# Patient Record
Sex: FEMALE | Race: WHITE | NOT HISPANIC OR LATINO | Employment: OTHER
[De-identification: names, ages, dates, MRNs, and addresses within clinical notes are randomized per-mention and may not be internally consistent; named-entity substitution may affect disease eponyms.]

---

## 2017-06-10 ENCOUNTER — HEALTH MAINTENANCE LETTER (OUTPATIENT)
Age: 52
End: 2017-06-10

## 2019-05-01 ENCOUNTER — APPOINTMENT (OUTPATIENT)
Age: 54
Setting detail: DERMATOLOGY
End: 2019-05-01

## 2019-05-01 VITALS — HEIGHT: 64 IN | WEIGHT: 140 LBS

## 2019-05-01 DIAGNOSIS — L57.0 ACTINIC KERATOSIS: ICD-10-CM

## 2019-05-01 PROCEDURE — OTHER LIQUID NITROGEN: OTHER

## 2019-05-01 PROCEDURE — 99213 OFFICE O/P EST LOW 20 MIN: CPT | Mod: 25

## 2019-05-01 PROCEDURE — 17003 DESTRUCT PREMALG LES 2-14: CPT

## 2019-05-01 PROCEDURE — OTHER COUNSELING: OTHER

## 2019-05-01 PROCEDURE — 17000 DESTRUCT PREMALG LESION: CPT

## 2019-05-01 ASSESSMENT — LOCATION DETAILED DESCRIPTION DERM
LOCATION DETAILED: LEFT CENTRAL MALAR CHEEK
LOCATION DETAILED: LEFT INFERIOR CENTRAL MALAR CHEEK

## 2019-05-01 ASSESSMENT — LOCATION SIMPLE DESCRIPTION DERM: LOCATION SIMPLE: LEFT CHEEK

## 2019-05-01 ASSESSMENT — LOCATION ZONE DERM: LOCATION ZONE: FACE

## 2019-05-01 NOTE — PROCEDURE: LIQUID NITROGEN
Number Of Freeze-Thaw Cycles: 1 freeze-thaw cycle
Render Note In Bullet Format When Appropriate: Yes
Post-Care Instructions: I reviewed with the patient in detail post-care instructions. Patient is to wear sunprotection, and avoid picking at any of the treated lesions. Patient may apply Aquaphor or Vaseline to crusted or scabbing areas.
Consent: The patient's consent was obtained including but not limited to risks of crusting, scabbing, blistering, scarring, darker or lighter pigmentary change, recurrence, incomplete removal and infection.
Detail Level: Detailed
Duration Of Freeze Thaw-Cycle (Seconds): 3

## 2019-09-30 ENCOUNTER — HEALTH MAINTENANCE LETTER (OUTPATIENT)
Age: 54
End: 2019-09-30

## 2019-11-22 ENCOUNTER — APPOINTMENT (OUTPATIENT)
Age: 54
Setting detail: DERMATOLOGY
End: 2019-11-22

## 2019-11-22 DIAGNOSIS — L57.8 OTHER SKIN CHANGES DUE TO CHRONIC EXPOSURE TO NONIONIZING RADIATION: ICD-10-CM

## 2019-11-22 PROBLEM — D48.5 NEOPLASM OF UNCERTAIN BEHAVIOR OF SKIN: Status: ACTIVE | Noted: 2019-11-22

## 2019-11-22 PROCEDURE — OTHER PATHOLOGY BILLING: OTHER

## 2019-11-22 PROCEDURE — OTHER BIOPSY BY SHAVE METHOD: OTHER

## 2019-11-22 PROCEDURE — 99213 OFFICE O/P EST LOW 20 MIN: CPT | Mod: 25

## 2019-11-22 PROCEDURE — OTHER COUNSELING: OTHER

## 2019-11-22 PROCEDURE — 88305 TISSUE EXAM BY PATHOLOGIST: CPT

## 2019-11-22 PROCEDURE — 11102 TANGNTL BX SKIN SINGLE LES: CPT

## 2019-11-22 ASSESSMENT — LOCATION ZONE DERM: LOCATION ZONE: FACE

## 2019-11-22 ASSESSMENT — LOCATION SIMPLE DESCRIPTION DERM: LOCATION SIMPLE: LEFT CHEEK

## 2019-11-22 ASSESSMENT — LOCATION DETAILED DESCRIPTION DERM: LOCATION DETAILED: LEFT CENTRAL MALAR CHEEK

## 2019-11-22 NOTE — PROCEDURE: BIOPSY BY SHAVE METHOD
Biopsy Type: H and E
Was A Bandage Applied: Yes
Destruction After The Procedure: No
Electrodesiccation And Curettage Text: The wound bed was treated with electrodesiccation and curettage after the biopsy was performed.
Biopsy Method: Dermablade
Anesthesia Volume In Cc (Will Not Render If 0): 0.5
Consent: Written consent was obtained and risks were reviewed including but not limited to scarring, infection, bleeding, scabbing, incomplete removal, nerve damage and allergy to anesthesia.
Size Of Lesion In Cm: 0
Detail Level: Detailed
Silver Nitrate Text: The wound bed was treated with silver nitrate after the biopsy was performed.
Type Of Destruction Used: Curettage
Cryotherapy Text: The wound bed was treated with cryotherapy after the biopsy was performed.
Billing Type: Third-Party Bill
Electrodesiccation Text: The wound bed was treated with electrodesiccation after the biopsy was performed.
Depth Of Biopsy: dermis
Curettage Text: The wound bed was treated with curettage after the biopsy was performed.
Dressing: bandage
Wound Care: Petrolatum
Post-Care Instructions: I reviewed with the patient in detail post-care instructions. Patient is to keep the biopsy site dry overnight, and then apply bacitracin twice daily until healed. Patient may apply hydrogen peroxide soaks to remove any crusting.
Notification Instructions: Patient will be notified of biopsy results. However, patient instructed to call the office if not contacted within 2 weeks.
Hemostasis: Drysol
Anesthesia Type: 1% lidocaine with epinephrine

## 2019-11-22 NOTE — PROCEDURE: PATHOLOGY BILLING
Immunohistochemistry ([id number] and [id number]) billing is not performed here. Please use the Immunohistochemistry Stain Billing plan to accomplish this. Immunohistochemistry ([id number] and [id number]) billing is not performed here. Please use the Immunohistochemistry Stain Billing plan to accomplish this.

## 2019-12-14 ENCOUNTER — RX ONLY (RX ONLY)
Age: 54
End: 2019-12-14

## 2019-12-14 ENCOUNTER — APPOINTMENT (OUTPATIENT)
Age: 54
Setting detail: DERMATOLOGY
End: 2019-12-18

## 2019-12-14 PROBLEM — C44.01 BASAL CELL CARCINOMA OF SKIN OF LIP: Status: ACTIVE | Noted: 2019-12-14

## 2019-12-14 PROCEDURE — OTHER MOHS SURGERY: OTHER

## 2019-12-14 PROCEDURE — 13152 CMPLX RPR E/N/E/L 2.6-7.5 CM: CPT

## 2019-12-14 PROCEDURE — 17312 MOHS ADDL STAGE: CPT

## 2019-12-14 PROCEDURE — 17311 MOHS 1 STAGE H/N/HF/G: CPT

## 2019-12-14 PROCEDURE — A4550 SURGICAL TRAYS: HCPCS

## 2019-12-19 ENCOUNTER — APPOINTMENT (OUTPATIENT)
Age: 54
Setting detail: DERMATOLOGY
End: 2019-12-19

## 2019-12-19 DIAGNOSIS — Z48.02 ENCOUNTER FOR REMOVAL OF SUTURES: ICD-10-CM

## 2019-12-19 PROCEDURE — OTHER SUTURE REMOVAL (GLOBAL PERIOD): OTHER

## 2019-12-19 PROCEDURE — OTHER ADDITIONAL NOTES: OTHER

## 2019-12-19 ASSESSMENT — LOCATION SIMPLE DESCRIPTION DERM: LOCATION SIMPLE: RIGHT LIP

## 2019-12-19 ASSESSMENT — LOCATION DETAILED DESCRIPTION DERM: LOCATION DETAILED: RIGHT UPPER CUTANEOUS LIP

## 2019-12-19 ASSESSMENT — LOCATION ZONE DERM: LOCATION ZONE: LIP

## 2019-12-19 NOTE — PROCEDURE: ADDITIONAL NOTES
Additional Notes: Patient was concerned about how much her wound is bulging out. AR examined the wound and ensured her that it looks like it’s healing well and that she can follow up in a couple months and see how the scar looks/possible treatments
Detail Level: Simple

## 2019-12-19 NOTE — PROCEDURE: SUTURE REMOVAL (GLOBAL PERIOD)
Add 29953 Cpt? (Important Note: In 2017 The Use Of 74380 Is Being Tracked By Cms To Determine Future Global Period Reimbursement For Global Periods): no
Detail Level: Detailed

## 2020-01-10 ENCOUNTER — APPOINTMENT (OUTPATIENT)
Age: 55
Setting detail: DERMATOLOGY
End: 2020-01-12

## 2020-01-10 VITALS — HEIGHT: 64 IN | RESPIRATION RATE: 16 BRPM | WEIGHT: 140 LBS

## 2020-01-10 DIAGNOSIS — L71.8 OTHER ROSACEA: ICD-10-CM

## 2020-01-10 DIAGNOSIS — Z48.817 ENCOUNTER FOR SURGICAL AFTERCARE FOLLOWING SURGERY ON THE SKIN AND SUBCUTANEOUS TISSUE: ICD-10-CM

## 2020-01-10 PROCEDURE — OTHER COUNSELING: OTHER

## 2020-01-10 PROCEDURE — OTHER PRESCRIPTION: OTHER

## 2020-01-10 PROCEDURE — 99213 OFFICE O/P EST LOW 20 MIN: CPT

## 2020-01-10 RX ORDER — METRONIDAZOLE 7.5 MG/G
0.75% CREAM TOPICAL BID
Qty: 1 | Refills: 2 | Status: ERX

## 2020-01-10 ASSESSMENT — LOCATION SIMPLE DESCRIPTION DERM
LOCATION SIMPLE: CHIN
LOCATION SIMPLE: RIGHT CHEEK
LOCATION SIMPLE: LEFT CHEEK

## 2020-01-10 ASSESSMENT — LOCATION ZONE DERM: LOCATION ZONE: FACE

## 2020-01-10 ASSESSMENT — LOCATION DETAILED DESCRIPTION DERM
LOCATION DETAILED: RIGHT CENTRAL MALAR CHEEK
LOCATION DETAILED: LEFT MEDIAL MALAR CHEEK
LOCATION DETAILED: LEFT CHIN

## 2020-01-10 NOTE — PROCEDURE: COUNSELING
Detail Level: Detailed
Patient Specific Counseling (Will Not Stick From Patient To Patient): Pt reports uncomfortable sensation on the scar area. There is firm lump along the excision site. \\nAdvised that at this time she is still healing, her swelling has decreased, and will continue to improve in appearance. Recommended gentle massage along the site, sunscreen application and time. In the future, may consider IPL along the scar to decrease redness.

## 2020-02-06 ENCOUNTER — APPOINTMENT (OUTPATIENT)
Age: 55
Setting detail: DERMATOLOGY
End: 2020-02-07

## 2020-02-06 VITALS — WEIGHT: 145 LBS | RESPIRATION RATE: 14 BRPM | HEIGHT: 64 IN

## 2020-02-06 DIAGNOSIS — L71.8 OTHER ROSACEA: ICD-10-CM

## 2020-02-06 DIAGNOSIS — Z48.817 ENCOUNTER FOR SURGICAL AFTERCARE FOLLOWING SURGERY ON THE SKIN AND SUBCUTANEOUS TISSUE: ICD-10-CM

## 2020-02-06 PROBLEM — L30.9 DERMATITIS, UNSPECIFIED: Status: ACTIVE | Noted: 2020-02-06

## 2020-02-06 PROCEDURE — OTHER COUNSELING: OTHER

## 2020-02-06 PROCEDURE — OTHER ADDITIONAL NOTES: OTHER

## 2020-02-06 PROCEDURE — OTHER PATHOLOGY BILLING: OTHER

## 2020-02-06 PROCEDURE — OTHER BIOPSY BY SHAVE METHOD: OTHER

## 2020-02-06 PROCEDURE — 99213 OFFICE O/P EST LOW 20 MIN: CPT | Mod: 25

## 2020-02-06 PROCEDURE — 11102 TANGNTL BX SKIN SINGLE LES: CPT

## 2020-02-06 PROCEDURE — 88305 TISSUE EXAM BY PATHOLOGIST: CPT

## 2020-02-06 ASSESSMENT — LOCATION ZONE DERM
LOCATION ZONE: LIP
LOCATION ZONE: FACE

## 2020-02-06 ASSESSMENT — LOCATION DETAILED DESCRIPTION DERM
LOCATION DETAILED: LEFT MEDIAL MALAR CHEEK
LOCATION DETAILED: RIGHT UPPER CUTANEOUS LIP

## 2020-02-06 ASSESSMENT — LOCATION SIMPLE DESCRIPTION DERM
LOCATION SIMPLE: RIGHT LIP
LOCATION SIMPLE: LEFT CHEEK

## 2020-02-06 NOTE — HPI: RASH
What Type Of Note Output Would You Prefer (Optional)?: Standard Output
Is The Patient Presenting As Previously Scheduled?: Yes
How Severe Is Your Rash?: mild
Is This A New Presentation, Or A Follow-Up?: Follow Up Rash
Additional History: She has been using metronidazole without improvement, it actually worsened. She is here for follow up.

## 2020-02-06 NOTE — PROCEDURE: ADDITIONAL NOTES
Detail Level: Simple
Additional Notes: Reassured patient that the scar looks better today than at last visit. Patient is still worried about the puffiness. AR reassured her that the puffiness will continue to reduce. In a couple months if she still isn’t happy with the scar, AR discussed that she can do further treatment
Additional Notes: Patient diagnosed with what seemed to be rosacea at last visit. Was prescribed metronidazole, which seemed to make red papules worse

## 2020-02-06 NOTE — PROCEDURE: BIOPSY BY SHAVE METHOD
Hide Additional Size Dimension?: No
Silver Nitrate Text: The wound bed was treated with silver nitrate after the biopsy was performed.
Notification Instructions: Patient will be notified of biopsy results. However, patient instructed to call the office if not contacted within 2 weeks.
Biopsy Type: H and E
Depth Of Biopsy: dermis
Size Of Lesion In Cm: 0
Was A Bandage Applied: Yes
Billing Type: Client Bill
Post-Care Instructions: I reviewed with the patient in detail post-care instructions. Patient is to keep the biopsy site dry overnight, and then apply bacitracin twice daily until healed. Patient may apply hydrogen peroxide soaks to remove any crusting.
Detail Level: Detailed
Anesthesia Type: 1% lidocaine with epinephrine
Wound Care: Petrolatum
Hemostasis: Drysol
Curettage Text: The wound bed was treated with curettage after the biopsy was performed.
Anesthesia Volume In Cc (Will Not Render If 0): 0.5
Biopsy Method: Dermablade
Electrodesiccation And Curettage Text: The wound bed was treated with electrodesiccation and curettage after the biopsy was performed.
Consent: Written consent was obtained and risks were reviewed including but not limited to scarring, infection, bleeding, scabbing, incomplete removal, nerve damage and allergy to anesthesia.
Electrodesiccation Text: The wound bed was treated with electrodesiccation after the biopsy was performed.
Cryotherapy Text: The wound bed was treated with cryotherapy after the biopsy was performed.
Type Of Destruction Used: Curettage
Dressing: bandage

## 2020-02-06 NOTE — PROCEDURE: PATHOLOGY BILLING
Immunohistochemistry ([id number] and [id number]) billing is not performed here. Please use the Immunohistochemistry Stain Billing plan to accomplish this. Immunohistochemistry ([id number] and [id number]) billing is not performed here. Please use the Immunohistochemistry Stain Billing plan to accomplish this.

## 2020-02-27 ENCOUNTER — APPOINTMENT (OUTPATIENT)
Age: 55
Setting detail: DERMATOLOGY
End: 2020-02-27

## 2020-02-27 DIAGNOSIS — L91.0 HYPERTROPHIC SCAR: ICD-10-CM

## 2020-02-27 DIAGNOSIS — L57.0 ACTINIC KERATOSIS: ICD-10-CM

## 2020-02-27 PROCEDURE — 17003 DESTRUCT PREMALG LES 2-14: CPT

## 2020-02-27 PROCEDURE — OTHER LIQUID NITROGEN: OTHER

## 2020-02-27 PROCEDURE — 17000 DESTRUCT PREMALG LESION: CPT

## 2020-02-27 PROCEDURE — OTHER PRESCRIPTION: OTHER

## 2020-02-27 PROCEDURE — OTHER ADDITIONAL NOTES: OTHER

## 2020-02-27 PROCEDURE — OTHER INTRALESIONAL KENALOG: OTHER

## 2020-02-27 PROCEDURE — OTHER COUNSELING: OTHER

## 2020-02-27 PROCEDURE — 11900 INJECT SKIN LESIONS </W 7: CPT | Mod: 59

## 2020-02-27 RX ORDER — FLUOROURACIL 5 MG/G
CREAM TOPICAL BID
Qty: 1 | Refills: 0 | Status: ERX

## 2020-02-27 ASSESSMENT — LOCATION DETAILED DESCRIPTION DERM
LOCATION DETAILED: RIGHT INFERIOR CENTRAL MALAR CHEEK
LOCATION DETAILED: RIGHT UPPER CUTANEOUS LIP
LOCATION DETAILED: RIGHT MEDIAL MALAR CHEEK
LOCATION DETAILED: RIGHT INFERIOR MEDIAL MALAR CHEEK
LOCATION DETAILED: RIGHT CENTRAL MALAR CHEEK
LOCATION DETAILED: LEFT INFERIOR CENTRAL MALAR CHEEK

## 2020-02-27 ASSESSMENT — LOCATION ZONE DERM
LOCATION ZONE: LIP
LOCATION ZONE: FACE

## 2020-02-27 ASSESSMENT — LOCATION SIMPLE DESCRIPTION DERM
LOCATION SIMPLE: LEFT CHEEK
LOCATION SIMPLE: RIGHT CHEEK
LOCATION SIMPLE: RIGHT LIP

## 2020-02-27 NOTE — PROCEDURE: ADDITIONAL NOTES
Detail Level: Simple
Additional Notes: Follow up 2-4 wks after finishing treatment with efudex for a full body exam.

## 2020-02-27 NOTE — PROCEDURE: INTRALESIONAL KENALOG
Detail Level: Detailed
Consent: The risks of atrophy were reviewed with the patient.
Kenalog Preparation: Kenalog
Concentration Of Solution Injected (Mg/Ml): 2.5
Total Volume Injected (Ccs- Only Use Numbers And Decimals): .15
X Size Of Lesion In Cm (Optional): 0
Include Z78.9 (Other Specified Conditions Influencing Health Status) As An Associated Diagnosis?: No
Medical Necessity Clause: This procedure was medically necessary because the lesions that were treated were:

## 2020-02-27 NOTE — PROCEDURE: COUNSELING
Detail Level: Detailed
Patient Specific Counseling (Will Not Stick From Patient To Patient): Discussed IPL laser and will discuss with Her mohs surgeon/plastic surgeon to see I’d they will consider complimentary laser treatment.

## 2020-05-07 ENCOUNTER — APPOINTMENT (OUTPATIENT)
Age: 55
Setting detail: DERMATOLOGY
End: 2020-05-12

## 2020-05-07 VITALS — RESPIRATION RATE: 14 BRPM | WEIGHT: 150 LBS | HEIGHT: 65 IN

## 2020-05-07 DIAGNOSIS — D22 MELANOCYTIC NEVI: ICD-10-CM

## 2020-05-07 DIAGNOSIS — D18.0 HEMANGIOMA: ICD-10-CM

## 2020-05-07 DIAGNOSIS — L82.1 OTHER SEBORRHEIC KERATOSIS: ICD-10-CM

## 2020-05-07 DIAGNOSIS — Z71.89 OTHER SPECIFIED COUNSELING: ICD-10-CM

## 2020-05-07 DIAGNOSIS — L81.4 OTHER MELANIN HYPERPIGMENTATION: ICD-10-CM

## 2020-05-07 DIAGNOSIS — L57.0 ACTINIC KERATOSIS: ICD-10-CM

## 2020-05-07 DIAGNOSIS — Z85.828 PERSONAL HISTORY OF OTHER MALIGNANT NEOPLASM OF SKIN: ICD-10-CM

## 2020-05-07 PROBLEM — D18.01 HEMANGIOMA OF SKIN AND SUBCUTANEOUS TISSUE: Status: ACTIVE | Noted: 2020-05-07

## 2020-05-07 PROBLEM — D22.5 MELANOCYTIC NEVI OF TRUNK: Status: ACTIVE | Noted: 2020-05-07

## 2020-05-07 PROCEDURE — OTHER COUNSELING: OTHER

## 2020-05-07 PROCEDURE — 99214 OFFICE O/P EST MOD 30 MIN: CPT

## 2020-05-07 ASSESSMENT — LOCATION SIMPLE DESCRIPTION DERM
LOCATION SIMPLE: UPPER BACK
LOCATION SIMPLE: LEFT UPPER BACK
LOCATION SIMPLE: RIGHT CHEEK
LOCATION SIMPLE: RIGHT LIP

## 2020-05-07 ASSESSMENT — LOCATION ZONE DERM
LOCATION ZONE: FACE
LOCATION ZONE: LIP
LOCATION ZONE: TRUNK

## 2020-05-07 ASSESSMENT — LOCATION DETAILED DESCRIPTION DERM
LOCATION DETAILED: RIGHT UPPER CUTANEOUS LIP
LOCATION DETAILED: LEFT SUPERIOR MEDIAL UPPER BACK
LOCATION DETAILED: RIGHT CENTRAL MALAR CHEEK
LOCATION DETAILED: INFERIOR THORACIC SPINE

## 2020-05-07 NOTE — PROCEDURE: COUNSELING
Patient Specific Counseling (Will Not Stick From Patient To Patient): She treated her face with 5FU bid x 1 month. Instructed her to D/C the 5 FU at this time.
Detail Level: Generalized
Detail Level: Simple
Detail Level: Zone

## 2020-05-07 NOTE — HPI: FULL BODY SKIN EXAMINATION
How Severe Are Your Spot(S)?: mild
What Type Of Note Output Would You Prefer (Optional)?: Standard Output
What Is The Reason For Today's Visit?: Full Body Skin Examination
What Is The Reason For Today's Visit? (Being Monitored For X): the risk of recurrence of previously treated lesion(s)
Additional History: She would like to address the scar from mohs on her upper lip. The scar and appearance is undesirable.

## 2020-07-01 ENCOUNTER — APPOINTMENT (OUTPATIENT)
Age: 55
Setting detail: DERMATOLOGY
End: 2020-07-03

## 2020-07-01 DIAGNOSIS — Z85.828 PERSONAL HISTORY OF OTHER MALIGNANT NEOPLASM OF SKIN: ICD-10-CM

## 2020-07-01 DIAGNOSIS — L90.5 SCAR CONDITIONS AND FIBROSIS OF SKIN: ICD-10-CM

## 2020-07-01 PROCEDURE — OTHER COUNSELING: OTHER

## 2020-07-01 PROCEDURE — OTHER DEFER: OTHER

## 2020-07-01 PROCEDURE — 99213 OFFICE O/P EST LOW 20 MIN: CPT

## 2020-07-01 PROCEDURE — OTHER MIPS QUALITY: OTHER

## 2020-07-01 ASSESSMENT — LOCATION ZONE DERM: LOCATION ZONE: LIP

## 2020-07-01 ASSESSMENT — LOCATION DETAILED DESCRIPTION DERM: LOCATION DETAILED: RIGHT UPPER CUTANEOUS LIP

## 2020-07-01 ASSESSMENT — LOCATION SIMPLE DESCRIPTION DERM: LOCATION SIMPLE: RIGHT LIP

## 2020-07-01 NOTE — PROCEDURE: DEFER
Other Procedure: Surgical scar revision
Detail Level: Detailed
Introduction Text (Please End With A Colon): The following procedure was deferred: Surgical scar revision

## 2020-07-01 NOTE — PROCEDURE: COUNSELING
Detail Level: Detailed
Patient Specific Counseling (Will Not Stick From Patient To Patient): Discussed in detail with patient the reasons that her scar is noticeable: the depressed, atrophic line of the scar which casts a shadow in almost any light and the slight lifting of the vermilion border in dynamic movement.  I explained that the atrophic configuration and the dynamic changes could be improved, though not guaranteed, by surgical excision of this portion of the scar, undermining, and re-suturing with increased wound edge eversion.  She is self-conscious enough of this scar, because it causes so many adverse comments, that she would like to proceed with the proposed scar revision.  I will discuss this with Dr. Abrams and get back to her.
Detail Level: Simple

## 2020-07-09 ENCOUNTER — APPOINTMENT (OUTPATIENT)
Age: 55
Setting detail: DERMATOLOGY
End: 2020-07-13

## 2020-07-09 DIAGNOSIS — Z85.828 PERSONAL HISTORY OF OTHER MALIGNANT NEOPLASM OF SKIN: ICD-10-CM

## 2020-07-09 DIAGNOSIS — R22.9 LOCALIZED SWELLING, MASS AND LUMP, UNSPECIFIED: ICD-10-CM

## 2020-07-09 PROCEDURE — OTHER SCAR REVISION: OTHER

## 2020-07-09 PROCEDURE — OTHER MIPS QUALITY: OTHER

## 2020-07-09 PROCEDURE — OTHER COUNSELING: OTHER

## 2020-07-09 ASSESSMENT — LOCATION DETAILED DESCRIPTION DERM: LOCATION DETAILED: RIGHT UPPER CUTANEOUS LIP

## 2020-07-09 ASSESSMENT — LOCATION ZONE DERM: LOCATION ZONE: LIP

## 2020-07-09 ASSESSMENT — LOCATION SIMPLE DESCRIPTION DERM: LOCATION SIMPLE: RIGHT LIP

## 2020-07-09 NOTE — PROCEDURE: SCAR REVISION
Estimated Blood Loss (Cc): minimal
Post-Care Instructions: I reviewed with the patient in detail post-care instructions. Patient is not to engage in any heavy lifting, exercise, or swimming for the next 14 days. Should the patient develop any fevers, chills, bleeding, severe pain patient will contact the office immediately.
Epidermal Closure: running and interrupted
Deep Sutures: 6-0 Monocryl
Repair Type: Flap
Flap Billing Options: Measure Secondary Defect Directly
Primary Defect Length (In Cm): 1.2
Wound Care: Petrolatum
Epidermal Sutures: 6-0 Surgipro
Skin Substitute Units (Will Override Primary Defect Units If Greater Than 0): 0
Consent: The rationale for scar revision was explained to the patient and consent was obtained. The risks, benefits and alternatives to therapy were discussed in detail. Specifically, the risks of infection, scarring, bleeding, prolonged wound healing, incomplete removal, allergy to anesthesia, nerve injury and recurrence were addressed. Prior to the procedure, the treatment site was clearly identified and confirmed by the patient. All components of Universal Protocol/PAUSE Rule completed.
Simple / Intermediate / Complex Repair - Final Wound Length In Cm: 1.4
Hemostasis: Electrocautery
Anesthesia Type: 1% lidocaine with epinephrine and a 1:10 solution of 8.4% sodium bicarbonate
Bill For Surgical Tray: no
Flap Type: O-T Advancement Flap
Detail Level: Detailed
Suture Removal: 7 days

## 2020-07-16 ENCOUNTER — APPOINTMENT (OUTPATIENT)
Age: 55
Setting detail: DERMATOLOGY
End: 2020-07-19

## 2020-07-16 DIAGNOSIS — Z48.02 ENCOUNTER FOR REMOVAL OF SUTURES: ICD-10-CM

## 2020-07-16 PROCEDURE — OTHER COUNSELING: OTHER

## 2020-07-16 PROCEDURE — OTHER SUTURE REMOVAL (GLOBAL PERIOD): OTHER

## 2020-07-16 PROCEDURE — OTHER DIAGNOSIS COMMENT: OTHER

## 2020-07-16 ASSESSMENT — LOCATION DETAILED DESCRIPTION DERM: LOCATION DETAILED: RIGHT UPPER CUTANEOUS LIP

## 2020-07-16 ASSESSMENT — LOCATION SIMPLE DESCRIPTION DERM: LOCATION SIMPLE: RIGHT LIP

## 2020-07-16 ASSESSMENT — LOCATION ZONE DERM: LOCATION ZONE: LIP

## 2020-07-16 NOTE — PROCEDURE: SUTURE REMOVAL (GLOBAL PERIOD)
Detail Level: Detailed
Body Location Override (Optional - Billing Will Still Be Based On Selected Body Map Location If Applicable): Right upper cutaneous lip
Add 61516 Cpt? (Important Note: In 2017 The Use Of 46819 Is Being Tracked By Cms To Determine Future Global Period Reimbursement For Global Periods): no

## 2020-07-16 NOTE — PROCEDURE: DIAGNOSIS COMMENT
Detail Level: Simple
Comment: S/P scar revision performed 7/9/2020 following MMS for Basal Cell Carcinoma

## 2020-08-13 ENCOUNTER — APPOINTMENT (OUTPATIENT)
Dept: URBAN - METROPOLITAN AREA CLINIC 255 | Age: 55
Setting detail: DERMATOLOGY
End: 2020-08-13

## 2020-08-13 ENCOUNTER — APPOINTMENT (OUTPATIENT)
Dept: URBAN - METROPOLITAN AREA CLINIC 255 | Age: 55
Setting detail: DERMATOLOGY
End: 2020-08-14

## 2020-08-13 DIAGNOSIS — Z85.828 PERSONAL HISTORY OF OTHER MALIGNANT NEOPLASM OF SKIN: ICD-10-CM

## 2020-08-13 DIAGNOSIS — Z48.817 ENCOUNTER FOR SURGICAL AFTERCARE FOLLOWING SURGERY ON THE SKIN AND SUBCUTANEOUS TISSUE: ICD-10-CM

## 2020-08-13 PROCEDURE — OTHER MIPS QUALITY: OTHER

## 2020-08-13 PROCEDURE — OTHER ULCER EVALUATION: OTHER

## 2020-08-13 PROCEDURE — 99213 OFFICE O/P EST LOW 20 MIN: CPT | Mod: 24

## 2020-08-13 PROCEDURE — OTHER DIAGNOSIS COMMENT: OTHER

## 2020-08-13 PROCEDURE — OTHER COUNSELING: OTHER

## 2020-08-13 ASSESSMENT — LOCATION SIMPLE DESCRIPTION DERM: LOCATION SIMPLE: RIGHT LIP

## 2020-08-13 ASSESSMENT — LOCATION ZONE DERM: LOCATION ZONE: LIP

## 2020-08-13 ASSESSMENT — LOCATION DETAILED DESCRIPTION DERM: LOCATION DETAILED: RIGHT UPPER CUTANEOUS LIP

## 2020-08-13 NOTE — PROCEDURE: ULCER EVALUATION
X Size Of Lesion In Cm (Optional): 0
Instructions (Optional): Physical Examination:\\nEschar: none\\nGranulation Tissue: absent \\nRe-Epithelialization: complete\\nDrainage: none\\nSurrounding Edema: absent\\nPain to palpation: 0 / 10\\nOdor: none \\nSurrounding Dermatitis: absent\\n\\nAssessment:\\nHealed (completely re-epithelialized)\\nNo signs / symptoms of infection\\nNo dressing-associated contact dermatitis\\n\\nPlan:\\nContinue gently massaging along the suture line using a fingertip moistened with Vitamin E oil in circular motion and massage along the suture line for 5 minutes up to three times a day.\\n\\nFollow up in 6-8 weeks\\n
Detail Level: Detailed
Body Location Override (Optional - Billing Will Still Be Based On Selected Body Map Location If Applicable): Right Upper Cutaneous Lip

## 2020-08-13 NOTE — PROCEDURE: MIPS QUALITY
Your wet prep was normal.     Take the Diflucan - 1 dose, 1 time.     Follow up if symptoms fail to resolve as expected.         
Detail Level: Detailed
Quality 226: Preventive Care And Screening: Tobacco Use: Screening And Cessation Intervention: Patient screened for tobacco use and is an ex/non-smoker
Quality 110: Preventive Care And Screening: Influenza Immunization: Influenza Immunization Administered during Influenza season
Quality 130: Documentation Of Current Medications In The Medical Record: Current Medications Documented
Quality 431: Preventive Care And Screening: Unhealthy Alcohol Use - Screening: Patient screened for unhealthy alcohol use using a single question and scores less than 2 times per year

## 2020-08-13 NOTE — PROCEDURE: DIAGNOSIS COMMENT
Detail Level: Simple
Comment: BCC, Right upper cutaneous lip, S/P MMS (12/2019), S/P Surgical Scar revision (07/09/2020)

## 2020-09-24 ENCOUNTER — APPOINTMENT (OUTPATIENT)
Dept: URBAN - METROPOLITAN AREA CLINIC 255 | Age: 55
Setting detail: DERMATOLOGY
End: 2020-09-27

## 2020-09-24 DIAGNOSIS — Z85.828 PERSONAL HISTORY OF OTHER MALIGNANT NEOPLASM OF SKIN: ICD-10-CM

## 2020-09-24 DIAGNOSIS — L90.5 SCAR CONDITIONS AND FIBROSIS OF SKIN: ICD-10-CM

## 2020-09-24 PROCEDURE — 99213 OFFICE O/P EST LOW 20 MIN: CPT | Mod: 24

## 2020-09-24 PROCEDURE — OTHER COUNSELING: OTHER

## 2020-09-24 ASSESSMENT — LOCATION DETAILED DESCRIPTION DERM: LOCATION DETAILED: RIGHT UPPER CUTANEOUS LIP

## 2020-09-24 ASSESSMENT — LOCATION SIMPLE DESCRIPTION DERM: LOCATION SIMPLE: RIGHT LIP

## 2020-09-24 ASSESSMENT — LOCATION ZONE DERM: LOCATION ZONE: LIP

## 2021-01-15 ENCOUNTER — HEALTH MAINTENANCE LETTER (OUTPATIENT)
Age: 56
End: 2021-01-15

## 2021-05-24 ENCOUNTER — APPOINTMENT (OUTPATIENT)
Dept: URBAN - METROPOLITAN AREA CLINIC 253 | Age: 56
Setting detail: DERMATOLOGY
End: 2021-05-24

## 2021-05-24 VITALS — HEIGHT: 64 IN | WEIGHT: 150 LBS

## 2021-05-24 DIAGNOSIS — K13.0 DISEASES OF LIPS: ICD-10-CM

## 2021-05-24 DIAGNOSIS — D22 MELANOCYTIC NEVI: ICD-10-CM

## 2021-05-24 DIAGNOSIS — L82.1 OTHER SEBORRHEIC KERATOSIS: ICD-10-CM

## 2021-05-24 DIAGNOSIS — L84 CORNS AND CALLOSITIES: ICD-10-CM

## 2021-05-24 DIAGNOSIS — D18.0 HEMANGIOMA: ICD-10-CM

## 2021-05-24 DIAGNOSIS — Z71.89 OTHER SPECIFIED COUNSELING: ICD-10-CM

## 2021-05-24 DIAGNOSIS — B07.8 OTHER VIRAL WARTS: ICD-10-CM

## 2021-05-24 DIAGNOSIS — L81.4 OTHER MELANIN HYPERPIGMENTATION: ICD-10-CM

## 2021-05-24 DIAGNOSIS — Z85.828 PERSONAL HISTORY OF OTHER MALIGNANT NEOPLASM OF SKIN: ICD-10-CM

## 2021-05-24 PROBLEM — D18.01 HEMANGIOMA OF SKIN AND SUBCUTANEOUS TISSUE: Status: ACTIVE | Noted: 2021-05-24

## 2021-05-24 PROBLEM — D48.5 NEOPLASM OF UNCERTAIN BEHAVIOR OF SKIN: Status: ACTIVE | Noted: 2021-05-24

## 2021-05-24 PROBLEM — D22.5 MELANOCYTIC NEVI OF TRUNK: Status: ACTIVE | Noted: 2021-05-24

## 2021-05-24 PROCEDURE — 99213 OFFICE O/P EST LOW 20 MIN: CPT | Mod: 25

## 2021-05-24 PROCEDURE — OTHER PATHOLOGY BILLING: OTHER

## 2021-05-24 PROCEDURE — OTHER LIQUID NITROGEN: OTHER

## 2021-05-24 PROCEDURE — OTHER COUNSELING: OTHER

## 2021-05-24 PROCEDURE — OTHER BIOPSY BY SHAVE METHOD: OTHER

## 2021-05-24 PROCEDURE — 11102 TANGNTL BX SKIN SINGLE LES: CPT | Mod: 59

## 2021-05-24 PROCEDURE — OTHER PRESCRIPTION: OTHER

## 2021-05-24 PROCEDURE — 17110 DESTRUCT B9 LESION 1-14: CPT

## 2021-05-24 PROCEDURE — 88305 TISSUE EXAM BY PATHOLOGIST: CPT

## 2021-05-24 RX ORDER — NYSTATIN 100000 [USP'U]/G
OINTMENT TOPICAL BID
Qty: 1 | Refills: 0 | Status: ERX

## 2021-05-24 ASSESSMENT — LOCATION DETAILED DESCRIPTION DERM
LOCATION DETAILED: RIGHT MID-UPPER BACK
LOCATION DETAILED: LEFT PLANTAR FOREFOOT OVERLYING 4TH METATARSAL
LOCATION DETAILED: RIGHT SUPERIOR VERMILION LIP
LOCATION DETAILED: LEFT ORAL COMMISSURE
LOCATION DETAILED: LEFT PLANTAR FOREFOOT OVERLYING 2ND METATARSAL
LOCATION DETAILED: INFERIOR THORACIC SPINE
LOCATION DETAILED: RIGHT UPPER CUTANEOUS LIP

## 2021-05-24 ASSESSMENT — LOCATION SIMPLE DESCRIPTION DERM
LOCATION SIMPLE: RIGHT LIP
LOCATION SIMPLE: UPPER BACK
LOCATION SIMPLE: LEFT LIP
LOCATION SIMPLE: RIGHT UPPER BACK
LOCATION SIMPLE: LEFT PLANTAR SURFACE

## 2021-05-24 ASSESSMENT — LOCATION ZONE DERM
LOCATION ZONE: TRUNK
LOCATION ZONE: LIP
LOCATION ZONE: FEET

## 2021-05-24 NOTE — PROCEDURE: PATHOLOGY BILLING
Immunohistochemistry ([id number] and [id number]) billing is not performed here. Please use the Immunohistochemistry Stain Billing plan to accomplish this. Immunohistochemistry ([id number] and [id number]) billing is not performed here. Please use the Immunohistochemistry Stain Billing plan to accomplish this.

## 2021-05-24 NOTE — PROCEDURE: BIOPSY BY SHAVE METHOD
Bill 78638 For Specimen Handling/Conveyance To Laboratory?: no
Was A Bandage Applied: Yes
Post-Care Instructions: I reviewed with the patient in detail post-care instructions. Patient is to keep the biopsy site dry overnight, and then apply bacitracin twice daily until healed. Patient may apply hydrogen peroxide soaks to remove any crusting.
Biopsy Type: H and E
X Size Of Lesion In Cm: 0
Type Of Destruction Used: Curettage
Electrodesiccation And Curettage Text: The wound bed was treated with electrodesiccation and curettage after the biopsy was performed.
Wound Care: Petrolatum
Anesthesia Volume In Cc (Will Not Render If 0): 0.3
Notification Instructions: Patient will be notified of biopsy results. However, patient instructed to call the office if not contacted within 2 weeks.
Depth Of Biopsy: dermis
Cryotherapy Text: The wound bed was treated with cryotherapy after the biopsy was performed.
Silver Nitrate Text: The wound bed was treated with silver nitrate after the biopsy was performed.
Hemostasis: Drysol
Detail Level: Detailed
Billing Type: Client Bill
Anesthesia Type: 2% lidocaine with epinephrine and a 1:10 solution of 8.4% sodium bicarbonate
Biopsy Method: Dermablade
Electrodesiccation Text: The wound bed was treated with electrodesiccation after the biopsy was performed.
Consent: Written consent was obtained and risks were reviewed including but not limited to scarring, infection, bleeding, scabbing, incomplete removal, nerve damage and allergy to anesthesia.
Information: Selecting Yes will display possible errors in your note based on the variables you have selected. This validation is only offered as a suggestion for you. PLEASE NOTE THAT THE VALIDATION TEXT WILL BE REMOVED WHEN YOU FINALIZE YOUR NOTE. IF YOU WANT TO FAX A PRELIMINARY NOTE YOU WILL NEED TO TOGGLE THIS TO 'NO' IF YOU DO NOT WANT IT IN YOUR FAXED NOTE.
Curettage Text: The wound bed was treated with curettage after the biopsy was performed.
Dressing: bandage

## 2021-05-24 NOTE — PROCEDURE: LIQUID NITROGEN
Post-Care Instructions: I reviewed with the patient in detail post-care instructions. Patient is to wear sunprotection, and avoid picking at any of the treated lesions. Pt may apply Vaseline to crusted or scabbing areas.
Consent: The patient's consent was obtained including but not limited to risks of crusting, scabbing, blistering, scarring, darker or lighter pigmentary change, recurrence, incomplete removal and infection.
Medical Necessity Clause: This procedure was medically necessary because the lesions that were treated were:
Detail Level: Detailed
Number Of Freeze-Thaw Cycles: 2 freeze-thaw cycles
Duration Of Freeze Thaw-Cycle (Seconds): 3
Medical Necessity Information: It is in your best interest to select a reason for this procedure from the list below. All of these items fulfill various CMS LCD requirements except the new and changing color options.
Render Post-Care Instructions In Note?: yes
Add 52 Modifier (Optional): no

## 2021-10-18 ENCOUNTER — APPOINTMENT (OUTPATIENT)
Dept: URBAN - METROPOLITAN AREA CLINIC 253 | Age: 56
Setting detail: DERMATOLOGY
End: 2021-10-20

## 2021-10-18 VITALS — HEIGHT: 64 IN | RESPIRATION RATE: 14 BRPM | WEIGHT: 150 LBS

## 2021-10-18 DIAGNOSIS — L82.1 OTHER SEBORRHEIC KERATOSIS: ICD-10-CM

## 2021-10-18 DIAGNOSIS — D18.0 HEMANGIOMA: ICD-10-CM

## 2021-10-18 DIAGNOSIS — L84 CORNS AND CALLOSITIES: ICD-10-CM

## 2021-10-18 DIAGNOSIS — L60.0 INGROWING NAIL: ICD-10-CM

## 2021-10-18 DIAGNOSIS — Z85.828 PERSONAL HISTORY OF OTHER MALIGNANT NEOPLASM OF SKIN: ICD-10-CM

## 2021-10-18 DIAGNOSIS — Z71.89 OTHER SPECIFIED COUNSELING: ICD-10-CM

## 2021-10-18 DIAGNOSIS — D22 MELANOCYTIC NEVI: ICD-10-CM

## 2021-10-18 DIAGNOSIS — Z41.9 ENCOUNTER FOR PROCEDURE FOR PURPOSES OTHER THAN REMEDYING HEALTH STATE, UNSPECIFIED: ICD-10-CM

## 2021-10-18 DIAGNOSIS — L81.4 OTHER MELANIN HYPERPIGMENTATION: ICD-10-CM

## 2021-10-18 PROBLEM — D22.5 MELANOCYTIC NEVI OF TRUNK: Status: ACTIVE | Noted: 2021-10-18

## 2021-10-18 PROBLEM — D18.01 HEMANGIOMA OF SKIN AND SUBCUTANEOUS TISSUE: Status: ACTIVE | Noted: 2021-10-18

## 2021-10-18 PROBLEM — D48.5 NEOPLASM OF UNCERTAIN BEHAVIOR OF SKIN: Status: ACTIVE | Noted: 2021-10-18

## 2021-10-18 PROCEDURE — 99213 OFFICE O/P EST LOW 20 MIN: CPT | Mod: 25

## 2021-10-18 PROCEDURE — OTHER COUNSELING: OTHER

## 2021-10-18 PROCEDURE — OTHER BIOPSY BY PUNCH METHOD: OTHER

## 2021-10-18 PROCEDURE — 11104 PUNCH BX SKIN SINGLE LESION: CPT

## 2021-10-18 PROCEDURE — OTHER ADDITIONAL NOTES: OTHER

## 2021-10-18 ASSESSMENT — LOCATION SIMPLE DESCRIPTION DERM
LOCATION SIMPLE: UPPER BACK
LOCATION SIMPLE: LEFT GREAT TOE
LOCATION SIMPLE: LEFT PLANTAR SURFACE
LOCATION SIMPLE: LEFT UPPER BACK
LOCATION SIMPLE: RIGHT LIP

## 2021-10-18 ASSESSMENT — LOCATION ZONE DERM
LOCATION ZONE: TOENAIL
LOCATION ZONE: TRUNK
LOCATION ZONE: FEET
LOCATION ZONE: LIP

## 2021-10-18 ASSESSMENT — LOCATION DETAILED DESCRIPTION DERM
LOCATION DETAILED: INFERIOR THORACIC SPINE
LOCATION DETAILED: LEFT LATERAL UPPER BACK
LOCATION DETAILED: LEFT PLANTAR FOREFOOT OVERLYING 2ND METATARSAL
LOCATION DETAILED: LEFT GREAT TOENAIL
LOCATION DETAILED: RIGHT UPPER CUTANEOUS LIP

## 2021-10-18 NOTE — PROCEDURE: ADDITIONAL NOTES
Statement Selected
Render Risk Assessment In Note?: no
Detail Level: Zone
Additional Notes: Recommended [place] for cellulite \\nPt asked about a breast lift. Recommended Dr. Nayak
Additional Notes: Recommended placing a cotton ball or gauze under toenail
Additional Notes: Pared down with a 15 blade. Recommended scrubbing with a pumice stone after bathing
Additional Notes: Discussed cosmetic treatment of SKs on forearm. Cosmetic price sheet given

## 2021-10-18 NOTE — PROCEDURE: BIOPSY BY PUNCH METHOD
X Size Of Lesion In Cm (Optional): 0
Billing Type: Third-Party Bill
Anesthesia Volume In Cc (Will Not Render If 0): 0.5
Render Post-Care Instructions In Note?: no
Detail Level: Detailed
Suture Removal: 14 days
Number Of Epidermal Sutures (Optional): 1
Hemostasis: None
Home Suture Removal Text: Patient was provided a home suture removal kit and will remove their sutures at home.  If they have any questions or difficulties they will call the office.
Anesthesia Type: 2% lidocaine with epinephrine
Information: Selecting Yes will display possible errors in your note based on the variables you have selected. This validation is only offered as a suggestion for you. PLEASE NOTE THAT THE VALIDATION TEXT WILL BE REMOVED WHEN YOU FINALIZE YOUR NOTE. IF YOU WANT TO FAX A PRELIMINARY NOTE YOU WILL NEED TO TOGGLE THIS TO 'NO' IF YOU DO NOT WANT IT IN YOUR FAXED NOTE.
Biopsy Type: H and E
Dressing: bandage
Epidermal Sutures: 3-0 Nylon
Consent: Written consent was obtained and risks were reviewed including but not limited to scarring, infection, bleeding, scabbing, incomplete removal, nerve damage and allergy to anesthesia.
Notification Instructions: Patient will be notified of biopsy results. However, patient instructed to call the office if not contacted within 2 weeks.
Punch Size In Mm: 3
Validate Note Data (See Information Below): Yes
Post-Care Instructions: I reviewed with the patient in detail post-care instructions. Patient is to keep the biopsy site dry overnight, and then apply bacitracin twice daily until healed. Patient may apply hydrogen peroxide soaks to remove any crusting.
Wound Care: Petrolatum

## 2021-10-24 ENCOUNTER — HEALTH MAINTENANCE LETTER (OUTPATIENT)
Age: 56
End: 2021-10-24

## 2022-02-13 ENCOUNTER — HEALTH MAINTENANCE LETTER (OUTPATIENT)
Age: 57
End: 2022-02-13

## 2022-10-04 ENCOUNTER — HOSPITAL ENCOUNTER (OUTPATIENT)
Dept: MAMMOGRAPHY | Facility: CLINIC | Age: 57
Discharge: HOME | End: 2022-10-04
Attending: PHYSICIAN ASSISTANT | Admitting: PHYSICIAN ASSISTANT
Payer: COMMERCIAL

## 2022-10-04 DIAGNOSIS — Z12.31 VISIT FOR SCREENING MAMMOGRAM: ICD-10-CM

## 2022-10-04 PROCEDURE — 77067 SCR MAMMO BI INCL CAD: CPT

## 2022-11-09 ENCOUNTER — APPOINTMENT (OUTPATIENT)
Dept: URBAN - METROPOLITAN AREA CLINIC 253 | Age: 57
Setting detail: DERMATOLOGY
End: 2022-11-15

## 2022-11-09 VITALS — WEIGHT: 160 LBS | HEIGHT: 65 IN | RESPIRATION RATE: 14 BRPM

## 2022-11-09 DIAGNOSIS — Z71.89 OTHER SPECIFIED COUNSELING: ICD-10-CM

## 2022-11-09 DIAGNOSIS — L81.4 OTHER MELANIN HYPERPIGMENTATION: ICD-10-CM

## 2022-11-09 DIAGNOSIS — D22 MELANOCYTIC NEVI: ICD-10-CM

## 2022-11-09 DIAGNOSIS — L85.3 XEROSIS CUTIS: ICD-10-CM

## 2022-11-09 DIAGNOSIS — L82.1 OTHER SEBORRHEIC KERATOSIS: ICD-10-CM

## 2022-11-09 DIAGNOSIS — Z85.828 PERSONAL HISTORY OF OTHER MALIGNANT NEOPLASM OF SKIN: ICD-10-CM

## 2022-11-09 DIAGNOSIS — D18.0 HEMANGIOMA: ICD-10-CM

## 2022-11-09 PROBLEM — D18.01 HEMANGIOMA OF SKIN AND SUBCUTANEOUS TISSUE: Status: ACTIVE | Noted: 2022-11-09

## 2022-11-09 PROBLEM — D22.5 MELANOCYTIC NEVI OF TRUNK: Status: ACTIVE | Noted: 2022-11-09

## 2022-11-09 PROCEDURE — OTHER COUNSELING: OTHER

## 2022-11-09 PROCEDURE — OTHER MIPS QUALITY: OTHER

## 2022-11-09 PROCEDURE — 99213 OFFICE O/P EST LOW 20 MIN: CPT

## 2022-11-09 ASSESSMENT — LOCATION DETAILED DESCRIPTION DERM
LOCATION DETAILED: RIGHT DISTAL CALF
LOCATION DETAILED: RIGHT BUTTOCK
LOCATION DETAILED: INFERIOR THORACIC SPINE
LOCATION DETAILED: LEFT POSTERIOR ANKLE
LOCATION DETAILED: RIGHT UPPER CUTANEOUS LIP
LOCATION DETAILED: SUPERIOR THORACIC SPINE
LOCATION DETAILED: LEFT BUTTOCK

## 2022-11-09 ASSESSMENT — LOCATION ZONE DERM
LOCATION ZONE: TRUNK
LOCATION ZONE: LIP
LOCATION ZONE: LEG

## 2022-11-09 ASSESSMENT — LOCATION SIMPLE DESCRIPTION DERM
LOCATION SIMPLE: LEFT BUTTOCK
LOCATION SIMPLE: RIGHT CALF
LOCATION SIMPLE: RIGHT LIP
LOCATION SIMPLE: UPPER BACK
LOCATION SIMPLE: LEFT ANKLE
LOCATION SIMPLE: RIGHT BUTTOCK

## 2023-01-19 ENCOUNTER — APPOINTMENT (OUTPATIENT)
Dept: CT IMAGING | Facility: CLINIC | Age: 58
End: 2023-01-19
Attending: EMERGENCY MEDICINE
Payer: COMMERCIAL

## 2023-01-19 ENCOUNTER — APPOINTMENT (OUTPATIENT)
Dept: GENERAL RADIOLOGY | Facility: CLINIC | Age: 58
End: 2023-01-19
Attending: EMERGENCY MEDICINE
Payer: COMMERCIAL

## 2023-01-19 ENCOUNTER — HOSPITAL ENCOUNTER (EMERGENCY)
Facility: CLINIC | Age: 58
Discharge: HOME | End: 2023-01-19
Attending: EMERGENCY MEDICINE | Admitting: EMERGENCY MEDICINE
Payer: COMMERCIAL

## 2023-01-19 VITALS
RESPIRATION RATE: 16 BRPM | TEMPERATURE: 97.5 F | SYSTOLIC BLOOD PRESSURE: 122 MMHG | HEART RATE: 80 BPM | OXYGEN SATURATION: 100 % | DIASTOLIC BLOOD PRESSURE: 79 MMHG

## 2023-01-19 DIAGNOSIS — S50.12XA TRAUMATIC HEMATOMA OF LEFT FOREARM, INITIAL ENCOUNTER: ICD-10-CM

## 2023-01-19 DIAGNOSIS — S00.03XA SCALP HEMATOMA, INITIAL ENCOUNTER: ICD-10-CM

## 2023-01-19 DIAGNOSIS — S01.01XA SCALP LACERATION, INITIAL ENCOUNTER: ICD-10-CM

## 2023-01-19 LAB
ANION GAP SERPL CALCULATED.3IONS-SCNC: 11 MMOL/L (ref 7–15)
BASOPHILS # BLD AUTO: 0 10E3/UL (ref 0–0.2)
BASOPHILS NFR BLD AUTO: 2 %
BUN SERPL-MCNC: 11.2 MG/DL (ref 8–23)
CALCIUM SERPL-MCNC: 8.6 MG/DL (ref 8.6–10)
CHLORIDE SERPL-SCNC: 103 MMOL/L (ref 98–107)
CREAT SERPL-MCNC: 1.04 MG/DL (ref 0.51–0.95)
DEPRECATED HCO3 PLAS-SCNC: 22 MMOL/L (ref 22–29)
EOSINOPHIL # BLD AUTO: 0.1 10E3/UL (ref 0–0.7)
EOSINOPHIL NFR BLD AUTO: 3 %
ERYTHROCYTE [DISTWIDTH] IN BLOOD BY AUTOMATED COUNT: 15.9 % (ref 10–15)
GFR SERPL CREATININE-BSD FRML MDRD: 62 ML/MIN/1.73M2
GLUCOSE SERPL-MCNC: 81 MG/DL (ref 70–99)
HCT VFR BLD AUTO: 37.4 % (ref 35–47)
HGB BLD-MCNC: 12.1 G/DL (ref 11.7–15.7)
HOLD SPECIMEN: NORMAL
HOLD SPECIMEN: NORMAL
IMM GRANULOCYTES # BLD: 0 10E3/UL
IMM GRANULOCYTES NFR BLD: 1 %
LYMPHOCYTES # BLD AUTO: 0.7 10E3/UL (ref 0.8–5.3)
LYMPHOCYTES NFR BLD AUTO: 25 %
MCH RBC QN AUTO: 30.9 PG (ref 26.5–33)
MCHC RBC AUTO-ENTMCNC: 32.4 G/DL (ref 31.5–36.5)
MCV RBC AUTO: 95 FL (ref 78–100)
MONOCYTES # BLD AUTO: 0.2 10E3/UL (ref 0–1.3)
MONOCYTES NFR BLD AUTO: 8 %
NEUTROPHILS # BLD AUTO: 1.7 10E3/UL (ref 1.6–8.3)
NEUTROPHILS NFR BLD AUTO: 61 %
NRBC # BLD AUTO: 0 10E3/UL
NRBC BLD AUTO-RTO: 0 /100
PLATELET # BLD AUTO: 128 10E3/UL (ref 150–450)
POTASSIUM SERPL-SCNC: 4.3 MMOL/L (ref 3.4–5.3)
RBC # BLD AUTO: 3.92 10E6/UL (ref 3.8–5.2)
SODIUM SERPL-SCNC: 136 MMOL/L (ref 136–145)
WBC # BLD AUTO: 2.7 10E3/UL (ref 4–11)

## 2023-01-19 PROCEDURE — 85025 COMPLETE CBC W/AUTO DIFF WBC: CPT | Performed by: EMERGENCY MEDICINE

## 2023-01-19 PROCEDURE — 70450 CT HEAD/BRAIN W/O DYE: CPT

## 2023-01-19 PROCEDURE — 36415 COLL VENOUS BLD VENIPUNCTURE: CPT | Performed by: EMERGENCY MEDICINE

## 2023-01-19 PROCEDURE — 80048 BASIC METABOLIC PNL TOTAL CA: CPT | Performed by: EMERGENCY MEDICINE

## 2023-01-19 PROCEDURE — 99285 EMERGENCY DEPT VISIT HI MDM: CPT | Mod: 25

## 2023-01-19 PROCEDURE — 12001 RPR S/N/AX/GEN/TRNK 2.5CM/<: CPT

## 2023-01-19 PROCEDURE — 73090 X-RAY EXAM OF FOREARM: CPT | Mod: LT

## 2023-01-19 RX ORDER — MORPHINE SULFATE 4 MG/ML
4 INJECTION, SOLUTION INTRAMUSCULAR; INTRAVENOUS
Status: DISCONTINUED | OUTPATIENT
Start: 2023-01-19 | End: 2023-01-19 | Stop reason: HOSPADM

## 2023-01-19 ASSESSMENT — ENCOUNTER SYMPTOMS
BRUISES/BLEEDS EASILY: 1
HEADACHES: 0
DIAPHORESIS: 0
CHEST TIGHTNESS: 0
SHORTNESS OF BREATH: 0
BACK PAIN: 0
DIZZINESS: 0
FEVER: 0
LIGHT-HEADEDNESS: 0
WOUND: 1
CHILLS: 0
NECK PAIN: 0

## 2023-01-19 ASSESSMENT — ACTIVITIES OF DAILY LIVING (ADL): ADLS_ACUITY_SCORE: 35

## 2023-01-19 NOTE — ED TRIAGE NOTES
Mechanical fall off elliptical machine at gym around 0945 striking the posterior of her skull, hematoma noted and hair is bloody but bleeding controlled upon arrival. L hematoma to L posterior forearm as well, CMS intact. VSS on RA. 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 Neuro status intact.

## 2023-01-19 NOTE — ED PROVIDER NOTES
History     Chief Complaint:  Fall and Head Injury       HPI   Sulma Ramos is a 57 year old female who presents with fall on elipitical now more than 3 hours ago.  Purely mechanicla.  No LOC.  Hit posterior scalp and had some bleeding and left arm bruised.    On eliquis      Independent Historian:     Review of External Notes:     ROS:  Review of Systems   Constitutional: Negative for chills, diaphoresis and fever.   Respiratory: Negative for chest tightness and shortness of breath.    Cardiovascular: Negative for chest pain.   Musculoskeletal: Negative for back pain and neck pain.   Skin: Positive for wound.   Neurological: Negative for dizziness, light-headedness and headaches.   Hematological: Bruises/bleeds easily.   All other systems reviewed and are negative.      Allergies:  Clarithromycin  Clindamycin Base  Droperidol     Medications:    Ascorbic Acid (VITAMIN C PO)  bacitracin 500 UNIT/GM OINT  calcium-vitamin D (CALTRATE) 600-400 MG-UNIT per tablet  cyanocobalamin 1000 MCG/ML injection  ferrous sulfate 325 (65 FE) MG tablet  FLUoxetine (PROZAC) 40 MG capsule  Furosemide (LASIX) 20 MG tablet  GABAPENTIN PO  lactulose 20 GM/30ML SOLN  LEVOTHYROXINE SODIUM PO  Multiple Vitamins-Minerals (MULTIVITAMIN OR)  NADOLOL PO  oxyCODONE-acetaminophen (PERCOCET) 5-325 MG per tablet  Pyridoxine HCl (VITAMIN B6 PO)  Rifaximin (XIFAXAN PO)  spironolactone (ALDACTONE) 100 MG tablet  Thiamine HCl (VITAMIN B-1 PO)  Zolpidem Tartrate (AMBIEN PO)        Past Medical History:    Past Medical History:   Diagnosis Date     Anxiety state, unspecified      Polyphagia(783.6)        Past Surgical History:    Past Surgical History:   Procedure Laterality Date     Z NONSPECIFIC PROCEDURE       x 2     Z NONSPECIFIC PROCEDURE              Family History:    family history includes Arthritis in her father, paternal grandfather, and paternal grandmother; C.A.D. in her mother and paternal grandfather; Hypertension  in her mother and paternal grandfather; Thyroid Disease in her paternal grandfather.    Social History:   reports that she has never smoked. She does not have any smokeless tobacco history on file. She reports current alcohol use. She reports that she does not use drugs.  PCP: Raudel Ortiz     Physical Exam     Patient Vitals for the past 24 hrs:   BP Temp Temp src Pulse Resp SpO2   01/19/23 1021 103/79 97.5  F (36.4  C) Temporal 85 19 94 %        Physical Exam  General: Patient is well appearing. No distress.  Head: No bony deformity or crepitus of the scalp.  Post occipital about a silver dollar size small nonexpanding hematoma with a small not full thickness 1 cm horizontal laceration that is aproximateable, otherwise abraded tissue for another 2cm laterally.  Bleeding has stopped.   Eyes: Conjunctivae and EOM are normal. No scleral icterus.  Neck: Normal range of motion. Neck supple.  No midline tenderness  Cardiovascular: Normal rate, regular rhythm, normal heart sounds and intact distal pulses.   Pulmonary/Chest: Breath sounds normal. No respiratory distress.  Abdominal: Soft. Bowel sounds are normal. No distension. No tenderness. No rebound or guarding.   Musculoskeletal: Left forearm contusion and ecchymosis, not actively expanding.  Pro/sup/ flex/ext of elbow and full function of wrist without pain or numbness.  Cap refill brisk at fingers.  Skin: Warm and dry. No rash noted. Not diaphoretic. As above    Emergency Department Course   ECG:  ECG results from 09/06/13   EKG 12-lead, tracing only     Value    Interpretation ECG Click View Image link to view waveform and result       Imaging:  Head CT w/o contrast   Final Result   IMPRESSION:   1.  Left posterior scalp contusion without underlying fracture or   acute intracranial abnormality.   2.  Mild/moderate generalized volume loss and chronic microvascular   ischemic change.      ALONSO ARITA MD            SYSTEM ID:  [id number]      Radius/Ulna  XR,  PA &LAT, left    (Results Pending)      Report per radiology    Laboratory:  Labs Ordered and Resulted from Time of ED Arrival to Time of ED Departure   BASIC METABOLIC PANEL - Abnormal       Result Value    Sodium 136      Potassium 4.3      Chloride 103      Carbon Dioxide (CO2) 22      Anion Gap 11      Urea Nitrogen 11.2      Creatinine 1.04 (*)     Calcium 8.6      Glucose 81      GFR Estimate 62     CBC WITH PLATELETS AND DIFFERENTIAL - Abnormal    WBC Count 2.7 (*)     RBC Count 3.92      Hemoglobin 12.1      Hematocrit 37.4      MCV 95      MCH 30.9      MCHC 32.4      RDW 15.9 (*)     Platelet Count 128 (*)     % Neutrophils 61      % Lymphocytes 25      % Monocytes 8      % Eosinophils 3      % Basophils 2      % Immature Granulocytes 1      NRBCs per 100 WBC 0      Absolute Neutrophils 1.7      Absolute Lymphocytes 0.7 (*)     Absolute Monocytes 0.2      Absolute Eosinophils 0.1      Absolute Basophils 0.0      Absolute Immature Granulocytes 0.0      Absolute NRBCs 0.0          [facility]    -Laceration Repair    Date/Time: 1/19/2023 12:51 PM  Performed by: Estrada Kothari MD  Authorized by: Estrada Kothari MD     Risks, benefits and alternatives discussed.      ANESTHESIA (see MAR for exact dosages):     Anesthesia method:  None  LACERATION DETAILS     Location:  Scalp    Length (cm):  1    Depth (mm):  3    REPAIR TYPE:     Repair type:  Simple      EXPLORATION:     Hemostasis achieved with:  Direct pressure    Wound extent: fascia not violated and no underlying fracture      Contaminated: no      TREATMENT:     Area cleansed with:  Shdyana-Clens    Amount of cleaning:  Extensive    Irrigation solution:  Sterile saline    Irrigation method:  Tap    Visualized foreign bodies/material removed: no      SKIN REPAIR     Repair method:  Staples    Number of staples:  2    APPROXIMATION     Approximation:  Close    POST-PROCEDURE DETAILS     Dressing:  Open (no  dressing)        PROCEDURE    Patient Tolerance:  Patient tolerated the procedure well with no immediate complications         Emergency Department Course & Assessments:             Interventions:  Medications   morphine (PF) injection 4 mg (has no administration in time range)        Independent Interpretation (X-rays, CTs, rhythm strip):       Consultations/Discussion of Management or Tests:          Social Determinants of Health affecting care:       Disposition:  The patient was discharged to home.     Impression & Plan        Medical Decision Making:  Pt had purely mechanical fall with overall reassuring trauma picture.  On blood thinner but CT normal intracranial at this time.  Discussed risk of delayed bleeding.  Also small scalp hematoma and partial thickness small lac that was closed as above.  Discussed bleeding infection need for removal.  Left forearm fully functional and doubt fracture but with blood thinner had in depth discussion on what to look for in compartment syndrome and strict return and f/u.  Diagnosis:    ICD-10-CM    1. Scalp hematoma, initial encounter  S00.03XA       2. Scalp laceration, initial encounter  S01.01XA       3. Traumatic hematoma of left forearm, initial encounter  S50.12XA            Discharge Medications:  New Prescriptions    No medications on file        Scribe Disclosure:  Estrada VANEGAS MD, am serving as a scribe at 12:47 PM on 1/19/2023 to document services personally performed by Estrada Kothari MD based on my observations and the provider's statements to me.     1/19/2023   Estrada Kothari MD Stevens, Andrew C, MD  01/19/23 6140

## 2023-01-27 ENCOUNTER — ALLIED HEALTH/NURSE VISIT (OUTPATIENT)
Dept: FAMILY MEDICINE | Facility: CLINIC | Age: 58
End: 2023-01-27
Payer: COMMERCIAL

## 2023-01-27 DIAGNOSIS — Z48.02 VISIT FOR SUTURE REMOVAL: Primary | ICD-10-CM

## 2023-01-27 PROCEDURE — 99207 PR NO CHARGE NURSE ONLY: CPT

## 2023-01-27 NOTE — NURSING NOTE
Suture removal:     Date sutures applied: 1/19/23         Where (setting) in which they applied:ER visit    Description:  Type: staples  Location: scalp    History:    Cause of laceration: trauma    Accompanying Signs & Symptoms: (staff: if yes-describe)  Redness: No  Warmth: No  Drainage: No  Still bleeding: No  Fevers: No    Last tetanus shot: last tetanus booster within 10 years    Teresa Capellan RN   PAL (Patient Advocate Liason)  [facility]

## 2023-02-26 ENCOUNTER — APPOINTMENT (OUTPATIENT)
Dept: GENERAL RADIOLOGY | Facility: CLINIC | Age: 58
End: 2023-02-26
Attending: EMERGENCY MEDICINE
Payer: COMMERCIAL

## 2023-02-26 ENCOUNTER — APPOINTMENT (OUTPATIENT)
Dept: CT IMAGING | Facility: CLINIC | Age: 58
End: 2023-02-26
Attending: EMERGENCY MEDICINE
Payer: COMMERCIAL

## 2023-02-26 ENCOUNTER — APPOINTMENT (OUTPATIENT)
Dept: GENERAL RADIOLOGY | Facility: CLINIC | Age: 58
End: 2023-02-26
Attending: PHYSICIAN ASSISTANT
Payer: COMMERCIAL

## 2023-02-26 ENCOUNTER — HOSPITAL ENCOUNTER (EMERGENCY)
Facility: CLINIC | Age: 58
Discharge: ANOTHER HOSPITAL | End: 2023-02-26
Attending: EMERGENCY MEDICINE | Admitting: EMERGENCY MEDICINE
Payer: COMMERCIAL

## 2023-02-26 ENCOUNTER — APPOINTMENT (OUTPATIENT)
Dept: GENERAL RADIOLOGY | Facility: CLINIC | Age: 58
End: 2023-02-26
Attending: NURSE PRACTITIONER
Payer: COMMERCIAL

## 2023-02-26 ENCOUNTER — HOSPITAL ENCOUNTER (INPATIENT)
Facility: CLINIC | Age: 58
LOS: 9 days | Discharge: HOME HEALTH | End: 2023-03-07
Attending: EMERGENCY MEDICINE | Admitting: PSYCHIATRY & NEUROLOGY
Payer: COMMERCIAL

## 2023-02-26 ENCOUNTER — APPOINTMENT (OUTPATIENT)
Dept: CT IMAGING | Facility: CLINIC | Age: 58
End: 2023-02-26
Payer: COMMERCIAL

## 2023-02-26 ENCOUNTER — APPOINTMENT (OUTPATIENT)
Dept: ULTRASOUND IMAGING | Facility: CLINIC | Age: 58
End: 2023-02-26
Attending: PHYSICIAN ASSISTANT
Payer: COMMERCIAL

## 2023-02-26 ENCOUNTER — ANESTHESIA (OUTPATIENT)
Dept: SURGERY | Facility: CLINIC | Age: 58
End: 2023-02-26
Payer: COMMERCIAL

## 2023-02-26 ENCOUNTER — ANESTHESIA EVENT (OUTPATIENT)
Dept: SURGERY | Facility: CLINIC | Age: 58
End: 2023-02-26
Payer: COMMERCIAL

## 2023-02-26 ENCOUNTER — TRANSFERRED RECORDS (OUTPATIENT)
Dept: HEALTH INFORMATION MANAGEMENT | Facility: CLINIC | Age: 58
End: 2023-02-26

## 2023-02-26 ENCOUNTER — APPOINTMENT (OUTPATIENT)
Dept: CT IMAGING | Facility: CLINIC | Age: 58
End: 2023-02-26
Attending: PHYSICIAN ASSISTANT
Payer: COMMERCIAL

## 2023-02-26 VITALS
RESPIRATION RATE: 14 BRPM | OXYGEN SATURATION: 100 % | DIASTOLIC BLOOD PRESSURE: 87 MMHG | WEIGHT: 175 LBS | HEART RATE: 87 BPM | TEMPERATURE: 97.8 F | SYSTOLIC BLOOD PRESSURE: 143 MMHG | BODY MASS INDEX: 29.88 KG/M2 | HEIGHT: 64 IN

## 2023-02-26 DIAGNOSIS — K52.9 COLITIS: ICD-10-CM

## 2023-02-26 DIAGNOSIS — T14.8XXA TRAUMATIC ECCHYMOSIS OF MULTIPLE SITES: ICD-10-CM

## 2023-02-26 DIAGNOSIS — M79.89 SWELLING OF LIMB: ICD-10-CM

## 2023-02-26 DIAGNOSIS — Z11.52 ENCOUNTER FOR SCREENING LABORATORY TESTING FOR SEVERE ACUTE RESPIRATORY SYNDROME CORONAVIRUS 2 (SARS-COV-2): ICD-10-CM

## 2023-02-26 DIAGNOSIS — K52.9 INFLAMMATORY BOWEL DISEASE: ICD-10-CM

## 2023-02-26 DIAGNOSIS — A41.9 SEPSIS, DUE TO UNSPECIFIED ORGANISM, UNSPECIFIED WHETHER ACUTE ORGAN DYSFUNCTION PRESENT (H): ICD-10-CM
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
## 2023-02-26 DIAGNOSIS — E16.2 HYPOGLYCEMIA: ICD-10-CM

## 2023-02-26 DIAGNOSIS — J90 PLEURAL EFFUSION: ICD-10-CM

## 2023-02-26 DIAGNOSIS — D68.9 COAGULOPATHY (H): ICD-10-CM

## 2023-02-26 DIAGNOSIS — K76.82 ENCEPHALOPATHY, HEPATIC (H): ICD-10-CM

## 2023-02-26 DIAGNOSIS — S06.5XAA SUBDURAL HEMATOMA (H): Primary | ICD-10-CM

## 2023-02-26 DIAGNOSIS — S06.5XAA: ICD-10-CM

## 2023-02-26 DIAGNOSIS — K74.60 CIRRHOSIS OF LIVER WITHOUT ASCITES, UNSPECIFIED HEPATIC CIRRHOSIS TYPE (H): ICD-10-CM

## 2023-02-26 DIAGNOSIS — R41.82 ALTERED MENTAL STATUS, UNSPECIFIED ALTERED MENTAL STATUS TYPE: ICD-10-CM

## 2023-02-26 LAB
ABO/RH(D): NORMAL
ABO/RH(D): NORMAL
ALBUMIN SERPL BCG-MCNC: 2.4 G/DL (ref 3.5–5.2)
ALBUMIN UR-MCNC: NEGATIVE MG/DL
ALP SERPL-CCNC: 157 U/L (ref 35–104)
ALT SERPL W P-5'-P-CCNC: 34 U/L (ref 10–35)
AMMONIA PLAS-SCNC: 45 UMOL/L (ref 11–51)
ANION GAP SERPL CALCULATED.3IONS-SCNC: 12 MMOL/L (ref 7–15)
ANION GAP SERPL CALCULATED.3IONS-SCNC: 14 MMOL/L (ref 7–15)
ANTIBODY SCREEN: NEGATIVE
ANTIBODY SCREEN: NEGATIVE
APPEARANCE UR: CLEAR
APTT PPP: 39 SECONDS (ref 22–38)
APTT PPP: 40 SECONDS (ref 22–38)
AST SERPL W P-5'-P-CCNC: 96 U/L (ref 10–35)
BASE EXCESS BLDA CALC-SCNC: -4.1 MMOL/L (ref -9.6–2)
BASOPHILS # BLD AUTO: 0 10E3/UL (ref 0–0.2)
BASOPHILS # BLD AUTO: 0 10E3/UL (ref 0–0.2)
BASOPHILS NFR BLD AUTO: 1 %
BASOPHILS NFR BLD AUTO: 1 %
BILIRUB SERPL-MCNC: 1.5 MG/DL
BILIRUB UR QL STRIP: NEGATIVE
BLD PROD TYP BPU: NORMAL
BLOOD COMPONENT TYPE: NORMAL
BUN SERPL-MCNC: 25.3 MG/DL (ref 8–23)
BUN SERPL-MCNC: 30.7 MG/DL (ref 8–23)
CA-I BLD-MCNC: 4.5 MG/DL (ref 4.4–5.2)
CA-I BLD-MCNC: 4.5 MG/DL (ref 4.4–5.2)
CA-I BLD-MCNC: 4.8 MG/DL (ref 4.4–5.2)
CALCIUM SERPL-MCNC: 8.3 MG/DL (ref 8.6–10)
CALCIUM SERPL-MCNC: 8.5 MG/DL (ref 8.6–10)
CF REDUC 60M P MA LENFR BLD TEG: 1.3 % (ref 0–15)
CFT BLD TEG: 1.8 MINUTE (ref 1–3)
CHLORIDE SERPL-SCNC: 107 MMOL/L (ref 98–107)
CHLORIDE SERPL-SCNC: 109 MMOL/L (ref 98–107)
CI (COAGULATION INDEX)(Z) NON NATIVE: 0.5 (ref -3–3)
CLOT ANGLE BLD TEG: 69.2 DEGREES (ref 53–72)
CLOT INIT BLD TEG: 4.7 MINUTE (ref 5–10)
CLOT LYSIS 30M P MA LENFR BLD TEG: 0 % (ref 0–8)
CLOT STRENGTH BLD TEG: 6 KD/SC (ref 4.5–11)
CODING SYSTEM: NORMAL
COLOR UR AUTO: YELLOW
CPB POCT: NO
CREAT SERPL-MCNC: 1.07 MG/DL (ref 0.51–0.95)
CREAT SERPL-MCNC: 1.56 MG/DL (ref 0.51–0.95)
CROSSMATCH: NORMAL
DEPRECATED HCO3 PLAS-SCNC: 17 MMOL/L (ref 22–29)
DEPRECATED HCO3 PLAS-SCNC: 22 MMOL/L (ref 22–29)
EOSINOPHIL # BLD AUTO: 0 10E3/UL (ref 0–0.7)
EOSINOPHIL # BLD AUTO: 0 10E3/UL (ref 0–0.7)
EOSINOPHIL NFR BLD AUTO: 1 %
EOSINOPHIL NFR BLD AUTO: 2 %
ERYTHROCYTE [DISTWIDTH] IN BLOOD BY AUTOMATED COUNT: 17.9 % (ref 10–15)
ERYTHROCYTE [DISTWIDTH] IN BLOOD BY AUTOMATED COUNT: 18.1 % (ref 10–15)
ERYTHROCYTE [DISTWIDTH] IN BLOOD BY AUTOMATED COUNT: 18.2 % (ref 10–15)
ETHANOL SERPL-MCNC: <0.01 G/DL
FIBRINOGEN PPP-MCNC: 183 MG/DL (ref 170–490)
FLUAV RNA SPEC QL NAA+PROBE: NEGATIVE
FLUBV RNA RESP QL NAA+PROBE: NEGATIVE
GFR SERPL CREATININE-BSD FRML MDRD: 38 ML/MIN/1.73M2
GFR SERPL CREATININE-BSD FRML MDRD: 60 ML/MIN/1.73M2
GLUCOSE BLD-MCNC: 120 MG/DL (ref 70–99)
GLUCOSE BLD-MCNC: 137 MG/DL (ref 70–99)
GLUCOSE BLDC GLUCOMTR-MCNC: 112 MG/DL (ref 70–99)
GLUCOSE BLDC GLUCOMTR-MCNC: 130 MG/DL (ref 70–99)
GLUCOSE BLDC GLUCOMTR-MCNC: 132 MG/DL (ref 70–99)
GLUCOSE BLDC GLUCOMTR-MCNC: 135 MG/DL (ref 70–99)
GLUCOSE BLDC GLUCOMTR-MCNC: 140 MG/DL (ref 70–99)
GLUCOSE BLDC GLUCOMTR-MCNC: 140 MG/DL (ref 70–99)
GLUCOSE SERPL-MCNC: 138 MG/DL (ref 70–99)
GLUCOSE SERPL-MCNC: 63 MG/DL (ref 70–99)
GLUCOSE UR STRIP-MCNC: NEGATIVE MG/DL
HCO3 BLDA-SCNC: 21 MMOL/L (ref 21–28)
HCO3 BLDV-SCNC: 23 MMOL/L (ref 21–28)
HCT VFR BLD AUTO: 21.3 % (ref 35–47)
HCT VFR BLD AUTO: 27.9 % (ref 35–47)
HCT VFR BLD AUTO: 29.7 % (ref 35–47)
HCT VFR BLD CALC: 32 % (ref 35–47)
HEMOCCULT STL QL: NEGATIVE
HGB BLD-MCNC: 10.9 G/DL (ref 11.7–15.7)
HGB BLD-MCNC: 7 G/DL (ref 11.7–15.7)
HGB BLD-MCNC: 8.1 G/DL (ref 11.7–15.7)
HGB BLD-MCNC: 9.2 G/DL (ref 11.7–15.7)
HGB BLD-MCNC: 9.4 G/DL (ref 11.7–15.7)
HGB UR QL STRIP: NEGATIVE
HOLD SPECIMEN: NORMAL
IMM GRANULOCYTES # BLD: 0 10E3/UL
IMM GRANULOCYTES # BLD: 0 10E3/UL
IMM GRANULOCYTES NFR BLD: 1 %
IMM GRANULOCYTES NFR BLD: 1 %
INR BLD: 1.8 (ref 2–3)
INR PPP: 1.43 (ref 0.85–1.15)
INR PPP: 1.72 (ref 0.85–1.15)
INR PPP: 2.66 (ref 0.85–1.15)
ISSUE DATE AND TIME: NORMAL
KETONES UR STRIP-MCNC: ABNORMAL MG/DL
LACTATE BLD-SCNC: 1.4 MMOL/L
LACTATE SERPL-SCNC: 2.4 MMOL/L (ref 0.7–2)
LEUKOCYTE ESTERASE UR QL STRIP: NEGATIVE
LIPASE SERPL-CCNC: 43 U/L (ref 13–60)
LYMPHOCYTES # BLD AUTO: 0.4 10E3/UL (ref 0.8–5.3)
LYMPHOCYTES # BLD AUTO: 0.6 10E3/UL (ref 0.8–5.3)
LYMPHOCYTES NFR BLD AUTO: 21 %
LYMPHOCYTES NFR BLD AUTO: 31 %
MAGNESIUM SERPL-MCNC: 2 MG/DL (ref 1.7–2.3)
MCF BLD TEG: 54.7 MM (ref 50–70)
MCH RBC QN AUTO: 32.4 PG (ref 26.5–33)
MCH RBC QN AUTO: 32.4 PG (ref 26.5–33)
MCH RBC QN AUTO: 32.5 PG (ref 26.5–33)
MCHC RBC AUTO-ENTMCNC: 31.6 G/DL (ref 31.5–36.5)
MCHC RBC AUTO-ENTMCNC: 32.9 G/DL (ref 31.5–36.5)
MCHC RBC AUTO-ENTMCNC: 33 G/DL (ref 31.5–36.5)
MCV RBC AUTO: 102 FL (ref 78–100)
MCV RBC AUTO: 99 FL (ref 78–100)
MCV RBC AUTO: 99 FL (ref 78–100)
MONOCYTES # BLD AUTO: 0.1 10E3/UL (ref 0–1.3)
MONOCYTES # BLD AUTO: 0.2 10E3/UL (ref 0–1.3)
MONOCYTES NFR BLD AUTO: 10 %
MONOCYTES NFR BLD AUTO: 6 %
MUCOUS THREADS #/AREA URNS LPF: PRESENT /LPF
NEUTROPHILS # BLD AUTO: 1 10E3/UL (ref 1.6–8.3)
NEUTROPHILS # BLD AUTO: 1.4 10E3/UL (ref 1.6–8.3)
NEUTROPHILS NFR BLD AUTO: 55 %
NEUTROPHILS NFR BLD AUTO: 70 %
NITRATE UR QL: NEGATIVE
NRBC # BLD AUTO: 0 10E3/UL
NRBC # BLD AUTO: 0 10E3/UL
NRBC BLD AUTO-RTO: 0 /100
NRBC BLD AUTO-RTO: 0 /100
NT-PROBNP SERPL-MCNC: 2082 PG/ML (ref 0–900)
O2/TOTAL GAS SETTING VFR VENT: 31 %
PCO2 BLDA: 37 MM HG (ref 35–45)
PCO2 BLDV: 42 MM HG (ref 40–50)
PH BLDA: 7.36 [PH] (ref 7.35–7.45)
PH BLDV: 7.35 [PH] (ref 7.32–7.43)
PH UR STRIP: 5.5 [PH] (ref 5–7)
PLATELET # BLD AUTO: 112 10E3/UL (ref 150–450)
PLATELET # BLD AUTO: 124 10E3/UL (ref 150–450)
PLATELET # BLD AUTO: 91 10E3/UL (ref 150–450)
PO2 BLDA: 86 MM HG (ref 80–105)
PO2 BLDV: 28 MM HG (ref 25–47)
POTASSIUM BLD-SCNC: 3.5 MMOL/L (ref 3.4–5.3)
POTASSIUM BLD-SCNC: 3.6 MMOL/L (ref 3.5–5)
POTASSIUM SERPL-SCNC: 3.7 MMOL/L (ref 3.4–5.3)
POTASSIUM SERPL-SCNC: 3.8 MMOL/L (ref 3.4–5.3)
PROCALCITONIN SERPL IA-MCNC: 0.58 NG/ML
PROT SERPL-MCNC: 5.3 G/DL (ref 6.4–8.3)
RADIOLOGIST FLAGS: ABNORMAL
RBC # BLD AUTO: 2.16 10E6/UL (ref 3.8–5.2)
RBC # BLD AUTO: 2.83 10E6/UL (ref 3.8–5.2)
RBC # BLD AUTO: 2.9 10E6/UL (ref 3.8–5.2)
RBC URINE: 1 /HPF
RSV RNA SPEC NAA+PROBE: NEGATIVE
SAO2 % BLDV: 49 % (ref 94–100)
SARS-COV-2 RNA RESP QL NAA+PROBE: NEGATIVE
SODIUM BLD-SCNC: 141 MMOL/L (ref 133–144)
SODIUM BLD-SCNC: 143 MMOL/L (ref 133–144)
SODIUM SERPL-SCNC: 140 MMOL/L (ref 136–145)
SODIUM SERPL-SCNC: 141 MMOL/L (ref 136–145)
SP GR UR STRIP: 1.02 (ref 1–1.03)
SPECIMEN EXPIRATION DATE: NORMAL
SPECIMEN EXPIRATION DATE: NORMAL
SQUAMOUS EPITHELIAL: 1 /HPF
TRANSITIONAL EPI: 3 /HPF
TROPONIN T SERPL HS-MCNC: 22 NG/L
TROPONIN T SERPL HS-MCNC: 77 NG/L
TSH SERPL DL<=0.005 MIU/L-ACNC: 1.11 UIU/ML (ref 0.3–4.2)
UNIT ABO/RH: NORMAL
UNIT NUMBER: NORMAL
UNIT STATUS: NORMAL
UNIT TYPE ISBT: 6200
UROBILINOGEN UR STRIP-MCNC: NORMAL MG/DL
WBC # BLD AUTO: 1.7 10E3/UL (ref 4–11)
WBC # BLD AUTO: 1.8 10E3/UL (ref 4–11)
WBC # BLD AUTO: 2 10E3/UL (ref 4–11)
WBC URINE: 11 /HPF

## 2023-02-26 PROCEDURE — 72128 CT CHEST SPINE W/O DYE: CPT

## 2023-02-26 PROCEDURE — 87637 SARSCOV2&INF A&B&RSV AMP PRB: CPT | Performed by: EMERGENCY MEDICINE

## 2023-02-26 PROCEDURE — 82947 ASSAY GLUCOSE BLOOD QUANT: CPT | Performed by: STUDENT IN AN ORGANIZED HEALTH CARE EDUCATION/TRAINING PROGRAM

## 2023-02-26 PROCEDURE — 84484 ASSAY OF TROPONIN QUANT: CPT | Performed by: EMERGENCY MEDICINE

## 2023-02-26 PROCEDURE — P9037 PLATE PHERES LEUKOREDU IRRAD: HCPCS

## 2023-02-26 PROCEDURE — 250N000009 HC RX 250: Performed by: NEUROLOGICAL SURGERY

## 2023-02-26 PROCEDURE — 250N000011 HC RX IP 250 OP 636: Performed by: STUDENT IN AN ORGANIZED HEALTH CARE EDUCATION/TRAINING PROGRAM

## 2023-02-26 PROCEDURE — 999N000185 HC STATISTIC TRANSPORT TIME EA 15 MIN

## 2023-02-26 PROCEDURE — 272N000002 HC OR SUPPLY OTHER OPNP: Performed by: NEUROLOGICAL SURGERY

## 2023-02-26 PROCEDURE — 70450 CT HEAD/BRAIN W/O DYE: CPT

## 2023-02-26 PROCEDURE — 99292 CRITICAL CARE ADDL 30 MIN: CPT | Mod: CS

## 2023-02-26 PROCEDURE — 96365 THER/PROPH/DIAG IV INF INIT: CPT | Mod: 59

## 2023-02-26 PROCEDURE — 70450 CT HEAD/BRAIN W/O DYE: CPT | Mod: 26 | Performed by: RADIOLOGY

## 2023-02-26 PROCEDURE — 250N000011 HC RX IP 250 OP 636: Performed by: EMERGENCY MEDICINE

## 2023-02-26 PROCEDURE — 94002 VENT MGMT INPAT INIT DAY: CPT

## 2023-02-26 PROCEDURE — 74177 CT ABD & PELVIS W/CONTRAST: CPT

## 2023-02-26 PROCEDURE — 87086 URINE CULTURE/COLONY COUNT: CPT | Performed by: EMERGENCY MEDICINE

## 2023-02-26 PROCEDURE — 999N000158 HC STATISTIC RCP TIME ED VENT EA 10 MIN

## 2023-02-26 PROCEDURE — 84145 PROCALCITONIN (PCT): CPT | Performed by: EMERGENCY MEDICINE

## 2023-02-26 PROCEDURE — C9803 HOPD COVID-19 SPEC COLLECT: HCPCS

## 2023-02-26 PROCEDURE — 250N000009 HC RX 250: Performed by: NURSE PRACTITIONER

## 2023-02-26 PROCEDURE — 999N000065 XR CHEST PORT 1 VIEW

## 2023-02-26 PROCEDURE — 85610 PROTHROMBIN TIME: CPT | Performed by: STUDENT IN AN ORGANIZED HEALTH CARE EDUCATION/TRAINING PROGRAM

## 2023-02-26 PROCEDURE — 85025 COMPLETE CBC W/AUTO DIFF WBC: CPT | Performed by: EMERGENCY MEDICINE

## 2023-02-26 PROCEDURE — 84484 ASSAY OF TROPONIN QUANT: CPT | Performed by: STUDENT IN AN ORGANIZED HEALTH CARE EDUCATION/TRAINING PROGRAM

## 2023-02-26 PROCEDURE — 71045 X-RAY EXAM CHEST 1 VIEW: CPT | Mod: 26 | Performed by: RADIOLOGY

## 2023-02-26 PROCEDURE — 72131 CT LUMBAR SPINE W/O DYE: CPT | Mod: 26 | Performed by: RADIOLOGY

## 2023-02-26 PROCEDURE — 83880 ASSAY OF NATRIURETIC PEPTIDE: CPT | Performed by: EMERGENCY MEDICINE

## 2023-02-26 PROCEDURE — 72125 CT NECK SPINE W/O DYE: CPT

## 2023-02-26 PROCEDURE — 72128 CT CHEST SPINE W/O DYE: CPT | Mod: 26 | Performed by: RADIOLOGY

## 2023-02-26 PROCEDURE — 73090 X-RAY EXAM OF FOREARM: CPT | Mod: 26 | Performed by: RADIOLOGY

## 2023-02-26 PROCEDURE — 93975 VASCULAR STUDY: CPT | Mod: 26 | Performed by: RADIOLOGY

## 2023-02-26 PROCEDURE — 93005 ELECTROCARDIOGRAM TRACING: CPT | Mod: XU

## 2023-02-26 PROCEDURE — 70486 CT MAXILLOFACIAL W/O DYE: CPT

## 2023-02-26 PROCEDURE — C9113 INJ PANTOPRAZOLE SODIUM, VIA: HCPCS | Performed by: EMERGENCY MEDICINE

## 2023-02-26 PROCEDURE — 99291 CRITICAL CARE FIRST HOUR: CPT | Mod: CS | Performed by: EMERGENCY MEDICINE

## 2023-02-26 PROCEDURE — C9803 HOPD COVID-19 SPEC COLLECT: HCPCS | Performed by: EMERGENCY MEDICINE

## 2023-02-26 PROCEDURE — 250N000013 HC RX MED GY IP 250 OP 250 PS 637: Performed by: NURSE PRACTITIONER

## 2023-02-26 PROCEDURE — 82962 GLUCOSE BLOOD TEST: CPT

## 2023-02-26 PROCEDURE — 99207 PR NO BILLABLE SERVICE THIS VISIT: CPT | Performed by: STUDENT IN AN ORGANIZED HEALTH CARE EDUCATION/TRAINING PROGRAM

## 2023-02-26 PROCEDURE — 5A1945Z RESPIRATORY VENTILATION, 24-96 CONSECUTIVE HOURS: ICD-10-PCS | Performed by: PSYCHIATRY & NEUROLOGY

## 2023-02-26 PROCEDURE — 61154 BURR HOLE W/EVAC&/DRG HMTMA: CPT | Mod: LT | Performed by: NEUROLOGICAL SURGERY

## 2023-02-26 PROCEDURE — 99207 PR APP CREDIT; MD BILLING SHARED VISIT: CPT | Performed by: NURSE PRACTITIONER

## 2023-02-26 PROCEDURE — 93975 VASCULAR STUDY: CPT

## 2023-02-26 PROCEDURE — 82077 ASSAY SPEC XCP UR&BREATH IA: CPT | Performed by: PHYSICIAN ASSISTANT

## 2023-02-26 PROCEDURE — 85730 THROMBOPLASTIN TIME PARTIAL: CPT | Performed by: STUDENT IN AN ORGANIZED HEALTH CARE EDUCATION/TRAINING PROGRAM

## 2023-02-26 PROCEDURE — 70450 CT HEAD/BRAIN W/O DYE: CPT | Mod: XE

## 2023-02-26 PROCEDURE — 258N000001 HC RX 258: Performed by: EMERGENCY MEDICINE

## 2023-02-26 PROCEDURE — 81001 URINALYSIS AUTO W/SCOPE: CPT | Performed by: EMERGENCY MEDICINE

## 2023-02-26 PROCEDURE — 258N000003 HC RX IP 258 OP 636: Performed by: STUDENT IN AN ORGANIZED HEALTH CARE EDUCATION/TRAINING PROGRAM

## 2023-02-26 PROCEDURE — 85610 PROTHROMBIN TIME: CPT

## 2023-02-26 PROCEDURE — 360N000078 HC SURGERY LEVEL 5, PER MIN: Performed by: NEUROLOGICAL SURGERY

## 2023-02-26 PROCEDURE — 83690 ASSAY OF LIPASE: CPT | Performed by: EMERGENCY MEDICINE

## 2023-02-26 PROCEDURE — 93010 ELECTROCARDIOGRAM REPORT: CPT | Mod: 76 | Performed by: INTERNAL MEDICINE

## 2023-02-26 PROCEDURE — 86923 COMPATIBILITY TEST ELECTRIC: CPT | Performed by: STUDENT IN AN ORGANIZED HEALTH CARE EDUCATION/TRAINING PROGRAM

## 2023-02-26 PROCEDURE — 84132 ASSAY OF SERUM POTASSIUM: CPT

## 2023-02-26 PROCEDURE — 99292 CRITICAL CARE ADDL 30 MIN: CPT | Performed by: NURSE PRACTITIONER

## 2023-02-26 PROCEDURE — 99291 CRITICAL CARE FIRST HOUR: CPT | Mod: 25,CS

## 2023-02-26 PROCEDURE — 999N000188 HC STATISTIC TRAUMA CODE W/O ACCESS

## 2023-02-26 PROCEDURE — 82803 BLOOD GASES ANY COMBINATION: CPT

## 2023-02-26 PROCEDURE — 82330 ASSAY OF CALCIUM: CPT | Performed by: NURSE PRACTITIONER

## 2023-02-26 PROCEDURE — 272N000001 HC OR GENERAL SUPPLY STERILE: Performed by: NEUROLOGICAL SURGERY

## 2023-02-26 PROCEDURE — 250N000009 HC RX 250: Performed by: EMERGENCY MEDICINE

## 2023-02-26 PROCEDURE — 31500 INSERT EMERGENCY AIRWAY: CPT

## 2023-02-26 PROCEDURE — 85610 PROTHROMBIN TIME: CPT | Performed by: EMERGENCY MEDICINE

## 2023-02-26 PROCEDURE — G0390 TRAUMA RESPONS W/HOSP CRITI: HCPCS | Performed by: EMERGENCY MEDICINE

## 2023-02-26 PROCEDURE — P9016 RBC LEUKOCYTES REDUCED: HCPCS

## 2023-02-26 PROCEDURE — 82272 OCCULT BLD FECES 1-3 TESTS: CPT | Performed by: EMERGENCY MEDICINE

## 2023-02-26 PROCEDURE — 999N000157 HC STATISTIC RCP TIME EA 10 MIN

## 2023-02-26 PROCEDURE — 99291 CRITICAL CARE FIRST HOUR: CPT | Mod: FS | Performed by: NURSE PRACTITIONER

## 2023-02-26 PROCEDURE — 74018 RADEX ABDOMEN 1 VIEW: CPT | Mod: 26 | Performed by: RADIOLOGY

## 2023-02-26 PROCEDURE — 82947 ASSAY GLUCOSE BLOOD QUANT: CPT

## 2023-02-26 PROCEDURE — 82140 ASSAY OF AMMONIA: CPT | Performed by: EMERGENCY MEDICINE

## 2023-02-26 PROCEDURE — 72131 CT LUMBAR SPINE W/O DYE: CPT

## 2023-02-26 PROCEDURE — 84132 ASSAY OF SERUM POTASSIUM: CPT | Performed by: STUDENT IN AN ORGANIZED HEALTH CARE EDUCATION/TRAINING PROGRAM

## 2023-02-26 PROCEDURE — 00940ZZ DRAINAGE OF INTRACRANIAL SUBDURAL SPACE, OPEN APPROACH: ICD-10-PCS | Performed by: NEUROLOGICAL SURGERY

## 2023-02-26 PROCEDURE — 51702 INSERT TEMP BLADDER CATH: CPT

## 2023-02-26 PROCEDURE — 82330 ASSAY OF CALCIUM: CPT

## 2023-02-26 PROCEDURE — 36415 COLL VENOUS BLD VENIPUNCTURE: CPT | Performed by: EMERGENCY MEDICINE

## 2023-02-26 PROCEDURE — 99223 1ST HOSP IP/OBS HIGH 75: CPT | Mod: 57 | Performed by: NEUROLOGICAL SURGERY

## 2023-02-26 PROCEDURE — 250N000025 HC SEVOFLURANE, PER MIN: Performed by: NEUROLOGICAL SURGERY

## 2023-02-26 PROCEDURE — 3E043XZ INTRODUCTION OF VASOPRESSOR INTO CENTRAL VEIN, PERCUTANEOUS APPROACH: ICD-10-PCS | Performed by: PSYCHIATRY & NEUROLOGY

## 2023-02-26 PROCEDURE — 999N000065 XR ABDOMEN PORT 1 VIEW

## 2023-02-26 PROCEDURE — 87040 BLOOD CULTURE FOR BACTERIA: CPT | Performed by: EMERGENCY MEDICINE

## 2023-02-26 PROCEDURE — 85396 CLOTTING ASSAY WHOLE BLOOD: CPT | Performed by: EMERGENCY MEDICINE

## 2023-02-26 PROCEDURE — 80053 COMPREHEN METABOLIC PANEL: CPT | Performed by: EMERGENCY MEDICINE

## 2023-02-26 PROCEDURE — P9059 PLASMA, FRZ BETWEEN 8-24HOUR: HCPCS | Performed by: STUDENT IN AN ORGANIZED HEALTH CARE EDUCATION/TRAINING PROGRAM

## 2023-02-26 PROCEDURE — 83605 ASSAY OF LACTIC ACID: CPT | Performed by: STUDENT IN AN ORGANIZED HEALTH CARE EDUCATION/TRAINING PROGRAM

## 2023-02-26 PROCEDURE — 73090 X-RAY EXAM OF FOREARM: CPT | Mod: LT

## 2023-02-26 PROCEDURE — 250N000011 HC RX IP 250 OP 636: Performed by: NURSE PRACTITIONER

## 2023-02-26 PROCEDURE — 86923 COMPATIBILITY TEST ELECTRIC: CPT

## 2023-02-26 PROCEDURE — 85384 FIBRINOGEN ACTIVITY: CPT | Performed by: STUDENT IN AN ORGANIZED HEALTH CARE EDUCATION/TRAINING PROGRAM

## 2023-02-26 PROCEDURE — 84443 ASSAY THYROID STIM HORMONE: CPT | Performed by: NURSE PRACTITIONER

## 2023-02-26 PROCEDURE — 82805 BLOOD GASES W/O2 SATURATION: CPT | Performed by: STUDENT IN AN ORGANIZED HEALTH CARE EDUCATION/TRAINING PROGRAM

## 2023-02-26 PROCEDURE — 200N000002 HC R&B ICU UMMC

## 2023-02-26 PROCEDURE — 85027 COMPLETE CBC AUTOMATED: CPT | Performed by: STUDENT IN AN ORGANIZED HEALTH CARE EDUCATION/TRAINING PROGRAM

## 2023-02-26 PROCEDURE — 250N000009 HC RX 250: Performed by: STUDENT IN AN ORGANIZED HEALTH CARE EDUCATION/TRAINING PROGRAM

## 2023-02-26 PROCEDURE — 96375 TX/PRO/DX INJ NEW DRUG ADDON: CPT | Mod: 59

## 2023-02-26 PROCEDURE — 85730 THROMBOPLASTIN TIME PARTIAL: CPT | Performed by: EMERGENCY MEDICINE

## 2023-02-26 PROCEDURE — 86901 BLOOD TYPING SEROLOGIC RH(D): CPT | Performed by: EMERGENCY MEDICINE

## 2023-02-26 PROCEDURE — 93005 ELECTROCARDIOGRAM TRACING: CPT

## 2023-02-26 PROCEDURE — 250N000009 HC RX 250

## 2023-02-26 PROCEDURE — 370N000017 HC ANESTHESIA TECHNICAL FEE, PER MIN: Performed by: NEUROLOGICAL SURGERY

## 2023-02-26 PROCEDURE — 250N000011 HC RX IP 250 OP 636

## 2023-02-26 PROCEDURE — 99222 1ST HOSP IP/OBS MODERATE 55: CPT | Performed by: PHYSICIAN ASSISTANT

## 2023-02-26 PROCEDURE — 70450 CT HEAD/BRAIN W/O DYE: CPT | Mod: 77

## 2023-02-26 PROCEDURE — 99291 CRITICAL CARE FIRST HOUR: CPT | Mod: 25,CS | Performed by: EMERGENCY MEDICINE

## 2023-02-26 PROCEDURE — 83735 ASSAY OF MAGNESIUM: CPT | Performed by: EMERGENCY MEDICINE

## 2023-02-26 PROCEDURE — P9016 RBC LEUKOCYTES REDUCED: HCPCS | Performed by: STUDENT IN AN ORGANIZED HEALTH CARE EDUCATION/TRAINING PROGRAM

## 2023-02-26 RX ORDER — LEVETIRACETAM 10 MG/ML
1000 INJECTION INTRAVASCULAR 2 TIMES DAILY
Status: DISCONTINUED | OUTPATIENT
Start: 2023-02-27 | End: 2023-02-27

## 2023-02-26 RX ORDER — FLUMAZENIL 0.1 MG/ML
0.2 INJECTION, SOLUTION INTRAVENOUS
Status: DISCONTINUED | OUTPATIENT
Start: 2023-02-26 | End: 2023-02-26 | Stop reason: HOSPADM

## 2023-02-26 RX ORDER — LORAZEPAM 2 MG/ML
0.5 INJECTION INTRAMUSCULAR ONCE
Status: COMPLETED | OUTPATIENT
Start: 2023-02-26 | End: 2023-02-26

## 2023-02-26 RX ORDER — PROPOFOL 10 MG/ML
5-75 INJECTION, EMULSION INTRAVENOUS CONTINUOUS
Status: DISCONTINUED | OUTPATIENT
Start: 2023-02-26 | End: 2023-02-26 | Stop reason: HOSPADM

## 2023-02-26 RX ORDER — ACETAMINOPHEN 500 MG
1000 TABLET ORAL EVERY 6 HOURS PRN
Status: DISCONTINUED | OUTPATIENT
Start: 2023-02-26 | End: 2023-02-27

## 2023-02-26 RX ORDER — PROCHLORPERAZINE 25 MG
25 SUPPOSITORY, RECTAL RECTAL EVERY 12 HOURS PRN
Status: DISCONTINUED | OUTPATIENT
Start: 2023-02-26 | End: 2023-03-07 | Stop reason: HOSPADM

## 2023-02-26 RX ORDER — NOREPINEPHRINE BITARTRATE 0.06 MG/ML
INJECTION, SOLUTION INTRAVENOUS
Status: COMPLETED
Start: 2023-02-26 | End: 2023-02-26

## 2023-02-26 RX ORDER — THIAMINE HYDROCHLORIDE 100 MG/ML
100 INJECTION, SOLUTION INTRAMUSCULAR; INTRAVENOUS DAILY
Status: DISCONTINUED | OUTPATIENT
Start: 2023-02-26 | End: 2023-03-07

## 2023-02-26 RX ORDER — FOLIC ACID 5 MG/ML
1 INJECTION, SOLUTION INTRAMUSCULAR; INTRAVENOUS; SUBCUTANEOUS DAILY
Status: DISCONTINUED | OUTPATIENT
Start: 2023-02-26 | End: 2023-03-07

## 2023-02-26 RX ORDER — DEXTROSE MONOHYDRATE 25 G/50ML
INJECTION, SOLUTION INTRAVENOUS
Status: DISCONTINUED
Start: 2023-02-26 | End: 2023-02-26 | Stop reason: HOSPADM

## 2023-02-26 RX ORDER — DEXTROSE MONOHYDRATE 25 G/50ML
25-50 INJECTION, SOLUTION INTRAVENOUS
Status: DISCONTINUED | OUTPATIENT
Start: 2023-02-26 | End: 2023-03-07 | Stop reason: HOSPADM

## 2023-02-26 RX ORDER — NICOTINE POLACRILEX 4 MG
15-30 LOZENGE BUCCAL
Status: DISCONTINUED | OUTPATIENT
Start: 2023-02-26 | End: 2023-03-07 | Stop reason: HOSPADM

## 2023-02-26 RX ORDER — NALOXONE HYDROCHLORIDE 0.4 MG/ML
0.2 INJECTION, SOLUTION INTRAMUSCULAR; INTRAVENOUS; SUBCUTANEOUS
Status: DISCONTINUED | OUTPATIENT
Start: 2023-02-26 | End: 2023-03-07 | Stop reason: HOSPADM

## 2023-02-26 RX ORDER — PROPOFOL 10 MG/ML
5-75 INJECTION, EMULSION INTRAVENOUS CONTINUOUS
Status: DISCONTINUED | OUTPATIENT
Start: 2023-02-26 | End: 2023-02-28

## 2023-02-26 RX ORDER — DEXTROSE MONOHYDRATE 25 G/50ML
50 INJECTION, SOLUTION INTRAVENOUS ONCE
Status: COMPLETED | OUTPATIENT
Start: 2023-02-26 | End: 2023-02-26

## 2023-02-26 RX ORDER — SODIUM CHLORIDE 234 MG/ML
30 SOLUTION, CONCENTRATE INTRAVENOUS ONCE
Status: DISCONTINUED | OUTPATIENT
Start: 2023-02-26 | End: 2023-02-26

## 2023-02-26 RX ORDER — POLYETHYLENE GLYCOL 3350 17 G/17G
17 POWDER, FOR SOLUTION ORAL ONCE
Status: COMPLETED | OUTPATIENT
Start: 2023-02-26 | End: 2023-02-26

## 2023-02-26 RX ORDER — GABAPENTIN 600 MG/1
600 TABLET ORAL AT BEDTIME
Status: DISCONTINUED | OUTPATIENT
Start: 2023-02-26 | End: 2023-03-07 | Stop reason: HOSPADM

## 2023-02-26 RX ORDER — SODIUM CHLORIDE, SODIUM GLUCONATE, SODIUM ACETATE, POTASSIUM CHLORIDE AND MAGNESIUM CHLORIDE 526; 502; 368; 37; 30 MG/100ML; MG/100ML; MG/100ML; MG/100ML; MG/100ML
1000 INJECTION, SOLUTION INTRAVENOUS ONCE
Status: COMPLETED | OUTPATIENT
Start: 2023-02-26 | End: 2023-02-26

## 2023-02-26 RX ORDER — CEFAZOLIN SODIUM/WATER 2 G/20 ML
SYRINGE (ML) INTRAVENOUS PRN
Status: DISCONTINUED | OUTPATIENT
Start: 2023-02-26 | End: 2023-02-26

## 2023-02-26 RX ORDER — LEVOTHYROXINE SODIUM 25 UG/1
25 TABLET ORAL EVERY OTHER DAY
Status: DISCONTINUED | OUTPATIENT
Start: 2023-02-26 | End: 2023-03-07 | Stop reason: HOSPADM

## 2023-02-26 RX ORDER — AMOXICILLIN 250 MG
2 CAPSULE ORAL AT BEDTIME
Status: DISCONTINUED | OUTPATIENT
Start: 2023-02-26 | End: 2023-02-27

## 2023-02-26 RX ORDER — CYANOCOBALAMIN 1000 UG/ML
1000 INJECTION, SOLUTION INTRAMUSCULAR; SUBCUTANEOUS
Status: DISCONTINUED | OUTPATIENT
Start: 2023-02-26 | End: 2023-03-07 | Stop reason: HOSPADM

## 2023-02-26 RX ORDER — ONDANSETRON 4 MG/1
4 TABLET, ORALLY DISINTEGRATING ORAL EVERY 6 HOURS PRN
Status: DISCONTINUED | OUTPATIENT
Start: 2023-02-26 | End: 2023-03-07 | Stop reason: HOSPADM

## 2023-02-26 RX ORDER — PROCHLORPERAZINE MALEATE 10 MG
10 TABLET ORAL EVERY 6 HOURS PRN
Status: DISCONTINUED | OUTPATIENT
Start: 2023-02-26 | End: 2023-03-07 | Stop reason: HOSPADM

## 2023-02-26 RX ORDER — CALCIUM GLUCONATE 20 MG/ML
2 INJECTION, SOLUTION INTRAVENOUS ONCE
Status: COMPLETED | OUTPATIENT
Start: 2023-02-26 | End: 2023-02-26

## 2023-02-26 RX ORDER — DEXMEDETOMIDINE HYDROCHLORIDE 4 UG/ML
.1-1.2 INJECTION, SOLUTION INTRAVENOUS CONTINUOUS
Status: DISCONTINUED | OUTPATIENT
Start: 2023-02-26 | End: 2023-02-26

## 2023-02-26 RX ORDER — DEXAMETHASONE SODIUM PHOSPHATE 10 MG/ML
10 INJECTION, SOLUTION INTRAMUSCULAR; INTRAVENOUS ONCE
Status: COMPLETED | OUTPATIENT
Start: 2023-02-26 | End: 2023-02-26

## 2023-02-26 RX ORDER — PROPOFOL 10 MG/ML
INJECTION, EMULSION INTRAVENOUS
Status: COMPLETED
Start: 2023-02-26 | End: 2023-02-26

## 2023-02-26 RX ORDER — HYDRALAZINE HYDROCHLORIDE 20 MG/ML
10 INJECTION INTRAMUSCULAR; INTRAVENOUS EVERY 4 HOURS PRN
Status: DISCONTINUED | OUTPATIENT
Start: 2023-02-26 | End: 2023-03-07 | Stop reason: HOSPADM

## 2023-02-26 RX ORDER — NALOXONE HYDROCHLORIDE 0.4 MG/ML
0.4 INJECTION, SOLUTION INTRAMUSCULAR; INTRAVENOUS; SUBCUTANEOUS
Status: DISCONTINUED | OUTPATIENT
Start: 2023-02-26 | End: 2023-03-07 | Stop reason: HOSPADM

## 2023-02-26 RX ORDER — IOPAMIDOL 755 MG/ML
500 INJECTION, SOLUTION INTRAVASCULAR ONCE
Status: COMPLETED | OUTPATIENT
Start: 2023-02-26 | End: 2023-02-26

## 2023-02-26 RX ORDER — LEVETIRACETAM 10 MG/ML
1000 INJECTION INTRAVASCULAR ONCE
Status: COMPLETED | OUTPATIENT
Start: 2023-02-26 | End: 2023-02-26

## 2023-02-26 RX ORDER — ONDANSETRON 2 MG/ML
4 INJECTION INTRAMUSCULAR; INTRAVENOUS EVERY 6 HOURS PRN
Status: DISCONTINUED | OUTPATIENT
Start: 2023-02-26 | End: 2023-03-07 | Stop reason: HOSPADM

## 2023-02-26 RX ORDER — SODIUM CHLORIDE, SODIUM GLUCONATE, SODIUM ACETATE, POTASSIUM CHLORIDE AND MAGNESIUM CHLORIDE 526; 502; 368; 37; 30 MG/100ML; MG/100ML; MG/100ML; MG/100ML; MG/100ML
INJECTION, SOLUTION INTRAVENOUS CONTINUOUS PRN
Status: DISCONTINUED | OUTPATIENT
Start: 2023-02-26 | End: 2023-02-26

## 2023-02-26 RX ORDER — LABETALOL HYDROCHLORIDE 5 MG/ML
10 INJECTION, SOLUTION INTRAVENOUS
Status: DISCONTINUED | OUTPATIENT
Start: 2023-02-26 | End: 2023-03-06

## 2023-02-26 RX ORDER — PROPOFOL 10 MG/ML
5-75 INJECTION, EMULSION INTRAVENOUS CONTINUOUS
Status: DISCONTINUED | OUTPATIENT
Start: 2023-02-26 | End: 2023-02-26

## 2023-02-26 RX ORDER — BUPIVACAINE HYDROCHLORIDE AND EPINEPHRINE 2.5; 5 MG/ML; UG/ML
INJECTION, SOLUTION EPIDURAL; INFILTRATION; INTRACAUDAL; PERINEURAL PRN
Status: DISCONTINUED | OUTPATIENT
Start: 2023-02-26 | End: 2023-02-26 | Stop reason: HOSPADM

## 2023-02-26 RX ORDER — FENTANYL CITRATE 50 UG/ML
25-50 INJECTION, SOLUTION INTRAMUSCULAR; INTRAVENOUS
Status: DISCONTINUED | OUTPATIENT
Start: 2023-02-26 | End: 2023-02-28

## 2023-02-26 RX ORDER — ATROPINE SULFATE 0.1 MG/ML
INJECTION INTRAVENOUS
Status: COMPLETED
Start: 2023-02-26 | End: 2023-02-26

## 2023-02-26 RX ORDER — ETOMIDATE 2 MG/ML
20 INJECTION INTRAVENOUS ONCE
Status: COMPLETED | OUTPATIENT
Start: 2023-02-26 | End: 2023-02-26

## 2023-02-26 RX ORDER — NOREPINEPHRINE BITARTRATE 0.06 MG/ML
.01-.6 INJECTION, SOLUTION INTRAVENOUS CONTINUOUS
Status: DISCONTINUED | OUTPATIENT
Start: 2023-02-26 | End: 2023-02-28

## 2023-02-26 RX ORDER — SODIUM CHLORIDE, SODIUM LACTATE, POTASSIUM CHLORIDE, CALCIUM CHLORIDE 600; 310; 30; 20 MG/100ML; MG/100ML; MG/100ML; MG/100ML
INJECTION, SOLUTION INTRAVENOUS CONTINUOUS PRN
Status: DISCONTINUED | OUTPATIENT
Start: 2023-02-26 | End: 2023-02-26

## 2023-02-26 RX ADMIN — FENTANYL CITRATE 50 MCG: 50 INJECTION, SOLUTION INTRAMUSCULAR; INTRAVENOUS at 19:08

## 2023-02-26 RX ADMIN — SODIUM CHLORIDE, SODIUM GLUCONATE, SODIUM ACETATE, POTASSIUM CHLORIDE AND MAGNESIUM CHLORIDE: 526; 502; 368; 37; 30 INJECTION, SOLUTION INTRAVENOUS at 12:44

## 2023-02-26 RX ADMIN — SODIUM CHLORIDE, SODIUM GLUCONATE, SODIUM ACETATE, POTASSIUM CHLORIDE AND MAGNESIUM CHLORIDE 1000 ML: 526; 502; 368; 37; 30 INJECTION, SOLUTION INTRAVENOUS at 18:19

## 2023-02-26 RX ADMIN — PHENYLEPHRINE HYDROCHLORIDE 200 MCG: 10 INJECTION INTRAVENOUS at 13:46

## 2023-02-26 RX ADMIN — PHENYLEPHRINE HYDROCHLORIDE 100 MCG: 10 INJECTION INTRAVENOUS at 12:58

## 2023-02-26 RX ADMIN — PHENYLEPHRINE HYDROCHLORIDE 200 MCG: 10 INJECTION INTRAVENOUS at 13:53

## 2023-02-26 RX ADMIN — PHENYLEPHRINE HYDROCHLORIDE 100 MCG: 10 INJECTION INTRAVENOUS at 13:23

## 2023-02-26 RX ADMIN — FENTANYL CITRATE 50 MCG: 50 INJECTION, SOLUTION INTRAMUSCULAR; INTRAVENOUS at 16:57

## 2023-02-26 RX ADMIN — SUGAMMADEX 200 MG: 100 INJECTION, SOLUTION INTRAVENOUS at 15:30

## 2023-02-26 RX ADMIN — DEXMEDETOMIDINE HYDROCHLORIDE 0.2 MCG/KG/HR: 4 INJECTION, SOLUTION INTRAVENOUS at 16:57

## 2023-02-26 RX ADMIN — PHENYLEPHRINE HYDROCHLORIDE 100 MCG: 10 INJECTION INTRAVENOUS at 13:18

## 2023-02-26 RX ADMIN — SENNOSIDES AND DOCUSATE SODIUM 2 TABLET: 50; 8.6 TABLET ORAL at 21:35

## 2023-02-26 RX ADMIN — PROTHROMBIN, COAGULATION FACTOR VII HUMAN, COAGULATION FACTOR IX HUMAN, COAGULATION FACTOR X HUMAN, PROTEIN C, PROTEIN S HUMAN, AND WATER 3854 UNITS: KIT at 07:02

## 2023-02-26 RX ADMIN — Medication 2 G: at 12:58

## 2023-02-26 RX ADMIN — PHENYLEPHRINE HYDROCHLORIDE 200 MCG: 10 INJECTION INTRAVENOUS at 14:07

## 2023-02-26 RX ADMIN — CALCIUM GLUCONATE 2 G: 20 INJECTION, SOLUTION INTRAVENOUS at 18:11

## 2023-02-26 RX ADMIN — PROPOFOL 10 MCG/KG/MIN: 10 INJECTION, EMULSION INTRAVENOUS at 07:05

## 2023-02-26 RX ADMIN — TAZOBACTAM SODIUM AND PIPERACILLIN SODIUM 4.5 G: 500; 4 INJECTION, SOLUTION INTRAVENOUS at 07:49

## 2023-02-26 RX ADMIN — PROPOFOL 60 MCG/KG/MIN: 10 INJECTION, EMULSION INTRAVENOUS at 11:27

## 2023-02-26 RX ADMIN — DEXTROSE MONOHYDRATE 50 ML: 25 INJECTION, SOLUTION INTRAVENOUS at 07:03

## 2023-02-26 RX ADMIN — NOREPINEPHRINE BITARTRATE 0.03 MCG/KG/MIN: 0.06 INJECTION, SOLUTION INTRAVENOUS at 18:55

## 2023-02-26 RX ADMIN — PHENYLEPHRINE HYDROCHLORIDE 200 MCG: 10 INJECTION INTRAVENOUS at 14:15

## 2023-02-26 RX ADMIN — PROPOFOL 10 MCG/KG/MIN: 10 INJECTION, EMULSION INTRAVENOUS at 22:35

## 2023-02-26 RX ADMIN — Medication 50 MG: at 12:58

## 2023-02-26 RX ADMIN — PANTOPRAZOLE SODIUM 40 MG: 40 INJECTION, POWDER, FOR SOLUTION INTRAVENOUS at 05:26

## 2023-02-26 RX ADMIN — ROCURONIUM BROMIDE 60 MG: 10 INJECTION, SOLUTION INTRAVENOUS at 06:55

## 2023-02-26 RX ADMIN — PHENYLEPHRINE HYDROCHLORIDE 0.5 MCG/KG/MIN: 10 INJECTION INTRAVENOUS at 13:29

## 2023-02-26 RX ADMIN — Medication 0.03 MCG/KG/MIN: at 18:55

## 2023-02-26 RX ADMIN — PHENYLEPHRINE HYDROCHLORIDE 100 MCG: 10 INJECTION INTRAVENOUS at 13:41

## 2023-02-26 RX ADMIN — RIFAXIMIN 550 MG: 550 TABLET ORAL at 21:35

## 2023-02-26 RX ADMIN — IOPAMIDOL 90 ML: 755 INJECTION, SOLUTION INTRAVENOUS at 05:51

## 2023-02-26 RX ADMIN — DEXAMETHASONE SODIUM PHOSPHATE 10 MG: 10 INJECTION, SOLUTION INTRAMUSCULAR; INTRAVENOUS at 07:00

## 2023-02-26 RX ADMIN — LEVETIRACETAM 1000 MG: 10 INJECTION INTRAVENOUS at 06:45

## 2023-02-26 RX ADMIN — SODIUM CHLORIDE, POTASSIUM CHLORIDE, SODIUM LACTATE AND CALCIUM CHLORIDE: 600; 310; 30; 20 INJECTION, SOLUTION INTRAVENOUS at 13:29

## 2023-02-26 RX ADMIN — Medication 30 MG: at 14:00

## 2023-02-26 RX ADMIN — LORAZEPAM 0.5 MG: 2 INJECTION INTRAMUSCULAR; INTRAVENOUS at 06:06

## 2023-02-26 RX ADMIN — ETOMIDATE 20 MG: 20 INJECTION, SOLUTION INTRAVENOUS at 06:48

## 2023-02-26 RX ADMIN — GABAPENTIN 600 MG: 600 TABLET, FILM COATED ORAL at 21:35

## 2023-02-26 RX ADMIN — PHENYLEPHRINE HYDROCHLORIDE 200 MCG: 10 INJECTION INTRAVENOUS at 13:59

## 2023-02-26 ASSESSMENT — ENCOUNTER SYMPTOMS
FEVER: 0
SPEECH DIFFICULTY: 1
VOMITING: 0
CONFUSION: 1

## 2023-02-26 ASSESSMENT — VISUAL ACUITY
OU: OTHER (SEE COMMENT)

## 2023-02-26 ASSESSMENT — ACTIVITIES OF DAILY LIVING (ADL)
ADLS_ACUITY_SCORE: 35
ADLS_ACUITY_SCORE: 39

## 2023-02-26 NOTE — ANESTHESIA PROCEDURE NOTES
Arterial Line Procedure Note    Pre-Procedure   Staff -        Anesthesiologist:  Nic Bautista MD       Resident/Fellow: Raymond Ricardo MD       Performed By: resident       Location: pre-op       Pre-Anesthestic Checklist: patient identified, IV checked, monitors and equipment checked, pre-op evaluation and at physician/surgeon's request  Timeout:       Correct Patient: Yes        Correct Procedure: Yes        Correct Site: Yes        Correct Position: Yes   Line Placement:   This line was placed Post Induction  Procedure   Procedure: arterial line       Diagnosis: intra operative monitoring       Laterality: left       Insertion Site: radial.  Sterile Prep        Standard elements of sterile barrier followed       Skin prep: Chloraprep  Insertion/Injection        Technique: ultrasound guided and Seldinger Technique        1. Ultrasound was used to evaluate the access site.       2. Artery evaluated via ultrasound for patency/adequacy.       3. Using real-time ultrasound the needle/catheter was observed entering the artery/vein.       Catheter Type/Size: 20 G, 12 cm  Narrative         Secured by: suture       Tegaderm dressing used.       Complications: None apparent,        Arterial waveform:        IBP within 10% of NIBP: Yes

## 2023-02-26 NOTE — CONSULTS
[facility]       NEUROSURGERY CONSULTATION NOTE    Overhead trauma activation:  Neurosurgery paged: No  Arrived: prior to patient arrival in ED    Reason for Consultation  2.5 cm acute-on-subacute left convexity subdural hematoma    HPI:  Sulma Ramos is a 57 year old female with history of liver cancer, cirrhosis, thrombocytopenia, and portal vein thrombosis on Eliquis who presented to [facility] earlier today after her  noticed erratic behavior over the past 2 days (similar to when she has forgotten to take her lactulose).    During her time in the [facility] ED, the patient demonstrated a decline in mental status and was intubated for airway protection.  She was then transferred to [facility] for possibility of neurosurgical intervention.    On arrival, patient is intubated, eyes squeeze shut to noxious, localizing and spontaneous BUE, and withdraws BLE.  Platelets 124, INR 2.66 -> 1.8 (POCT), TEG pending.  Of note, the patient's  reports that she last took Eliquis two days ago; she discontinued it herself because she was having excessive bruising.    PAST MEDICAL HISTORY:   Past Medical History:   Diagnosis Date    Anxiety state, unspecified     Polyphagia(783.6)        PAST SURGICAL HISTORY:   Past Surgical History:   Procedure Laterality Date    ZZC NONSPECIFIC PROCEDURE       x 2    ZZC NONSPECIFIC PROCEDURE             FAMILY HISTORY:   Family History   Problem Relation Age of Onset    Hypertension Mother     Hypertension Paternal Grandfather     Thyroid Disease Paternal Grandfather     Arthritis Father         gout    Arthritis Paternal Grandmother         gout    Arthritis Paternal Grandfather         gout    C.A.D. Paternal Grandfather          of MI in 70s    C.A.D. Mother         atrial fibrillation       SOCIAL HISTORY:   Social History     Tobacco Use    Smoking status: Never    Smokeless tobacco: Not on file   Substance Use  Topics    Alcohol use: Yes     Comment: 8-10/week       MEDICATIONS:  Current Outpatient Medications   Medication Sig Dispense Refill    Ascorbic Acid (VITAMIN C PO) Take 500 mg by mouth daily      bacitracin 500 UNIT/GM OINT Apply 0.1 g topically 2 times daily 120 g 0    calcium-vitamin D (CALTRATE) 600-400 MG-UNIT per tablet Take 1 tablet by mouth daily      cyanocobalamin 1000 MCG/ML injection Inject 1 mL as directed every 30 days      ferrous sulfate 325 (65 FE) MG tablet Take 325 mg by mouth daily (with breakfast)      FLUoxetine (PROZAC) 40 MG capsule Take 1 capsule (40 mg) by mouth every morning 30 capsule     Furosemide (LASIX) 20 MG tablet Take 1 tablet (20 mg) by mouth daily 30 tablet     GABAPENTIN PO Take 600 mg by mouth At Bedtime for restless legs      lactulose 20 GM/30ML SOLN Take 30 mLs (20 g) by mouth 2 times daily 30 mL     LEVOTHYROXINE SODIUM PO Take 25 mcg by mouth every other day      Multiple Vitamins-Minerals (MULTIVITAMIN OR) Take 1 tablet by mouth daily      NADOLOL PO Take 20 mg by mouth every evening      oxyCODONE-acetaminophen (PERCOCET) 5-325 MG per tablet Take 1-2 tablets by mouth every 4 hours as needed for moderate to severe pain 15 tablet 0    Pyridoxine HCl (VITAMIN B6 PO) Take 1 tablet by mouth daily      Rifaximin (XIFAXAN PO) Take 550 mg by mouth 2 times daily      spironolactone (ALDACTONE) 100 MG tablet Take 1 tablet (100 mg) by mouth daily 30 tablet     Thiamine HCl (VITAMIN B-1 PO) Take 1 tablet by mouth daily      Zolpidem Tartrate (AMBIEN PO) Take 10 mg by mouth nightly as needed         Allergies:  Allergies   Allergen Reactions    Clarithromycin     Clindamycin Base     Droperidol        ROS: 10 point ROS of systems including Constitutional, Eyes, Respiratory, Cardiovascular, Gastroenterology, Genitourinary, Integumentary, Muscularskeletal, Psychiatric were all negative except for pertinent positives noted in my HPI.    Physical exam:   Blood pressure (!) 169/98, pulse  "80, temperature 97.4  F (36.3  C), temperature source Axillary, resp. rate 16, height 1.626 m (5' 4\"), last menstrual period 01/19/2004, SpO2 100 %.  GEN: scattered bruising  CV: RRR on telemetry  PULM: intubated, mechanically ventilated  ABD: soft, non-distended  NEUROLOGIC:  Intubated, off propofol x5 minutes.  GCS 8T (E2, VT, M5)  Not following commands.  Eyes open briefly to noxious.  Spontaneous and antigravity BUE.  Withdraws BLE.    IMAGING:  CT head 0616:  Large left acute-on-subacute SDH causing >1 cm rightward midline shift.    Radiology interpretation:  1.  Interval development of a large acute on subacute left convexity subdural hematoma. Mass effect and left to right midline shift with uncal herniation and trapping of the right lateral ventricle.     2.  Thin acute subdural hematomas along the falx and both tentorial leaflets as well as the right parietal lobe.    CT cervical spine 0814:  No acute fracture/subluxation.    Radiology interpretation:  1. No acute cervical spine fracture.  2. Mild retrolisthesis of C5 on C6.  3. Degenerative changes, as described, most pronounced at C5-C6 where there is mild to moderate/moderate spinal canal narrowing. No definite high-grade spinal canal or neural foraminal stenosis.  4. Partial visualization of a large right pleural effusion, incompletely assessed.      LABS:   Lab Results   Component Value Date    WBC 2.0 02/26/2023    WBC 3.3 09/07/2013     Lab Results   Component Value Date    RBC 2.90 02/26/2023    RBC 3.73 09/07/2013     Lab Results   Component Value Date    HGB 9.4 02/26/2023    HGB 10.9 02/26/2023    HGB 12.0 09/07/2013     Lab Results   Component Value Date    HCT 29.7 02/26/2023    HCT 32 02/26/2023    HCT 34.5 09/07/2013     Lab Results   Component Value Date     02/26/2023    MCV 93 09/07/2013     Lab Results   Component Value Date    MCH 32.4 02/26/2023    MCH 32.2 09/07/2013     Lab Results   Component Value Date    MCHC 31.6 02/26/2023 "    MCHC 34.8 09/07/2013     Lab Results   Component Value Date    RDW 18.2 02/26/2023    RDW 13.6 09/07/2013     Lab Results   Component Value Date     02/26/2023    PLT 80 09/07/2013       Last Comprehensive Metabolic Panel:  Sodium   Date Value Ref Range Status   02/26/2023 141 136 - 145 mmol/L Final   09/07/2013 135 133 - 144 mmol/L Final     Sodium POCT   Date Value Ref Range Status   02/26/2023 143 133 - 144 mmol/L Final     Potassium   Date Value Ref Range Status   02/26/2023 3.7 3.4 - 5.3 mmol/L Final   09/07/2013 3.4 3.4 - 5.3 mmol/L Final     Potassium POCT   Date Value Ref Range Status   02/26/2023 3.5 3.4 - 5.3 mmol/L Final     Chloride   Date Value Ref Range Status   02/26/2023 107 98 - 107 mmol/L Final   09/07/2013 103 94 - 109 mmol/L Final     Carbon Dioxide   Date Value Ref Range Status   09/07/2013 20 20 - 32 mmol/L Final     Carbon Dioxide (CO2)   Date Value Ref Range Status   02/26/2023 22 22 - 29 mmol/L Final     Anion Gap   Date Value Ref Range Status   02/26/2023 12 7 - 15 mmol/L Final   09/07/2013 12 6 - 17 mmol/L Final     Glucose   Date Value Ref Range Status   02/26/2023 63 (L) 70 - 99 mg/dL Final   09/07/2013 136 (H) 60 - 99 mg/dL Final     GLUCOSE BY METER POCT   Date Value Ref Range Status   02/26/2023 140 (H) 70 - 99 mg/dL Final     Glucose Whole Blood POCT   Date Value Ref Range Status   02/26/2023 137 (H) 70 - 99 mg/dL Final     Urea Nitrogen   Date Value Ref Range Status   02/26/2023 30.7 (H) 8.0 - 23.0 mg/dL Final   09/07/2013 20 5 - 24 mg/dL Final     Creatinine   Date Value Ref Range Status   02/26/2023 1.56 (H) 0.51 - 0.95 mg/dL Final   09/07/2013 0.97 0.52 - 1.04 mg/dL Final     GFR Estimate   Date Value Ref Range Status   02/26/2023 38 (L) >60 mL/min/1.73m2 Final     Comment:     eGFR calculated using 2021 CKD-EPI equation.   09/07/2013 61 >60 mL/min/1.7m2 Final     Calcium   Date Value Ref Range Status   02/26/2023 8.3 (L) 8.6 - 10.0 mg/dL Final   09/07/2013 8.7 8.5 -  10.4 mg/dL Final       INR   Date Value Ref Range Status   02/26/2023 2.66 (H) 0.85 - 1.15 Final   05/15/2011 1.57 (H) 0.86 - 1.14 Final     INR POCT   Date Value Ref Range Status   02/26/2023 1.8 (L) 2.0 - 3.0 Final      PTT   Date Value Ref Range Status   05/14/2009 40 (H) 22 - 37 sec Final        ASSESSMENT:  57 year old female with liver cancer and coagulopathy with thrombocytopenia and elevated INR, presenting with 2 days of AMS in setting of acute-on-subacute large left convexity SDH.  Anticipate surgery, pending correction of coagulopathy      RECOMMENDATIONS:  - Admit to ICU  - Q1h neuro checks  - Repeat head CT 6 hours from time of initial scan  - Strict SBP <140  - Reverse Eliquis  - Goal INR <1.5, platelets >100k, reverse Eliquis (as able)  - Complete trauma workup, including CT T- and L-spine  - Discuss goals of care with patient's family  - Anticipate operative management if coagulopathy corrected  - Avoid sedating medications that would alter a neurological examination    The patient was discussed with Dr. Mayo, neurosurgery staff, and she agrees with the above.    Pietro Bustamante M.D.  Neurosurgery Resident, PGY-4    Please contact neurosurgery resident on call with questions.    Dial * * [phone], enter [phone] when prompted.       Comorbid conditions:  The following conditions are also present, complicating the patient's current management, as described in the Assessment and Plan:   mechanical ventilation on day of admission, intracranial hematoma, brain compression, coma, based on current GCS of 6T, hypotension and anemia    Clinically Significant Risk Factors Present on Admission              # Hypoalbuminemia: Lowest albumin = 2.4 g/dL at 2/26/2023  4:54 AM, will monitor as appropriate  # Coagulation Defect: INR = 1.43 (Ref range: 0.85 - 1.15) and/or PTT = 39 Seconds (Ref range: 22 - 38 Seconds), will monitor for bleeding  # Thrombocytopenia: Lowest platelets = 112 in last 2 days, will monitor for  "bleeding        # Obesity: Estimated body mass index is 30.04 kg/m  as calculated from the following:    Height as of this encounter: 1.626 m (5' 4\").    Weight as of an earlier encounter on 2/26/23: 79.4 kg (175 lb).        # Compression of brain   I have seen this patient with the resident and formulated a plan and agree with this note.  AMP   "

## 2023-02-26 NOTE — ANESTHESIA CARE TRANSFER NOTE
Patient: Sulma Ramos    Procedure: Procedure(s):  Left errol holes, evacuation of hematoma subdural, combined       Diagnosis: * No pre-op diagnosis entered *  Diagnosis Additional Information: No value filed.    Anesthesia Type:   General     Note:    Oropharynx: oral gastic tube in place and endotracheal tube in place  Level of Consciousness: iatrogenic sedation    Level of Supplemental Oxygen (L/min / FiO2): 10  Independent Airway: airway patency not satisfactory and stable  Dentition: dentition unchanged  Vital Signs Stable: post-procedure vital signs reviewed and stable  Report to RN Given: handoff report given  Patient transferred to: ICU    ICU Handoff: Call for PAUSE to initiate/utilize ICU HANDOFF, Identified Patient, Identified Responsible Provider, Reviewed the Pertinent Medical History, Discussed Surgical Course, Reviewed Intra-OP Anesthesia Management and Issues during Anesthesia, Set Expectations for Post Procedure Period and Allowed Opportunity for Questions and Acknowledgement of Understanding      Vitals:  Vitals Value Taken Time   BP     Temp     Pulse     Resp     SpO2         Electronically Signed By: Raymond Ricardo MD  February 26, 2023  3:53 PM

## 2023-02-26 NOTE — PROGRESS NOTES
0645's, pt intubated per MD for airway protection. Positive for ETCO2.   ETT size 7.0, 22 at teeth. CXR done which confirmed good ETT placement per MD.    Placed pt on initial vent settings of AC/14/400/+5/40%. Sat 98%.

## 2023-02-26 NOTE — ANESTHESIA PREPROCEDURE EVALUATION
Anesthesia Pre-Procedure Evaluation    Patient: Sulma Ramos   MRN: [id number] : 1965        Procedure : Procedure(s):  Left errol holdes, possible Craniotomy, evacuate hematoma subdural, combined          Past Medical History:   Diagnosis Date     Anxiety state, unspecified      Polyphagia(783.6)       Past Surgical History:   Procedure Laterality Date     ZZC NONSPECIFIC PROCEDURE       x 2     ZZC NONSPECIFIC PROCEDURE            Allergies   Allergen Reactions     Clarithromycin      Clindamycin Base      Droperidol       Social History     Tobacco Use     Smoking status: Never     Smokeless tobacco: Not on file   Substance Use Topics     Alcohol use: Yes     Comment: 8-10/week      Wt Readings from Last 1 Encounters:   23 79.4 kg (175 lb)        Anesthesia Evaluation            ROS/MED HX  ENT/Pulmonary:       Neurologic: Comment: Hepatic encephalopathy    Multiple SDH with midline shift      Cardiovascular:     (+) hypertension-----    METS/Exercise Tolerance: 3 - Able to walk 1-2 blocks without stopping    Hematologic:       Musculoskeletal:       GI/Hepatic: Comment: Colitis  Anxiety   Polyphagia   Ascites chronic   Hypertension portal   Liver cancer   Portal vein thrombosis   GERD   Hypothyroidism     (+) hepatitis liver disease,     Renal/Genitourinary:       Endo:     (+) thyroid problem, hypothyroidism,     Psychiatric/Substance Use:       Infectious Disease:       Malignancy:   (+) Malignancy,     Other:            Physical Exam    Airway   unable to assess          Respiratory Devices and Support    ETT:      Dental    unable to assess        Cardiovascular          Rhythm and rate: regular and normal     Pulmonary    Unable to assess               OUTSIDE LABS:  CBC:   Lab Results   Component Value Date    WBC 2.0 (L) 2023    WBC 1.8 (L) 2023    HGB 9.4 (L) 2023    HGB 10.9 (L) 2023    HCT 29.7 (L) 2023    HCT 32 (L) 2023     (L)  02/26/2023     (L) 02/26/2023     BMP:   Lab Results   Component Value Date     02/26/2023     02/26/2023    POTASSIUM 3.5 02/26/2023    POTASSIUM 3.7 02/26/2023    CHLORIDE 107 02/26/2023    CHLORIDE 103 01/19/2023    CO2 22 02/26/2023    CO2 22 01/19/2023    BUN 30.7 (H) 02/26/2023    BUN 11.2 01/19/2023    CR 1.56 (H) 02/26/2023    CR 1.04 (H) 01/19/2023     (H) 02/26/2023     (H) 02/26/2023     (H) 02/26/2023     COAGS:   Lab Results   Component Value Date    PTT 39 (H) 02/26/2023    INR 1.43 (H) 02/26/2023     POC:   Lab Results   Component Value Date     (H) 09/06/2013     HEPATIC:   Lab Results   Component Value Date    ALBUMIN 2.4 (L) 02/26/2023    PROTTOTAL 5.3 (L) 02/26/2023    ALT 34 02/26/2023    AST 96 (H) 02/26/2023    ALKPHOS 157 (H) 02/26/2023    BILITOTAL 1.5 (H) 02/26/2023    ELIEZER 45 02/26/2023     OTHER:   Lab Results   Component Value Date    A1C 5.1 04/25/2006    CHERRIE 8.3 (L) 02/26/2023    MAG 2.0 02/26/2023    LIPASE 43 02/26/2023    TSH 10.90 (H) 05/15/2011    T4 1.31 05/15/2011       Anesthesia Plan    ASA Status:  3      Anesthesia Type: General.     - Airway: ETT   Induction: Intravenous.   Maintenance: Inhalation.   Techniques and Equipment:     - Lines/Monitors: Arterial Line     Consents         - Extended Intubation/Ventilatory Support Discussed: No.      - Patient is DNR/DNI Status: No    Use of blood products discussed: No .     Postoperative Care    Pain management: IV analgesics, Multi-modal analgesia.   PONV prophylaxis: Ondansetron (or other 5HT-3)     Comments:                Nic Bautista MD

## 2023-02-26 NOTE — OP NOTE
Procedure Date: 02/26/2023    PREOPERATIVE DIAGNOSES:  1.  Large left convexity subacute subdural hematoma with brain compression.  2.  Coagulopathy with elevated INR (2.66), status post Kcentra with normalization down to 1.43.  3.  Thrombocytopenia with platelets of 126.    POSTOPERATIVE DIAGNOSES:  1.  Large left convexity subacute subdural hematoma with brain compression.  2.  Coagulopathy with elevated INR (2.66), status post Kcentra with normalization down to 1.43.  3.  Thrombocytopenia with platelets of 126.    PROCEDURE PERFORMED:  Left bur holes for subdural hematoma evacuation.    ATTENDING SURGEON:  Liz Mayo MD, PhD    RESIDENT:  Pietro Chang MD, PhD    ANESTHESIA:  General endotracheal anesthesia.    ESTIMATED BLOOD LOSS:  300 mL    FINDINGS:  Subacute subdural hematoma under severe pressure.  Very good evacuation, though diffuse oozing everywhere.  Subdural space was running clear after a significant amount of irrigation.  A 10 round KATIA drain left in the subdural space, tunneled anteriorly.    INDICATIONS FOR PROCEDURE:  Ms. Ramos is a 57-year-old female with a history of liver cancer, cirrhosis and thrombocytopenia as well as portal vein thrombosis, on Eliquis (though she stopped taking Eliquis 48 hours ago due to diffuse bruising she was experiencing), who presented to [facility] for evaluation of her erratic behavior, which developed over the past 2 days.  Of note, the patient has exhibited similar behavior in the past when she stopped taking lactulose and there was concern that she had hepatic encephalopathy.  However, she underwent a CT of her head, which demonstrated a massive left convexity subdural hematoma with significant brain compression.  Based on these findings, she was transferred to the [facility] for further cares.  She received Kcentra and her INR normalized from 2.66 down to 1.4.  Therefore, we plan to bring her to the operating room  for errol holes to evacuate the subdural collection.  The risks, benefits and alternatives were discussed with the patient's family and they elected to proceed.    DESCRIPTION OF PROCEDURE:  After informed consent was obtained, the patient was brought to the operating room.  She was transferred supine onto the operating room table.  Note that she undergone intubation and mechanical ventilation prior to arrival in the operating room.  General anesthesia was induced.  The bed was turned 180 degrees.  The patient's head was placed in a horseshoe, which was attached to the bed via the Trivedi adapter.  The left region of her scalp was shaved using surgical clippers.  Two linear incisions were marked just above the superior temporal line.  The patient was then prepped and draped in standard fashion.    Timeout was performed to verify site and side of surgery as well as that the available imaging in the room corresponded to the correct patient.  The planned incisions were injected with local anesthetic (0.25% Marcaine with epinephrine 1:100,000).  Total of 10 mL was injected.  The skin was opened using a #15 blade.  Monopolar electrocautery was used to dissect down to the cranium.  A periosteal elevator was used to mobilize the scalp.  Self-retaining retractors were placed and errol holes were created in the cranium using a high-speed drill with a  drill bit.  The residual inner table was removed using a straight curette.  The dura was then coagulated with bipolar electrocautery and FloSeal was applied to achieve hemostasis.  The dura was opened using #15 blade and the subacute blood was immediately encountered under significant pressure.  Once the dura was opened, we irrigated with copious amounts of irrigation and evacuated a massive amount of subdural subacute blood.  Note, there was significant epidural oozing; therefore, we placed tack-up sutures around the bur holes, which in conjunction with FloSeal  controlled the epidural bleeding.  We then placed a 10 round KATIA drain in the subdural space and tunneled it out anteriorly.  Gelfoam was placed in each bur hole and the scalp was reapproximated using 2-0 Vicryl sutures in simple inverted interrupted fashion.  The skin was closed using staples.  The drain was secured in position using a 3-0 nylon suture.  At the end of the procedure, all counts including needle, instrument, sponge were correct x 2.  There were no immediate complications encountered during the procedure.  The drapes were taken down and the incision was covered with sterile dressing.  The anesthesia team then placed a right internal jugular central line.  The patient was then transferred back to the neuro ICU for postoperative cares.    Dr. Mayo, Neurosurgery staff, was present during the key and critical portions of the case and immediately available during the entire procedure.    Liz Mayo MD    As Dictated by JANELL ADORNO MD, PhD        D: 2023   T: 2023   MT: [id number]    Name:     LAURA LONG  MRN:      [id number]        Account:        [id number]   :      1965           Procedure Date: 2023     Document: [id number]    I was present for the critical portions of the operation and immediately available for the remainder.  AMP

## 2023-02-26 NOTE — CONSULTS
[facility]    Consult note: Trauma Service       Date of Admission:  2/26/2023    Time of Admission/Consult Request (page/call): Full Trauma Activation   Time of my evaluation: upon arrival, Dr. Machuca Arrival: 0846    Trauma mechanism: unknown   Time/date of injury:unknown   Known Injuries:  1. SDHs with Mass Effect  2. Hematoma to left forearm  3. ecchymosis throughout       Assessment: Sulma Ramos 57 year old with a PMH of liver failure with portal hypertension, ascites, s/p Beto-en Y, with fall hx was seen at [facility] ED for AMS. Pt was found to have multiple SDHs with mass effect. Patient was intubated and transferred to [facility]. At [facility] patient received Decadron 10mg, Keppra 1,000mg, Ativan, and Kcentra. Pip/tazo was also given for colitis seen on CT.       Neuro/Pain/Psych:  # Acute on Subacute left convexity SDH  # Thin acute SDHs falx and both tentorial leaflets as well as the right parietal lobe  # Brain compression   # Hx of seizures  - Initial Head CT @ 0616, repeat ordered   - Neurosurgery following     # RLS  - Gabapentin 600mg at bedtime.     # Chronic Pain  - PTA: Percocet     # Sedation   - Propofol gtts     # Anxiety  # Insomnia   - PTA: Ambien, Fluoxetine     EENT:  - Facial CT: No acute maxillofacial fracture.  - PTA: Cequa gtts    Pulmonary:  # Intubated for airway protection   # Moderate right pleural effusion, on CT  - On MV    Cardiovascular:    # Hypertension on admission  - Monitor hemodynamic status.  - ordered Nicardipine gtts to start if SBP > 140  - BNP: 2,082    Hep/GI/Nutrition:    # Cirrhosis with Portal HTN from prior alcohol use  # Hx early stage HCC s/p ablation 2009  # Chronic Ascites  # Distal esophageal varices, seen on CT 2/26/23   # GERD  # s/p Beto-en Y  - OG placed in ED  - Ammonia: 45  - PTA: lactulose, rifaximin, nadolol, omeprazole, Lasix, spironolactone   - US of portal vein ordered     Renal/ Fluids/Electrolytes:  #  NEFTALI  - Creatinine: 1.56,     Endocrine:  # Hypothyroid   -  PTA: levothyroxine     Infectious disease:   # Colitis   -  BC x2 collected at [facility]   - Pip/tazo given @ 0746, continue at this time     Hematology:    # Pancytopenia   - Hgb 9.2, Monitor and trend. Threshold for transfusion if hgb <7.0 or signs/symptoms of hypoperfusion.   - PTA: ferrous sulfate,     # Coagulopathy   # Hx of Portal Vein Thrombus  - Holding PTA: Eliquis, has not taken in 2 days per    - OSH gave Kcentra @ 0702  - TEG in process  - INR,@ 0454: 2.66       Musculoskeletal:  # Multiple falls   - Fall on 1/19/23, resulting in hematoma to entire left forearm.    - Left forearm xray: Within normal limits. No fracture.  - C-spine CT: No acute cervical spine fracture.   Cleared C-collar through imaging.     Skin:  # Ecchymosis throughout   - dilgent cares to prevent skin breakdown and wound formation.        Overall Plan:  - Pt to 4A with NeuroCrit  - Neurosurgery to follow  - Will follow-up on labs and images.       Lindy Bird PA-C  To contact the trauma service use job code pager [phone],   Numeric texts or alpha text through [facility]        Chief Complaint   AMS    History of Present Illness   Sulma Ramos 57 year old with a PMH of liver failure with portal hypertension, ascites, s/p Beto-en Y, with fall hx was seen at [facility] ED for AMS.  Per family patient has been increasingly altered with last night being the worse where she could not sit still. She was seen at [facility] ED where she was found to a have significant SDH with midline shift. Patient transferred for Trauma and Neurosurgery evaluation.      Past Medical History    I have reviewed this patient's medical history and updated it with pertinent information if needed.   Past Medical History:   Diagnosis Date     Anxiety state, unspecified      Polyphagia(783.6)        Past Surgical History   I have reviewed this patient's surgical history and updated it with pertinent  information if needed.  Past Surgical History:   Procedure Laterality Date     [facility] NONSPECIFIC PROCEDURE       x 2     [facility] NONSPECIFIC PROCEDURE           Prior to Admission Medications   Prior to Admission Medications   Prescriptions Last Dose Informant Patient Reported? Taking?   Ascorbic Acid (VITAMIN C PO)  Self Yes No   Sig: Take 500 mg by mouth daily   FLUoxetine (PROZAC) 40 MG capsule   Yes No   Sig: Take 1 capsule (40 mg) by mouth every morning   Furosemide (LASIX) 20 MG tablet   Yes No   Sig: Take 1 tablet (20 mg) by mouth daily   GABAPENTIN PO   Yes No   Sig: Take 600 mg by mouth At Bedtime for restless legs   LEVOTHYROXINE SODIUM PO  Pharmacy Yes No   Sig: Take 25 mcg by mouth every other day   Multiple Vitamins-Minerals (MULTIVITAMIN OR)   Yes No   Sig: Take 1 tablet by mouth daily   NADOLOL PO   Yes No   Sig: Take 20 mg by mouth every evening   Pyridoxine HCl (VITAMIN B6 PO)  Self Yes No   Sig: Take 1 tablet by mouth daily   Rifaximin (XIFAXAN PO)  Pharmacy Yes No   Sig: Take 550 mg by mouth 2 times daily   Thiamine HCl (VITAMIN B-1 PO)  Self Yes No   Sig: Take 1 tablet by mouth daily   Zolpidem Tartrate (AMBIEN PO)  Pharmacy Yes No   Sig: Take 10 mg by mouth nightly as needed   bacitracin 500 UNIT/GM OINT   No No   Sig: Apply 0.1 g topically 2 times daily   calcium-vitamin D (CALTRATE) 600-400 MG-UNIT per tablet  Self Yes No   Sig: Take 1 tablet by mouth daily   cyanocobalamin 1000 MCG/ML injection  Self Yes No   Sig: Inject 1 mL as directed every 30 days   ferrous sulfate 325 (65 FE) MG tablet  Self Yes No   Sig: Take 325 mg by mouth daily (with breakfast)   lactulose 20 GM/30ML SOLN   Yes No   Sig: Take 30 mLs (20 g) by mouth 2 times daily   oxyCODONE-acetaminophen (PERCOCET) 5-325 MG per tablet   No No   Sig: Take 1-2 tablets by mouth every 4 hours as needed for moderate to severe pain   spironolactone (ALDACTONE) 100 MG tablet   Yes No   Sig: Take 1 tablet (100 mg) by mouth daily       Facility-Administered Medications: None     Allergies   Allergies   Allergen Reactions     Clarithromycin      Clindamycin Base      Droperidol        Social History       Family History   Family history reviewed with patient and is noncontributory.      Review of Systems Pt intubated, altered on arrival to OSH    Physical Exam   Temp: 97.4  F (36.3  C) Temp src: Axillary   Pulse: 80   Resp: 16 SpO2: 100 % O2 Device: Mechanical Ventilator    Vital Signs with Ranges  Temp:  [97.4  F (36.3  C)-97.8  F (36.6  C)] 97.4  F (36.3  C)  Pulse:  [80-88] 80  Resp:  [14-26] 16  BP: (116-156)/(77-97) 143/87  FiO2 (%):  [40 %] 40 %  SpO2:  [81 %-100 %] 100 % 0 lbs 0 oz    Primary Survey:   Airway: ETT  Breathing: MV, can auscultate breaths sounds bilaterally    Circulation: central pulses present and peripheral pulses present  Disability: Pupils - left slightly larger then right    [place] Coma Scale - Total 3i/15  Eye Response (E): 1  4= spontaneous,  3= to verbal/voice, 2=  to pain, 1= No response   Verbal Response (V): 1   5= Orientated, converses,  4= Confused, converses, 3= Inappropriate words,  2= Incomprehensible sounds,  1=No response   Motor Response (M): 1   6= Obeys commands, 5= Localizes to pain, 4= Withdrawal to pain, 3=Fexion to pain, 2= Extension to pain, 1= No response      Primary Care Physician   Physician No Ref-Primary    Secondary Survey:  General: sedated   Head: atraumatic, normocephalic,  Eyes: pupils lef slightly > right  Nose: nares patent, no drainage,   Mouth/Throat: ETT in place   Neck:  EMS cervical collar present, removed after C-spine CT reported. Trachea midline.   Chest/Pulmonary: MV, bilateral clear breath sounds, no deformities,   Cardiovascular: normal and regular rate and rhythm,  Abdomen: soft, non-distended   : normal external genitalia, pelvis stable to lateral compression, hodges, /urine yellow and clear  Back/Spine: no deformity, step-offs,  Musculoskel/Extremities: deformity to left  forearm from deep tissue hematoma, ecchymosis throughout, with prominent areas along thighs and left buttock.   Skin: skin warm and dry.   Neuro: sedated   Data   Results for orders placed or performed during the hospital encounter of 02/26/23 (from the past 24 hour(s))   iStat Gases Electrolytes ICA Glucose Venous, POCT   Result Value Ref Range    CPB Applied No     Hematocrit POCT 32 (L) 35 - 47 %    Calcium, Ionized Whole Blood POCT 4.8 4.4 - 5.2 mg/dL    Glucose Whole Blood POCT 137 (H) 70 - 99 mg/dL    Bicarbonate Venous POCT 23 21 - 28 mmol/L    Hemoglobin POCT 10.9 (L) 11.7 - 15.7 g/dL    Potassium POCT 3.5 3.4 - 5.3 mmol/L    Sodium POCT 143 133 - 144 mmol/L    pCO2 Venous POCT 42 40 - 50 mm Hg    pO2 Venous POCT 28 25 - 47 mm Hg    pH Venous POCT 7.35 7.32 - 7.43    O2 Sat, Venous POCT 49 (L) 94 - 100 %   iStat INR, POCT   Result Value Ref Range    INR POCT 1.8 (L) 2.0 - 3.0   Glucose by meter   Result Value Ref Range    GLUCOSE BY METER POCT 140 (H) 70 - 99 mg/dL   [place] Draw    Narrative    The following orders were created for panel order [place] Draw.  Procedure                               Abnormality         Status                     ---------                               -----------         ------                     Extra Blue Top Tube[[id number]]                              Final result               Extra Red Top Tube[[id number]]                               Final result               Extra Green Top (Lithium...[[id number]]                      Final result               Extra Purple Top Tube[[id number]]                            Final result                 Please view results for these tests on the individual orders.   Extra Blue Top Tube   Result Value Ref Range    Hold Specimen JIC    Extra Red Top Tube   Result Value Ref Range    Hold Specimen JIC    Extra Green Top (Lithium Heparin) Tube   Result Value Ref Range    Hold Specimen JIC    Extra Purple Top Tube   Result Value Ref Range     Hold Specimen [facility]    Alcohol ethyl   Result Value Ref Range    Alcohol ethyl <0.01 <=0.01 g/dL   CBC with platelets differential    Narrative    The following orders were created for panel order CBC with platelets differential.  Procedure                               Abnormality         Status                     ---------                               -----------         ------                     CBC with platelets and d...[[id number]]  Abnormal            Final result                 Please view results for these tests on the individual orders.   INR   Result Value Ref Range    INR 1.43 (H) 0.85 - 1.15   ABO/Rh type and screen    Narrative    The following orders were created for panel order ABO/Rh type and screen.  Procedure                               Abnormality         Status                     ---------                               -----------         ------                     Adult Type and Screen[[id number]]                            Final result                 Please view results for these tests on the individual orders.   Partial thromboplastin time   Result Value Ref Range    aPTT 39 (H) 22 - 38 Seconds   CBC with platelets and differential   Result Value Ref Range    WBC Count 2.0 (L) 4.0 - 11.0 10e3/uL    RBC Count 2.90 (L) 3.80 - 5.20 10e6/uL    Hemoglobin 9.4 (L) 11.7 - 15.7 g/dL    Hematocrit 29.7 (L) 35.0 - 47.0 %     (H) 78 - 100 fL    MCH 32.4 26.5 - 33.0 pg    MCHC 31.6 31.5 - 36.5 g/dL    RDW 18.2 (H) 10.0 - 15.0 %    Platelet Count 124 (L) 150 - 450 10e3/uL    % Neutrophils 70 %    % Lymphocytes 21 %    % Monocytes 6 %    % Eosinophils 1 %    % Basophils 1 %    % Immature Granulocytes 1 %    NRBCs per 100 WBC 0 <1 /100    Absolute Neutrophils 1.4 (L) 1.6 - 8.3 10e3/uL    Absolute Lymphocytes 0.4 (L) 0.8 - 5.3 10e3/uL    Absolute Monocytes 0.1 0.0 - 1.3 10e3/uL    Absolute Eosinophils 0.0 0.0 - 0.7 10e3/uL    Absolute Basophils 0.0 0.0 - 0.2 10e3/uL    Absolute Immature  Granulocytes 0.0 <=0.4 10e3/uL    Absolute NRBCs 0.0 10e3/uL   Adult Type and Screen   Result Value Ref Range    ABO/RH(D) A POS     Antibody Screen Negative Negative    SPECIMEN EXPIRATION DATE [id number]    XR Forearm Port Left 2 Views    Narrative    EXAM: XR FOREARM PORT LEFT 2 VIEWS  LOCATION: [facility]  DATE/TIME: 2/26/2023 9:18 AM    INDICATION: Unknown if fall, deformity. Arm pain.  COMPARISON: None.      Impression    IMPRESSION: Within normal limits. No fracture. Osteopenia.   XR Chest Port 1 View    Narrative    EXAM: Chest radiograph 2/26/2023 9:19 AM    HISTORY: Endotracheal tube placement.     COMPARISON: Same-day chest radiograph.    TECHNIQUE: Portable supine AP view of the chest.    FINDINGS: Endotracheal tube tip projects over the midthoracic trachea  approximately 3.8 cm and the claudio. Trachea appears midline.  Unchanged cardiomediastinal silhouette. Unchanged hazy right  hemithoracic opacification. Left lung remains clear. No pneumothorax.  Right upper quadrant surgical clips. Gastric bypass sutures left upper  quadrant. Soft tissues unremarkable. No acute osseous abnormality.  Osteopenia.      Impression    IMPRESSION:   1.  Endotracheal tube projects over the midthoracic trachea.  2.  Unchanged moderate to large right pleural effusion.    I have personally reviewed the examination and initial interpretation  and I agree with the findings.    IRON ENNIS MD         SYSTEM ID:  [id number]   Lumbar spine CT w/o contrast    Impression    RESIDENT PRELIMINARY INTERPRETATION  Impression:   Thoracic spine:    1. No acute osseous abnormality. No significant neural foraminal or  spinal canal narrowing throughout.   Lumbar Spine:   1. Favored acute moderate/severe compression fracture deformity of L1  without retropulsion; further characterized above.  2. Multilevel mild spondylosis with degenerative changes worst at the  L4-S1 levels. Mild disc  bulge L5-S1 with mild abutment of the right S1  exiting nerve root.   CT Thoracic Spine w/o Contrast    Impression    RESIDENT PRELIMINARY INTERPRETATION  Impression:   Thoracic spine:    1. No acute osseous abnormality. No significant neural foraminal or  spinal canal narrowing throughout.   Lumbar Spine:   1. Favored acute moderate/severe compression fracture deformity of L1  without retropulsion; further characterized above.  2. Multilevel mild spondylosis with degenerative changes worst at the  L4-S1 levels. Mild disc bulge L5-S1 with mild abutment of the right S1  exiting nerve root.   XR Abdomen Port 1 View    Impression    RESIDENT PRELIMINARY INTERPRETATION  IMPRESSION:  1. OG catheter tip projects over the stomach, however sidehole is  located at the approximate gastroesophageal junction.  2. Nonobstructive bowel gas pattern.       Studies:  Lumbar spine CT w/o contrast    (Results Pending)   CT Thoracic Spine w/o Contrast    (Results Pending)   XR Forearm Port Left 2 Views    (Results Pending)   XR Chest Port 1 View    (Results Pending)

## 2023-02-26 NOTE — ANESTHESIA PROCEDURE NOTES
Central Line/PA Catheter Placement    Pre-Procedure   Staff -        Anesthesiologist:  Nic Bautista MD       Resident/Fellow: Raymond Ricardo MD       Performed By: resident       Location: OR       Pre-Anesthestic Checklist: patient identified, IV checked, site marked, risks and benefits discussed, monitors and equipment checked, pre-op evaluation and at physician/surgeon's request  Timeout:       Correct Patient: Yes        Correct Procedure: Yes        Correct Site: Yes        Correct Position: Yes        Correct Laterality: Yes   Line Placement:   This line was placed Post Induction starting at 2/26/2023 3:20 PM and ending at 2/26/2023 3:34 PM    Procedure   Procedure: central line       Laterality: right       Insertion Site: internal jugular.       Patient Position: Trendelenburg  Sterile Prep        All elements of maximal sterile barrier technique followed       Patient Prep/Sterile Barriers: draped, hand hygiene, gloves , hat , mask , draped, gown, sterile gel and probe cover       Skin prep: Chloraprep  Insertion/Injection        Technique: ultrasound guided and Seldinger Technique        1. Ultrasound was used to evaluate the access site.       2. Vein evaluated via ultrasound for patency/adequacy.       3. Using real-time ultrasound the needle/catheter was observed entering the artery/vein.       Type: CVC       Catheter Size: 7 Fr       Catheter Length: 20  Narrative         Secured by: suture       Tegaderm and Biopatch dressing used.       Complications: None apparent,        blood aspirated from all lumens,        All lumens flushed: Yes       Verification method: Ultrasound and Placement to be verified post-op

## 2023-02-26 NOTE — ED NOTES
Dr. Sauer at bedside. Assessing multiple bruising to legs, back. Pt has vaginal bleeding. MD aware. Dr. Sauer states she will make a vulnerable adult report.

## 2023-02-26 NOTE — ED PROVIDER NOTES
[place] EMERGENCY DEPARTMENT ([facility])    2/26/23       ED PROVIDER NOTE      History   No chief complaint on file.    The history is provided by medical records and the EMS personnel. The history is limited by the condition of the patient.     Sulma Ramos is a 57 year old female with past medical history significant for cirrhosis and liver cancer, portal hypertension, portal vein thrombosis, hepatic encephalopathy, seizure, gastric bypass, anticoagulated on Eliquis who presents to the ED [facility] from [facility] for altered mental status found to have acute subdural with midline shift and signs of uncal herniation on CT head.  Per chart review, patient presented to [facility] ED today with her  for confusion and slurring of speech.  He said she was last at her baseline 2 days ago. She underwent an extensive work-up during her time in the ED at [facility]. Patient there was noted to be mildly hypoglycemic on arrival, dextrose given.  She was noted to be leukopenic as well with mild worsening renal insufficiency.  Procalcitonin elevated.  Blood cultures were sent.  She was covered with IV Zosyn given concern for colitis on CT.  Patient did undergo formal CT head which confirms acute subdural with midline shift and signs of uncal herniation.  Throughout her time in the ED her mentation did seem to decline.  Decision was made to intubate for airway protection.  Patient is coagulopathic given underlying cirrhosis/anticoagulation, she was given Kcentra in addition to IV Keppra and Decadron.  She was noted to have bruising of various staging to her body and atypical bruising pattern to her inner thighs that raised concern for possible nonaccidental trauma.  Scant vaginal bleeding was also noted when the nurse was placing a Clark.  The patient was ultimately transferred to [facility] and arrives here intubated with EMS.    EXAM: CT HEAD WITHOUT CONTRAST  LOCATION: [facility]  DATE/TIME:  2023, 6:16 AM  IMPRESSION:  1.  Interval development of a large acute on subacute left convexity subdural hematoma. Mass effect and left to right midline shift with uncal herniation and trapping of the right lateral ventricle.  2.  Thin acute subdural hematomas along the falx and both tentorial leaflets as well as the right parietal lobe.  [Critical Result: Intracranial hemorrhage and mass effect.]    EXAM: CT CHEST/ABDOMEN/PELVIS W CONTRAST  LOCATION: [facility]  DATE/TIME: 2023 6:17 AM  IMPRESSION:  1.  Moderate right pleural effusion.  2.  Cirrhosis and portal hypertension.  3.  Hypodense, peripherally calcified right hepatic lesion similar to recent ultrasound.  4.  Wall thickening of the descending and sigmoid colon not excluded. Correlate for colitis.  5.  Right portal vein is small in size and not well seen. Evaluation also reduced due to motion artifact. If characterization of hepatic vasculature is indicated consider Doppler ultrasound.    EXAM: CT CERVICAL SPINE WITHOUT CONTRAST  LOCATION: [facility]  DATE/TIME: 2023, 8:14 AM  IMPRESSION:  1.  No acute cervical spine fracture.  2.  Mild retrolisthesis of C5 on C6.  3.  Degenerative changes, as described, most pronounced at C5-C6 where there is mild to moderate/moderate spinal canal narrowing. No definite high-grade spinal canal or neural foraminal stenosis.  4.  Partial visualization of a large right pleural effusion, incompletely assessed.    Past Medical History  Past Medical History:   Diagnosis Date     Anxiety state, unspecified      Polyphagia(783.6)      Past Surgical History:   Procedure Laterality Date     [facility] NONSPECIFIC PROCEDURE       x 2     [facility] NONSPECIFIC PROCEDURE           Ascorbic Acid (VITAMIN C PO)  bacitracin 500 UNIT/GM OINT  calcium-vitamin D (CALTRATE) 600-400 MG-UNIT per tablet  cyanocobalamin 1000 MCG/ML injection  ferrous sulfate 325 (65 FE) MG  "tablet  FLUoxetine (PROZAC) 40 MG capsule  Furosemide (LASIX) 20 MG tablet  GABAPENTIN PO  lactulose 20 GM/30ML SOLN  LEVOTHYROXINE SODIUM PO  Multiple Vitamins-Minerals (MULTIVITAMIN OR)  NADOLOL PO  oxyCODONE-acetaminophen (PERCOCET) 5-325 MG per tablet  Pyridoxine HCl (VITAMIN B6 PO)  Rifaximin (XIFAXAN PO)  spironolactone (ALDACTONE) 100 MG tablet  Thiamine HCl (VITAMIN B-1 PO)  Zolpidem Tartrate (AMBIEN PO)      Allergies   Allergen Reactions     Clarithromycin      Clindamycin Base      Droperidol      Family History  Family History   Problem Relation Age of Onset     Hypertension Mother      Hypertension Paternal Grandfather      Thyroid Disease Paternal Grandfather      Arthritis Father         gout     Arthritis Paternal Grandmother         gout     Arthritis Paternal Grandfather         gout     C.A.D. Paternal Grandfather          of MI in 70s     C.A.D. Mother         atrial fibrillation     Social History   Social History     Tobacco Use     Smoking status: Never   Substance Use Topics     Alcohol use: Yes     Comment: 8-10/week     Drug use: No      Past medical history, past surgical history, medications, allergies, family history, and social history were reviewed with the patient. No additional pertinent items.      ROS was attempted but limited due to AMS/intubated.    Physical Exam   BP: (!) 169/98  Pulse: 80  Temp: 97.4  F (36.3  C)  Resp: 16  Height: 162.6 cm (5' 4\")  SpO2: 100 %  Physical Exam  General: patient is intubated and sedated  Head: atraumatic and normocephalic   EENT: moist mucus membranes, left pupil 4 mm and sluggish reactive, right pupil 3.5 mm and sluggishly reactive  Neck: supple   Cardiovascular: regular rate and rhythm, extremities warm and well perfused, no lower extremity edema  Pulmonary: lungs clear to auscultation bilaterally   Abdomen: soft, non-tender   Musculoskeletal: deformity of left distal forearm  Neurological: Intubated and sedated, patient's propofol was held " and patient is moving all extremities and withdrawing to pain  Skin: warm, dry, multiple scattered ecchymoses on the bilateral proximal upper extremities, right upper and lower flank, right upper thigh, bilateral inner thighs and bilateral lower legs    ED Course, Procedures, & Data     8:41 AM  The patient was seen and examined by Dr. Bina Warner MD, in Room ED02.     Procedures       ED Course Selections:   Critical Care Addendum  My initial assessment, based on my review of prehospital provider report, review of nursing observations, review of vital signs, focused history, physical exam and discussion with trauma, neurosurgery , established a high suspicion that Sulma Ramos has severe traumatic injuries and altered mental status, which requires immediate intervention, and therefore she is critically ill.     After the initial assessment, the care team initiated multiple lab tests, initiated medication therapy with propofol, nicardipine , performed advanced airway management and consulted with trauma, NSG to provide stabilization care. Due to the critical nature of this patient, I reassessed nursing observations, vital signs, physical exam, neurologic status and respiratory status multiple times prior to her disposition.     Time also spent performing documentation, reviewing test results, discussion with consultants and coordination of care.     Critical care time (excluding teaching time and procedures): 45 minutes.         Trauma:  Level of trauma activation: Full  C-collar and immobilization: not indicated, cleared.  CSpine Clearance: negative CT, cleared prior to arrival  GCS at arrival: 3T  GCS at disposition: unchanged  Full Primary and Secondary survey with appropriate immobilization of spine completed in exam section.  Consults prior to admission or transfer: Neurosurgery called at 0843  Procedures done in the ED: none         Results for orders placed or performed during the hospital encounter of  02/26/23   Head CT w/o contrast     Status: Abnormal   Result Value Ref Range    Radiologist flags Intracranial hemorrhage and mass effect. (AA)     Narrative    EXAM: CT HEAD WITHOUT CONTRAST  LOCATION: [facility]  DATE/TIME: 02/26/2023, 6:16 AM    INDICATION: Altered mental status.  COMPARISON: 01/19/2023.  TECHNIQUE: Routine CT Head without IV contrast. Multiplanar reformats. Dose reduction techniques were used.    FINDINGS:  INTRACRANIAL CONTENTS: Interval development of a large predominantly iso- to hyperattenuating left convexity subdural hematoma measuring up to 2.8 cm in maximum dimension. There is significant mass effect on the left cerebral hemisphere and 18 mm left to   right midline shift. Thin acute subdural hematoma along the falx measures up to 6 mm in thickness. Thin subdural hematoma along the right posterior falx and parietal lobe measures 1-2 mm in thickness. Both falcine hemorrhages layer over the tentorial   leaflets. Uncal herniation. Mild trapping of the right temporal horn. Background age-related changes.     VISUALIZED ORBITS/SINUSES/MASTOIDS: No intraorbital abnormality. No paranasal sinus mucosal disease. No middle ear or mastoid effusion.    BONES/SOFT TISSUES: No acute abnormality.      Impression    IMPRESSION:  1.  Interval development of a large acute on subacute left convexity subdural hematoma. Mass effect and left to right midline shift with uncal herniation and trapping of the right lateral ventricle.    2.  Thin acute subdural hematomas along the falx and both tentorial leaflets as well as the right parietal lobe.      [Critical Result: Intracranial hemorrhage and mass effect.]    Finding was identified on 02/26/2023 at 6:21 AM.     Dr. Sauer was contacted by me on 02/26/2023 at 6:29 AM and verbalized understanding of the critical result.      CT Chest/Abdomen/Pelvis w Contrast     Status: None    Narrative    EXAM: CT CHEST/ABDOMEN/PELVIS W  CONTRAST  LOCATION: [facility]  DATE/TIME: 2/26/2023 6:17 AM    INDICATION: Sepsis  COMPARISON: 12/21/2022  TECHNIQUE: CT scan of the chest, abdomen, and pelvis was performed following injection of IV contrast. Multiplanar reformats were obtained. Dose reduction techniques were used.   CONTRAST: 90mL Isovue 370    FINDINGS:   LUNGS AND PLEURA: Moderate right pleural effusion right basilar atelectasis. Respiratory motion.    MEDIASTINUM/AXILLAE: No adenopathy. Varices along the distal esophagus. Trace amount of pericardial fluid.    CORONARY ARTERY CALCIFICATION: Severe.    HEPATOBILIARY: Nodular, heterogeneous liver. 2.4 cm peripherally calcified hypodense lesion right liver as seen on ultrasound. Cholecystectomy.    PANCREAS: Normal.    SPLEEN: Splenomegaly.    ADRENAL GLANDS: Normal.    KIDNEYS/BLADDER: Normal.    BOWEL: Normal caliber. Slight mesenteric congestion. Wall thickening of the descending and sigmoid colon. Trace amount of free fluid.    LYMPH NODES: Normal.    VASCULATURE: Atherosclerotic vascular calcification. Upper abdominal varices.    PELVIC ORGANS: Normal.    MUSCULOSKELETAL: Degenerative change osseous structures. Severe compression fracture L1 with slight retropulsion. Mild compression superior endplate T7 and T5.      Impression    IMPRESSION:  1.  Moderate right pleural effusion.  2.  Cirrhosis and portal hypertension.  3.  Hypodense, peripherally calcified right hepatic lesion similar to recent ultrasound.  4.  Wall thickening of the descending and sigmoid colon not excluded. Correlate for colitis.  5.  Right portal vein is small in size and not well seen. Evaluation also reduced due to motion artifact. If characterization of hepatic vasculature is indicated consider Doppler ultrasound.   XR Chest Port 1 View     Status: None    Narrative    EXAM: XR CHEST PORT 1 VIEW  LOCATION: [facility]  DATE/TIME: 2/26/2023 7:07 AM    INDICATION:  Postintubation.  COMPARISON: CT 2/26/2023      Impression    IMPRESSION: Endotracheal tube tip 2 cm above the claudio in good position. Large right pleural effusion with right lower lobe atelectasis similar to previous CT imaging. Left lung is clear. No pneumothorax.   Cervical spine CT w/o contrast     Status: None    Narrative    EXAM: CT CERVICAL SPINE WITHOUT CONTRAST  LOCATION: [facility]  DATE/TIME: 02/26/2023, 8:14 AM    INDICATION: Fall; Neck pain; Trauma; Mild/moderate trauma; intracranial hemorrhage. None of the following: Spondyloarthropathy, cervical x-ray with negative result, questionable finding, or inadequate coverage.  COMPARISON: None.  TECHNIQUE: Routine CT Cervical Spine without IV contrast. Multiplanar reformats. Dose reduction techniques were used.    FINDINGS:  VERTEBRA: No acute cervical spine fracture. Normal vertebral body heights. Straightening of the normal cervical lordosis, which can be positional. Mild retrolisthesis of C5 on C6 measuring approximately 2 mm. Alignment of the cervical spine otherwise   appears normal.     CANAL/FORAMINA: Moderate disc height loss at C5-C6. The other intervertebral discs appear relatively maintained in height. There is a diffuse disc bulge with possible superimposed central disc herniation at C5-C6 with up to mild to moderate/moderate   central spinal canal stenosis at that level and some flattening of the normal cord contour. Shallow multilevel disc bulges elsewhere with milder degrees of spinal canal narrowing. Uncovertebral spurring at C5-C6. No high-grade neural foraminal stenosis   identified.    PARASPINAL: The patient is intubated. The tip of the endotracheal tube is not imaged within the field-of-view of the CT exam. Small foci of air noted within the region of the subclavian vasculature, possibly related to recent venipuncture. Partial   visualization of a large right pleural effusion, incompletely evaluated. The visualized  paraspinous soft tissues otherwise appear grossly unremarkable.      Impression    IMPRESSION:  1.  No acute cervical spine fracture.  2.  Mild retrolisthesis of C5 on C6.  3.  Degenerative changes, as described, most pronounced at C5-C6 where there is mild to moderate/moderate spinal canal narrowing. No definite high-grade spinal canal or neural foraminal stenosis.  4.  Partial visualization of a large right pleural effusion, incompletely assessed.       CT Facial Bones without Contrast     Status: None (Preliminary result)    Narrative    EXAM: CT FACIAL BONES WITHOUT CONTRAST  LOCATION: [facility]  DATE/TIME: 02/26/2023, 8:13 AM    INDICATION: Fall, trauma, intracranial hemorrhage.  COMPARISON: CT of the head dated 02/26/2023.  TECHNIQUE: Routine CT Maxillofacial without IV contrast. Multiplanar reformats. Dose reduction techniques were used.     FINDINGS: The pterygoid plates are intact. The bilateral zygomatic arches, sphenotemporal buttresses, the walls of both orbits, and the walls of the maxillary sinuses appear intact bilaterally. No displaced nasal arch or nasal septal fracture. The   mandible is intact. The bilateral temporomandibular joints are normally located.    Partial visualization of multicompartment intracranial hemorrhage, including a large left cerebral convexity subdural hematoma with fluid-fluid level and marked associated mass effect, rightward midline shift, and left uncus herniation as well as   ventricular entrapment probably at the level of the foramen of Monro, as seen to better advantage on the prior head CT.    Moderate polypoid mucosal thickening in the left maxillary sinus. Mild to moderate mucosal thickening in the anterior left ethmoid air cells. No paranasal sinus air-fluid levels seen. The visualized aspects of the mastoid air cells are clear. The patient   is intubated. The endotracheal tube is incompletely assessed. Small amount of secretions noted in the  pharynx/supraglottic larynx.      Impression    IMPRESSION:  1.  No acute maxillofacial fracture.  2.  Partial visualization of multicompartment intracranial hemorrhage with marked associated mass effect, better characterized on the previous head CT.         Extra Tube (Pisek Draw)     Status: None    Narrative    The following orders were created for panel order Extra Tube (Pisek Draw).  Procedure                               Abnormality         Status                     ---------                               -----------         ------                     Extra Blue Top Tube[[id number]]                              Final result               Extra Red Top Tube[[id number]]                               Final result               Extra Green Top (Lithium...[[id number]]                      Final result               Extra Purple Top Tube[[id number]]                            Final result                 Please view results for these tests on the individual orders.   Extra Blue Top Tube     Status: None   Result Value Ref Range    Hold Specimen JIC    Extra Red Top Tube     Status: None   Result Value Ref Range    Hold Specimen JIC    Extra Green Top (Lithium Heparin) Tube     Status: None   Result Value Ref Range    Hold Specimen JIC    Extra Purple Top Tube     Status: None   Result Value Ref Range    Hold Specimen JIC    Comprehensive metabolic panel     Status: Abnormal   Result Value Ref Range    Sodium 141 136 - 145 mmol/L    Potassium 3.7 3.4 - 5.3 mmol/L    Chloride 107 98 - 107 mmol/L    Carbon Dioxide (CO2) 22 22 - 29 mmol/L    Anion Gap 12 7 - 15 mmol/L    Urea Nitrogen 30.7 (H) 8.0 - 23.0 mg/dL    Creatinine 1.56 (H) 0.51 - 0.95 mg/dL    Calcium 8.3 (L) 8.6 - 10.0 mg/dL    Glucose 63 (L) 70 - 99 mg/dL    Alkaline Phosphatase 157 (H) 35 - 104 U/L    AST 96 (H) 10 - 35 U/L    ALT 34 10 - 35 U/L    Protein Total 5.3 (L) 6.4 - 8.3 g/dL    Albumin 2.4 (L) 3.5 - 5.2 g/dL    Bilirubin Total 1.5 (H) <=1.2  mg/dL    GFR Estimate 38 (L) >60 mL/min/1.73m2   Lipase     Status: Normal   Result Value Ref Range    Lipase 43 13 - 60 U/L   Troponin T, High Sensitivity     Status: Abnormal   Result Value Ref Range    Troponin T, High Sensitivity 22 (H) <=14 ng/L   Magnesium     Status: Normal   Result Value Ref Range    Magnesium 2.0 1.7 - 2.3 mg/dL   Ammonia     Status: Normal   Result Value Ref Range    Ammonia 45 11 - 51 umol/L   UA with Microscopic reflex to Culture     Status: Abnormal    Specimen: Urine, Catheter   Result Value Ref Range    Color Urine Yellow Colorless, Straw, Light Yellow, Yellow    Appearance Urine Clear Clear    Glucose Urine Negative Negative mg/dL    Bilirubin Urine Negative Negative    Ketones Urine Trace (A) Negative mg/dL    Specific Gravity Urine 1.021 1.003 - 1.035    Blood Urine Negative Negative    pH Urine 5.5 5.0 - 7.0    Protein Albumin Urine Negative Negative mg/dL    Urobilinogen Urine Normal Normal, 2.0 mg/dL    Nitrite Urine Negative Negative    Leukocyte Esterase Urine Negative Negative    Mucus Urine Present (A) None Seen /LPF    RBC Urine 1 <=2 /HPF    WBC Urine 11 (H) <=5 /HPF    Squamous Epithelials Urine 1 <=1 /HPF    Transitional Epithelials Urine 3 (H) <=1 /HPF    Narrative    Urine Culture ordered based on laboratory criteria   INR     Status: Abnormal   Result Value Ref Range    INR 2.66 (H) 0.85 - 1.15   Stool: occult blood     Status: Normal   Result Value Ref Range    Occult Blood Negative Negative   CBC with platelets and differential     Status: Abnormal   Result Value Ref Range    WBC Count 1.8 (L) 4.0 - 11.0 10e3/uL    RBC Count 2.83 (L) 3.80 - 5.20 10e6/uL    Hemoglobin 9.2 (L) 11.7 - 15.7 g/dL    Hematocrit 27.9 (L) 35.0 - 47.0 %    MCV 99 78 - 100 fL    MCH 32.5 26.5 - 33.0 pg    MCHC 33.0 31.5 - 36.5 g/dL    RDW 17.9 (H) 10.0 - 15.0 %    Platelet Count 112 (L) 150 - 450 10e3/uL    % Neutrophils 55 %    % Lymphocytes 31 %    % Monocytes 10 %    % Eosinophils 2 %    %  Basophils 1 %    % Immature Granulocytes 1 %    NRBCs per 100 WBC 0 <1 /100    Absolute Neutrophils 1.0 (L) 1.6 - 8.3 10e3/uL    Absolute Lymphocytes 0.6 (L) 0.8 - 5.3 10e3/uL    Absolute Monocytes 0.2 0.0 - 1.3 10e3/uL    Absolute Eosinophils 0.0 0.0 - 0.7 10e3/uL    Absolute Basophils 0.0 0.0 - 0.2 10e3/uL    Absolute Immature Granulocytes 0.0 <=0.4 10e3/uL    Absolute NRBCs 0.0 10e3/uL   Symptomatic Influenza A/B & SARS-CoV2 (COVID-19) Virus PCR Multiplex Nasopharyngeal     Status: Normal    Specimen: Nasopharyngeal; Swab   Result Value Ref Range    Influenza A PCR Negative Negative    Influenza B PCR Negative Negative    RSV PCR Negative Negative    SARS CoV2 PCR Negative Negative    Narrative    Testing was performed using the Xpert Xpress CoV2/Flu/RSV Assay on the [employer] GeneXpert Instrument. This test should be ordered for the detection of SARS-CoV-2 and influenza viruses in individuals who meet clinical and/or epidemiological criteria. Test performance is unknown in asymptomatic patients. This test is for in vitro diagnostic use under the FDA EUA for laboratories certified under CLIA to perform high or moderate complexity testing. This test has not been FDA cleared or approved. A negative result does not rule out the presence of PCR inhibitors in the specimen or target RNA in concentration below the limit of detection for the assay. If only one viral target is positive but coinfection with multiple targets is suspected, the sample should be re-tested with another FDA cleared, approved, or authorized test, if coinfection would change clinical management. This test was validated by the [facility]. These laboratories are certified under the Clinical Laboratory Improvement Amendments of 1988 (CLIA-88) as qualified to perform high complexity laboratory testing.   Procalcitonin     Status: Abnormal   Result Value Ref Range    Procalcitonin 0.58 (H) <0.05 ng/mL   Glucose by meter     Status:  Abnormal   Result Value Ref Range    GLUCOSE BY METER POCT 130 (H) 70 - 99 mg/dL   Glucose by meter     Status: Abnormal   Result Value Ref Range    GLUCOSE BY METER POCT 140 (H) 70 - 99 mg/dL   EKG 12-lead, tracing only     Status: None (Preliminary result)   Result Value Ref Range    Systolic Blood Pressure  mmHg    Diastolic Blood Pressure  mmHg    Ventricular Rate 85 BPM    Atrial Rate 85 BPM    MT Interval 140 ms    QRS Duration 78 ms     ms    QTc 523 ms    P Axis 74 degrees    R AXIS 21 degrees    T Axis -33 degrees    Interpretation ECG       Sinus rhythm  Nonspecific T wave abnormality  Abnormal ECG  When compared with ECG of 06-SEP-2013 23:24,  T wave inversion now evident in Anterior leads     Adult Type and Screen     Status: None   Result Value Ref Range    ABO/RH(D) A POS     Antibody Screen Negative Negative    SPECIMEN EXPIRATION DATE [id number]    CBC with platelets differential     Status: Abnormal    Narrative    The following orders were created for panel order CBC with platelets differential.  Procedure                               Abnormality         Status                     ---------                               -----------         ------                     CBC with platelets and d...[[id number]]  Abnormal            Final result                 Please view results for these tests on the individual orders.   ABO/Rh type and screen     Status: None    Narrative    The following orders were created for panel order ABO/Rh type and screen.  Procedure                               Abnormality         Status                     ---------                               -----------         ------                     Adult Type and Screen[[id number]]                            Edited Result - FINAL        Please view results for these tests on the individual orders.     Medications - No data to display  Labs Ordered and Resulted from Time of ED Arrival to Time of ED Departure - No data to  display  No orders to display            Assessment & Plan    Ms. Ramos is a 57 year old female with past medical history significant for cirrhosis and liver cancer, portal hypertension, portal vein thrombosis, hepatic encephalopathy, seizure, gastric bypass, anticoagulated on Eliquis who presents to the ED [facility] from [facility] for altered mental status found to have acute subdural with midline shift and signs of uncal herniation.  Upon arrival patient is intubated and sedated with propofol.  Propofol was briefly held and patient is moving all extremities and responding to pain.  Portable chest x-ray obtained and ET tube advanced.  Repeat shows appropriate positioning.  Outside imaging was reviewed and CT of the thoracic and lumbar spine were also obtained which does show an L1 fracture.  Patient has already received reversal with Kcentra, Decadron and Keppra.  Neurosurgery has seen and evaluated the patient.  X-ray was obtained due to swelling of the left forearm which shows no evidence of fracture.  There is concern regarding the location of her bruising as well as some vaginal bleeding at the outside facility for potential nonaccidental trauma.  Nursing discussed with outside facility who reports that they will file a vulnerable adult report.  Patient will be admitted to the neuro critical care unit with trauma and neurosurgery and close monitoring for potential surgical intervention.    I have reviewed the nursing notes. I have reviewed the findings, diagnosis, plan and need for follow up with the patient.    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 Bina Warner MD based on the provider's statements to me on February 26, 2023.  This document has been checked and approved by the  attending provider.    I, Dr. Bina Warner MD, was physically present and have reviewed and verified the accuracy of this note documented by Chandni Steward, medical scribe.      Bina Warner MD  [facility] EMERGENCY DEPARTMENT  2/26/2023     Bina Warner MD  02/26/23 2620

## 2023-02-26 NOTE — H&P
Neurocritical Care History & Physical    Reason for critical care admission: Subdural hematoma   Admitting Team: Neurocritical Care team   Date of Service:  02/26/2023  Date of Admission:  2/26/2023  Hospital Day: 1    Assessment/Plan  Sulma Ramos is a 57 year old female with a past medical history of anxiety, insomnia, RLS, cirrhosis 2/2 liver cancer, portal hypertension, hepatic encephalopathy, seizure, gastric bypass, and portal vein thrombus on Eliquis who was admitted on 2/26/2023 from [facility] after presenting with altered mental status found to have acute subdural hematoma with midline shift and signs of uncal herniation on CT head.    24 hour events: 4A admission, plans for OR    Neuro  #Subdural hematoma  #Brain compression  #Uncal herniation  #Hx of seizures  One time seizure, noted back in 2011. Was thought to be do from sleep deprivation. Was not started on any AEDs.   -NSGY consulted - plans for emergent OR for evacuation of hematoma  -Kcentra and Keppra given at [facility] prior to arrival      -Neurochecks Q1h  -SBP goal < 140 mmHg  -HOB > 30   -PT/OT/SLP when appropriate     #Analgesics & sedation  #Chronic pain  RASS goal: 0 to -1  -Consider adding pain regimen once out of OR   -Propofol infusion -> Consider Precedex out of OR    #RLS  -Holding PTA Gabapentin 600 mg at bedtime    Psych   #Anxity  #Insomnia   -Holding PTA Fluoxetine 40 mg daily  -Holding PTA Ambien 10 mg at bedtime PRN    CV  #Hypertensive on admission   -Cardiac monitoring  -SBP goal < 140 mmHg  -Nicardipine infusion   -PRN Labetalol and Hydralazine  -TTE    Resp  #Intubated for airway protection at [facility]  #Moderate amount of right pleural effusion and atelectasis on CT CAP   Oxygen/vent: AC 24/380/+5  -PST as able   -Continuous pulse ox  -Maintain O2 saturations greater than 92%    GI  #Cirrhosis and Portal hypertension from prior alcohol use  #Ascities  #Esophageal varices       #Hx of HCC s/p ablation 2009  #Portal vein  thrombus (on Eliquis)  -Holding PTA Eliquis  -Holding PTA Lactulose, Rifaximin, Nadolol, Lasix, Spironolactone   -US Abd Hepatic & Portal Vasculature Cmpl ordered  -Ammonia; 45      #Hx of gastric bypass (Beto-en Y)  #GERD  #Concern for colitis on CT CAP  Diet: NPO for OR  -OGT in place   Last BM: PTA   GI prophylaxis: Pantoprazole 40 mg daily (Takes omeprazole at home)  -Bowel regimen: Consider scheduling senna-docusate BID and Miralax daily when out of OR    Renal/  #NEFTALI (BUN 30.7, Creatinine 1.56, GFR 38)   -Daily BMP  -IV fluids: None  -Electrolyte replacement protocol    Endo  #Hypothyroidism  -TSH; add on   -Holding PTA Levothyroxine 25 mcg every other day     -Hgb A1c; add on   -Monitor glucose levels    Heme  #Pancytopenia  -Holding PTA Ferrous sulfate  -Daily CBC   -Hgb goal >7, plt goal >50k  -Transfuse to meet Hgb and plt goals    #Coagulopathy  #Hx Portal vein thrombus (on Eliquis)  -Holding PTA Eliquis  -INR 2.66  -Kcentra given at [facility]  -TEG pending       ID  #Concern for colitis on CT CAP  -Afebrile   -BC x2 collected at [facility]; in process  -Urine culture collected at [facility]; in process  -Zosyn 4.5 g given x1 for concern for sepsis - will hold for now  -Daily CBC  -Follow temperature curve    Musculoskeletal   #Multiple falls  Last fall on 1/19/2023 resulting in a Lg hematoma to left forearm. Xray without fracture.  -C-spine without fracture; trauma cleared C-collar through imaging.  -Trauma service following      ICU Checklist  Lines/tubes/drains: PIV x3, Basia, ETT, OGT, hodges  FEN: NPO  PPX: DVT - SCDs; GI - Pantoprazole.  Code: Full  Dispo: ICU - NCC    Clinically Significant Risk Factors Present on Admission              # Hypoalbuminemia: Lowest albumin = 2.4 g/dL at 2/26/2023  4:54 AM, will monitor as appropriate  # Coagulation Defect: INR = 1.43 (Ref range: 0.85 - 1.15) and/or PTT = 39 Seconds (Ref range: 22 - 38 Seconds), will monitor for bleeding  # Thrombocytopenia: Lowest  "platelets = 112 in last 2 days, will monitor for bleeding        # Obesity: Estimated body mass index is 30.04 kg/m  as calculated from the following:    Height as of this encounter: 1.626 m (5' 4\").    Weight as of an earlier encounter on 2/26/23: 79.4 kg (175 lb).        # Coma: based on GCS score of <8  # Compression of brain      TIME SPENT ON THIS ENCOUNTER   I spent 60 minutes of critical care time on the unit/floor managing the care of Sulma Ramos excluding time performing procedures. Upon evaluation, this patient had a high probability of imminent or life-threatening deterioration due to a subdural hematoma, which required my direct attention, intervention, and personal management. Greater than 50% of my time was spent at the bedside counseling the patient and/or coordinating care including chart review, history, exam, documentation, and further activities per this note. I have personally reviewed the following data/imaging over the past 24 hours.     The patient was seen and discussed with the NCC attending, Dr. Rosales.    Ibis LEMONS CNP  Neurocritical care nurse practitioner  Pager: [phone]  Ascom: [phone] available [facility] [phone] to [phone]      History of Present Illness:  Sulma Ramos is a 57 year old female with a past medical history of cirrhosis 2/2 liver cancer, portal hypertension, hepatic encephalopathy, seizure, gastric bypass, and portal vein thrombus on Eliquis who was admitted on 2/26/2023 from [facility] after presenting with altered mental status found to have acute subdural hematoma with midline shift and signs of uncal herniation on CT head.    Patient presented to [facility] with her  for confusion and slurring of speech. Spouse states she was last at her baseline 2 days ago. During an extensive work up at [facility] she had a CT head confirming an acute subdural hematoma with midline shift and signs of uncal herniation. Her mentation while in the ED continued to decline " "and she was ultimately intubated for airway protection. Given her liver disease, patient is coagulopathic. With her having intracranial bleeding and being on Eliquis she was given a XA inhibitor Kcentra. She also received a dose of IV Keppra. Patient then transferred to [facility] for further management and possible surgical intervention with Neurosurgery.       Allergies   Allergen Reactions     Clarithromycin      Clindamycin Base      Droperidol        Current Medications:    [Auto Hold] cyanocobalamin  1,000 mcg Intramuscular Q30 Days     [Auto Hold] folic acid  1 mg Intravenous Daily     [Auto Hold] pantoprazole  40 mg Per Feeding Tube QAM AC    Or     [Auto Hold] pantoprazole  40 mg Intravenous QAM AC     [Auto Hold] sodium chloride 23.4% (4 mEq/mL)  30 mL Intravenous Central line Once     [Auto Hold] thiamine  100 mg Intravenous Daily       PRN Medications:  bupivacaine 0.25 % - EPINEPHrine 1:200,000 (PF), [Auto Hold] glucose **OR** [Auto Hold] dextrose **OR** [Auto Hold] glucagon, [Auto Hold] ondansetron **OR** [Auto Hold] ondansetron, [Auto Hold] prochlorperazine **OR** [Auto Hold] prochlorperazine **OR** [Auto Hold] prochlorperazine, thrombin (Recombinant)    Infusions:    niCARdipine       propofol 60 mcg/kg/min (02/26/23 1127)       Physical Examination:  Vitals: BP 94/73   Pulse 73   Temp 97.8  F (36.6  C) (Axillary)   Resp 24   Ht 1.626 m (5' 4\")   LMP 01/19/2004   SpO2 100%   BMI 30.04 kg/m    General: Adult female patient, lying in bed, critically-ill  HEENT: Normocephalic, atraumatic, no icterus  Cardiac: Sinus rhythm on bedside monitor  Pulm: Appears unlabored, expansion symmetric, no retractions or use of accessory muscles, assisted mechanically   Abdomen: Soft, non-distended abdomen   Extremities: Warm, no edema, appears adequately perfused  Skin: Bruising  Psych: Calm  Neuro:  Mental status: Unresponsive, does not open eyes, does not follow commands, intubated.   Cranial nerves: Anisocoria, " right pupil 3 mm and reactive, left pupil 4 mm and reactive, conjugate gaze, cough and gag present with deep suction.  Motor: Normal bulk and tone. No abnormal movements. 5/5 strength in 4/4 extremities.   Sensory: Flexion in bilateral upper extremities to noxious stimuli, extensor posturing in bilateral lowers.   Coordination: EULA, deferred.  Gait: EULA, deferred.    NIHSS  Interval  (Upon 4A arrival) (02/26/23 1348)   1a. Level of Consciousness 3-->Responds only with reflex motor or autonomic effects or totally unresponsive, flaccid, and areflexic   1b. LOC Questions 2-->Answers neither question correctly   1c. LOC Commands 2-->Performs neither task correctly   2.   Best Gaze 0-->Normal   3.   Visual 0-->No visual loss   4.   Facial Palsy 0-->Normal symmetrical movements   5a. Motor Arm, Left 3-->No effort against gravity, limb falls   5b. Motor Arm, Right 3-->No effort against gravity, limb falls   6a. Motor Leg, Left 3-->No effort against gravity, leg falls to bed immediately   6b. Motor Leg, right 3-->No effort against gravity, leg falls to bed immediately   7.   Limb Ataxia 0-->Absent   8.   Sensory 1-->Mild-to-moderate sensory loss, patient feels pinprick is less sharp or is dull on the affected side, or there is a loss of superficial pain with pinprick, but patient is aware of being touched   9.   Best Language 3-->Mute, global aphasia, no usable speech or auditory comprehension (intubated)   10. Dysarthria (UN) Intubated or other physical barrier   11. Extinction and Inattention  0-->No abnormality   Total 23 (02/26/23 1348)       Labs and Imaging:    Results for orders placed or performed during the hospital encounter of 02/26/23 (from the past 24 hour(s))   iStat Gases Electrolytes ICA Glucose Venous, POCT   Result Value Ref Range    CPB Applied No     Hematocrit POCT 32 (L) 35 - 47 %    Calcium, Ionized Whole Blood POCT 4.8 4.4 - 5.2 mg/dL    Glucose Whole Blood POCT 137 (H) 70 - 99 mg/dL    Bicarbonate  Venous POCT 23 21 - 28 mmol/L    Hemoglobin POCT 10.9 (L) 11.7 - 15.7 g/dL    Potassium POCT 3.5 3.4 - 5.3 mmol/L    Sodium POCT 143 133 - 144 mmol/L    pCO2 Venous POCT 42 40 - 50 mm Hg    pO2 Venous POCT 28 25 - 47 mm Hg    pH Venous POCT 7.35 7.32 - 7.43    O2 Sat, Venous POCT 49 (L) 94 - 100 %   iStat INR, POCT   Result Value Ref Range    INR POCT 1.8 (L) 2.0 - 3.0   Glucose by meter   Result Value Ref Range    GLUCOSE BY METER POCT 140 (H) 70 - 99 mg/dL   [place] Draw    Narrative    The following orders were created for panel order [place] Draw.  Procedure                               Abnormality         Status                     ---------                               -----------         ------                     Extra Blue Top Tube[[id number]]                              Final result               Extra Red Top Tube[[id number]]                               Final result               Extra Green Top (Lithium...[[id number]]                      Final result               Extra Purple Top Tube[[id number]]                            Final result                 Please view results for these tests on the individual orders.   Extra Blue Top Tube   Result Value Ref Range    Hold Specimen JIC    Extra Red Top Tube   Result Value Ref Range    Hold Specimen JIC    Extra Green Top (Lithium Heparin) Tube   Result Value Ref Range    Hold Specimen JIC    Extra Purple Top Tube   Result Value Ref Range    Hold Specimen JIC    Alcohol ethyl   Result Value Ref Range    Alcohol ethyl <0.01 <=0.01 g/dL   CBC with platelets differential    Narrative    The following orders were created for panel order CBC with platelets differential.  Procedure                               Abnormality         Status                     ---------                               -----------         ------                     CBC with platelets and d...[[id number]]  Abnormal            Final result                 Please view results for these tests  on the individual orders.   INR   Result Value Ref Range    INR 1.43 (H) 0.85 - 1.15   ABO/Rh type and screen    Narrative    The following orders were created for panel order ABO/Rh type and screen.  Procedure                               Abnormality         Status                     ---------                               -----------         ------                     Adult Type and Screen[[id number]]                            Final result                 Please view results for these tests on the individual orders.   Partial thromboplastin time   Result Value Ref Range    aPTT 39 (H) 22 - 38 Seconds   TEG without Heparinase   Result Value Ref Range    R (Time until clot forms) 4.7 (L) 5.0 - 10.0 Minute    K ( Time to Spec. clot strength) 1.8 1.0 - 3.0 Minute    Angle (Rate of Clot Growth) 69.2 53.0 - 72.0 Degrees    MA ( Maximum Clot Strength) 54.7 50.0 - 70.0 mm    CI (coagulation index) 0.5 -3.0 - 3.0    G (actual clot strength) 6.0 4.5 - 11.0 Kd/sc    LY30 (lysis at 30 minutes) 0.0 0.0 - 8.0 %    LY60 (lysis at 60 minutes) 1.3 0.0 - 15.0 %   CBC with platelets and differential   Result Value Ref Range    WBC Count 2.0 (L) 4.0 - 11.0 10e3/uL    RBC Count 2.90 (L) 3.80 - 5.20 10e6/uL    Hemoglobin 9.4 (L) 11.7 - 15.7 g/dL    Hematocrit 29.7 (L) 35.0 - 47.0 %     (H) 78 - 100 fL    MCH 32.4 26.5 - 33.0 pg    MCHC 31.6 31.5 - 36.5 g/dL    RDW 18.2 (H) 10.0 - 15.0 %    Platelet Count 124 (L) 150 - 450 10e3/uL    % Neutrophils 70 %    % Lymphocytes 21 %    % Monocytes 6 %    % Eosinophils 1 %    % Basophils 1 %    % Immature Granulocytes 1 %    NRBCs per 100 WBC 0 <1 /100    Absolute Neutrophils 1.4 (L) 1.6 - 8.3 10e3/uL    Absolute Lymphocytes 0.4 (L) 0.8 - 5.3 10e3/uL    Absolute Monocytes 0.1 0.0 - 1.3 10e3/uL    Absolute Eosinophils 0.0 0.0 - 0.7 10e3/uL    Absolute Basophils 0.0 0.0 - 0.2 10e3/uL    Absolute Immature Granulocytes 0.0 <=0.4 10e3/uL    Absolute NRBCs 0.0 10e3/uL   Adult Type and Screen    Result Value Ref Range    ABO/RH(D) A POS     Antibody Screen Negative Negative    SPECIMEN EXPIRATION DATE [id number]    XR Forearm Port Left 2 Views    Narrative    EXAM: XR FOREARM PORT LEFT 2 VIEWS  LOCATION: [facility]  DATE/TIME: 2/26/2023 9:18 AM    INDICATION: Unknown if fall, deformity. Arm pain.  COMPARISON: None.      Impression    IMPRESSION: Within normal limits. No fracture. Osteopenia.   XR Chest Port 1 View    Narrative    EXAM: Chest radiograph 2/26/2023 9:19 AM    HISTORY: Endotracheal tube placement.     COMPARISON: Same-day chest radiograph.    TECHNIQUE: Portable supine AP view of the chest.    FINDINGS: Endotracheal tube tip projects over the midthoracic trachea  approximately 3.8 cm and the claudio. Trachea appears midline.  Unchanged cardiomediastinal silhouette. Unchanged hazy right  hemithoracic opacification. Left lung remains clear. No pneumothorax.  Right upper quadrant surgical clips. Gastric bypass sutures left upper  quadrant. Soft tissues unremarkable. No acute osseous abnormality.  Osteopenia.      Impression    IMPRESSION:   1.  Endotracheal tube projects over the midthoracic trachea.  2.  Unchanged moderate to large right pleural effusion.    I have personally reviewed the examination and initial interpretation  and I agree with the findings.    IRON ENNIS MD         SYSTEM ID:  [id number]   Lumbar spine CT w/o contrast    Narrative    Thoracic and Lumbar spine CT without contrast    History: AMS, s/p possible trauma with bruising.  ICD-10:  Comparison: CT abdomen pelvis 8/2/2007.    Technique: Axial, coronal, and sagittal multiplanar reconstructions  obtained from acquisition of thoracic and lumbar spine CT scan   without contrast.    Findings:  Thoracic spine:   Moderate motion artifact. There is no acute fracture or subluxation of  the thoracic spine. There is no prevertebral edema. There is no spinal  canal or foraminal  stenosis at any level. No soft tissue abnormality  in the visualized paraspinous tissues anteriorly. Mild/moderate T7 and  T10 superior endplate Schmorl's node degenerative formation.    Lumbar Spine:   Moderate motion artifact. Moderate/severe compression deformity of L1,  notably within the midportion, with approximately 60-70% vertebral  height loss (not seen in 2007). Trabecular sclerotic changes favoring  acute nature with comminution and fracture lines extending to involve  the mid inferior endplates and anterior superior endplate. No  significant retropulsed fragments. Advanced intervertebral disc space  loss at L4-5 and L5-S1 with associated vacuum disc phenomenon.  Associated opposing endplate subarticular cystic degenerative changes  at these levels. Normal alignment of the lumbar spine without  traumatic subluxation.    There are 5 type lumbar vertebra, used for the purposes of this  dictation .     On a level by level basis, the findings are as follows:  T12-L1: Mild disc bulge without significant neural canal or neural  foraminal narrowing. Moderate/severe compression deformity of L1  without retropulsion, noted above.  L1-2:  No significant neural foraminal neural foraminal narrowing.  L2-3:  Mild disc bulge without neural foraminal or canal narrowing.  L3-4:  Mild/moderate disc bulge without significant neural foraminal  or spinal canal narrowing.  L4-5:  Moderate degenerative facet arthropathy and mild disc bulge  with moderate bilateral neural foraminal narrowing.  L5-S1:  Mild disc bulge and moderate facet arthropathy. Moderate  bilateral neural foraminal narrowing.  The visualized paraspinous tissues anteriorly are unremarkable.      Impression    Impression:   Thoracic spine:    1. No acute osseous abnormality. No significant neural foraminal or  spinal canal narrowing throughout.   Lumbar Spine:   1. Favored subacute moderate/severe compression fracture deformity of  L1 without retropulsion;  further characterized above.  2. Multilevel mild spondylosis with degenerative changes worst at the  L4-S1 levels. Mild disc bulge L5-S1 with mild abutment of the right S1  exiting nerve root.    I have personally reviewed the examination and initial interpretation  and I agree with the findings.    MARIA C KELLEY MD         SYSTEM ID:  [id number]   CT Thoracic Spine w/o Contrast    Narrative    Thoracic and Lumbar spine CT without contrast    History: AMS, s/p possible trauma with bruising.  ICD-10:  Comparison: CT abdomen pelvis 8/2/2007.    Technique: Axial, coronal, and sagittal multiplanar reconstructions  obtained from acquisition of thoracic and lumbar spine CT scan   without contrast.    Findings:  Thoracic spine:   Moderate motion artifact. There is no acute fracture or subluxation of  the thoracic spine. There is no prevertebral edema. There is no spinal  canal or foraminal stenosis at any level. No soft tissue abnormality  in the visualized paraspinous tissues anteriorly. Mild/moderate T7 and  T10 superior endplate Schmorl's node degenerative formation.    Lumbar Spine:   Moderate motion artifact. Moderate/severe compression deformity of L1,  notably within the midportion, with approximately 60-70% vertebral  height loss (not seen in 2007). Trabecular sclerotic changes favoring  acute nature with comminution and fracture lines extending to involve  the mid inferior endplates and anterior superior endplate. No  significant retropulsed fragments. Advanced intervertebral disc space  loss at L4-5 and L5-S1 with associated vacuum disc phenomenon.  Associated opposing endplate subarticular cystic degenerative changes  at these levels. Normal alignment of the lumbar spine without  traumatic subluxation.    There are 5 type lumbar vertebra, used for the purposes of this  dictation .     On a level by level basis, the findings are as follows:  T12-L1: Mild disc bulge without significant neural canal or  neural  foraminal narrowing. Moderate/severe compression deformity of L1  without retropulsion, noted above.  L1-2:  No significant neural foraminal neural foraminal narrowing.  L2-3:  Mild disc bulge without neural foraminal or canal narrowing.  L3-4:  Mild/moderate disc bulge without significant neural foraminal  or spinal canal narrowing.  L4-5:  Moderate degenerative facet arthropathy and mild disc bulge  with moderate bilateral neural foraminal narrowing.  L5-S1:  Mild disc bulge and moderate facet arthropathy. Moderate  bilateral neural foraminal narrowing.  The visualized paraspinous tissues anteriorly are unremarkable.      Impression    Impression:   Thoracic spine:    1. No acute osseous abnormality. No significant neural foraminal or  spinal canal narrowing throughout.   Lumbar Spine:   1. Favored subacute moderate/severe compression fracture deformity of  L1 without retropulsion; further characterized above.  2. Multilevel mild spondylosis with degenerative changes worst at the  L4-S1 levels. Mild disc bulge L5-S1 with mild abutment of the right S1  exiting nerve root.    I have personally reviewed the examination and initial interpretation  and I agree with the findings.    MARIA C KELLEY MD         SYSTEM ID:  [id number]   XR Abdomen Port 1 View    Narrative    EXAM: XR ABDOMEN PORT 1 VIEW  2/26/2023 10:24 AM     HISTORY:  OG placed       TECHNIQUE: Single frontal radiograph of the abdomen    COMPARISON:  CT chest abdomen and pelvis 2/26/2023.    FINDINGS:   AP supine portable abdomen. OG catheter tip projects over the stomach  fundus; sidehole projects over the distal esophagus/gastroesophageal  junction. No obstructive bowel gas pattern. Portions of the right  peripheral-most abdomen are not visualized. Prior cholecystectomy. No  acute osseous abnormality.      Impression    IMPRESSION:  1. OG catheter tip projects over the stomach, however sidehole is  located at the approximate gastroesophageal  junction.  2. Nonobstructive bowel gas pattern.    I have personally reviewed the examination and initial interpretation  and I agree with the findings.    IRON ENNIS MD         SYSTEM ID:  [id number]   CT Head w/o Contrast    Narrative    EXAM: Head CT without contrast 2/26/2023 11:54 AM    HISTORY: Follow-up subdural hemorrhages.    COMPARISON: Same day head CT.    TECHNIQUE: Using multidetector thin collimation helical acquisition  technique, axial, coronal and sagittal CT images from the skull base  to the vertex were obtained without intravenous contrast.   (topogram) image(s) also obtained and reviewed.    FINDINGS:   Large left cerebral convexity mixed density extra-axial fluid  collection with a layering hematocrit level measures 28 mm maximal  diameter, previously 28 mm. There is rightward midline shift measuring  approximately 16 mm, previously 16 mm on same day head CT one  measuring similar fashion at the level of the septum pellucidum. There  is associated local mass effect and sulcal effacement. The left  lateral ventricle is effaced, similar to prior. There is left  parafalcine and left uncal herniation. Small subdural hemorrhages  along the falx and right posterior hemisphere are stable.    Generalized cerebral and cerebellar parenchymal volume loss. Mild  confluent periventricular white matter hypodensities, presumably  related to chronic cerebral microangiopathy.    The bony calvaria and the bones of the skull base are normal. The  orbits appear unremarkable. Left maxillary sinus polypoid mucosal  thickening. Mastoid air cells are clear. Unremarkable soft tissues.      Impression    IMPRESSION:  1.  Stable large left cerebral convexity subdural hematoma with  rightward midline shift and left uncal and subfalcine herniation.  2.  Stable small falcine and right posterior lobe subdural  hemorrhages.    I have personally reviewed the examination and initial interpretation  and I agree with  the findings.    MARIA C KELLEY MD         SYSTEM ID:  [id number]   Glucose by meter   Result Value Ref Range    GLUCOSE BY METER POCT 112 (H) 70 - 99 mg/dL   Prepare red blood cells (unit)   Result Value Ref Range    Blood Component Type Red Blood Cells     Product Code [id number]     Unit Status Issued     Unit Number [id number]     CROSSMATCH Compatible     CODING SYSTEM [id number]     ISSUE DATE AND TIME [id number]     UNIT ABO/RH A+     UNIT TYPE ISBT 6200    Prepare red blood cells (unit)   Result Value Ref Range    Blood Component Type Red Blood Cells     Product Code W3855F79     Unit Status Issued     Unit Number [id number]     CROSSMATCH Compatible     CODING SYSTEM [id number]     ISSUE DATE AND TIME [id number]     UNIT ABO/RH A+     UNIT TYPE ISBT 6200    Prepare pheresed platelets (unit)   Result Value Ref Range    Blood Component Type Platelets     Product Code R9944P34     Unit Status Issued     Unit Number [id number]     CODING SYSTEM [id number]     ISSUE DATE AND TIME [id number]     UNIT ABO/RH A+     UNIT TYPE ISBT 6200    Arterial Panel POCT   Result Value Ref Range    pH Arterial POCT 7.36 7.35 - 7.45    pCO2 Arterial POCT 37 35 - 45 mm Hg    pO2 Arterial POCT 86 80 - 105 mm Hg    Bicarbonate Arterial POCT 21 21 - 28 mmol/L    Sodium POCT 141 133 - 144 mmol/L    Potassium POCT 3.6 3.5 - 5.0 mmol/L    Hemoglobin POCT 8.1 (L) 11.7 - 15.7 g/dL    Glucose Whole Blood POCT 120 (H) 70 - 99 mg/dL    Calcium, Ionized Whole Blood POCT 4.5 4.4 - 5.2 mg/dL    Base Excess/Deficit (+/-) POCT -4.1 -9.6 - 2.0 mmol/L    FIO2 POCT 31.0 %    Lactic Acid POCT 1.4 <=2.0 mmol/L       All relevant imaging and laboratory values personally reviewed.

## 2023-02-26 NOTE — ED PROVIDER NOTES
History     Chief Complaint:  Altered Mental Status       The history is provided by the spouse.      Sulma Ramos is a 57 year old female, anticoagulated on Eliquis, with a history of cirrhosis and liver cancer, who presents with altered mental status. The patient's  reports that the patient developed increased confusion and slurring of words. He states that the patient missed a dose of her lactulose.  notes that the patient will not listen to  and notes that the last time he saw her at her baseline behavior was 2 days ago.  also states that he believes this is encephalopathy due to patient's history and states that she has similar symptoms to previous episode of encephalopathy.  denies fever, vomiting, or chest pain.  denies alcohol use and states that she is not currently on chemo therapy medications. She has a history of falls though no recent fall to his knowledge.  He reports patient has not taken her eliquis over past 2 days.      Independent Historian:    Spouse/partner    Review of External Notes: 23 ED visit    ROS:  Review of Systems   Constitutional: Negative for fever.   Cardiovascular: Negative for chest pain.   Gastrointestinal: Negative for vomiting.   Neurological: Positive for speech difficulty.   Psychiatric/Behavioral: Positive for confusion.   All other systems reviewed and are negative.      Allergies:  Clarithromycin  Clindamycin Base  Droperidol     Medications:    Prozac   Lasix  Gabapentin   Levothyroxine   Lactulose   Rifaximin   Ambien   Eliquis   Salagen     Past Medical History:    Anxiety   Polyphagia   Ascites chronic   Hepatic Encephalopathy   Hypertension portal   Liver cancer   Portal vein thrombosis   Seizure   Steatohepatitis   GERD   Hypothyroidism     Past Surgical History:     section x2       EGD x3   Gallbladder surgery     Family History:    Arthritis   CAD   Hypertension     Social History:  The patient  "arrived via EMS.   She presents with her .     Physical Exam     Patient Vitals for the past 24 hrs:   BP Temp Temp src Pulse Resp SpO2 Height Weight   02/26/23 0524 -- -- -- 82 24 97 % -- --   02/26/23 0514 -- -- -- 83 26 98 % -- --   02/26/23 0453 -- -- -- -- -- -- 1.626 m (5' 4\") 79.4 kg (175 lb)   02/26/23 0452 118/77 -- -- 82 18 90 % -- --   02/26/23 0449 -- 97.8  F (36.6  C) Axillary 81 16 93 % -- --   02/26/23 0444 123/85 -- -- 85 -- -- -- --        Physical Exam  General: Alert.   Head:  The scalp, face are with ecchymosis of various staging; ecchymosis noted to R. cheek  Eyes:  Sclera white; Pupils are equal and round  ENT:    External ears normal.  No hemotympanum.      External nares normal.  No septal hematoma.  Appears to have dried blood in mouth  Neck:  No midline tenderness or pain with full ROM.  CV:  Rate as above with regular rhythm   2/2 radial and dorsal pedal pulses  Resp:  Breath sounds clear and equal bilaterally    Non-labored, no retractions or accessory muscle use  GI:  Abdomen soft, non-tender, non-distended    No rebound tenderness or guarding  MSK:  No midline tenderness or bony step-off    Moves all extremities; L. Forearm with hematoma; R. Inner thigh with 10cm bruise of various staging  Skin:  No rash or lesions noted.  Ecchymosis of various staging diffusely to extremities and back  Neuro:  Moves all extremities     GCS: 13  Psych:  Flat affect        Emergency Department Course   ECG  ECG taken at 0538, ECG read at 0538  Normal sinus rhythm   Nonspecific T wave abnormality   Abnormal ECG   Rate 85 bpm. OH interval 140 ms. QRS duration 78 ms. QT/QTc 440/523 ms. P-R-T axes 74 21 -33.    Imaging:  XR Chest Port 1 View   Final Result   IMPRESSION: Endotracheal tube tip 2 cm above the claudio in good position. Large right pleural effusion with right lower lobe atelectasis similar to previous CT imaging. Left lung is clear. No pneumothorax.      CT Chest/Abdomen/Pelvis w Contrast "   Final Result   IMPRESSION:   1.  Moderate right pleural effusion.   2.  Cirrhosis and portal hypertension.   3.  Hypodense, peripherally calcified right hepatic lesion similar to recent ultrasound.   4.  Wall thickening of the descending and sigmoid colon not excluded. Correlate for colitis.   5.  Right portal vein is small in size and not well seen. Evaluation also reduced due to motion artifact. If characterization of hepatic vasculature is indicated consider Doppler ultrasound.      Head CT w/o contrast   Final Result   Abnormal   IMPRESSION:   1.  Interval development of a large acute on subacute left convexity subdural hematoma. Mass effect and left to right midline shift with uncal herniation and trapping of the right lateral ventricle.      2.  Thin acute subdural hematomas along the falx and both tentorial leaflets as well as the right parietal lobe.         [Critical Result: Intracranial hemorrhage and mass effect.]      Finding was identified on 02/26/2023 at 6:21 AM.       Dr. Sauer was contacted by me on 02/26/2023 at 6:29 AM and verbalized understanding of the critical result.          Cervical spine CT w/o contrast    (Results Pending)   CT Facial Bones without Contrast    (Results Pending)   CT Thoracic Spine w/o Contrast    (Results Pending)   Lumbar spine CT w/o contrast    (Results Pending)     Report per radiology    Laboratory:  Labs Ordered and Resulted from Time of ED Arrival to Time of ED Departure   COMPREHENSIVE METABOLIC PANEL - Abnormal       Result Value    Sodium 141      Potassium 3.7      Chloride 107      Carbon Dioxide (CO2) 22      Anion Gap 12      Urea Nitrogen 30.7 (*)     Creatinine 1.56 (*)     Calcium 8.3 (*)     Glucose 63 (*)     Alkaline Phosphatase 157 (*)     AST 96 (*)     ALT 34      Protein Total 5.3 (*)     Albumin 2.4 (*)     Bilirubin Total 1.5 (*)     GFR Estimate 38 (*)    TROPONIN T, HIGH SENSITIVITY - Abnormal    Troponin T, High Sensitivity 22 (*)     ROUTINE UA WITH MICROSCOPIC REFLEX TO CULTURE - Abnormal    Color Urine Yellow      Appearance Urine Clear      Glucose Urine Negative      Bilirubin Urine Negative      Ketones Urine Trace (*)     Specific Gravity Urine 1.021      Blood Urine Negative      pH Urine 5.5      Protein Albumin Urine Negative      Urobilinogen Urine Normal      Nitrite Urine Negative      Leukocyte Esterase Urine Negative      Mucus Urine Present (*)     RBC Urine 1      WBC Urine 11 (*)     Squamous Epithelials Urine 1      Transitional Epithelials Urine 3 (*)    INR - Abnormal    INR 2.66 (*)    CBC WITH PLATELETS AND DIFFERENTIAL - Abnormal    WBC Count 1.8 (*)     RBC Count 2.83 (*)     Hemoglobin 9.2 (*)     Hematocrit 27.9 (*)     MCV 99      MCH 32.5      MCHC 33.0      RDW 17.9 (*)     Platelet Count 112 (*)     % Neutrophils 55      % Lymphocytes 31      % Monocytes 10      % Eosinophils 2      % Basophils 1      % Immature Granulocytes 1      NRBCs per 100 WBC 0      Absolute Neutrophils 1.0 (*)     Absolute Lymphocytes 0.6 (*)     Absolute Monocytes 0.2      Absolute Eosinophils 0.0      Absolute Basophils 0.0      Absolute Immature Granulocytes 0.0      Absolute NRBCs 0.0     PROCALCITONIN - Abnormal    Procalcitonin 0.58 (*)    LIPASE - Normal    Lipase 43     MAGNESIUM - Normal    Magnesium 2.0     AMMONIA - Normal    Ammonia 45     OCCULT BLOOD STOOL - Normal    Occult Blood Negative     INFLUENZA A/B & SARS-COV2 PCR MULTIPLEX - Normal    Influenza A PCR Negative      Influenza B PCR Negative      RSV PCR Negative      SARS CoV2 PCR Negative     GLUCOSE MONITOR NURSING POCT   NT PROBNP INPATIENT   TYPE AND SCREEN, ADULT    ABO/RH(D) A POS      Antibody Screen Negative      SPECIMEN EXPIRATION DATE [id number]     BLOOD CULTURE   BLOOD CULTURE   URINE CULTURE   ABO/RH TYPE AND SCREEN      XR Chest Port 1 View   Final Result   IMPRESSION: Endotracheal tube tip 2 cm above the claudio in good position. Large right pleural  "effusion with right lower lobe atelectasis similar to previous CT imaging. Left lung is clear. No pneumothorax.      CT Chest/Abdomen/Pelvis w Contrast   Final Result   IMPRESSION:   1.  Moderate right pleural effusion.   2.  Cirrhosis and portal hypertension.   3.  Hypodense, peripherally calcified right hepatic lesion similar to recent ultrasound.   4.  Wall thickening of the descending and sigmoid colon not excluded. Correlate for colitis.   5.  Right portal vein is small in size and not well seen. Evaluation also reduced due to motion artifact. If characterization of hepatic vasculature is indicated consider Doppler ultrasound.      Head CT w/o contrast   Final Result   Abnormal   IMPRESSION:   1.  Interval development of a large acute on subacute left convexity subdural hematoma. Mass effect and left to right midline shift with uncal herniation and trapping of the right lateral ventricle.      2.  Thin acute subdural hematomas along the falx and both tentorial leaflets as well as the right parietal lobe.         [Critical Result: Intracranial hemorrhage and mass effect.]      Finding was identified on 02/26/2023 at 6:21 AM.       Dr. Sauer was contacted by me on 02/26/2023 at 6:29 AM and verbalized understanding of the critical result.          Cervical spine CT w/o contrast    (Results Pending)   CT Facial Bones without Contrast    (Results Pending)     Procedure     Intubation      INDICATION: Airway protection.    PERFORMED BY: Dr. Brigitte Sauer    CONSENT: Consent was obtained after discussing the risks, benefits and alternatives with the Family, patient's .    TIMEOUT: Immediately prior to procedure a \"time out\" was called to verify the correct patient, procedure, equipment, support staff and site/side marked as required.    INTUBATION METHOD: Glidescope    PATIENT STATUS: RSI    PREOXYGENATION: Mask    PRETREATMENT MEDICATIONS: None    SEDATIVES: Etomidate    PARALYTIC: " rocuronium    LARYNGOSCOPE SIZE: Mac 3    TUBE SIZE: 7.0 cuffed with cuff inflated after placement  Number of attempts: 1  Cricoid pressure: yes  Cords visualized: yes    POST-PROCEDURE ASSESSMENT: Breath sounds equal bilaterally with chest rise and absent over the epigastrium, Chest x-ray interpreted by me demonstrating endotracheal tube in appropriate position and CO2 detector.    ETT TO TEETH: 22 cm  Tube secured with: ETT dukes    Patient tolerated the procedure well with no immediate complications.  COMPLICATIONS:  None      Emergency Department Course & Assessments:         Interventions:  Medications   flumazenil (ROMAZICON) injection 0.2 mg (has no administration in time range)   propofol (DIPRIVAN) infusion (10 mcg/kg/min × 79.4 kg Intravenous $New Bag 2/26/23 0705)   dextrose 50 % injection (has no administration in time range)   pantoprazole (PROTONIX) IV push injection 40 mg (40 mg Intravenous $Given 2/26/23 0526)   sodium chloride (PF) 0.9% PF flush 100 mL (60 mLs Intravenous $Given 2/26/23 0551)   iopamidol (ISOVUE-370) solution 500 mL (90 mLs Intravenous $Given 2/26/23 0551)   LORazepam (ATIVAN) injection 0.5 mg (0.5 mg Intravenous $Given 2/26/23 0606)   prothrombin 4 factor complex concentrate (KCENTRA) infusion 3,854 Units (3,854 Units Intravenous $Given 2/26/23 0702)   levETIRAcetam (KEPPRA) intermittent infusion 1,000 mg (0 mg Intravenous Stopped 2/26/23 0703)   dexamethasone PF (DECADRON) injection 10 mg (10 mg Intravenous $Given 2/26/23 0700)   dextrose 50 % injection 50 mL (50 mLs Intravenous $Given 2/26/23 0703)   etomidate (AMIDATE) injection 20 mg (20 mg Intravenous $Given 2/26/23 0648)   rocuronium injection 60 mg (60 mg Intravenous $Given 2/26/23 0655)          Consultations/Discussion of Management or Tests:  Spoke to intensivist at [facility] Dr. Ulloa    Spoke to Dr. Vivar, neurosurgery at [facility]. Recommends transfer to [facility] given history of underlying cirrhosis and  anticoagulation    7:26 AM Spoke to Dr. Mayo, neurosurgery at [facility]    Spoke to Dr. Romano in the ED at [facility]    Social Determinants of Health affecting care:  none     Assessments:  0500 I obtained history and examined the patient as noted above.     Disposition:  The patient was transferred to [facility] via EMS. Dr. Romano accepted the patient for transfer.     Impression & Plan      Medical Decision Making:  Patient is a 57-year-old female presenting with altered mental status.  She underwent an extensive work-up during her time in the ED. patient noted to be mildly hypoglycemic on arrival, dextrose given.  She is noted to be leukopenic as well with mild worsening renal insufficiency.  Procalcitonin elevated.  Blood cultures were sent.  She was covered with IV Zosyn given concern for colitis on CT.  Patient did undergo formal CT head which confirms acute subdural with midline shift and signs of uncal herniation.  Throughout her time in the ED her mentation did seem to decline.  Decision was made to intubate for airway protection.  Patient is coagulopathic given underlying cirrhosis/anticoagulation, she was given Kcentra in addition to IV Keppra and Decadron.  I did speak to neurosurgery team at [facility] initially who requested transfer to [facility].  I then spoke to [facility] neurosurgery and in addition to the ED physician who is graciously excepted the patient.  Patient on secondary survey does have significant amounts of bruising of various staging to her body.  Additional CT imaging was ordered though was not to delay transfer will defer to outside facility.  She remains in a c-collar and CT cervical read pending at dispo.  She has atypical bruising pattern to her inner thighs that raises concern for possible nonaccidental trauma.  She also was noted to have scant vaginal bleeding when hodges catheter was being placed by RN.  I discussed this with Dr. Romano at [facility] ED who will consider filing a vulnerable adult  report.  Patient's  agreeable to transfer to [facility] for continuity of care.  He expressed understanding of the gravity of the situation.    Critical Care time was 55 minutes for this patient excluding procedures.    Diagnosis:    ICD-10-CM    1. Subdural hematoma  S06.5XAA       2. Altered mental status, unspecified altered mental status type  R41.82       3. Hypoglycemia  E16.2       4. Coagulopathy (H)  D68.9       5. Pleural effusion  J90       6. Traumatic ecchymosis of multiple sites  T14.8XXA       7. Sepsis, due to unspecified organism, unspecified whether acute organ dysfunction present (H)  A41.9            Discharge Medications:  New Prescriptions    No medications on file        Scribe Disclosure:  I, Sofia Mancia, am serving as a scribe at 5:24 AM on 2/26/2023 to document services personally performed by Brigitte Sauer DO based on my observations and the provider's statements to me.    2/26/2023   Brigitte Sauer DO McDonald, Lindsey E, DO  02/26/23 0759

## 2023-02-26 NOTE — ED TRIAGE NOTES
Pt transfer from [facility]. Hx cirrhosis, seizures, ETOH anticoagulated on Eliquis. Found to have subdural hematoma with midline shift. Hypoglycemic at OSH. Given kcentra and IV Keppra at OSH. Various bruises with different stages of healing. Scant vaginal bleeding noted when RN placed hodges. Pt arrived intubated. Full trauma activation upon arrival.

## 2023-02-26 NOTE — BRIEF OP NOTE
[facility]    Brief Operative Note    Pre-operative diagnosis: * No pre-op diagnosis entered *  Post-operative diagnosis Same as pre-operative diagnosis    Procedure: Procedure(s):  Left errol holes, evacuation of hematoma subdural, combined  Surgeon: Surgeon(s) and Role:     * Liz Mayo MD - Primary     * Pietro Bustamante MD - Resident - Assisting     * Pietro Chang MD - Resident - Assisting  Anesthesia: General   Estimated Blood Loss: 300 mL    Drains: Hemovac  Specimens: * No specimens in log *  Findings:   Subacute hemorrhage under significant pressure in the subdural space. .  Complications: None.  Implants: * No implants in log *

## 2023-02-26 NOTE — ED TRIAGE NOTES
Pt presents to ED via EMS for concerns of altered mental status.  Pts  phoned 911 for increased confusion, increased falls recently.  Pt has known history of liver disease, encephalopathy,.  Pt doctors at [facility] however is not able to be transferred there via EMS from the pts home and thus presents here. Pts spouse at bedside. 18g right AC, blood sugar of 74mg/dL  ABCs intact.  VSS

## 2023-02-27 ENCOUNTER — APPOINTMENT (OUTPATIENT)
Dept: CT IMAGING | Facility: CLINIC | Age: 58
End: 2023-02-27
Attending: STUDENT IN AN ORGANIZED HEALTH CARE EDUCATION/TRAINING PROGRAM
Payer: COMMERCIAL

## 2023-02-27 ENCOUNTER — APPOINTMENT (OUTPATIENT)
Dept: CARDIOLOGY | Facility: CLINIC | Age: 58
End: 2023-02-27
Attending: STUDENT IN AN ORGANIZED HEALTH CARE EDUCATION/TRAINING PROGRAM
Payer: COMMERCIAL

## 2023-02-27 LAB
ALBUMIN SERPL BCG-MCNC: 2.4 G/DL (ref 3.5–5.2)
ALLEN'S TEST: ABNORMAL
ALLEN'S TEST: NO
ALP SERPL-CCNC: 109 U/L (ref 35–104)
ALT SERPL W P-5'-P-CCNC: 22 U/L (ref 10–35)
ANION GAP SERPL CALCULATED.3IONS-SCNC: 14 MMOL/L (ref 7–15)
APTT PPP: 36 SECONDS (ref 22–38)
AST SERPL W P-5'-P-CCNC: 75 U/L (ref 10–35)
ATRIAL RATE - MUSE: 70 BPM
ATRIAL RATE - MUSE: 73 BPM
ATRIAL RATE - MUSE: 85 BPM
BASE EXCESS BLDA CALC-SCNC: -1.8 MMOL/L (ref -9–1.8)
BASE EXCESS BLDA CALC-SCNC: 0.1 MMOL/L (ref -9–1.8)
BASE EXCESS BLDV CALC-SCNC: 0.8 MMOL/L (ref -7.7–1.9)
BILIRUB SERPL-MCNC: 1.8 MG/DL
BUN SERPL-MCNC: 26.2 MG/DL (ref 8–23)
CA-I BLD-MCNC: 4.5 MG/DL (ref 4.4–5.2)
CALCIUM SERPL-MCNC: 8.5 MG/DL (ref 8.6–10)
CHLORIDE SERPL-SCNC: 110 MMOL/L (ref 98–107)
CREAT SERPL-MCNC: 1.09 MG/DL (ref 0.51–0.95)
DEPRECATED HCO3 PLAS-SCNC: 17 MMOL/L (ref 22–29)
DIASTOLIC BLOOD PRESSURE - MUSE: NORMAL MMHG
ERYTHROCYTE [DISTWIDTH] IN BLOOD BY AUTOMATED COUNT: 19.9 % (ref 10–15)
ERYTHROCYTE [DISTWIDTH] IN BLOOD BY AUTOMATED COUNT: 21.2 % (ref 10–15)
FIBRINOGEN PPP-MCNC: 183 MG/DL (ref 170–490)
GFR SERPL CREATININE-BSD FRML MDRD: 58 ML/MIN/1.73M2
GLUCOSE BLDC GLUCOMTR-MCNC: 107 MG/DL (ref 70–99)
GLUCOSE BLDC GLUCOMTR-MCNC: 124 MG/DL (ref 70–99)
GLUCOSE BLDC GLUCOMTR-MCNC: 125 MG/DL (ref 70–99)
GLUCOSE BLDC GLUCOMTR-MCNC: 86 MG/DL (ref 70–99)
GLUCOSE BLDC GLUCOMTR-MCNC: 99 MG/DL (ref 70–99)
GLUCOSE SERPL-MCNC: 130 MG/DL (ref 70–99)
HBA1C MFR BLD: 5.3 %
HCO3 BLD-SCNC: 20 MMOL/L (ref 21–28)
HCO3 BLD-SCNC: 21 MMOL/L (ref 21–28)
HCO3 BLDV-SCNC: 22 MMOL/L (ref 21–28)
HCT VFR BLD AUTO: 22.8 % (ref 35–47)
HCT VFR BLD AUTO: 26 % (ref 35–47)
HGB BLD-MCNC: 7.8 G/DL (ref 11.7–15.7)
HGB BLD-MCNC: 8.1 G/DL (ref 11.7–15.7)
HGB BLD-MCNC: 8.5 G/DL (ref 11.7–15.7)
INR PPP: 1.65 (ref 0.85–1.15)
INTERPRETATION ECG - MUSE: NORMAL
LVEF ECHO: NORMAL
MAGNESIUM SERPL-MCNC: 2.2 MG/DL (ref 1.7–2.3)
MCH RBC QN AUTO: 31 PG (ref 26.5–33)
MCH RBC QN AUTO: 31.8 PG (ref 26.5–33)
MCHC RBC AUTO-ENTMCNC: 32.7 G/DL (ref 31.5–36.5)
MCHC RBC AUTO-ENTMCNC: 34.2 G/DL (ref 31.5–36.5)
MCV RBC AUTO: 93 FL (ref 78–100)
MCV RBC AUTO: 95 FL (ref 78–100)
O2/TOTAL GAS SETTING VFR VENT: 40 %
O2/TOTAL GAS SETTING VFR VENT: 60 %
O2/TOTAL GAS SETTING VFR VENT: 60 %
OXYHGB MFR BLD: 95 % (ref 92–100)
OXYHGB MFR BLD: 98 % (ref 92–100)
OXYHGB MFR BLDV: 70 % (ref 70–75)
P AXIS - MUSE: 48 DEGREES
P AXIS - MUSE: 60 DEGREES
P AXIS - MUSE: 74 DEGREES
PCO2 BLD: 20 MM HG (ref 35–45)
PCO2 BLD: 23 MM HG (ref 35–45)
PCO2 BLDV: 24 MM HG (ref 40–50)
PH BLD: 7.55 [PH] (ref 7.35–7.45)
PH BLD: 7.62 [PH] (ref 7.35–7.45)
PH BLDV: 7.58 [PH] (ref 7.32–7.43)
PHOSPHATE SERPL-MCNC: 3.1 MG/DL (ref 2.5–4.5)
PLATELET # BLD AUTO: 105 10E3/UL (ref 150–450)
PLATELET # BLD AUTO: 136 10E3/UL (ref 150–450)
PO2 BLD: 154 MM HG (ref 80–105)
PO2 BLD: 72 MM HG (ref 80–105)
PO2 BLDV: 34 MM HG (ref 25–47)
POTASSIUM SERPL-SCNC: 3.5 MMOL/L (ref 3.4–5.3)
PR INTERVAL - MUSE: 136 MS
PR INTERVAL - MUSE: 138 MS
PR INTERVAL - MUSE: 140 MS
PROT SERPL-MCNC: 4.8 G/DL (ref 6.4–8.3)
QRS DURATION - MUSE: 78 MS
QRS DURATION - MUSE: 82 MS
QRS DURATION - MUSE: 90 MS
QT - MUSE: 440 MS
QT - MUSE: 490 MS
QT - MUSE: 502 MS
QTC - MUSE: 523 MS
QTC - MUSE: 539 MS
QTC - MUSE: 542 MS
R AXIS - MUSE: 21 DEGREES
R AXIS - MUSE: 36 DEGREES
R AXIS - MUSE: 40 DEGREES
RBC # BLD AUTO: 2.45 10E6/UL (ref 3.8–5.2)
RBC # BLD AUTO: 2.74 10E6/UL (ref 3.8–5.2)
SODIUM SERPL-SCNC: 141 MMOL/L (ref 136–145)
SYSTOLIC BLOOD PRESSURE - MUSE: NORMAL MMHG
T AXIS - MUSE: -33 DEGREES
T AXIS - MUSE: 42 DEGREES
T AXIS - MUSE: 53 DEGREES
VENTRICULAR RATE- MUSE: 70 BPM
VENTRICULAR RATE- MUSE: 73 BPM
VENTRICULAR RATE- MUSE: 85 BPM
WBC # BLD AUTO: 2.9 10E3/UL (ref 4–11)
WBC # BLD AUTO: 5.9 10E3/UL (ref 4–11)

## 2023-02-27 PROCEDURE — 200N000002 HC R&B ICU UMMC

## 2023-02-27 PROCEDURE — 82330 ASSAY OF CALCIUM: CPT | Performed by: NURSE PRACTITIONER

## 2023-02-27 PROCEDURE — 250N000013 HC RX MED GY IP 250 OP 250 PS 637: Performed by: NURSE PRACTITIONER

## 2023-02-27 PROCEDURE — 85018 HEMOGLOBIN: CPT | Performed by: NURSE PRACTITIONER

## 2023-02-27 PROCEDURE — 85384 FIBRINOGEN ACTIVITY: CPT | Performed by: STUDENT IN AN ORGANIZED HEALTH CARE EDUCATION/TRAINING PROGRAM

## 2023-02-27 PROCEDURE — 99291 CRITICAL CARE FIRST HOUR: CPT | Mod: FS | Performed by: NURSE PRACTITIONER

## 2023-02-27 PROCEDURE — 999N000015 HC STATISTIC ARTERIAL MONITORING DAILY

## 2023-02-27 PROCEDURE — 85018 HEMOGLOBIN: CPT | Performed by: STUDENT IN AN ORGANIZED HEALTH CARE EDUCATION/TRAINING PROGRAM

## 2023-02-27 PROCEDURE — 71250 CT THORAX DX C-: CPT | Mod: 26 | Performed by: RADIOLOGY

## 2023-02-27 PROCEDURE — 93306 TTE W/DOPPLER COMPLETE: CPT

## 2023-02-27 PROCEDURE — 70450 CT HEAD/BRAIN W/O DYE: CPT

## 2023-02-27 PROCEDURE — 84100 ASSAY OF PHOSPHORUS: CPT | Performed by: NURSE PRACTITIONER

## 2023-02-27 PROCEDURE — 94003 VENT MGMT INPAT SUBQ DAY: CPT

## 2023-02-27 PROCEDURE — C9113 INJ PANTOPRAZOLE SODIUM, VIA: HCPCS | Performed by: NURSE PRACTITIONER

## 2023-02-27 PROCEDURE — 250N000011 HC RX IP 250 OP 636: Performed by: STUDENT IN AN ORGANIZED HEALTH CARE EDUCATION/TRAINING PROGRAM

## 2023-02-27 PROCEDURE — 250N000013 HC RX MED GY IP 250 OP 250 PS 637: Performed by: STUDENT IN AN ORGANIZED HEALTH CARE EDUCATION/TRAINING PROGRAM

## 2023-02-27 PROCEDURE — 82805 BLOOD GASES W/O2 SATURATION: CPT

## 2023-02-27 PROCEDURE — 99292 CRITICAL CARE ADDL 30 MIN: CPT | Performed by: PSYCHIATRY & NEUROLOGY

## 2023-02-27 PROCEDURE — 999N000253 HC STATISTIC WEANING TRIALS

## 2023-02-27 PROCEDURE — 71250 CT THORAX DX C-: CPT

## 2023-02-27 PROCEDURE — 250N000011 HC RX IP 250 OP 636: Performed by: NURSE PRACTITIONER

## 2023-02-27 PROCEDURE — P9059 PLASMA, FRZ BETWEEN 8-24HOUR: HCPCS | Performed by: STUDENT IN AN ORGANIZED HEALTH CARE EDUCATION/TRAINING PROGRAM

## 2023-02-27 PROCEDURE — 999N000157 HC STATISTIC RCP TIME EA 10 MIN

## 2023-02-27 PROCEDURE — 250N000013 HC RX MED GY IP 250 OP 250 PS 637: Performed by: NEUROLOGICAL SURGERY

## 2023-02-27 PROCEDURE — 70450 CT HEAD/BRAIN W/O DYE: CPT | Mod: 26 | Performed by: RADIOLOGY

## 2023-02-27 PROCEDURE — 83036 HEMOGLOBIN GLYCOSYLATED A1C: CPT | Performed by: NURSE PRACTITIONER

## 2023-02-27 PROCEDURE — 85610 PROTHROMBIN TIME: CPT | Performed by: STUDENT IN AN ORGANIZED HEALTH CARE EDUCATION/TRAINING PROGRAM

## 2023-02-27 PROCEDURE — 85730 THROMBOPLASTIN TIME PARTIAL: CPT | Performed by: STUDENT IN AN ORGANIZED HEALTH CARE EDUCATION/TRAINING PROGRAM

## 2023-02-27 PROCEDURE — 82805 BLOOD GASES W/O2 SATURATION: CPT | Performed by: NURSE PRACTITIONER

## 2023-02-27 PROCEDURE — 999N000155 HC STATISTIC RAPCV CVP MONITORING

## 2023-02-27 PROCEDURE — 80053 COMPREHEN METABOLIC PANEL: CPT | Performed by: NURSE PRACTITIONER

## 2023-02-27 PROCEDURE — 93306 TTE W/DOPPLER COMPLETE: CPT | Mod: 26 | Performed by: INTERNAL MEDICINE

## 2023-02-27 PROCEDURE — 83735 ASSAY OF MAGNESIUM: CPT | Performed by: NURSE PRACTITIONER

## 2023-02-27 RX ORDER — HYDROCODONE BITARTRATE AND ACETAMINOPHEN 5; 325 MG/1; MG/1
1 TABLET ORAL EVERY 6 HOURS PRN
Status: ON HOLD | COMMUNITY
End: 2023-03-07

## 2023-02-27 RX ORDER — GUAIFENESIN 600 MG/1
15 TABLET, EXTENDED RELEASE ORAL DAILY
Status: DISCONTINUED | OUTPATIENT
Start: 2023-02-27 | End: 2023-02-28

## 2023-02-27 RX ORDER — POTASSIUM CHLORIDE 1.5 G/1.58G
20 POWDER, FOR SOLUTION ORAL ONCE
Status: COMPLETED | OUTPATIENT
Start: 2023-02-27 | End: 2023-02-27

## 2023-02-27 RX ORDER — PILOCARPINE HYDROCHLORIDE 5 MG/1
5 TABLET, FILM COATED ORAL 3 TIMES DAILY
COMMUNITY

## 2023-02-27 RX ORDER — ACETAMINOPHEN 325 MG/1
650 TABLET ORAL EVERY 4 HOURS PRN
Status: DISCONTINUED | OUTPATIENT
Start: 2023-02-27 | End: 2023-02-27

## 2023-02-27 RX ORDER — CHOLECALCIFEROL (VITAMIN D3) 125 MCG
30000 CAPSULE ORAL DAILY
COMMUNITY

## 2023-02-27 RX ORDER — FLUOXETINE 40 MG/1
40 CAPSULE ORAL EVERY MORNING
Status: DISCONTINUED | OUTPATIENT
Start: 2023-02-27 | End: 2023-02-27

## 2023-02-27 RX ORDER — LEVETIRACETAM 500 MG/1
500 TABLET ORAL 2 TIMES DAILY
Status: DISCONTINUED | OUTPATIENT
Start: 2023-02-27 | End: 2023-03-01

## 2023-02-27 RX ORDER — AMOXICILLIN 250 MG
2 CAPSULE ORAL 2 TIMES DAILY
Status: DISCONTINUED | OUTPATIENT
Start: 2023-02-27 | End: 2023-02-28

## 2023-02-27 RX ORDER — LEVETIRACETAM 5 MG/ML
500 INJECTION INTRAVASCULAR 2 TIMES DAILY
Status: DISCONTINUED | OUTPATIENT
Start: 2023-02-27 | End: 2023-02-27

## 2023-02-27 RX ORDER — POLYETHYLENE GLYCOL 3350 17 G/17G
17 POWDER, FOR SOLUTION ORAL DAILY
Status: DISCONTINUED | OUTPATIENT
Start: 2023-02-27 | End: 2023-03-01

## 2023-02-27 RX ADMIN — RIFAXIMIN 550 MG: 550 TABLET ORAL at 19:47

## 2023-02-27 RX ADMIN — FENTANYL CITRATE 50 MCG: 50 INJECTION, SOLUTION INTRAMUSCULAR; INTRAVENOUS at 01:00

## 2023-02-27 RX ADMIN — POTASSIUM CHLORIDE 20 MEQ: 1.5 POWDER, FOR SOLUTION ORAL at 04:43

## 2023-02-27 RX ADMIN — LEVETIRACETAM 1000 MG: 10 INJECTION INTRAVENOUS at 00:38

## 2023-02-27 RX ADMIN — POLYETHYLENE GLYCOL 3350 17 G: 17 POWDER, FOR SOLUTION ORAL at 08:06

## 2023-02-27 RX ADMIN — FENTANYL CITRATE 50 MCG: 50 INJECTION, SOLUTION INTRAMUSCULAR; INTRAVENOUS at 08:24

## 2023-02-27 RX ADMIN — FENTANYL CITRATE 50 MCG: 50 INJECTION, SOLUTION INTRAMUSCULAR; INTRAVENOUS at 16:23

## 2023-02-27 RX ADMIN — SENNOSIDES AND DOCUSATE SODIUM 2 TABLET: 50; 8.6 TABLET ORAL at 08:06

## 2023-02-27 RX ADMIN — THIAMINE HYDROCHLORIDE 100 MG: 100 INJECTION, SOLUTION INTRAMUSCULAR; INTRAVENOUS at 08:07

## 2023-02-27 RX ADMIN — PROPOFOL 30 MCG/KG/MIN: 10 INJECTION, EMULSION INTRAVENOUS at 11:48

## 2023-02-27 RX ADMIN — LEVETIRACETAM 500 MG: 500 TABLET, FILM COATED ORAL at 19:47

## 2023-02-27 RX ADMIN — FLUOXETINE HYDROCHLORIDE 40 MG: 40 CAPSULE ORAL at 08:24

## 2023-02-27 RX ADMIN — FENTANYL CITRATE 50 MCG: 50 INJECTION, SOLUTION INTRAMUSCULAR; INTRAVENOUS at 04:54

## 2023-02-27 RX ADMIN — PANTOPRAZOLE SODIUM 40 MG: 40 INJECTION, POWDER, FOR SOLUTION INTRAVENOUS at 08:05

## 2023-02-27 RX ADMIN — FENTANYL CITRATE 50 MCG: 50 INJECTION, SOLUTION INTRAMUSCULAR; INTRAVENOUS at 20:03

## 2023-02-27 RX ADMIN — PROPOFOL 20 MCG/KG/MIN: 10 INJECTION, EMULSION INTRAVENOUS at 19:00

## 2023-02-27 RX ADMIN — SENNOSIDES AND DOCUSATE SODIUM 2 TABLET: 50; 8.6 TABLET ORAL at 19:47

## 2023-02-27 RX ADMIN — LEVETIRACETAM 1000 MG: 10 INJECTION INTRAVENOUS at 08:05

## 2023-02-27 RX ADMIN — RIFAXIMIN 550 MG: 550 TABLET ORAL at 08:06

## 2023-02-27 RX ADMIN — GABAPENTIN 600 MG: 600 TABLET, FILM COATED ORAL at 21:18

## 2023-02-27 RX ADMIN — FOLIC ACID 1 MG: 5 INJECTION, SOLUTION INTRAMUSCULAR; INTRAVENOUS; SUBCUTANEOUS at 08:06

## 2023-02-27 ASSESSMENT — ACTIVITIES OF DAILY LIVING (ADL)
ADLS_ACUITY_SCORE: 45
DEPENDENT_IADLS:: INDEPENDENT
ADLS_ACUITY_SCORE: 46
ADLS_ACUITY_SCORE: 49
ADLS_ACUITY_SCORE: 46
ADLS_ACUITY_SCORE: 43
ADLS_ACUITY_SCORE: 45
ADLS_ACUITY_SCORE: 46
ADLS_ACUITY_SCORE: 45
ADLS_ACUITY_SCORE: 46

## 2023-02-27 ASSESSMENT — VISUAL ACUITY
OU: OTHER (SEE COMMENT)

## 2023-02-27 NOTE — PROGRESS NOTES
Admitted/transferred from: ED  Reason for admission/transfer: subdural hematoma  2 RN skin assessment: completed by BARTOLO Springer and BARTOLO Rodriguez  Result of skin assessment and interventions/actions: scattered bruising head to toe. L fore arm hematoma, L lip scab,  Height, weight, drug calc weight: completed  Patient belongings (see Flowsheet)

## 2023-02-27 NOTE — PROGRESS NOTES
CLINICAL NUTRITION SERVICES - ASSESSMENT NOTE     Nutrition Prescription    RECOMMENDATIONS FOR MDs/PROVIDERS TO ORDER:  - Total daily fluids/adjustments per MD   - Electrolyte replacement per protocol as indicated   - B vitamins/micors as ordered. Continued post-bariatric micronutrients post-discharge as ordered in OP meds.     Malnutrition Status:    - Unable to determine due to unable to complete all parameters of NFPE. Of note gastric bypass (inevidable/induced malnutrition)    Recommendations already ordered by Registered Dietitian (RD):  - Initiate via current access/OGT: Pam Adamson Peptide 1.5 @ 40 mL/hr (960 mL/day) to provide 1476 kcal (25 kcal/kg), 71 g protein (1.2 g/kg), 132 g CHO, 9 g fiber, 74 g fat (40% from MCTs), and 672 mL free water daily   - Prosource x 2 = 1636 kcals (28 kcals/kg) and 111 gm PRO (1.9 gm/kg)  - Do not start or advance until lytes (Mg++,K+) WNL and phos>2.0  - Recommend 30-60 ml q4hr fluid flushes for tube patency. Additional fluids and/or adjustments per MD.    - Order multivitamin/mineral (15 ml/day via FT) to help ensure micronutrient needs being met with suspected hypermetabolic demands and potential interruptions to TF infusions.  - Elevated HOB with gastric feeds      Future/Additional Recommendations:  - EN initiation via OGT   - GI status   - Weight trends      REASON FOR ASSESSMENT  Sulma Ramos is a/an 57 year old female assessed by the dietitian for Provider Order - Registered Dietitian to Assess and Order TF per Medical Nutrition Therapy Protocol    Medical History:  Past medical history of anxiety, insomnia, RLS, cirrhosis 2/2 liver cancer, portal hypertension, hepatic encephalopathy, seizure, gastric bypass, and portal vein thrombus on Eliquis who was admitted on 2/26/2023 from [facility] after presenting with altered mental status found to have acute subdural hematoma with midline shift and signs of uncal herniation on CT head.    NUTRITION HISTORY  From  "Chart:  Alcohol consumption - reportedly 8-10/week per chart    Home meds/micronutrient review (unclear of compliance and/or recency of micros)  Ascorbic Acid (VITAMIN C PO)  calcium-vitamin D (CALTRATE) 600-400 MG-UNIT per tablet  cyanocobalamin 1000 MCG/ML injection  ferrous sulfate 325 (65 FE) MG tablet  Multiple Vitamins-Minerals (MULTIVITAMIN OR)  Pyridoxine HCl (VITAMIN B6 PO)  Thiamine HCl (VITAMIN B-1 PO)    Attempted to visit with pt and family x 3. Busy with cares, gone from room, busy with cares again and family not available.     CURRENT NUTRITION ORDERS  Diet: NPO  Enteral Access: OGT (AXR)    LABS  Labs reviewed  BUN: 26.2 (H)  Creatinine: 1.09 (H)  Alk phos: 109 (H)  AST: 75 (H)  Bili total: 1.8 (H)  Glucose: 130 (H)    MEDICATIONS  Medications reviewed  Cyanocobalamin injection (baseline)  Folic acid IV  Keppra   Miralax   Senokot  Rifaximin   Thiamine IV  Propofol @4.8 ml/hr = 127 kcals   Norepi     ANTHROPOMETRICS  Height: 162.6 cm (5' 4\") 64\"   Most Recent Weight: 74.1 kg (163 lb 5.8 oz)    IBW: 54 kg  BMI: Overweight BMI 25-29.9  Weight History:   Wt Readings from Last 8 Encounters:   02/26/23 74.1 kg (163 lb 5.8 oz)   02/26/23 79.4 kg (175 lb)   09/07/13 79.7 kg (175 lb 11.2 oz)   02/11/13 63.5 kg (140 lb)   12/03/04 87.5 kg (193 lb)   11/30/04 87.8 kg (193 lb 8 oz)   02/04/04 86.2 kg (190 lb)   weight history too distant and pt had gastric bypass (unclear when). Follow for accurate weight (two different weights on same date)    Dosing Weight: 59 kg (adjusted weight using current/admit weight of 74 kg and IBW of 54 kg)    ASSESSED NUTRITION NEEDS  Estimated Energy Needs: 3573-8047 kcals/day (25 - 30 kcals/kg)  Justification: Maintenance  Estimated Protein Needs:  grams protein/day (1.5 - 2 grams of pro/kg)  Justification: Increased needs  Estimated Fluid Needs: (1 mL/kcal)   Justification: Maintenance and Per provider pending fluid status    PHYSICAL FINDINGS  See malnutrition section " below.  From Chart   Chronic Ascites  Distal esophageal varices, seen on CT 2/26/23   s/p Beto-en Y  OG placed in ED    MALNUTRITION  % Intake: Unable to assess  % Weight Loss: Unable to assess  Subcutaneous Fat Loss: Unable to assess  Muscle Loss: Unable to assess  *none obvious on superficial exam, but requires more detailed exam*  Fluid Accumulation/Edema: Unable to assess  Malnutrition Diagnosis: Unable to determine due to unable to complete all parameters of NFPE. Of note gastric bypass (inevidable/induced malnutrition)    NUTRITION DIAGNOSIS  Inadequate oral intake related to inability to take oral PO/vented as evidenced by NPO and meeting 0% nutrition needs at present       INTERVENTIONS  Implementation  Nutrition Education: Not appropriate at this time due to patient condition   Enteral Nutrition - Initiate as ordered and monitor      Goals  Total avg nutritional intake to meet a minimum of 25 kcal/kg and 1.5 g PRO/kg daily (per dosing wt 59 kg).     Monitoring/Evaluation  Progress toward goals will be monitored and evaluated per protocol.    Hiral Connolly RD, TARI, [facility]  Neuro ICU  Pager: [phone]

## 2023-02-27 NOTE — PROGRESS NOTES
Neurosurgery brief interval note:    Postop:  Subdural KATIA drain in place  Lactate 2.4, Troponins mildly elevated  Receiving 2U FFP for INR 1.7 and acute blood on postop CT   - Repeat INR ordered with 4AM labs.  Receiving 1U PRBC for Hemoglobin 7.0   - Repeat ordered for 2 hours post infusion  1L plasmalyte bolus given for MAPs in 50s. Persisted. Levophed started. BP fluctuations including bradycardia into 30s-40s--not captured on EKG. Switched to propofol, no issues since.   Echo ordered at 1555 as Routine by [facility], not performed by technologists.    - Cardiology curbsided for bedside echo--no obvious focal or global akinesis, IVC could not be visualized due to patient positioning.

## 2023-02-27 NOTE — PROGRESS NOTES
Major Shift Events: pt arrived from ED at 12pm, intubated and sedated on propofol. Upon arrival pt noted to be posturing in all extremities. L pupil larger then R. Neuro-crit and neuro-surge notified. In room, pt taken to emergent OR for a evacuation of  Subdural hematoma. Pt returned post-op at 1554 from OR, with KATIA in front left side of head. Pt remained sedated, posturing in all extremities, neuro-crit and neur-surg aware. propofoll weaned off, precedex initiated. Pt more alert, restless, agitated Noted to be moving all extremities, does not follow any commands. PRN fentanyl given for pain. Head CT completed. NSR, 1L plasmalyte bolus given, levophed started. Hypothermic, PETER hugger placed. LS clear, ETT CMV settings, 40%. abd soft, OG in place, waiting on placement post X-ray. No BM. Clark in place with adequate UOP. Pt noted to have scattered bruising from head to toe, provider aware.   Plan: continue POC  For vital signs and complete assessments, please see documentation flowsheets.

## 2023-02-27 NOTE — CONSULTS
Care Management Initial Consult    General Information  Assessment completed with: Spouse or significant other (Eugene-), Eugene-  Type of CM/SW Visit: Initial Assessment    Primary Care Provider verified and updated as needed: Yes   Readmission within the last 30 days: no previous admission in last 30 days         Advance Care Planning: Advance Care Planning Reviewed: present on chart          Communication Assessment  Patient's communication style: spoken language (English or Bilingual)             Cognitive  Cognitive/Neuro/Behavioral: .WDL except  Level of Consciousness: sedated  Arousal Level: arouses to touch/gentle shaking  Orientation: other (see comments) (gregg, ett)  Mood/Behavior: restless, agitated, other (see comments) (sedated; intermittently agitated)  Best Language:  (GREGG)  Speech: endotracheal tube    Living Environment:   People in home: significant other  Eugene-  Current living Arrangements: other (see comments) (townhome, single level-no stairs)      Able to return to prior arrangements:         Family/Social Support:  Care provided by: self  Provides care for: no one  Marital Status:     Eugene       Description of Support System: Supportive, Involved    Support Assessment: Adequate family and caregiver support, Adequate social supports    Current Resources:   Patient receiving home care services: No     Community Resources: None  Equipment currently used at home: none  Supplies currently used at home: None    Employment/Financial:  Employment Status: retired        Financial Concerns: No concerns identified   Referral to Financial Worker: No       Lifestyle & Psychosocial Needs:  Social Determinants of Health     Tobacco Use: Unknown     Smoking Tobacco Use: Never     Smokeless Tobacco Use: Unknown     Passive Exposure: Not on file   Alcohol Use: Not on file   Financial Resource Strain: Not on file   Food Insecurity: Not on file   Transportation Needs: Not on file    Physical Activity: Not on file   Stress: Not on file   Social Connections: Not on file   Intimate Partner Violence: Not on file   Depression: Not on file   Housing Stability: Not on file       Functional Status:  Prior to admission patient needed assistance:   Dependent ADLs:: Independent  Dependent IADLs:: Independent  Assesssment of Functional Status: Not at baseline with ADL Functioning    Mental Health Status:  Mental Health Status: No Current Concerns       Chemical Dependency Status:  Chemical Dependency Status: No Current Concerns             Values/Beliefs:  Spiritual, Cultural Beliefs, Sabianism Practices, Values that affect care: yes  Description of Beliefs that Will Affect Care: Dilip    Cultural/Sabianism Practices Patient Routinely Participates In: prayer       Additional Information:   completed CMA with pt  Eugene over phone.  noted there is a HCD-social work will follow up to get copy, pt does not have PCP but regular doctor with [facility].     ENRRIQUE Ferris, LG[facility] Care   [phone]

## 2023-02-27 NOTE — PROGRESS NOTES
[facility], Tertiary Survey Progress Note     Date of Service (when I saw the patient): 02/27/2023  Trauma mechanism: Known fall 01/19 with head strike  Time/date of injury:01/19/2023  Known Injuries:  1. Large left SDH with mass effect     Brief history  Sulma Ramos 57 year old with a PMH of liver failure secondary to HCC s/p ablation, portal vein thrombosis on Apixaban, ascites, s/p Beto-en Y, who was evaluated at the ED for alerted mental status found to have a large left SDH with mass effect. Patient was intubated and transferred to [facility], S/P errol hole for SDH evacuation, C CAP T T/L spine with L1 subacute compression deformity and moderate right effusion,  remains intubated.    Assessment/plan  #Large left convexity SDH S/P errol hole drainage, Neurosurgery managing  #L1 compression deformity unknown chronicity: unable to correlate with pain, sedated. If tender with palpation when able to follow commands, neurosurgery to re-eval for possible bracing.  # Mod right pleural effusion: CT without appreciable blunt chest trauma, recommend leaving for now unless causing issues with ventilation or oxygenation.  Left forearm deformity, sequale from left forearm hematoma from fall off Ellipta 1 month ago. No fractures appreciated on 2 view XR.     Tertiary exam complete, no other injuries appreciated.  Trauma Surgery is available at anytime if needed.   Please call or page as needed [phone]    Interval History   Intubated, sedated  Review of Systems   Unable to complete    Physical Exam     Stephany Coma Scale: 4/T - sedated/intubated    Physical Exam  Constitutional:       Comments: sedated   HENT:      Head:      Comments: KATIA drain   Surgical incision CDI     Nose: Nose normal.   Eyes:      Pupils: Pupils are equal, round, and reactive to light.   Cardiovascular:      Rate and Rhythm: Normal rate.   Pulmonary:      Comments: intubated  Abdominal:      General: There is no  distension.      Palpations: Abdomen is soft.   Skin:     General: Skin is warm and dry.      Findings: Bruising present.      Comments: Scattered bruises arms and legs   Neurological:      Comments: Sedated, moves extremities/restless not necessarily to command       Temp: 98.2  F (36.8  C) Temp src: Axillary BP: 99/63 Pulse: 71   Resp: 18 SpO2: 95 % O2 Device: Mechanical Ventilator    Vitals:    02/26/23 1710   Weight: 74.1 kg (163 lb 5.8 oz)     Vital Signs with Ranges  Temp:  [94.1  F (34.5  C)-98.4  F (36.9  C)] 98.2  F (36.8  C)  Pulse:  [45-78] 71  Resp:  [0-32] 18  BP: ()/(63-95) 99/63  MAP:  [54 mmHg-111 mmHg] 62 mmHg  Arterial Line BP: ()/(33-74) 86/46  FiO2 (%):  [40 %-50 %] 40 %  SpO2:  [93 %-100 %] 95 %  I/O last 3 completed shifts:  In: 3100.95 [I.V.:1889.95; NG/GT:150]  Out: 1428 [Urine:870; Drains:258; Blood:300]      CRISTO Last CNP  To contact the trauma service use job code pager [phone],   Numeric texts or alpha text through [facility]

## 2023-02-27 NOTE — PROGRESS NOTES
SPIRITUAL HEALTH SERVICES Progress Note  [facility] 4A    I met briefly with Sulma's  Eugene and one of their sons. Eugene related how sudden and unexpected this was and his distress. He named that his spirituality is private but his daughter may be interested in follow up. Eugene was teary throughout our conversation contemplating Sulma's condition. I attempted to meet with her today but was unsuccessful.     Radhames Chaudhary  Chaplain Resident  Pager [phone]    * [facility] remains available 24/7 for emergent requests/referrals, either by having the switchboard page the on-call  or by entering an ASAP/STAT consult in Epic (this will also page the on-call ). Routine Epic consults receive an initial response within 24 hours.*

## 2023-02-27 NOTE — PROGRESS NOTES
[facility], [place]  Neurosurgery Progress Note:  02/27/2023      Interval History: Remains intubated, not following commands, moving spontaneously    Assessment:  57 year old female with liver cancer and coagulopathy with thrombocytopenia and elevated INR, presenting with 2 days of AMS in setting of acute-on-subacute large left convexity SDH.    She underwent left-sided bur holes craniotomy for evacuation on 2/26/2023 with Dr. Mayo.  Postop head CT obtained showing decompression with residual left-sided convexity collection.  Subdural KATIA drain left in place to thumbprint-will require significant stripping.  Postoperatively INR increased 1.7, received 2 units of FFP.  Bedside echo performed by cardiology showing no obvious akinesis (with a focal low global).  Lactate and troponins obtained slightly elevated.  Received 1 unit of PRBCs for hemoglobin of 7.  Sedation switched from Precedex to propofol.    Pre-operative anticoagulation/antiplatelet: Eliquis    Plan:  Follow KATIA drain output  Formal echo today  Follow INR/platelets/fibrinogen/lactate/troponins  Serial neuro exams  Pain control  HOB > 30 degrees  Advance diet as tolerated  Bowel regimen  PRN antiemetics  IVF until taking adequate PO  PT/OT  SCDs for DVT proph    -----------------------------------  James Hardin MD  Neurosurgery resident, PGY-3  -----------------------------------  General: Cirrhotic appearing, icteric sclera    Pulm: Intubated, sedated  Neuro:  Eyes open spontaneously, not following commands, pupils 3 to 4 mm midline, briskly reactive  Moving all extremities purposefully antigravity  --------------------------------------------------------------------------------------------------------------------------    Clinically Significant Risk Factors Present on Admission              # Hypoalbuminemia: Lowest albumin = 2.4 g/dL at 2/27/2023  3:35 AM, will monitor as appropriate  # Coagulation Defect: INR = 1.65  "(Ref range: 0.85 - 1.15) and/or PTT = 36 Seconds (Ref range: 22 - 38 Seconds), will monitor for bleeding  # Thrombocytopenia: Lowest platelets = 91 in last 2 days, will monitor for bleeding     # Circulatory Shock: currently requiring pressors for blood pressure support     # Overweight: Estimated body mass index is 28.04 kg/m  as calculated from the following:    Height as of this encounter: 1.626 m (5' 4\").    Weight as of this encounter: 74.1 kg (163 lb 5.8 oz).        # Compression of brain         Objective:   Temp:  [94.1  F (34.5  C)-98.4  F (36.9  C)] 98.2  F (36.8  C)  Pulse:  [45-80] 71  Resp:  [0-32] 18  BP: ()/() 99/63  MAP:  [54 mmHg-111 mmHg] 62 mmHg  Arterial Line BP: ()/(33-74) 86/46  FiO2 (%):  [40 %-50 %] 40 %  SpO2:  [93 %-100 %] 95 %  I/O last 3 completed shifts:  In: 3100.95 [I.V.:1889.95; NG/GT:150]  Out: 1428 [Urine:870; Drains:258; Blood:300]        LABS:  Recent Labs   Lab 02/27/23  0341 02/27/23  0335 02/27/23  0101 02/26/23  2250 02/26/23  1826 02/26/23  1613 02/26/23  1405 02/26/23  0718 02/26/23  0454   NA  --  141  --   --  140  --  141   < > 141   POTASSIUM  --  3.5  --   --  3.8  --  3.6   < > 3.7   CHLORIDE  --  110*  --   --  109*  --   --   --  107   CO2  --  17*  --   --  17*  --   --   --  22   ANIONGAP  --  14  --   --  14  --   --   --  12   * 130* 125*   < > 138*   < > 120*   < > 63*   BUN  --  26.2*  --   --  25.3*  --   --   --  30.7*   CR  --  1.09*  --   --  1.07*  --   --   --  1.56*   CHERRIE  --  8.5*  --   --  8.5*  --   --   --  8.3*    < > = values in this interval not displayed.       Recent Labs   Lab 02/27/23  0335   WBC 2.9*   RBC 2.45*   HGB 7.8*   HCT 22.8*   MCV 93   MCH 31.8   MCHC 34.2   RDW 19.9*   *       IMAGING:  Recent Results (from the past 24 hour(s))   XR Forearm Port Left 2 Views    Narrative    EXAM: XR FOREARM PORT LEFT 2 VIEWS  LOCATION: [facility]  DATE/TIME: 2/26/2023 " 9:18 AM    INDICATION: Unknown if fall, deformity. Arm pain.  COMPARISON: None.      Impression    IMPRESSION: Within normal limits. No fracture. Osteopenia.   XR Chest Port 1 View    Narrative    EXAM: Chest radiograph 2/26/2023 9:19 AM    HISTORY: Endotracheal tube placement.     COMPARISON: Same-day chest radiograph.    TECHNIQUE: Portable supine AP view of the chest.    FINDINGS: Endotracheal tube tip projects over the midthoracic trachea  approximately 3.8 cm and the claudio. Trachea appears midline.  Unchanged cardiomediastinal silhouette. Unchanged hazy right  hemithoracic opacification. Left lung remains clear. No pneumothorax.  Right upper quadrant surgical clips. Gastric bypass sutures left upper  quadrant. Soft tissues unremarkable. No acute osseous abnormality.  Osteopenia.      Impression    IMPRESSION:   1.  Endotracheal tube projects over the midthoracic trachea.  2.  Unchanged moderate to large right pleural effusion.    I have personally reviewed the examination and initial interpretation  and I agree with the findings.    IRON ENNIS MD         SYSTEM ID:  [id number]   Lumbar spine CT w/o contrast    Narrative    Thoracic and Lumbar spine CT without contrast    History: AMS, s/p possible trauma with bruising.  ICD-10:  Comparison: CT abdomen pelvis 8/2/2007.    Technique: Axial, coronal, and sagittal multiplanar reconstructions  obtained from acquisition of thoracic and lumbar spine CT scan   without contrast.    Findings:  Thoracic spine:   Moderate motion artifact. There is no acute fracture or subluxation of  the thoracic spine. There is no prevertebral edema. There is no spinal  canal or foraminal stenosis at any level. No soft tissue abnormality  in the visualized paraspinous tissues anteriorly. Mild/moderate T7 and  T10 superior endplate Schmorl's node degenerative formation.    Lumbar Spine:   Moderate motion artifact. Moderate/severe compression deformity of L1,  notably within the  midportion, with approximately 60-70% vertebral  height loss (not seen in 2007). Trabecular sclerotic changes favoring  acute nature with comminution and fracture lines extending to involve  the mid inferior endplates and anterior superior endplate. No  significant retropulsed fragments. Advanced intervertebral disc space  loss at L4-5 and L5-S1 with associated vacuum disc phenomenon.  Associated opposing endplate subarticular cystic degenerative changes  at these levels. Normal alignment of the lumbar spine without  traumatic subluxation.    There are 5 type lumbar vertebra, used for the purposes of this  dictation .     On a level by level basis, the findings are as follows:  T12-L1: Mild disc bulge without significant neural canal or neural  foraminal narrowing. Moderate/severe compression deformity of L1  without retropulsion, noted above.  L1-2:  No significant neural foraminal neural foraminal narrowing.  L2-3:  Mild disc bulge without neural foraminal or canal narrowing.  L3-4:  Mild/moderate disc bulge without significant neural foraminal  or spinal canal narrowing.  L4-5:  Moderate degenerative facet arthropathy and mild disc bulge  with moderate bilateral neural foraminal narrowing.  L5-S1:  Mild disc bulge and moderate facet arthropathy. Moderate  bilateral neural foraminal narrowing.  The visualized paraspinous tissues anteriorly are unremarkable.      Impression    Impression:   Thoracic spine:    1. No acute osseous abnormality. No significant neural foraminal or  spinal canal narrowing throughout.   Lumbar Spine:   1. Favored subacute moderate/severe compression fracture deformity of  L1 without retropulsion; further characterized above.  2. Multilevel mild spondylosis with degenerative changes worst at the  L4-S1 levels. Mild disc bulge L5-S1 with mild abutment of the right S1  exiting nerve root.    I have personally reviewed the examination and initial interpretation  and I agree with the  findings.    MARIA C KELLEY MD         SYSTEM ID:  [id number]   CT Thoracic Spine w/o Contrast    Narrative    Thoracic and Lumbar spine CT without contrast    History: AMS, s/p possible trauma with bruising.  ICD-10:  Comparison: CT abdomen pelvis 8/2/2007.    Technique: Axial, coronal, and sagittal multiplanar reconstructions  obtained from acquisition of thoracic and lumbar spine CT scan   without contrast.    Findings:  Thoracic spine:   Moderate motion artifact. There is no acute fracture or subluxation of  the thoracic spine. There is no prevertebral edema. There is no spinal  canal or foraminal stenosis at any level. No soft tissue abnormality  in the visualized paraspinous tissues anteriorly. Mild/moderate T7 and  T10 superior endplate Schmorl's node degenerative formation.    Lumbar Spine:   Moderate motion artifact. Moderate/severe compression deformity of L1,  notably within the midportion, with approximately 60-70% vertebral  height loss (not seen in 2007). Trabecular sclerotic changes favoring  acute nature with comminution and fracture lines extending to involve  the mid inferior endplates and anterior superior endplate. No  significant retropulsed fragments. Advanced intervertebral disc space  loss at L4-5 and L5-S1 with associated vacuum disc phenomenon.  Associated opposing endplate subarticular cystic degenerative changes  at these levels. Normal alignment of the lumbar spine without  traumatic subluxation.    There are 5 type lumbar vertebra, used for the purposes of this  dictation .     On a level by level basis, the findings are as follows:  T12-L1: Mild disc bulge without significant neural canal or neural  foraminal narrowing. Moderate/severe compression deformity of L1  without retropulsion, noted above.  L1-2:  No significant neural foraminal neural foraminal narrowing.  L2-3:  Mild disc bulge without neural foraminal or canal narrowing.  L3-4:  Mild/moderate disc bulge without significant  neural foraminal  or spinal canal narrowing.  L4-5:  Moderate degenerative facet arthropathy and mild disc bulge  with moderate bilateral neural foraminal narrowing.  L5-S1:  Mild disc bulge and moderate facet arthropathy. Moderate  bilateral neural foraminal narrowing.  The visualized paraspinous tissues anteriorly are unremarkable.      Impression    Impression:   Thoracic spine:    1. No acute osseous abnormality. No significant neural foraminal or  spinal canal narrowing throughout.   Lumbar Spine:   1. Favored subacute moderate/severe compression fracture deformity of  L1 without retropulsion; further characterized above.  2. Multilevel mild spondylosis with degenerative changes worst at the  L4-S1 levels. Mild disc bulge L5-S1 with mild abutment of the right S1  exiting nerve root.    I have personally reviewed the examination and initial interpretation  and I agree with the findings.    MARIA C KELLEY MD         SYSTEM ID:  [id number]   XR Abdomen Port 1 View    Narrative    EXAM: XR ABDOMEN PORT 1 VIEW  2/26/2023 10:24 AM     HISTORY:  OG placed       TECHNIQUE: Single frontal radiograph of the abdomen    COMPARISON:  CT chest abdomen and pelvis 2/26/2023.    FINDINGS:   AP supine portable abdomen. OG catheter tip projects over the stomach  fundus; sidehole projects over the distal esophagus/gastroesophageal  junction. No obstructive bowel gas pattern. Portions of the right  peripheral-most abdomen are not visualized. Prior cholecystectomy. No  acute osseous abnormality.      Impression    IMPRESSION:  1. OG catheter tip projects over the stomach, however sidehole is  located at the approximate gastroesophageal junction.  2. Nonobstructive bowel gas pattern.    I have personally reviewed the examination and initial interpretation  and I agree with the findings.    IRON ENNIS MD         SYSTEM ID:  [id number]   CT Head w/o Contrast    Narrative    EXAM: Head CT without contrast 2/26/2023 11:54  AM    HISTORY: Follow-up subdural hemorrhages.    COMPARISON: Same day head CT.    TECHNIQUE: Using multidetector thin collimation helical acquisition  technique, axial, coronal and sagittal CT images from the skull base  to the vertex were obtained without intravenous contrast.   (topogram) image(s) also obtained and reviewed.    FINDINGS:   Large left cerebral convexity mixed density extra-axial fluid  collection with a layering hematocrit level measures 28 mm maximal  diameter, previously 28 mm. There is rightward midline shift measuring  approximately 16 mm, previously 16 mm on same day head CT one  measuring similar fashion at the level of the septum pellucidum. There  is associated local mass effect and sulcal effacement. The left  lateral ventricle is effaced, similar to prior. There is left  parafalcine and left uncal herniation. Small subdural hemorrhages  along the falx and right posterior hemisphere are stable.    Generalized cerebral and cerebellar parenchymal volume loss. Mild  confluent periventricular white matter hypodensities, presumably  related to chronic cerebral microangiopathy.    The bony calvaria and the bones of the skull base are normal. The  orbits appear unremarkable. Left maxillary sinus polypoid mucosal  thickening. Mastoid air cells are clear. Unremarkable soft tissues.      Impression    IMPRESSION:  1.  Stable large left cerebral convexity subdural hematoma with  rightward midline shift and left uncal and subfalcine herniation.  2.  Stable small falcine and right posterior lobe subdural  hemorrhages.    I have personally reviewed the examination and initial interpretation  and I agree with the findings.    MARIA C KELLEY MD         SYSTEM ID:  [id number]   CT Head w/o Contrast    Narrative    EXAM: CT HEAD W/O CONTRAST  2/26/2023 5:37 PM     HISTORY:  s/p SDH evacuation via errol holes       COMPARISON:  Head CT from 1148 hours    TECHNIQUE: Using multidetector thin collimation  helical acquisition  technique, axial, coronal and sagittal CT images from the skull base  to the vertex were obtained without intravenous contrast.   (topogram) image(s) also obtained and reviewed.    FINDINGS:  Interval borehole placement through the left parietal bone and bore  hole through the left frontal bone with drain terminating at the left  cerebral apex. New moderate pneumocephalus. The extra-axial collection  along the left cerebral convexity, now measuring 2.3 cm at greatest  depth compared to 2.8 cm prior, demonstrates hyperdense change  consistent with new blood products, however overall burden is  decreased as compared to prior exam with decompression of the left  lateral ventricle, and reduced midline shift of approximately 1.0 cm,  previously 1.6 cm, and reduced mass effect as demonstrated by a  reduction in sulcal effacement.  Collection extends craniocaudally  from the left falx down to the anterior left temporal lobe (series 3,  image 26). Stable appearance of thickening of the bilateral tentorial  and falx dura likely represents additional subdural blood products. A  presumably new thin extra-axial collection along the right cerebral  convexity measuring approximately 4 mm (series 6, image 36) is likely  to represent redistribution of old hemorrhage. Subfalcine and uncal  herniations are mildly improved, if not stable.     No acute loss of gray-white matter differentiation in the cerebral  hemispheres. Ventricles are proportionate to the cerebral sulci. Clear  basal cisterns.    The remaining bony calvaria and the bones of the skull base are  normal. Mucosal thickening within the left maxillary sinus. The  remaining visualized portions of the paranasal sinuses and mastoid air  cells are clear. Grossly normal orbits.       Impression    IMPRESSION:  1. Overall decreased left cerebral convexity subdural hemorrhage with  improved mass effect and herniation post evacuation and  drain  placement. However, there is increased density and increased mass  effect exerted by the posterior portion of the subdural hemorrhage.  2. New small right cerebral convexity subdural hemorrhage.    I have personally reviewed the examination and initial interpretation  and I agree with the findings.    MARIA C KELLEY MD         SYSTEM ID:  [id number]   XR Abdomen Port 1 View    Narrative    Portable abdomen 1 view 2/26/2023 at 1733 hours    INDICATION: Gastric tube position    COMPARISON: 1018 hours earlier today    FINDINGS: Nonobstructive bowel gas pattern. Enteric tube tip and side  hole both projected within the stomach. Cholecystectomy clips in the  right upper abdomen. When referring to chest radiograph earlier today,  right IJ catheter tip is partially visualized.      Impression    IMPRESSION: Enteric tube tip and sidehole projecting in the stomach.    KISHA HOOKS MD         SYSTEM ID:  [id number]   XR Chest Port 1 View    Narrative    Portable chest 2/26/2023 at 1733 hours    INDICATION: Endotracheal tube positioning. Central line position.    COMPARISON: 0857 hours earlier today    FINDINGS: Heart size normal. Continued asymmetrical opacification of  the right hemithorax compared to the left. This may indicate effusion  with layering in a somewhat supine position at the upper chest.  There is a new right IJ catheter noted without pneumothorax. Tip  projects at the cavoatrial junction. NG/OG tube progresses beyond  inferior margin of the image. Endotracheal tube there is approximately  1.8 cm above the claudio.      Impression    IMPRESSION: Continued right pleural effusion and possible edema in the  right lung. Right IJ catheter tip at cavoatrial junction. Endotracheal  tube tip just under 2 cm above the claudio.    KISHA HOOKS MD         SYSTEM ID:  [id number]   US Abd Hepatic & Portal Vasculature Cmpl    Narrative    EXAMINATION: US ABD HEPATIC & PORTAL VASCULATURE CMPL, 2/26/2023  9:02 PM      COMPARISON: CT chest abdomen pelvis 2/26/2023    HISTORY: Liver failure patient with history of portal vein thrombus on  August. Please assess portal vein for recurrence    TECHNIQUE:  Grey-scale, color Doppler and spectral flow analysis.    FINDINGS:    LIVER DOPPLER:  Splenic vein:  Patent continuous normal antegrade direction flow  towards the liver, 17 cm/sec.  Extrahepatic portal vein: Measures 0.8 cm. Echogenic material is noted  on grayscale imaging. There is minimal, spotty flow measuring  approximately 10 cm/s  Right portal vein flow is antegrade, measuring 9 cm/sec.  Left portal vein flow is antegrade, measuring 10 cm/sec.    Inferior vena cava patent with flow toward the heart throughout..    Right, mid, left hepatic veins: Patent with flow towards the inferior  vena cava.    Extrahepatic hepatic artery: Low resistance waveform with flow towards  the liver. 103 cm/sec with resistive index 0.67.  Right hepatic artery: 34 cm/sec with resistive index 0.68.  Left hepatic artery: 33 cm/sec with resistive index 0.64.    Visualized portions of the aorta are unremarkable.    The liver is echogenic.      Impression    Impression:   Echogenic material noted within the main portal vein with limited  sluggish flow, suggestive of portal vein thrombus. The right and left  portal veins are patent with antegrade flow. Liver disease.    Findings discussed with Dr. Pierre by Dr. Samano at 9:10 PM on  2/26/2023    I have personally reviewed the examination and initial interpretation  and I agree with the findings.    KISHA HOOKS MD         SYSTEM ID:  [id number]     I have seen this patient with the resident and formulated a plan and agree with this note.  AMP

## 2023-02-27 NOTE — PLAN OF CARE
Shift Summary - EULA orientation, sedated w/ propofol gtt, does not follow commands, aroused to pain, intermittently agitated, moves all extremities and localizes pain, BLE and BRIANNA extremity posturing but improving, pupils PERRLA. L head KATIA drain, serosanguineous output. Drain dumps when pt is agitated/restless, MD aware. Afebrile. At 1930 and 2230 pt became hypertensive and went into junctional rhythm with HR 30s-40s, stat EKGs ordered, both showed NSR with prolonged QT. These episodes likely caused by precedex gtt, sedation now switched to propofol and HR has been stabilized. BP very labile, levo gtt titrating between 0.01-0.06 frequently. ETT CMV settings, 0400 pH 7.62 and CO2 20, RR changed from 24 to 18. , PEEP 5, 40% FiO2. LS clear/diminished. Scant thin/white inline secretions. Clark in place with minimal output, no BM overnight. HgB 7.0, 1 unit RBCs and 2 units plasma given. R triple CVC, 1 R PIV, L radial art line.    Plan - continue to titrate levo and propofol as needed, manage pain and agitation, and continue with plan of care

## 2023-02-27 NOTE — PROGRESS NOTES
Neurocritical Care Progress Note    Reason for critical care admission: Subdural hematoma   Admitting Team: Neurocritical Care team   Date of Service:  02/27/2023  Date of Admission:  2/26/2023  Hospital Day: 2    Assessment/Plan  Sulma Ramos is a 57 year old female with a past medical history of anxiety, insomnia, RLS, cirrhosis 2/2 liver cancer, portal hypertension, hepatic encephalopathy, seizure, gastric bypass, and portal vein thrombus on Eliquis who was admitted on 2/26/2023 from [facility] after presenting with altered mental status found to have acute subdural hematoma with midline shift and signs of uncal herniation on CT head.    24 hour events: At 1930 and 2230 pt became hypertensive and went into a junctional rhythm with HR in the 30s-40s. EKG ordered. Cause likely secondary to Precedex infusion. Patient switched back to Propofol. Hgb 7.0, 1 unit PRBCs and 2 units plasma given. Levophed added for hypotension.     Neuro  #Subdural hematoma  #Brain compression  #Uncal herniation  #POD1 for left bur holes for subdural hematoma evacuation  -NSGY following      -Neurochecks Q1h -> Can change to Q2h if CT head ok   -Decrease Keppra to 500 mg Q12h x 7 days  -CT head w/o contrast this morning   -SBP goal < 140 mmHg  -HOB > 30   -PT/OT/SLP when appropriate      #Analgesics & sedation  #Chronic pain  RASS goal: 0 to -1  -Stop Tylenol 650 mg Q4h  -Fentanyl 25-50 mcg Q2h PRN   -Propofol infusion - unable to tolerate Precedex    #Hx of seizures  One time seizure, noted back in 2011. Was thought to be do from sleep deprivation. Was not started on any AEDs.      #RLS  -Continue PTA Gabapentin 600 mg at bedtime     Psych   #Anxity  #Insomnia   -Holding PTA Ambien 10 mg at bedtime PRN     CV  #Hypotension likely 2/2 Propofol infusion   -Levophed infusion  -MAP > 65    #Prolonged Qtc; 539  -Will trend EKGs  -Avoid prolonging Qt medications    #Hypertension on admission   -Cardiac monitoring  -SBP goal < 140 mmHg  -PRN  Labetalol and Hydralazine  -TTE pending      Resp   #Respiratory alkalosis  -RR dropped from 24 to 18  -Repeat ABG this afternoon   #Intubated for airway protection at [facility]  #Moderate amount of right pleural effusion and atelectasis on CT CAP  -Repeat CT chest w/o contrast, discuss need for pigtail for Lg right pleural effusion?   Oxygen/vent: AC 24/380/+5  -PST as able   -Continuous pulse ox  -Maintain O2 saturations greater than 92%     GI  #Cirrhosis and Portal hypertension from prior alcohol use  #Ascities  #Esophageal varices       #Hx of HCC s/p ablation 2009  #Portal vein thrombus (on Eliquis)  -Holding PTA Eliquis 5 mg BID  -Continue PTA Rifaximin 550 mg BID   -Holding PTA Lactulose and Nadolol   -2/26 US Abd Hepatic & Portal Vasculature Cmpl: Echogenic material noted within the main portal vein with limited sluggish flow, suggestive of portal vein thrombus. The right and left portal veins are patent with antegrade flow.  -Ammonia; 45  -ALT 22, AST 75       #Hx of gastric bypass (Beto-en Y)  -Continue PTA Thiamine 100 mg daily and Folic acid 1 mg daily  -Continue Cyanocobalamin 1000 mcg every 30 days   #GERD  #Concern for colitis on CT CAP  Diet: NPO  -OGT in place  -Nutrition consulted to initiate tube feeds   Last BM: PTA   GI prophylaxis: Pantoprazole 40 mg daily (Takes omeprazole at home)  -Bowel regimen: Scheduled Senna-docusate BID and Miralax daily     Renal/  #NEFTALI ~ improving   (BUN 26.2, Creatinine 1.09, GFR 58)  -Start CVP monitoring to assess volume status   -Daily BMP  -IV fluids: None  -Electrolyte replacement protocol     Endo  #Hypothyroidism  -TSH; 1.11   -Continue PTA Levothyroxine 25 mcg every other day      -Hgb A1c; 5.3   -Monitor glucose levels     Heme  #Pancytopenia  -Holding PTA Ferrous sulfate  -Received 1 unit of PRBC's overnight for Hgb of 7.0 - morning Hgb 8.5  -Daily CBC   -Hgb goal >7, plt goal >50k  -Transfuse to meet Hgb and plt goals     #Coagulopathy  #Hx Portal vein  "thrombus (on Eliquis)  -Holding PTA Eliquis 5 mg BID  -INR 1.65 post 2 units of FFP overnight   -Kcentra given at [facility]  -2/26 TEG w/o heparinase with slightly decreased R time (4.7)        ID  #Concern for colitis on CT CAP  -Afebrile   -2/26 BC x2 collected at [facility] with NGTD  -Urine culture collected at [facility]; in process  -Zosyn 4.5 g given x1 for concern for sepsis - will hold for now  -Daily CBC  -Follow temperature curve     Musculoskeletal   #Multiple falls  Last fall on 1/19/2023 resulting in a Lg hematoma to left forearm. Xray without fracture.  -C-spine without fracture; trauma cleared C-collar through imaging.  -Trauma service following       ICU Checklist  Lines/tubes/drains: PIV x1, R internal jugular/CVC, replace Basia, ETT, OGT, hodges, left scalp KATIA bulb  FEN: NPO  PPX: DVT - SCDs; GI - Pantoprazole.  Code: Full  Dispo: ICU - NCC      Clinically Significant Risk Factors Present on Admission              # Hypoalbuminemia: Lowest albumin = 2.4 g/dL at 2/27/2023  3:35 AM, will monitor as appropriate  # Coagulation Defect: INR = 1.65 (Ref range: 0.85 - 1.15) and/or PTT = 36 Seconds (Ref range: 22 - 38 Seconds), will monitor for bleeding  # Thrombocytopenia: Lowest platelets = 91 in last 2 days, will monitor for bleeding     # Circulatory Shock: currently requiring pressors for blood pressure support     # Overweight: Estimated body mass index is 28.04 kg/m  as calculated from the following:    Height as of this encounter: 1.626 m (5' 4\").    Weight as of this encounter: 74.1 kg (163 lb 5.8 oz).        # Compression of brain    TIME SPENT ON THIS ENCOUNTER   I spent 52 minutes of critical care time on the unit/floor managing the care of Sulma Ramos excluding time performing procedures. Upon evaluation, this patient had a high probability of imminent or life-threatening deterioration due to subdural hematoma s/p errol holes for hematoma evacuation, which required my direct attention, " intervention, and personal management. Greater than 50% of my time was spent at the bedside counseling the patient and/or coordinating care including chart review, history, exam, documentation, and further activities per this note. I have personally reviewed the following data/imaging over the past 24 hours.     The patient was seen and discussed with the NCC attending, Dr. Rosales.    Ibis LEMONS CNP  Neurocritical care nurse practitioner  Pager: [phone]  Ascom: *[phone] available [facility] [place] 0700 to 1700    24 Hour Vital Signs Summary:  Temp: 98.2  F (36.8  C) Temp  Min: 94.1  F (34.5  C)  Max: 98.4  F (36.9  C)  Resp: 18 Resp  Min: 0  Max: 32  SpO2: 95 % SpO2  Min: 93 %  Max: 100 %  Pulse: 71 Pulse  Min: 45  Max: 88  BP: 99/63 Systolic (24hrs), Av , Min:91 , Max:169   Diastolic (24hrs), Av, Min:58, Max:108    Respiratory monitoring:   Vent Mode: CMV/AC  (Continuous Mandatory Ventilation/ Assist Control)  FiO2 (%): 40 %  Resp Rate (Set): 18 breaths/min  Tidal Volume (Set, mL): 380 mL  PEEP (cm H2O): 5 cmH2O  Resp: 18      I/O last 3 completed shifts:  In: 3100.95 [I.V.:1889.95; NG/GT:150]  Out: 1428 [Urine:870; Drains:258; Blood:300]    Current Medications:    cyanocobalamin  1,000 mcg Intramuscular Q30 Days     folic acid  1 mg Intravenous Daily     gabapentin  600 mg Oral At Bedtime     levETIRAcetam  1,000 mg Intravenous BID     levothyroxine  25 mcg Oral Every Other Day     pantoprazole  40 mg Per Feeding Tube QAM AC    Or     pantoprazole  40 mg Intravenous QAM AC     rifaximin  550 mg Oral BID     senna-docusate  2 tablet Oral At Bedtime     thiamine  100 mg Intravenous Daily       PRN Medications:  acetaminophen, glucose **OR** dextrose **OR** glucagon, fentaNYL, hydrALAZINE, labetalol, naloxone **OR** naloxone **OR** naloxone **OR** naloxone, ondansetron **OR** ondansetron, prochlorperazine **OR** prochlorperazine **OR** prochlorperazine    Infusions:    norepinephrine 0.04 mcg/kg/min  "(02/27/23 0700)     propofol 10 mcg/kg/min (02/27/23 0700)       Allergies   Allergen Reactions     Clarithromycin      Clindamycin Base      Droperidol        Physical Examination:  Vitals: BP 99/63 (BP Location: Left arm)   Pulse 71   Temp 98.2  F (36.8  C)   Resp 18   Ht 1.626 m (5' 4\")   Wt 74.1 kg (163 lb 5.8 oz)   LMP 01/19/2004   SpO2 95%   BMI 28.04 kg/m    General: Adult female patient, lying in bed, critically-ill  HEENT: Normocephalic, atraumatic, bilateral icterus  Cardiac: Sinus rhythm on bedside monitor  Pulm: Appears unlabored, expansion symmetric, no retractions or use of accessory muscles, assisted mechanically   Abdomen: Firm distended abdomen, ascites   Extremities: Warm, no edema, appears adequately perfused  Skin: Bruising  Psych: Intermittent restlessness   Neuro:  Mental status: Opening eyes to stimulation, does not track, does not follow commands, intubated.   Cranial nerves: PERRL, conjugate gaze, cough and gag present with deep suction.  Motor: Normal bulk and tone. No abnormal movements. 4/5 strength in 4/4 extremities with purposeful movement.   Sensory: Brisk withdrawal in 4/4 extremities.    Coordination: EULA, unable to follow commands.  Gait: EULA, deferred.    Labs and Imaging:    Results for orders placed or performed during the hospital encounter of 02/26/23 (from the past 24 hour(s))   iStat Gases Electrolytes ICA Glucose Venous, POCT   Result Value Ref Range    CPB Applied No     Hematocrit POCT 32 (L) 35 - 47 %    Calcium, Ionized Whole Blood POCT 4.8 4.4 - 5.2 mg/dL    Glucose Whole Blood POCT 137 (H) 70 - 99 mg/dL    Bicarbonate Venous POCT 23 21 - 28 mmol/L    Hemoglobin POCT 10.9 (L) 11.7 - 15.7 g/dL    Potassium POCT 3.5 3.4 - 5.3 mmol/L    Sodium POCT 143 133 - 144 mmol/L    pCO2 Venous POCT 42 40 - 50 mm Hg    pO2 Venous POCT 28 25 - 47 mm Hg    pH Venous POCT 7.35 7.32 - 7.43    O2 Sat, Venous POCT 49 (L) 94 - 100 %   iStat INR, POCT   Result Value Ref Range    INR " POCT 1.8 (L) 2.0 - 3.0   Glucose by meter   Result Value Ref Range    GLUCOSE BY METER POCT 140 (H) 70 - 99 mg/dL   [place] Draw    Narrative    The following orders were created for panel order [place] Draw.  Procedure                               Abnormality         Status                     ---------                               -----------         ------                     Extra Blue Top Tube[[id number]]                              Final result               Extra Red Top Tube[[id number]]                               Final result               Extra Green Top (Lithium...[[id number]]                      Final result               Extra Purple Top Tube[[id number]]                            Final result                 Please view results for these tests on the individual orders.   Extra Blue Top Tube   Result Value Ref Range    Hold Specimen JIC    Extra Red Top Tube   Result Value Ref Range    Hold Specimen JIC    Extra Green Top (Lithium Heparin) Tube   Result Value Ref Range    Hold Specimen JIC    Extra Purple Top Tube   Result Value Ref Range    Hold Specimen JIC    Alcohol ethyl   Result Value Ref Range    Alcohol ethyl <0.01 <=0.01 g/dL   TSH with free T4 reflex   Result Value Ref Range    TSH 1.11 0.30 - 4.20 uIU/mL   CBC with platelets differential    Narrative    The following orders were created for panel order CBC with platelets differential.  Procedure                               Abnormality         Status                     ---------                               -----------         ------                     CBC with platelets and d...[[id number]]  Abnormal            Final result                 Please view results for these tests on the individual orders.   INR   Result Value Ref Range    INR 1.43 (H) 0.85 - 1.15   ABO/Rh type and screen    Narrative    The following orders were created for panel order ABO/Rh type and screen.  Procedure                               Abnormality          Status                     ---------                               -----------         ------                     Adult Type and Screen[[id number]]                            Final result                 Please view results for these tests on the individual orders.   Partial thromboplastin time   Result Value Ref Range    aPTT 39 (H) 22 - 38 Seconds   TEG without Heparinase   Result Value Ref Range    R (Time until clot forms) 4.7 (L) 5.0 - 10.0 Minute    K ( Time to Spec. clot strength) 1.8 1.0 - 3.0 Minute    Angle (Rate of Clot Growth) 69.2 53.0 - 72.0 Degrees    MA ( Maximum Clot Strength) 54.7 50.0 - 70.0 mm    CI (coagulation index) 0.5 -3.0 - 3.0    G (actual clot strength) 6.0 4.5 - 11.0 Kd/sc    LY30 (lysis at 30 minutes) 0.0 0.0 - 8.0 %    LY60 (lysis at 60 minutes) 1.3 0.0 - 15.0 %   CBC with platelets and differential   Result Value Ref Range    WBC Count 2.0 (L) 4.0 - 11.0 10e3/uL    RBC Count 2.90 (L) 3.80 - 5.20 10e6/uL    Hemoglobin 9.4 (L) 11.7 - 15.7 g/dL    Hematocrit 29.7 (L) 35.0 - 47.0 %     (H) 78 - 100 fL    MCH 32.4 26.5 - 33.0 pg    MCHC 31.6 31.5 - 36.5 g/dL    RDW 18.2 (H) 10.0 - 15.0 %    Platelet Count 124 (L) 150 - 450 10e3/uL    % Neutrophils 70 %    % Lymphocytes 21 %    % Monocytes 6 %    % Eosinophils 1 %    % Basophils 1 %    % Immature Granulocytes 1 %    NRBCs per 100 WBC 0 <1 /100    Absolute Neutrophils 1.4 (L) 1.6 - 8.3 10e3/uL    Absolute Lymphocytes 0.4 (L) 0.8 - 5.3 10e3/uL    Absolute Monocytes 0.1 0.0 - 1.3 10e3/uL    Absolute Eosinophils 0.0 0.0 - 0.7 10e3/uL    Absolute Basophils 0.0 0.0 - 0.2 10e3/uL    Absolute Immature Granulocytes 0.0 <=0.4 10e3/uL    Absolute NRBCs 0.0 10e3/uL   Adult Type and Screen   Result Value Ref Range    ABO/RH(D) A POS     Antibody Screen Negative Negative    SPECIMEN EXPIRATION DATE [id number]    XR Forearm Port Left 2 Views    Narrative    EXAM: XR FOREARM PORT LEFT 2 VIEWS  LOCATION: [facility]  MEDICAL CENTER  DATE/TIME: 2/26/2023 9:18 AM    INDICATION: Unknown if fall, deformity. Arm pain.  COMPARISON: None.      Impression    IMPRESSION: Within normal limits. No fracture. Osteopenia.   XR Chest Port 1 View    Narrative    EXAM: Chest radiograph 2/26/2023 9:19 AM    HISTORY: Endotracheal tube placement.     COMPARISON: Same-day chest radiograph.    TECHNIQUE: Portable supine AP view of the chest.    FINDINGS: Endotracheal tube tip projects over the midthoracic trachea  approximately 3.8 cm and the claudio. Trachea appears midline.  Unchanged cardiomediastinal silhouette. Unchanged hazy right  hemithoracic opacification. Left lung remains clear. No pneumothorax.  Right upper quadrant surgical clips. Gastric bypass sutures left upper  quadrant. Soft tissues unremarkable. No acute osseous abnormality.  Osteopenia.      Impression    IMPRESSION:   1.  Endotracheal tube projects over the midthoracic trachea.  2.  Unchanged moderate to large right pleural effusion.    I have personally reviewed the examination and initial interpretation  and I agree with the findings.    IRON ENNIS MD         SYSTEM ID:  [id number]   Lumbar spine CT w/o contrast    Narrative    Thoracic and Lumbar spine CT without contrast    History: AMS, s/p possible trauma with bruising.  ICD-10:  Comparison: CT abdomen pelvis 8/2/2007.    Technique: Axial, coronal, and sagittal multiplanar reconstructions  obtained from acquisition of thoracic and lumbar spine CT scan   without contrast.    Findings:  Thoracic spine:   Moderate motion artifact. There is no acute fracture or subluxation of  the thoracic spine. There is no prevertebral edema. There is no spinal  canal or foraminal stenosis at any level. No soft tissue abnormality  in the visualized paraspinous tissues anteriorly. Mild/moderate T7 and  T10 superior endplate Schmorl's node degenerative formation.    Lumbar Spine:   Moderate motion artifact. Moderate/severe compression  deformity of L1,  notably within the midportion, with approximately 60-70% vertebral  height loss (not seen in 2007). Trabecular sclerotic changes favoring  acute nature with comminution and fracture lines extending to involve  the mid inferior endplates and anterior superior endplate. No  significant retropulsed fragments. Advanced intervertebral disc space  loss at L4-5 and L5-S1 with associated vacuum disc phenomenon.  Associated opposing endplate subarticular cystic degenerative changes  at these levels. Normal alignment of the lumbar spine without  traumatic subluxation.    There are 5 type lumbar vertebra, used for the purposes of this  dictation .     On a level by level basis, the findings are as follows:  T12-L1: Mild disc bulge without significant neural canal or neural  foraminal narrowing. Moderate/severe compression deformity of L1  without retropulsion, noted above.  L1-2:  No significant neural foraminal neural foraminal narrowing.  L2-3:  Mild disc bulge without neural foraminal or canal narrowing.  L3-4:  Mild/moderate disc bulge without significant neural foraminal  or spinal canal narrowing.  L4-5:  Moderate degenerative facet arthropathy and mild disc bulge  with moderate bilateral neural foraminal narrowing.  L5-S1:  Mild disc bulge and moderate facet arthropathy. Moderate  bilateral neural foraminal narrowing.  The visualized paraspinous tissues anteriorly are unremarkable.      Impression    Impression:   Thoracic spine:    1. No acute osseous abnormality. No significant neural foraminal or  spinal canal narrowing throughout.   Lumbar Spine:   1. Favored subacute moderate/severe compression fracture deformity of  L1 without retropulsion; further characterized above.  2. Multilevel mild spondylosis with degenerative changes worst at the  L4-S1 levels. Mild disc bulge L5-S1 with mild abutment of the right S1  exiting nerve root.    I have personally reviewed the examination and initial  interpretation  and I agree with the findings.    MARIA C KELLEY MD         SYSTEM ID:  [id number]   CT Thoracic Spine w/o Contrast    Narrative    Thoracic and Lumbar spine CT without contrast    History: AMS, s/p possible trauma with bruising.  ICD-10:  Comparison: CT abdomen pelvis 8/2/2007.    Technique: Axial, coronal, and sagittal multiplanar reconstructions  obtained from acquisition of thoracic and lumbar spine CT scan   without contrast.    Findings:  Thoracic spine:   Moderate motion artifact. There is no acute fracture or subluxation of  the thoracic spine. There is no prevertebral edema. There is no spinal  canal or foraminal stenosis at any level. No soft tissue abnormality  in the visualized paraspinous tissues anteriorly. Mild/moderate T7 and  T10 superior endplate Schmorl's node degenerative formation.    Lumbar Spine:   Moderate motion artifact. Moderate/severe compression deformity of L1,  notably within the midportion, with approximately 60-70% vertebral  height loss (not seen in 2007). Trabecular sclerotic changes favoring  acute nature with comminution and fracture lines extending to involve  the mid inferior endplates and anterior superior endplate. No  significant retropulsed fragments. Advanced intervertebral disc space  loss at L4-5 and L5-S1 with associated vacuum disc phenomenon.  Associated opposing endplate subarticular cystic degenerative changes  at these levels. Normal alignment of the lumbar spine without  traumatic subluxation.    There are 5 type lumbar vertebra, used for the purposes of this  dictation .     On a level by level basis, the findings are as follows:  T12-L1: Mild disc bulge without significant neural canal or neural  foraminal narrowing. Moderate/severe compression deformity of L1  without retropulsion, noted above.  L1-2:  No significant neural foraminal neural foraminal narrowing.  L2-3:  Mild disc bulge without neural foraminal or canal narrowing.  L3-4:   Mild/moderate disc bulge without significant neural foraminal  or spinal canal narrowing.  L4-5:  Moderate degenerative facet arthropathy and mild disc bulge  with moderate bilateral neural foraminal narrowing.  L5-S1:  Mild disc bulge and moderate facet arthropathy. Moderate  bilateral neural foraminal narrowing.  The visualized paraspinous tissues anteriorly are unremarkable.      Impression    Impression:   Thoracic spine:    1. No acute osseous abnormality. No significant neural foraminal or  spinal canal narrowing throughout.   Lumbar Spine:   1. Favored subacute moderate/severe compression fracture deformity of  L1 without retropulsion; further characterized above.  2. Multilevel mild spondylosis with degenerative changes worst at the  L4-S1 levels. Mild disc bulge L5-S1 with mild abutment of the right S1  exiting nerve root.    I have personally reviewed the examination and initial interpretation  and I agree with the findings.    MARIA C KELLEY MD         SYSTEM ID:  [id number]   XR Abdomen Port 1 View    Narrative    EXAM: XR ABDOMEN PORT 1 VIEW  2/26/2023 10:24 AM     HISTORY:  OG placed       TECHNIQUE: Single frontal radiograph of the abdomen    COMPARISON:  CT chest abdomen and pelvis 2/26/2023.    FINDINGS:   AP supine portable abdomen. OG catheter tip projects over the stomach  fundus; sidehole projects over the distal esophagus/gastroesophageal  junction. No obstructive bowel gas pattern. Portions of the right  peripheral-most abdomen are not visualized. Prior cholecystectomy. No  acute osseous abnormality.      Impression    IMPRESSION:  1. OG catheter tip projects over the stomach, however sidehole is  located at the approximate gastroesophageal junction.  2. Nonobstructive bowel gas pattern.    I have personally reviewed the examination and initial interpretation  and I agree with the findings.    IRON ENNIS MD         SYSTEM ID:  [id number]   CT Head w/o Contrast    Narrative    EXAM: Head  CT without contrast 2/26/2023 11:54 AM    HISTORY: Follow-up subdural hemorrhages.    COMPARISON: Same day head CT.    TECHNIQUE: Using multidetector thin collimation helical acquisition  technique, axial, coronal and sagittal CT images from the skull base  to the vertex were obtained without intravenous contrast.   (topogram) image(s) also obtained and reviewed.    FINDINGS:   Large left cerebral convexity mixed density extra-axial fluid  collection with a layering hematocrit level measures 28 mm maximal  diameter, previously 28 mm. There is rightward midline shift measuring  approximately 16 mm, previously 16 mm on same day head CT one  measuring similar fashion at the level of the septum pellucidum. There  is associated local mass effect and sulcal effacement. The left  lateral ventricle is effaced, similar to prior. There is left  parafalcine and left uncal herniation. Small subdural hemorrhages  along the falx and right posterior hemisphere are stable.    Generalized cerebral and cerebellar parenchymal volume loss. Mild  confluent periventricular white matter hypodensities, presumably  related to chronic cerebral microangiopathy.    The bony calvaria and the bones of the skull base are normal. The  orbits appear unremarkable. Left maxillary sinus polypoid mucosal  thickening. Mastoid air cells are clear. Unremarkable soft tissues.      Impression    IMPRESSION:  1.  Stable large left cerebral convexity subdural hematoma with  rightward midline shift and left uncal and subfalcine herniation.  2.  Stable small falcine and right posterior lobe subdural  hemorrhages.    I have personally reviewed the examination and initial interpretation  and I agree with the findings.    MARIA C KELLEY MD         SYSTEM ID:  [id number]   Glucose by meter   Result Value Ref Range    GLUCOSE BY METER POCT 112 (H) 70 - 99 mg/dL   Prepare red blood cells (unit)   Result Value Ref Range    Blood Component Type Red Blood Cells      Product Code M7900I00     Unit Status Returned     Unit Number [id number]     CROSSMATCH Compatible     CODING SYSTEM [id number]     ISSUE DATE AND TIME [id number]     UNIT ABO/RH A+     UNIT TYPE ISBT 6200    Prepare red blood cells (unit)   Result Value Ref Range    Blood Component Type Red Blood Cells     Product Code F7713Q26     Unit Status Returned     Unit Number [id number]     CROSSMATCH Compatible     CODING SYSTEM [id number]     ISSUE DATE AND TIME [id number]     UNIT ABO/RH A+     UNIT TYPE ISBT 6200    Prepare pheresed platelets (unit)   Result Value Ref Range    Blood Component Type Platelets     Product Code D5054O47     Unit Status Transfused     Unit Number [id number]     CODING SYSTEM [id number]     ISSUE DATE AND TIME [id number]     UNIT ABO/RH A+     UNIT TYPE ISBT 6200    Arterial Panel POCT   Result Value Ref Range    pH Arterial POCT 7.36 7.35 - 7.45    pCO2 Arterial POCT 37 35 - 45 mm Hg    pO2 Arterial POCT 86 80 - 105 mm Hg    Bicarbonate Arterial POCT 21 21 - 28 mmol/L    Sodium POCT 141 133 - 144 mmol/L    Potassium POCT 3.6 3.5 - 5.0 mmol/L    Hemoglobin POCT 8.1 (L) 11.7 - 15.7 g/dL    Glucose Whole Blood POCT 120 (H) 70 - 99 mg/dL    Calcium, Ionized Whole Blood POCT 4.5 4.4 - 5.2 mg/dL    Base Excess/Deficit (+/-) POCT -4.1 -9.6 - 2.0 mmol/L    FIO2 POCT 31.0 %    Lactic Acid POCT 1.4 <=2.0 mmol/L   Glucose by meter   Result Value Ref Range    GLUCOSE BY METER POCT 135 (H) 70 - 99 mg/dL   Ionized Calcium   Result Value Ref Range    Calcium Ionized 4.5 4.4 - 5.2 mg/dL   CT Head w/o Contrast    Narrative    EXAM: CT HEAD W/O CONTRAST  2/26/2023 5:37 PM     HISTORY:  s/p SDH evacuation via errol holes       COMPARISON:  Head CT from 1148 hours    TECHNIQUE: Using multidetector thin collimation helical acquisition  technique, axial, coronal and sagittal CT images from the skull base  to the vertex were obtained without intravenous contrast.   (topogram) image(s) also  obtained and reviewed.    FINDINGS:  Interval borehole placement through the left parietal bone and bore  hole through the left frontal bone with drain terminating at the left  cerebral apex. New moderate pneumocephalus. The extra-axial collection  along the left cerebral convexity, now measuring 2.3 cm at greatest  depth compared to 2.8 cm prior, demonstrates hyperdense change  consistent with new blood products, however overall burden is  decreased as compared to prior exam with decompression of the left  lateral ventricle, and reduced midline shift of approximately 1.0 cm,  previously 1.6 cm, and reduced mass effect as demonstrated by a  reduction in sulcal effacement.  Collection extends craniocaudally  from the left falx down to the anterior left temporal lobe (series 3,  image 26). Stable appearance of thickening of the bilateral tentorial  and falx dura likely represents additional subdural blood products. A  presumably new thin extra-axial collection along the right cerebral  convexity measuring approximately 4 mm (series 6, image 36) is likely  to represent redistribution of old hemorrhage. Subfalcine and uncal  herniations are mildly improved, if not stable.     No acute loss of gray-white matter differentiation in the cerebral  hemispheres. Ventricles are proportionate to the cerebral sulci. Clear  basal cisterns.    The remaining bony calvaria and the bones of the skull base are  normal. Mucosal thickening within the left maxillary sinus. The  remaining visualized portions of the paranasal sinuses and mastoid air  cells are clear. Grossly normal orbits.       Impression    IMPRESSION:  1. Overall decreased left cerebral convexity subdural hemorrhage with  improved mass effect and herniation post evacuation and drain  placement. However, there is increased density and increased mass  effect exerted by the posterior portion of the subdural hemorrhage.  2. New small right cerebral convexity subdural  hemorrhage.    I have personally reviewed the examination and initial interpretation  and I agree with the findings.    MARIA C KELLEY MD         SYSTEM ID:  [id number]   XR Abdomen Port 1 View    Narrative    Portable abdomen 1 view 2/26/2023 at 1733 hours    INDICATION: Gastric tube position    COMPARISON: 1018 hours earlier today    FINDINGS: Nonobstructive bowel gas pattern. Enteric tube tip and side  hole both projected within the stomach. Cholecystectomy clips in the  right upper abdomen. When referring to chest radiograph earlier today,  right IJ catheter tip is partially visualized.      Impression    IMPRESSION: Enteric tube tip and sidehole projecting in the stomach.    KISHA HOOKS MD         SYSTEM ID:  [id number]   XR Chest Port 1 View    Narrative    Portable chest 2/26/2023 at 1733 hours    INDICATION: Endotracheal tube positioning. Central line position.    COMPARISON: 0857 hours earlier today    FINDINGS: Heart size normal. Continued asymmetrical opacification of  the right hemithorax compared to the left. This may indicate effusion  with layering in a somewhat supine position at the upper chest.  There is a new right IJ catheter noted without pneumothorax. Tip  projects at the cavoatrial junction. NG/OG tube progresses beyond  inferior margin of the image. Endotracheal tube there is approximately  1.8 cm above the claudio.      Impression    IMPRESSION: Continued right pleural effusion and possible edema in the  right lung. Right IJ catheter tip at cavoatrial junction. Endotracheal  tube tip just under 2 cm above the claudio.    KISHA HOOKS MD         SYSTEM ID:  [id number]   Basic metabolic panel   Result Value Ref Range    Sodium 140 136 - 145 mmol/L    Potassium 3.8 3.4 - 5.3 mmol/L    Chloride 109 (H) 98 - 107 mmol/L    Carbon Dioxide (CO2) 17 (L) 22 - 29 mmol/L    Anion Gap 14 7 - 15 mmol/L    Urea Nitrogen 25.3 (H) 8.0 - 23.0 mg/dL    Creatinine 1.07 (H) 0.51 - 0.95 mg/dL    Calcium  8.5 (L) 8.6 - 10.0 mg/dL    Glucose 138 (H) 70 - 99 mg/dL    GFR Estimate 60 (L) >60 mL/min/1.73m2   CBC with platelets   Result Value Ref Range    WBC Count 1.7 (L) 4.0 - 11.0 10e3/uL    RBC Count 2.16 (L) 3.80 - 5.20 10e6/uL    Hemoglobin 7.0 (L) 11.7 - 15.7 g/dL    Hematocrit 21.3 (L) 35.0 - 47.0 %    MCV 99 78 - 100 fL    MCH 32.4 26.5 - 33.0 pg    MCHC 32.9 31.5 - 36.5 g/dL    RDW 18.1 (H) 10.0 - 15.0 %    Platelet Count 91 (L) 150 - 450 10e3/uL   INR   Result Value Ref Range    INR 1.72 (H) 0.85 - 1.15   Partial thromboplastin time   Result Value Ref Range    aPTT 40 (H) 22 - 38 Seconds   Fibrinogen activity   Result Value Ref Range    Fibrinogen Activity 183 170 - 490 mg/dL   EKG 12-lead, complete   Result Value Ref Range    Systolic Blood Pressure  mmHg    Diastolic Blood Pressure  mmHg    Ventricular Rate 70 BPM    Atrial Rate 70 BPM    VA Interval 136 ms    QRS Duration 82 ms     ms    QTc 542 ms    P Axis 60 degrees    R AXIS 36 degrees    T Axis 53 degrees    Interpretation ECG       Sinus rhythm  Prolonged QT  Abnormal ECG  When compared with ECG of 26-FEB-2023 05:38, (unconfirmed)  Nonspecific T wave abnormality no longer evident in Inferior leads  Nonspecific T wave abnormality no longer evident in Lateral leads     Prepare red blood cells (unit)   Result Value Ref Range    Blood Component Type Red Blood Cells     Product Code R4216K62     Unit Status Transfused     Unit Number [id number]     CROSSMATCH Compatible     CODING SYSTEM [id number]     ISSUE DATE AND TIME [id number]     UNIT ABO/RH A+     UNIT TYPE ISBT 6200    Lactic acid whole blood   Result Value Ref Range    Lactic Acid 2.4 (H) 0.7 - 2.0 mmol/L   Troponin T, High Sensitivity   Result Value Ref Range    Troponin T, High Sensitivity 77 (H) <=14 ng/L   US Abd Hepatic & Portal Vasculature Cmpl    Narrative    EXAMINATION: US ABD HEPATIC & PORTAL VASCULATURE CMPL, 2/26/2023  9:02 PM     COMPARISON: CT chest abdomen pelvis  2/26/2023    HISTORY: Liver failure patient with history of portal vein thrombus on  August. Please assess portal vein for recurrence    TECHNIQUE:  Grey-scale, color Doppler and spectral flow analysis.    FINDINGS:    LIVER DOPPLER:  Splenic vein:  Patent continuous normal antegrade direction flow  towards the liver, 17 cm/sec.  Extrahepatic portal vein: Measures 0.8 cm. Echogenic material is noted  on grayscale imaging. There is minimal, spotty flow measuring  approximately 10 cm/s  Right portal vein flow is antegrade, measuring 9 cm/sec.  Left portal vein flow is antegrade, measuring 10 cm/sec.    Inferior vena cava patent with flow toward the heart throughout..    Right, mid, left hepatic veins: Patent with flow towards the inferior  vena cava.    Extrahepatic hepatic artery: Low resistance waveform with flow towards  the liver. 103 cm/sec with resistive index 0.67.  Right hepatic artery: 34 cm/sec with resistive index 0.68.  Left hepatic artery: 33 cm/sec with resistive index 0.64.    Visualized portions of the aorta are unremarkable.    The liver is echogenic.      Impression    Impression:   Echogenic material noted within the main portal vein with limited  sluggish flow, suggestive of portal vein thrombus. The right and left  portal veins are patent with antegrade flow. Liver disease.    Findings discussed with Dr. Pierre by Dr. Samano at 9:10 PM on  2/26/2023    I have personally reviewed the examination and initial interpretation  and I agree with the findings.    KISHA HOOKS MD         SYSTEM ID:  [id number]   Prepare plasma (unit)   Result Value Ref Range    Blood Component Type Plasma     Product Code R5418F11     Unit Status Transfused     Unit Number [id number]     CODING SYSTEM [id number]     ISSUE DATE AND TIME [id number]     UNIT ABO/RH A+     UNIT TYPE ISBT 6200    Prepare plasma (unit)   Result Value Ref Range    Blood Component Type Plasma     Product Code E1935E54     Unit Status  Ready for issue     Unit Number [id number]     CODING SYSTEM [id number]    EKG 12-lead, complete   Result Value Ref Range    Systolic Blood Pressure  mmHg    Diastolic Blood Pressure  mmHg    Ventricular Rate 73 BPM    Atrial Rate 73 BPM    SC Interval 138 ms    QRS Duration 90 ms     ms    QTc 539 ms    P Axis 48 degrees    R AXIS 40 degrees    T Axis 42 degrees    Interpretation ECG       Sinus rhythm  Prolonged QT  Abnormal ECG  When compared with ECG of 26-FEB-2023 19:34, (unconfirmed)  No significant change was found     Glucose by meter   Result Value Ref Range    GLUCOSE BY METER POCT 132 (H) 70 - 99 mg/dL   Blood gas venous with oxyhemoglobin   Result Value Ref Range    pH Venous 7.58 (H) 7.32 - 7.43    pCO2 Venous 24 (L) 40 - 50 mm Hg    pO2 Venous 34 25 - 47 mm Hg    Bicarbonate Venous 22 21 - 28 mmol/L    FIO2 60     Oxyhemoglobin Venous 70 70 - 75 %    Base Excess/Deficit (+/-) 0.8 -7.7 - 1.9 mmol/L   Glucose by meter   Result Value Ref Range    GLUCOSE BY METER POCT 125 (H) 70 - 99 mg/dL   Hemoglobin   Result Value Ref Range    Hemoglobin 8.1 (L) 11.7 - 15.7 g/dL   Comprehensive metabolic panel   Result Value Ref Range    Sodium 141 136 - 145 mmol/L    Potassium 3.5 3.4 - 5.3 mmol/L    Chloride 110 (H) 98 - 107 mmol/L    Carbon Dioxide (CO2) 17 (L) 22 - 29 mmol/L    Anion Gap 14 7 - 15 mmol/L    Urea Nitrogen 26.2 (H) 8.0 - 23.0 mg/dL    Creatinine 1.09 (H) 0.51 - 0.95 mg/dL    Calcium 8.5 (L) 8.6 - 10.0 mg/dL    Glucose 130 (H) 70 - 99 mg/dL    Alkaline Phosphatase 109 (H) 35 - 104 U/L    AST 75 (H) 10 - 35 U/L    ALT 22 10 - 35 U/L    Protein Total 4.8 (L) 6.4 - 8.3 g/dL    Albumin 2.4 (L) 3.5 - 5.2 g/dL    Bilirubin Total 1.8 (H) <=1.2 mg/dL    GFR Estimate 58 (L) >60 mL/min/1.73m2   CBC with platelets   Result Value Ref Range    WBC Count 2.9 (L) 4.0 - 11.0 10e3/uL    RBC Count 2.45 (L) 3.80 - 5.20 10e6/uL    Hemoglobin 7.8 (L) 11.7 - 15.7 g/dL    Hematocrit 22.8 (L) 35.0 - 47.0 %    MCV 93 78  - 100 fL    MCH 31.8 26.5 - 33.0 pg    MCHC 34.2 31.5 - 36.5 g/dL    RDW 19.9 (H) 10.0 - 15.0 %    Platelet Count 105 (L) 150 - 450 10e3/uL   Magnesium   Result Value Ref Range    Magnesium 2.2 1.7 - 2.3 mg/dL   Phosphorus   Result Value Ref Range    Phosphorus 3.1 2.5 - 4.5 mg/dL   Ionized Calcium   Result Value Ref Range    Calcium Ionized 4.5 4.4 - 5.2 mg/dL   INR   Result Value Ref Range    INR 1.65 (H) 0.85 - 1.15   Partial thromboplastin time   Result Value Ref Range    aPTT 36 22 - 38 Seconds   Fibrinogen activity   Result Value Ref Range    Fibrinogen Activity 183 170 - 490 mg/dL   Blood gas arterial with oxyhemoglobin   Result Value Ref Range    pH Arterial 7.62 (HH) 7.35 - 7.45    pCO2 Arterial 20 (L) 35 - 45 mm Hg    pO2 Arterial 72 (L) 80 - 105 mm Hg    Bicarbonate Arterial 21 21 - 28 mmol/L    Oxyhemoglobin Arterial 95 92 - 100 %    Base Excess/Deficit (+/-) 0.1 -9.0 - 1.8 mmol/L    FIO2 60     Chad's Test No    Glucose by meter   Result Value Ref Range    GLUCOSE BY METER POCT 124 (H) 70 - 99 mg/dL       All relevant imaging and laboratory values personally reviewed.

## 2023-02-27 NOTE — PHARMACY-ADMISSION MEDICATION HISTORY
Admission Medication History Completed by Pharmacy    See [facility] Admission Navigator for allergy information, preferred outpatient pharmacy, prior to admission medications and immunization status.     Medication History Sources:     Medication bottles    Patient's     Surescripts dispense history    [facility] Everywhere    Changes made to PTA medication list (reason):    Added: apixaban, norco, omeprazole, pilocarpine, vitamin a,     Deleted: furosemide, spironolactone, fluoxetine, percocet, bacitracin, vitamin C, pyridoxine, multivitamin, thiamine    Changed: lactulose twice daily ->every other day (per )    Additional Information:    None    Prior to Admission medications    Medication Sig Last Dose Taking? Auth Provider Long Term End Date   apixaban ANTICOAGULANT (ELIQUIS) 5 MG tablet Take 5 mg by mouth 2 times daily  Yes Unknown, Entered By History     HYDROcodone-acetaminophen (NORCO) 5-325 MG tablet Take 1 tablet by mouth every 6 hours as needed for severe pain (7-10)  Yes Unknown, Entered By History     omeprazole (PRILOSEC) 20 MG DR capsule Take 20 mg by mouth daily  Yes Unknown, Entered By History     pilocarpine (SALAGEN) 5 MG tablet Take 5 mg by mouth 3 times daily  Yes Unknown, Entered By History     vitamin A 3 MG (22960 UNITS) capsule Take 30,000 Units by mouth daily  Yes Unknown, Entered By History     Ascorbic Acid (VITAMIN C PO) Take 500 mg by mouth daily   Unknown, Entered By History     calcium-vitamin D (CALTRATE) 600-400 MG-UNIT per tablet Take 1 tablet by mouth daily   Unknown, Entered By History     cyanocobalamin 1000 MCG/ML injection Inject 1 mL as directed every 30 days   Unknown, Entered By History     ferrous sulfate 325 (65 FE) MG tablet Take 325 mg by mouth daily (with breakfast)   Unknown, Entered By History     GABAPENTIN PO Take 600 mg by mouth At Bedtime for restless legs   Unknown, Entered By History Yes    lactulose 20 GM/30ML SOLN Take 30 mLs (20 g) by mouth 2 times  daily   Aubrie Rolon MD     LEVOTHYROXINE SODIUM PO Take 25 mcg by mouth every other day   Unknown, Entered By History Yes    Multiple Vitamins-Minerals (MULTIVITAMIN OR) Take 1 tablet by mouth daily   Unknown, Entered By History     NADOLOL PO Take 20 mg by mouth every evening   Unknown, Entered By History Yes    Pyridoxine HCl (VITAMIN B6 PO) Take 1 tablet by mouth daily   Unknown, Entered By History     Rifaximin (XIFAXAN PO) Take 550 mg by mouth 2 times daily   Unknown, Entered By History     Thiamine HCl (VITAMIN B-1 PO) Take 1 tablet by mouth daily   Unknown, Entered By History     Zolpidem Tartrate (AMBIEN PO) Take 10 mg by mouth nightly as needed   Unknown, Entered By History         Date completed: 02/27/23    Medication history completed by: Delfino Epps [facility]

## 2023-02-27 NOTE — ANESTHESIA POSTPROCEDURE EVALUATION
Patient: Sulma Ramos    Procedure: Procedure(s):  Left errol holes, evacuation of hematoma subdural, combined       Anesthesia Type:  General    Note:  Disposition: Inpatient; ICU            ICU Sign Out: Anesthesiologist/ICU physician sign out WAS performed   Postop Pain Control: Uneventful            Sign Out: Well controlled pain   PONV: No   Neuro/Psych: Uneventful            Sign Out: Acceptable/Baseline neuro status   Airway/Respiratory: Uneventful            Sign Out: AIRWAY IN SITU/Resp. Support               Airway in situ/Resp. Support: ETT                 Reason: Planned Pre-op   CV/Hemodynamics: Uneventful            Sign Out: Acceptable CV status; No obvious hypovolemia; No obvious fluid overload   Other NRE:    DID A NON-ROUTINE EVENT OCCUR? No           Last vitals:  Vitals:    02/26/23 1657 02/26/23 1700 02/26/23 1800   BP:      Pulse: 77 76 68   Resp: 18 12 24   Temp:  (!) 35  C (95  F)    SpO2: 93% 98% 99%       Electronically Signed By: Nic Bautista MD  February 26, 2023  6:42 PM

## 2023-02-28 ENCOUNTER — APPOINTMENT (OUTPATIENT)
Dept: CT IMAGING | Facility: CLINIC | Age: 58
End: 2023-02-28
Payer: COMMERCIAL

## 2023-02-28 ENCOUNTER — TRANSFERRED RECORDS (OUTPATIENT)
Dept: HEALTH INFORMATION MANAGEMENT | Facility: CLINIC | Age: 58
End: 2023-02-28

## 2023-02-28 LAB
ALBUMIN SERPL BCG-MCNC: 2.3 G/DL (ref 3.5–5.2)
ALP SERPL-CCNC: 107 U/L (ref 35–104)
ALT SERPL W P-5'-P-CCNC: 23 U/L (ref 10–35)
ANION GAP SERPL CALCULATED.3IONS-SCNC: 7 MMOL/L (ref 7–15)
AST SERPL W P-5'-P-CCNC: 75 U/L (ref 10–35)
BACTERIA UR CULT: NO GROWTH
BILIRUB SERPL-MCNC: 1.2 MG/DL
BUN SERPL-MCNC: 29.1 MG/DL (ref 8–23)
BURR CELLS BLD QL SMEAR: ABNORMAL
CALCIUM SERPL-MCNC: 8.5 MG/DL (ref 8.6–10)
CHLORIDE SERPL-SCNC: 112 MMOL/L (ref 98–107)
CREAT SERPL-MCNC: 1.02 MG/DL (ref 0.51–0.95)
DEPRECATED HCO3 PLAS-SCNC: 23 MMOL/L (ref 22–29)
ERYTHROCYTE [DISTWIDTH] IN BLOOD BY AUTOMATED COUNT: 21.2 % (ref 10–15)
GFR SERPL CREATININE-BSD FRML MDRD: 63 ML/MIN/1.73M2
GLUCOSE BLDC GLUCOMTR-MCNC: 108 MG/DL (ref 70–99)
GLUCOSE BLDC GLUCOMTR-MCNC: 108 MG/DL (ref 70–99)
GLUCOSE BLDC GLUCOMTR-MCNC: 110 MG/DL (ref 70–99)
GLUCOSE BLDC GLUCOMTR-MCNC: 117 MG/DL (ref 70–99)
GLUCOSE SERPL-MCNC: 110 MG/DL (ref 70–99)
HCT VFR BLD AUTO: 24.2 % (ref 35–47)
HGB BLD-MCNC: 7.7 G/DL (ref 11.7–15.7)
MAGNESIUM SERPL-MCNC: 2 MG/DL (ref 1.7–2.3)
MCH RBC QN AUTO: 31.3 PG (ref 26.5–33)
MCHC RBC AUTO-ENTMCNC: 31.8 G/DL (ref 31.5–36.5)
MCV RBC AUTO: 98 FL (ref 78–100)
PHOSPHATE SERPL-MCNC: 2.4 MG/DL (ref 2.5–4.5)
PLAT MORPH BLD: ABNORMAL
PLATELET # BLD AUTO: 77 10E3/UL (ref 150–450)
POLYCHROMASIA BLD QL SMEAR: SLIGHT
POTASSIUM SERPL-SCNC: 3.7 MMOL/L (ref 3.4–5.3)
PROT SERPL-MCNC: 4.5 G/DL (ref 6.4–8.3)
RBC # BLD AUTO: 2.46 10E6/UL (ref 3.8–5.2)
RBC MORPH BLD: ABNORMAL
SODIUM SERPL-SCNC: 142 MMOL/L (ref 136–145)
WBC # BLD AUTO: 3.5 10E3/UL (ref 4–11)

## 2023-02-28 PROCEDURE — 70450 CT HEAD/BRAIN W/O DYE: CPT | Mod: 26 | Performed by: RADIOLOGY

## 2023-02-28 PROCEDURE — 93010 ELECTROCARDIOGRAM REPORT: CPT | Performed by: INTERNAL MEDICINE

## 2023-02-28 PROCEDURE — 999N000157 HC STATISTIC RCP TIME EA 10 MIN

## 2023-02-28 PROCEDURE — 83735 ASSAY OF MAGNESIUM: CPT | Performed by: NURSE PRACTITIONER

## 2023-02-28 PROCEDURE — 93005 ELECTROCARDIOGRAM TRACING: CPT

## 2023-02-28 PROCEDURE — 250N000013 HC RX MED GY IP 250 OP 250 PS 637: Performed by: STUDENT IN AN ORGANIZED HEALTH CARE EDUCATION/TRAINING PROGRAM

## 2023-02-28 PROCEDURE — 250N000011 HC RX IP 250 OP 636

## 2023-02-28 PROCEDURE — 94003 VENT MGMT INPAT SUBQ DAY: CPT

## 2023-02-28 PROCEDURE — 99291 CRITICAL CARE FIRST HOUR: CPT | Mod: 24 | Performed by: NURSE PRACTITIONER

## 2023-02-28 PROCEDURE — 250N000011 HC RX IP 250 OP 636: Performed by: NURSE PRACTITIONER

## 2023-02-28 PROCEDURE — 70450 CT HEAD/BRAIN W/O DYE: CPT

## 2023-02-28 PROCEDURE — 250N000013 HC RX MED GY IP 250 OP 250 PS 637: Performed by: PSYCHIATRY & NEUROLOGY

## 2023-02-28 PROCEDURE — 250N000013 HC RX MED GY IP 250 OP 250 PS 637: Performed by: NURSE PRACTITIONER

## 2023-02-28 PROCEDURE — 200N000002 HC R&B ICU UMMC

## 2023-02-28 PROCEDURE — 999N000185 HC STATISTIC TRANSPORT TIME EA 15 MIN

## 2023-02-28 PROCEDURE — 250N000011 HC RX IP 250 OP 636: Performed by: STUDENT IN AN ORGANIZED HEALTH CARE EDUCATION/TRAINING PROGRAM

## 2023-02-28 PROCEDURE — 80053 COMPREHEN METABOLIC PANEL: CPT | Performed by: NURSE PRACTITIONER

## 2023-02-28 PROCEDURE — 85027 COMPLETE CBC AUTOMATED: CPT | Performed by: NURSE PRACTITIONER

## 2023-02-28 PROCEDURE — 84100 ASSAY OF PHOSPHORUS: CPT | Performed by: NURSE PRACTITIONER

## 2023-02-28 PROCEDURE — 99292 CRITICAL CARE ADDL 30 MIN: CPT | Performed by: PSYCHIATRY & NEUROLOGY

## 2023-02-28 RX ORDER — DEXAMETHASONE SODIUM PHOSPHATE 4 MG/ML
4 INJECTION, SOLUTION INTRA-ARTICULAR; INTRALESIONAL; INTRAMUSCULAR; INTRAVENOUS; SOFT TISSUE EVERY 6 HOURS
Status: DISCONTINUED | OUTPATIENT
Start: 2023-02-28 | End: 2023-02-28

## 2023-02-28 RX ORDER — POTASSIUM CHLORIDE 1.5 G/1.58G
20 POWDER, FOR SOLUTION ORAL ONCE
Status: COMPLETED | OUTPATIENT
Start: 2023-02-28 | End: 2023-02-28

## 2023-02-28 RX ORDER — LEVETIRACETAM 5 MG/ML
500 INJECTION INTRAVASCULAR EVERY 12 HOURS
Status: DISCONTINUED | OUTPATIENT
Start: 2023-02-28 | End: 2023-03-01

## 2023-02-28 RX ORDER — DEXAMETHASONE SODIUM PHOSPHATE 4 MG/ML
4 INJECTION, SOLUTION INTRA-ARTICULAR; INTRALESIONAL; INTRAMUSCULAR; INTRAVENOUS; SOFT TISSUE EVERY 6 HOURS
Status: COMPLETED | OUTPATIENT
Start: 2023-02-28 | End: 2023-03-01

## 2023-02-28 RX ORDER — LACTULOSE 10 G/15ML
20 SOLUTION ORAL DAILY
Status: DISCONTINUED | OUTPATIENT
Start: 2023-02-28 | End: 2023-03-07 | Stop reason: HOSPADM

## 2023-02-28 RX ORDER — LACTULOSE 20 G/30ML
20 SOLUTION ORAL EVERY OTHER DAY
Status: DISCONTINUED | OUTPATIENT
Start: 2023-02-28 | End: 2023-02-28

## 2023-02-28 RX ORDER — POTASSIUM CHLORIDE 750 MG/1
20 TABLET, EXTENDED RELEASE ORAL ONCE
Status: DISCONTINUED | OUTPATIENT
Start: 2023-02-28 | End: 2023-02-28 | Stop reason: ALTCHOICE

## 2023-02-28 RX ADMIN — Medication 40 MG: at 07:58

## 2023-02-28 RX ADMIN — LEVETIRACETAM 500 MG: 500 TABLET, FILM COATED ORAL at 07:58

## 2023-02-28 RX ADMIN — PROPOFOL 40 MCG/KG/MIN: 10 INJECTION, EMULSION INTRAVENOUS at 09:57

## 2023-02-28 RX ADMIN — RIFAXIMIN 550 MG: 550 TABLET ORAL at 07:58

## 2023-02-28 RX ADMIN — PROPOFOL 30 MCG/KG/MIN: 10 INJECTION, EMULSION INTRAVENOUS at 01:40

## 2023-02-28 RX ADMIN — LACTULOSE 20 G: 20 SOLUTION ORAL at 07:58

## 2023-02-28 RX ADMIN — MULTIVITAMIN 15 ML: LIQUID ORAL at 07:58

## 2023-02-28 RX ADMIN — LEVOTHYROXINE SODIUM 25 MCG: 0.03 TABLET ORAL at 07:58

## 2023-02-28 RX ADMIN — FOLIC ACID 1 MG: 5 INJECTION, SOLUTION INTRAMUSCULAR; INTRAVENOUS; SUBCUTANEOUS at 07:58

## 2023-02-28 RX ADMIN — LEVETIRACETAM 500 MG: 5 INJECTION INTRAVENOUS at 20:35

## 2023-02-28 RX ADMIN — POTASSIUM CHLORIDE 20 MEQ: 1.5 POWDER, FOR SOLUTION ORAL at 04:48

## 2023-02-28 RX ADMIN — POTASSIUM & SODIUM PHOSPHATES POWDER PACK 280-160-250 MG 1 PACKET: 280-160-250 PACK at 04:40

## 2023-02-28 RX ADMIN — THIAMINE HYDROCHLORIDE 100 MG: 100 INJECTION, SOLUTION INTRAMUSCULAR; INTRAVENOUS at 07:58

## 2023-02-28 RX ADMIN — DEXAMETHASONE SODIUM PHOSPHATE 4 MG: 4 INJECTION, SOLUTION INTRA-ARTICULAR; INTRALESIONAL; INTRAMUSCULAR; INTRAVENOUS; SOFT TISSUE at 13:12

## 2023-02-28 RX ADMIN — POLYETHYLENE GLYCOL 3350 17 G: 17 POWDER, FOR SOLUTION ORAL at 07:58

## 2023-02-28 RX ADMIN — DEXAMETHASONE SODIUM PHOSPHATE 4 MG: 4 INJECTION, SOLUTION INTRA-ARTICULAR; INTRALESIONAL; INTRAMUSCULAR; INTRAVENOUS; SOFT TISSUE at 20:35

## 2023-02-28 RX ADMIN — POTASSIUM & SODIUM PHOSPHATES POWDER PACK 280-160-250 MG 1 PACKET: 280-160-250 PACK at 07:58

## 2023-02-28 ASSESSMENT — ACTIVITIES OF DAILY LIVING (ADL)
ADLS_ACUITY_SCORE: 45
ADLS_ACUITY_SCORE: 45
ADLS_ACUITY_SCORE: 49
ADLS_ACUITY_SCORE: 49
ADLS_ACUITY_SCORE: 45
ADLS_ACUITY_SCORE: 49
ADLS_ACUITY_SCORE: 45
ADLS_ACUITY_SCORE: 48

## 2023-02-28 ASSESSMENT — VISUAL ACUITY
OU: OTHER (SEE COMMENT)

## 2023-02-28 NOTE — PROGRESS NOTES
[facility], [place]  Neurosurgery Progress Note:  02/28/2023      Interval History: No acute events overnight.  Echo obtained-normal.  Subdural KATIA remains in place.  Keppra 1 g twice daily.  Repeat head CT yesterday morning stable.    Assessment:  57 year old female with liver cancer and coagulopathy with thrombocytopenia and elevated INR, presenting with 2 days of AMS in setting of acute-on-subacute large left convexity SDH.    She underwent left-sided bur holes craniotomy for evacuation on 2/26/2023 with Dr. Mayo.  Postop head CT obtained showing decompression with residual left-sided convexity collection.  Subdural KATIA drain left in place to thumbprint-will require significant stripping.  Postoperatively INR increased 1.7, received 2 units of FFP.  Bedside echo performed by cardiology showing no obvious akinesis (with a focal low global).  Lactate and troponins obtained slightly elevated.  Received 1 unit of PRBCs for hemoglobin of 7.  Sedation switched from Precedex to propofol.    Pre-operative anticoagulation/antiplatelet: Eliquis    Plan:  Follow KATIA drain output  Echo completed  Continue Keppra  Serial neuro exams  Pain control  HOB > 30 degrees  Advance diet as tolerated  Bowel regimen  PRN antiemetics  IVF until taking adequate PO  PT/OT  SCDs for DVT proph    -----------------------------------  James Hardin MD  Neurosurgery resident, PGY-3  -----------------------------------  General: Cirrhotic appearing, icteric sclera  Pulm: Intubated, sedated  Neuro:  Eyes open spontaneously, not following commands, pupils 3 to 4 mm midline, briskly reactive  Moving all extremities purposefully antigravity  --------------------------------------------------------------------------------------------------------------------------    Clinically Significant Risk Factors              # Hypoalbuminemia: Lowest albumin = 2.4 g/dL at 2/27/2023  3:35 AM, will monitor as appropriate   #  "Thrombocytopenia: Lowest platelets = 91 in last 2 days, will monitor for bleeding          # Overweight: Estimated body mass index is 28.04 kg/m  as calculated from the following:    Height as of this encounter: 1.626 m (5' 4\").    Weight as of this encounter: 74.1 kg (163 lb 5.8 oz)., PRESENT ON ADMISSION       # Compression of brain         Objective:   Temp:  [98  F (36.7  C)-99.8  F (37.7  C)] 99  F (37.2  C)  Pulse:  [60-89] 80  Resp:  [10-32] 14  BP: ()/(49-73) 92/50  MAP:  [57 mmHg-91 mmHg] 57 mmHg  Arterial Line BP: ()/(42-88) 57/57  FiO2 (%):  [40 %] 40 %  SpO2:  [92 %-98 %] 97 %  I/O last 3 completed shifts:  In: 1547.05 [I.V.:493.05; NG/GT:230]  Out: 1073 [Urine:585; Drains:488]        LABS:  Recent Labs   Lab 02/27/23  2343 02/27/23  1946 02/27/23  1318 02/27/23  0341 02/27/23  0335 02/26/23  2250 02/26/23  1826 02/26/23  1613 02/26/23  1405 02/26/23  0718 02/26/23  0454   NA  --   --   --   --  141  --  140  --  141   < > 141   POTASSIUM  --   --   --   --  3.5  --  3.8  --  3.6   < > 3.7   CHLORIDE  --   --   --   --  110*  --  109*  --   --   --  107   CO2  --   --   --   --  17*  --  17*  --   --   --  22   ANIONGAP  --   --   --   --  14  --  14  --   --   --  12   GLC 99 86 107*   < > 130*   < > 138*   < > 120*   < > 63*   BUN  --   --   --   --  26.2*  --  25.3*  --   --   --  30.7*   CR  --   --   --   --  1.09*  --  1.07*  --   --   --  1.56*   CHERRIE  --   --   --   --  8.5*  --  8.5*  --   --   --  8.3*    < > = values in this interval not displayed.       Recent Labs   Lab 02/27/23  0953   WBC 5.9   RBC 2.74*   HGB 8.5*   HCT 26.0*   MCV 95   MCH 31.0   MCHC 32.7   RDW 21.2*   *       IMAGING:  Recent Results (from the past 24 hour(s))   Echo Complete   Result Value    LVEF  60-65%    [facility]    [id number]  [id number]  [id number]  [id number]^JENNIFER^MYRON     [facility],[place]  Echocardiography Laboratory  [address] [place] [id number]     Name: " LAURA LONG  MRN: [id number]  : 1965  Study Date: 2023 08:00 AM  Age: 57 yrs  Gender: Female  Patient Location: [facility]  Reason For Study: Hypertension (HTN)  Ordering Physician: MYRON RODRIGUEZ  Performed By: Karolyn Colunga     BSA: 1.8 m2  Height: 64 in  Weight: 163 lb  HR: 78  BP: 99/63 mmHg  ______________________________________________________________________________  Procedure  Complete Portable Echo Adult.  ______________________________________________________________________________  Interpretation Summary  Technically difficult study with limited views.     Left ventricular size, wall motion and function are normal. The ejection  fraction is 60-65%.  Right ventricular function, chamber size, wall motion, and thickness are  normal.  Unable to assess mean RA pressure given the patient is on a ventilator. No  pericardial effusion is present.     There is no prior study for direct comparison.  ______________________________________________________________________________  Left Ventricle  Left ventricular size, wall motion and function are normal. The ejection  fraction is 60-65%. Grade I or early diastolic dysfunction.     Right Ventricle  Right ventricular function, chamber size, wall motion, and thickness are  normal.     Atria  Both atria appear normal.     Mitral Valve  Mild mitral annular calcification is present. Trace mitral insufficiency is  present.     Aortic Valve  Aortic valve is normal in structure and function. The aortic valve is  tricuspid.     Tricuspid Valve  The tricuspid valve is normal. Trace tricuspid insufficiency is present. The  right ventricular systolic pressure is approximated at 20.8 mmHg plus the  right atrial pressure.     Pulmonic Valve  The pulmonic valve is normal.     Vessels  The aorta root is normal. Unable to assess mean RA pressure given the patient  is on a ventilator.     Pericardium  No pericardial effusion is present.     Compared to Previous  Study  There is no prior study for direct comparison.  ______________________________________________________________________________  MMode/2D Measurements & Calculations     IVSd: 1.1 cm  LVIDd: 3.9 cm  LVIDs: 2.5 cm  LVPWd: 1.0 cm  FS: 36.6 %  LV mass(C)d: 131.7 grams  LV mass(C)dI: 73.5 grams/m2  asc Aorta Diam: 3.2 cm  LA Volume (BP): 45.1 ml  LA Volume Index (BP): 25.2 ml/m2  RWT: 0.52     Doppler Measurements & Calculations  MV E max heather: 65.0 cm/sec  MV A max heather: 88.0 cm/sec  MV E/A: 0.74  MV dec slope: 313.0 cm/sec2  MV dec time: 0.21 sec  TR max heather: 228.0 cm/sec  TR max P.8 mmHg  E/E' av.8  Lateral E/e': 6.0  Medial E/e': 7.7     ______________________________________________________________________________  Report approved by: Denis WESLEY 2023 09:01 AM         CT Chest w/o Contrast    Narrative    EXAMINATION: CT CHEST W/O CONTRAST, 2023 11:27 AM    TECHNIQUE:  Helical CT images from the thoracic inlet through the  upper abdomen were obtained without intravenous contrast.  Images are  displayed at 1 and 5 mm intervals. Images reviewed in lung, soft  tissue, and bone windows.    Radiation Dose (DLP): 1152 mGy*cm    COMPARISON: CT 2023, chest x-ray 2023    HISTORY: follow up large R pleural effusion    FINDINGS:    Lungs: The tip of the endotracheal tube is in the lower thoracic  trachea, approximately 1 cm above the claudio. Tracheal  secretions/debris in the trachea. There is narrowing of the right  mainstem bronchus, upper lobe bronchus, and bronchus intermedius, due  to compression from right pleural effusion. Moderate to large right  pleural effusion is increased in size compared to 2023. Small  left pleural effusion. No pneumothorax. There is complete atelectasis  of the right upper lobe, increased compared to prior. Additional  decompressive atelectasis in the right lower and middle lobes. Mild  left basilar atelectasis.    Mediastinum: Right IJ central  venous catheter tip in the lower SVC.  The visualized thyroid gland is atrophic. The heart is not enlarged.  No significant pericardial effusion. Coronary artery calcification.  The ascending aorta and main pulmonary artery are normal in caliber.  No mediastinal or axillary lymphadenopathy.    Upper abdomen: The enteric tube courses into the stomach. Cirrhotic  appearing liver with nodular contour of the hepatic surface. Partially  calcified 2.4 cm hypodense lesion in the subcapsular right hepatic  lobe. Small ascites in the upper abdomen. Paraesophageal varices. The  kidneys appear mildly atrophic. Multiple prominent mesenteric lymph  nodes in the upper abdomen. Hazy opacities throughout the mesentery.  Cholecystectomy clips. The spleen is partially visualized, but appears  enlarged, measuring at least 13.5 cm. Calcification and enlargement of  what appears to be the proximal portal vein.    Bones/soft tissues: Body wall anasarca. Chronic appearing right  anterior rib fracture deformities. Redemonstration of compression  fracture deformities of T7, T10, and L1. There is mild posterior  retropulsion of bony fragment into the spinal canal at L1.      Impression    IMPRESSION:   1. Moderate to large right pleural effusion is increased in size  compared to CT from 2/26/2023. Increased compressive atelectasis of  the right upper, middle, and lower lobes.  2. Small left pleural effusion.  3. Cirrhotic appearing liver with sequelae of portal hypertension,  including splenomegaly, ascites, and paraesophageal varices. Hazy  opacities throughout the mesentery with prominent mesenteric lymph  nodes, likely representing mesenteric congestion.  4. The tip of the endotracheal tube is in the low thoracic trachea,  approximately 1 cm above the claudio. Recommend retracting.    I have personally reviewed the examination and initial interpretation  and I agree with the findings.    KISHA HOOKS MD         SYSTEM ID:  [id number]    CT Head w/o Contrast    Narrative    CT HEAD W/O CONTRAST 2/27/2023 11:31 AM    Provided History: follow up SDH.  ICD-10: Subdural hemorrhage.    Comparison: CT head 2/26/2023, 1/19/2023.    Technique: Using multidetector thin collimation helical acquisition  technique, axial, coronal and sagittal CT images from the skull base  to the vertex were obtained without intravenous contrast.     Findings:    Postsurgical changes of left frontal subdural drain placement and left  parietal errol hole with decreased pneumocephalus and stable bilateral  left greater than right subdural hemorrhages extending along the left  falx and tentorium measuring up to 20 mm in thickness along the left  parietal lobe and 5 mm on the right. Stable subdural hemorrhage  tracking along the left tentorium and falx measuring up to 3 and 5 mm  respectively. No new intracranial hemorrhage.    Unchanged partial  effacement of the left lateral ventricle and 8 mm  of rightward shift. The gray to white matter differentiation of the  cerebral hemispheres is preserved. Ventricles are proportionate to the  sulci. No sulcal effacement.  The basal cisterns are patent.    Mucosal thickening and inspissated debris in the left maxillary sinus.  The mastoid air cells are clear. Orbits appear unremarkable. No acute  fracture.      Impression    Impression:   1.  Stable left greater than right subdural hemorrhages with stable  left frontal subdural drain with decreased pneumocephalus. No new  intracranial hemorrhage.  2.  Stable mass effect on the left lateral ventricle and unchanged 8  mm of rightward shift. No hydrocephalus.  3.  Inspissated debris in the left maxillary sinus as can be seen in  the setting of chronic sinusitis.    I have personally reviewed the examination and initial interpretation  and I agree with the findings.    KARLA CHOW MD         SYSTEM ID:  [id number]     I have seen this patient with the resident and formulated a plan and agree  with this note.  AMP

## 2023-02-28 NOTE — PROGRESS NOTES
Neurocritical Care Progress Note    Reason for critical care admission: Subdural hematoma   Admitting Team: Neurocritical Care team   Date of Service:  02/28/2023  Date of Admission:  2/26/2023  Hospital Day: 3    Assessment/Plan  Sulma Ramos is a 57 year old female with a past medical history of anxiety, insomnia, RLS, cirrhosis 2/2 liver cancer, portal hypertension, hepatic encephalopathy, seizure, gastric bypass, and portal vein thrombus on Eliquis who was admitted on 2/26/2023 from [facility] after presenting with altered mental status found to have acute subdural hematoma with midline shift and signs of uncal herniation on CT head.    24 hour events: Restless requiring Propofol while intubated. Pressure supported 7/5 all night without complications.    Neuro  #Subdural hematoma  #Brain compression ~ improved post evacuation   #Uncal herniation ~ improved post evacuation   #POD2 for left bur holes for subdural hematoma evacuation  -NSGY following      -Neurochecks Q2h    -Keppra 500 mg Q12h x 7 days   -SBP goal < 140 mmHg  -HOB > 30   -PT/OT/SLP       #Analgesics & sedation  #Chronic pain  RASS goal: 0 to -1  -Stop Fentanyl 25-50 mcg Q2h PRN   -Stop Propofol infusion - unable to tolerate Precedex    #Hx of seizures  One time seizure, noted back in 2011. Was thought to be do from sleep deprivation. Was not started on any AEDs.      #RLS  -Continue PTA Gabapentin 600 mg at bedtime     Psych   #Anxity  #Insomnia   -Holding PTA Ambien 10 mg at bedtime PRN     CV  #Hypotension likely 2/2 Propofol infusion ~ resolved    -Stop Levophed infusion  -MAP > 65    #Prolonged Qtc; 539  -Will trend EKGs, repeat EKG today  -Avoid prolonging Qt medications    #Hypertension on admission ~ resolved    -Cardiac monitoring  -SBP goal < 140 mmHg  -PRN Labetalol and Hydralazine  -TTE completed on 2/27, EF 60-65%      Resp   #Intubated for airway protection at [facility]  #Moderate amount of right pleural effusion and atelectasis on  CT CAP  -2/27 CT chest w/o contrast: Moderate to large right pleural effusion is increased in size compared to CT from 2/26/2023.  -Discussed with SICU on whether to drain or place pigtail, was advised that no intervention is required at this time. Patient is oxygenating well and requiring very minimal ventilator support.   Oxygen/vent: PS 7/5  -Extubate today     -PST as able   -Continuous pulse ox  -Maintain O2 saturations greater than 92%     GI  #Cirrhosis and Portal hypertension from prior alcohol use  #Ascities  #Esophageal varices       #Hx of HCC s/p ablation 2009  #Portal vein thrombus (on Eliquis)  -Holding PTA Eliquis 5 mg BID  -Continue PTA Rifaximin 550 mg BID   -Holding PTA Nadolol 20 mg every evening   -2/26 US Abd Hepatic & Portal Vasculature Cmpl: Echogenic material noted within the main portal vein with limited sluggish flow, suggestive of portal vein thrombus. The right and left portal veins are patent with antegrade flow.  -Ammonia; 45  -ALT 23, AST 75       #Hx of gastric bypass (Beto-en Y)  -Continue PTA Thiamine 100 mg daily and Folic acid 1 mg daily  -Continue PTA Cyanocobalamin 1000 mcg every 30 days   #GERD  Diet: TF's -> pause feeds now to prepare for extubation   -OGT in place  -Nutrition following  -Speech to evaluate post extubation    Last BM: 2/28  GI prophylaxis: Pantoprazole 40 mg daily (Takes omeprazole at home)  -Bowel regimen: PTA Lactulose 20 gm and Miralax daily     Renal/  #NEFTALI ~ improving  #Hyperchloremia; 112   (BUN 29.1, Creatinine 1.02, GFR 63)  -CVP monitoring 5-6  -Daily CMP, consider changing to BMP tomorrow   -IV fluids: None  -Electrolyte replacement protocol     Endo  #Hypothyroidism  -TSH; 1.11   -Continue PTA Levothyroxine 25 mcg every other day      -Hgb A1c; 5.3   -Monitor glucose levels      Heme  #Pancytopenia  -Holding PTA Ferrous sulfate  -Daily CBC   -Hgb goal >7, plt goal >50k  -Transfuse to meet Hgb and plt goals     #Coagulopathy  #Hx Portal vein  "thrombus (on Eliquis)  -Holding PTA Eliquis 5 mg BID  -INR 1.65 on 2/27  -Kcentra given at [facility]  -2/26 TEG w/o heparinase with slightly decreased R time (4.7)        ID  #Concern for colitis on CT CAP  -Afebrile   -2/26 BC x2 collected at [facility] with NGTD  -Urine culture collected at [facility] with NGTD  -Zosyn 4.5 g given x1 for concern for sepsis - will hold for now  -Daily CBC  -Follow temperature curve     Musculoskeletal   #Multiple falls  Last fall on 1/19/2023 resulting in a Lg hematoma to left forearm. Xray without fracture.  -C-spine without fracture; trauma cleared C-collar through imaging.  -Trauma service following       ICU Checklist  Lines/tubes/drains: PIV x1, R internal jugular/CVC, ETT, OGT, remove hodges, left scalp KATIA bulb  FEN: TF's, electrolyte replacement   PPX: DVT - SCDs; GI - Pantoprazole.  Code: Full  Dispo: ICU - NCC      Clinically Significant Risk Factors              # Hypoalbuminemia: Lowest albumin = 2.3 g/dL at 2/28/2023  3:33 AM, will monitor as appropriate   # Thrombocytopenia: Lowest platelets = 77 in last 2 days, will monitor for bleeding          # Overweight: Estimated body mass index is 28.23 kg/m  as calculated from the following:    Height as of this encounter: 1.626 m (5' 4\").    Weight as of this encounter: 74.6 kg (164 lb 7.4 oz)., PRESENT ON ADMISSION         TIME SPENT ON THIS ENCOUNTER   I spent 52 minutes of critical care time on the unit/floor managing the care of Sulma Ramos excluding time performing procedures. Upon evaluation, this patient had a high probability of imminent or life-threatening deterioration due to subdural hematoma s/p errol holes for hematoma evacuation, which required my direct attention, intervention, and personal management. Greater than 50% of my time was spent at the bedside counseling the patient and/or coordinating care including chart review, history, exam, documentation, and further activities per this note. I have personally " reviewed the following data/imaging over the past 24 hours.     The patient was seen and discussed with the NCC attending, Dr. Davis.    Ibis LEMONS CNP  Neurocritical care nurse practitioner  Pager: [phone]  Ascom: [phone] available [facility] 0700 to 1700    24 Hour Vital Signs Summary:  Temp: 99.2  F (37.3  C) Temp  Min: 98.6  F (37  C)  Max: 99.8  F (37.7  C)  Resp: 19 Resp  Min: 10  Max: 24  SpO2: 94 % SpO2  Min: 92 %  Max: 98 %  Pulse: 93 Pulse  Min: 64  Max: 93  BP: 117/72 Systolic (24hrs), Av , Min:88 , Max:126   Diastolic (24hrs), Av, Min:49, Max:73    Respiratory monitoring:   Vent Mode: CPAP/PS  (Continuous positive airway pressure with Pressure Support)  FiO2 (%): 40 %  Resp Rate (Set): (S) 12 breaths/min  Tidal Volume (Set, mL): 380 mL  PEEP (cm H2O): 5 cmH2O  Pressure Support (cm H2O): 7 cmH2O  Resp: 19      I/O last 3 completed shifts:  In: 1033.85 [I.V.:533.85; NG/GT:280]  Out: 900 [Urine:540; Drains:360]    Current Medications:    cyanocobalamin  1,000 mcg Intramuscular Q30 Days     folic acid  1 mg Intravenous Daily     gabapentin  600 mg Oral At Bedtime     levETIRAcetam  500 mg Oral or Feeding Tube BID     levothyroxine  25 mcg Oral Every Other Day     multivitamins w/minerals  15 mL Per Feeding Tube Daily     pantoprazole  40 mg Per Feeding Tube QAM AC     polyethylene glycol  17 g Oral or Feeding Tube Daily     potassium & sodium phosphates  1 packet Oral or Feeding Tube Q4H     protein modular  1 packet Per Feeding Tube BID     rifaximin  550 mg Oral BID     senna-docusate  2 tablet Oral or Feeding Tube BID     thiamine  100 mg Intravenous Daily       PRN Medications:  glucose **OR** dextrose **OR** glucagon, fentaNYL, hydrALAZINE, labetalol, naloxone **OR** naloxone **OR** naloxone **OR** naloxone, ondansetron **OR** ondansetron, prochlorperazine **OR** prochlorperazine **OR** prochlorperazine    Infusions:    norepinephrine Stopped (23 9332)     propofol 20 mcg/kg/min  "(23 0700)       Allergies   Allergen Reactions     Clarithromycin      Clindamycin Base      Droperidol        Physical Examination:  Vitals: /72   Pulse 93   Temp 99.2  F (37.3  C) (Axillary)   Resp 19   Ht 1.626 m (5' 4\")   Wt 74.6 kg (164 lb 7.4 oz)   LMP 2004   SpO2 94%   BMI 28.23 kg/m    General: Adult female patient, lying in bed, critically-ill  HEENT: Normocephalic, atraumatic, bilateral icterus  Cardiac: Sinus rhythm on bedside monitor  Pulm: Appears unlabored, expansion symmetric, no retractions or use of accessory muscles, assisted mechanically   Abdomen: Firm distended abdomen, ascites   Extremities: Warm, no edema, appears adequately perfused  Skin: Bruising  Psych: Intermittent restlessness   Neuro:  Mental status: Opening eyes to voice and stimulation, does not track, does not follow commands, intubated.   Cranial nerves: PERRL, conjugate gaze, cough and gag present with deep suction.  Motor: Normal bulk and tone. No abnormal movements. 4/5 strength in 4/4 extremities with purposeful movement.   Sensory: Brisk withdrawal in 4/4 extremities.    Coordination: EULA, unable to follow commands.  Gait: EULA, deferred.    Labs and Imaging:    Results for orders placed or performed during the hospital encounter of 23 (from the past 24 hour(s))   Blood gas arterial with oxyhemoglobin   Result Value Ref Range    pH Arterial 7.55 (H) 7.35 - 7.45    pCO2 Arterial 23 (L) 35 - 45 mm Hg    pO2 Arterial 154 (H) 80 - 105 mm Hg    Bicarbonate Arterial 20 (L) 21 - 28 mmol/L    Oxyhemoglobin Arterial 98 92 - 100 %    Base Excess/Deficit (+/-) -1.8 -9.0 - 1.8 mmol/L    FIO2 40     Chad's Test Artline    Echo Complete   Result Value Ref Range    LVEF  60-65%     Narrative    [id number]  [id number]  [id number]  [id number]^JENNIFER^MYRON     [facility],[place]  Echocardiography Laboratory  [address] [place] [id number]     Name: LAURA LONG  MRN: [id number]  : " 1965  Study Date: 02/27/2023 08:00 AM  Age: 57 yrs  Gender: Female  Patient Location: [facility]  Reason For Study: Hypertension (HTN)  Ordering Physician: MYRON RODRIGUEZ  Performed By: Karolyn Colunga     BSA: 1.8 m2  Height: 64 in  Weight: 163 lb  HR: 78  BP: 99/63 mmHg  ______________________________________________________________________________  Procedure  Complete Portable Echo Adult.  ______________________________________________________________________________  Interpretation Summary  Technically difficult study with limited views.     Left ventricular size, wall motion and function are normal. The ejection  fraction is 60-65%.  Right ventricular function, chamber size, wall motion, and thickness are  normal.  Unable to assess mean RA pressure given the patient is on a ventilator. No  pericardial effusion is present.     There is no prior study for direct comparison.  ______________________________________________________________________________  Left Ventricle  Left ventricular size, wall motion and function are normal. The ejection  fraction is 60-65%. Grade I or early diastolic dysfunction.     Right Ventricle  Right ventricular function, chamber size, wall motion, and thickness are  normal.     Atria  Both atria appear normal.     Mitral Valve  Mild mitral annular calcification is present. Trace mitral insufficiency is  present.     Aortic Valve  Aortic valve is normal in structure and function. The aortic valve is  tricuspid.     Tricuspid Valve  The tricuspid valve is normal. Trace tricuspid insufficiency is present. The  right ventricular systolic pressure is approximated at 20.8 mmHg plus the  right atrial pressure.     Pulmonic Valve  The pulmonic valve is normal.     Vessels  The aorta root is normal. Unable to assess mean RA pressure given the patient  is on a ventilator.     Pericardium  No pericardial effusion is present.     Compared to Previous Study  There is no prior study for direct  comparison.  ______________________________________________________________________________  MMode/2D Measurements & Calculations     IVSd: 1.1 cm  LVIDd: 3.9 cm  LVIDs: 2.5 cm  LVPWd: 1.0 cm  FS: 36.6 %  LV mass(C)d: 131.7 grams  LV mass(C)dI: 73.5 grams/m2  asc Aorta Diam: 3.2 cm  LA Volume (BP): 45.1 ml  LA Volume Index (BP): 25.2 ml/m2  RWT: 0.52     Doppler Measurements & Calculations  MV E max heather: 65.0 cm/sec  MV A max heather: 88.0 cm/sec  MV E/A: 0.74  MV dec slope: 313.0 cm/sec2  MV dec time: 0.21 sec  TR max heather: 228.0 cm/sec  TR max P.8 mmHg  E/E' av.8  Lateral E/e': 6.0  Medial E/e': 7.7     ______________________________________________________________________________  Report approved by: Denis WESLEY 2023 09:01 AM         CBC with platelets   Result Value Ref Range    WBC Count 5.9 4.0 - 11.0 10e3/uL    RBC Count 2.74 (L) 3.80 - 5.20 10e6/uL    Hemoglobin 8.5 (L) 11.7 - 15.7 g/dL    Hematocrit 26.0 (L) 35.0 - 47.0 %    MCV 95 78 - 100 fL    MCH 31.0 26.5 - 33.0 pg    MCHC 32.7 31.5 - 36.5 g/dL    RDW 21.2 (H) 10.0 - 15.0 %    Platelet Count 136 (L) 150 - 450 10e3/uL   CT Chest w/o Contrast    Narrative    EXAMINATION: CT CHEST W/O CONTRAST, 2023 11:27 AM    TECHNIQUE:  Helical CT images from the thoracic inlet through the  upper abdomen were obtained without intravenous contrast.  Images are  displayed at 1 and 5 mm intervals. Images reviewed in lung, soft  tissue, and bone windows.    Radiation Dose (DLP): 1152 mGy*cm    COMPARISON: CT 2023, chest x-ray 2023    HISTORY: follow up large R pleural effusion    FINDINGS:    Lungs: The tip of the endotracheal tube is in the lower thoracic  trachea, approximately 1 cm above the claudio. Tracheal  secretions/debris in the trachea. There is narrowing of the right  mainstem bronchus, upper lobe bronchus, and bronchus intermedius, due  to compression from right pleural effusion. Moderate to large right  pleural effusion is  increased in size compared to 2/26/2023. Small  left pleural effusion. No pneumothorax. There is complete atelectasis  of the right upper lobe, increased compared to prior. Additional  decompressive atelectasis in the right lower and middle lobes. Mild  left basilar atelectasis.    Mediastinum: Right IJ central venous catheter tip in the lower SVC.  The visualized thyroid gland is atrophic. The heart is not enlarged.  No significant pericardial effusion. Coronary artery calcification.  The ascending aorta and main pulmonary artery are normal in caliber.  No mediastinal or axillary lymphadenopathy.    Upper abdomen: The enteric tube courses into the stomach. Cirrhotic  appearing liver with nodular contour of the hepatic surface. Partially  calcified 2.4 cm hypodense lesion in the subcapsular right hepatic  lobe. Small ascites in the upper abdomen. Paraesophageal varices. The  kidneys appear mildly atrophic. Multiple prominent mesenteric lymph  nodes in the upper abdomen. Hazy opacities throughout the mesentery.  Cholecystectomy clips. The spleen is partially visualized, but appears  enlarged, measuring at least 13.5 cm. Calcification and enlargement of  what appears to be the proximal portal vein.    Bones/soft tissues: Body wall anasarca. Chronic appearing right  anterior rib fracture deformities. Redemonstration of compression  fracture deformities of T7, T10, and L1. There is mild posterior  retropulsion of bony fragment into the spinal canal at L1.      Impression    IMPRESSION:   1. Moderate to large right pleural effusion is increased in size  compared to CT from 2/26/2023. Increased compressive atelectasis of  the right upper, middle, and lower lobes.  2. Small left pleural effusion.  3. Cirrhotic appearing liver with sequelae of portal hypertension,  including splenomegaly, ascites, and paraesophageal varices. Hazy  opacities throughout the mesentery with prominent mesenteric lymph  nodes, likely representing  mesenteric congestion.  4. The tip of the endotracheal tube is in the low thoracic trachea,  approximately 1 cm above the claudio. Recommend retracting.    I have personally reviewed the examination and initial interpretation  and I agree with the findings.    KISHA HOOKS MD         SYSTEM ID:  [id number]   CT Head w/o Contrast    Narrative    CT HEAD W/O CONTRAST 2/27/2023 11:31 AM    Provided History: follow up SDH.  ICD-10: Subdural hemorrhage.    Comparison: CT head 2/26/2023, 1/19/2023.    Technique: Using multidetector thin collimation helical acquisition  technique, axial, coronal and sagittal CT images from the skull base  to the vertex were obtained without intravenous contrast.     Findings:    Postsurgical changes of left frontal subdural drain placement and left  parietal errol hole with decreased pneumocephalus and stable bilateral  left greater than right subdural hemorrhages extending along the left  falx and tentorium measuring up to 20 mm in thickness along the left  parietal lobe and 5 mm on the right. Stable subdural hemorrhage  tracking along the left tentorium and falx measuring up to 3 and 5 mm  respectively. No new intracranial hemorrhage.    Unchanged partial  effacement of the left lateral ventricle and 8 mm  of rightward shift. The gray to white matter differentiation of the  cerebral hemispheres is preserved. Ventricles are proportionate to the  sulci. No sulcal effacement.  The basal cisterns are patent.    Mucosal thickening and inspissated debris in the left maxillary sinus.  The mastoid air cells are clear. Orbits appear unremarkable. No acute  fracture.      Impression    Impression:   1.  Stable left greater than right subdural hemorrhages with stable  left frontal subdural drain with decreased pneumocephalus. No new  intracranial hemorrhage.  2.  Stable mass effect on the left lateral ventricle and unchanged 8  mm of rightward shift. No hydrocephalus.  3.  Inspissated debris in  the left maxillary sinus as can be seen in  the setting of chronic sinusitis.    I have personally reviewed the examination and initial interpretation  and I agree with the findings.    KARLA CHOW MD         SYSTEM ID:  [id number]   Glucose by meter   Result Value Ref Range    GLUCOSE BY METER POCT 107 (H) 70 - 99 mg/dL   Glucose by meter   Result Value Ref Range    GLUCOSE BY METER POCT 86 70 - 99 mg/dL   Glucose by meter   Result Value Ref Range    GLUCOSE BY METER POCT 99 70 - 99 mg/dL   Comprehensive metabolic panel   Result Value Ref Range    Sodium 142 136 - 145 mmol/L    Potassium 3.7 3.4 - 5.3 mmol/L    Chloride 112 (H) 98 - 107 mmol/L    Carbon Dioxide (CO2) 23 22 - 29 mmol/L    Anion Gap 7 7 - 15 mmol/L    Urea Nitrogen 29.1 (H) 8.0 - 23.0 mg/dL    Creatinine 1.02 (H) 0.51 - 0.95 mg/dL    Calcium 8.5 (L) 8.6 - 10.0 mg/dL    Glucose 110 (H) 70 - 99 mg/dL    Alkaline Phosphatase 107 (H) 35 - 104 U/L    AST 75 (H) 10 - 35 U/L    ALT 23 10 - 35 U/L    Protein Total 4.5 (L) 6.4 - 8.3 g/dL    Albumin 2.3 (L) 3.5 - 5.2 g/dL    Bilirubin Total 1.2 <=1.2 mg/dL    GFR Estimate 63 >60 mL/min/1.73m2   CBC with platelets   Result Value Ref Range    WBC Count 3.5 (L) 4.0 - 11.0 10e3/uL    RBC Count 2.46 (L) 3.80 - 5.20 10e6/uL    Hemoglobin 7.7 (L) 11.7 - 15.7 g/dL    Hematocrit 24.2 (L) 35.0 - 47.0 %    MCV 98 78 - 100 fL    MCH 31.3 26.5 - 33.0 pg    MCHC 31.8 31.5 - 36.5 g/dL    RDW 21.2 (H) 10.0 - 15.0 %    Platelet Count 77 (L) 150 - 450 10e3/uL   Magnesium   Result Value Ref Range    Magnesium 2.0 1.7 - 2.3 mg/dL   Phosphorus   Result Value Ref Range    Phosphorus 2.4 (L) 2.5 - 4.5 mg/dL   RBC and Platelet Morphology   Result Value Ref Range    Platelet Assessment  Automated Count Confirmed. Platelet morphology is normal.     Automated Count Confirmed. Platelet morphology is normal.    [place] Cells Marked (A) None Seen    Polychromasia Slight (A) None Seen    RBC Morphology Confirmed RBC Indices    Glucose by  meter   Result Value Ref Range    GLUCOSE BY METER POCT 117 (H) 70 - 99 mg/dL       All relevant imaging and laboratory values personally reviewed.

## 2023-02-28 NOTE — PROGRESS NOTES
SPIRITUAL HEALTH SERVICES Progress Note  [facility] ([place] 4A    I offered follow up visit to Sulma's family who identified no SHS needs at this time.    Radhames Chaudhary  Chaplain Resident  Pager [phone]    * [facility] remains available 24/7 for emergent requests/referrals, either by having the switchboard page the on-call  or by entering an ASAP/STAT consult in Epic (this will also page the on-call ). Routine Epic consults receive an initial response within 24 hours.*

## 2023-02-28 NOTE — PROGRESS NOTES
Care Management Follow Up    Length of Stay (days): 2    Expected Discharge Date: 03/04/2023     Concerns to be Addressed:       Patient plan of care discussed at interdisciplinary rounds: Yes    Anticipated Discharge Disposition:       Anticipated Discharge Services:    Anticipated Discharge DME:      Patient/family educated on Medicare website which has current facility and service quality ratings:    Education Provided on the Discharge Plan:    Patient/Family in Agreement with the Plan:      Referrals Placed by CM/SW:    Private pay costs discussed: Not applicable    Additional Information:  KIERA was informed that there may have been a [facility] report made on behalf of pt due to trauma/bruising  found upon admission to the ED. Per RN note: Dr. Sauer at bedside. Assessing multiple bruising to legs, back. Pt has vaginal bleeding. MD aware. Dr. Sauer states she will make a vulnerable adult report    KIERA contacted [place] [facility] at [phone]. They state there is no way to confirm that a report has been made as it goes to an outside agency. KIERA has not received any follow up calls from APS.      It is unclear if a SANE exam was performed on pt since admission. KIERA filed another [facility] report online, Web Report Number: [id number]    KIERA will await APS follow up.    ------------------------------------------------------------------------------------  CHALINO Villanueva  [facility]   4A and 4E Coverage  Ph: [phone]

## 2023-02-28 NOTE — PROGRESS NOTES
"Care Management Follow Up    Length of Stay (days): 2    Expected Discharge Date: 03/04/2023     Concerns to be Addressed:       Patient plan of care discussed at interdisciplinary rounds: Yes    Anticipated Discharge Disposition:       Anticipated Discharge Services:    Anticipated Discharge DME:      Patient/family educated on Medicare website which has current facility and service quality ratings:    Education Provided on the Discharge Plan:    Patient/Family in Agreement with the Plan:      Referrals Placed by CM/SW:    Private pay costs discussed: Not applicable    Additional Information:  KIERA received return call from Cher MCGRAW [phone] regarding earlier report-shared that case will be transferred to [facility] LUCIEN inquired about safety plan for pt while hospitalized as there is an \"immedate safety risk\" for pt. KIERA shared with LUCIEN that pt currently has no set plan for discharge and will notify care team of report and concern.       ENRRIQUE Ferris , LGSW  4 A&E ICU Social Work   [phone]      "

## 2023-02-28 NOTE — PROGRESS NOTES
[facility], Procedure Note          Extubation:       Sulma Ramos  MRN# [id number]   February 28, 2023, 11:50 PM         Patient extubated at: February 28, 2023, 11:50 PM   Supplemental Oxygen: Via nasal cannula at 4 liters per minute   Cough: The cough is good and productive   Secretion Mode: PRN suction with assistance   Secretion Amount: Moderate amount, moderately thin and creamy in color   Respiratory Exam:: Breath sounds: coarse with diminished bases     Location: bilaterally   Skin Exam:: Patient color: natural   Patient Status: Currently appears uncomfortable   Arterial Blood Gasses: pH Arterial (no units)   Date Value   02/27/2023 7.55 (H)     pO2 Arterial (mm Hg)   Date Value   02/27/2023 154 (H)     pCO2 Arterial (mm Hg)   Date Value   02/27/2023 23 (L)     Bicarbonate Arterial (mmol/L)   Date Value   02/27/2023 20 (L)            Recorded by Shaila Sprague, RT

## 2023-02-28 NOTE — PLAN OF CARE
SLP: Clinical swallow eval orders received. Attempted to see pt, however unable to follow directions to participate despite verbal and tactile cues. Will follow-up as appropriate.
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
## 2023-02-28 NOTE — PROGRESS NOTES
Major Shift Events: head CT completed. KATIA in place with large output, neuro-surg and neuro-crit aware. pt on propofol titrated as needed. Pupils equal reactive. Pt unable to follow commands. When propofol weaned, or during neuro checks and pt care, pt agitated, restless and not redirectable. Fentanyl given PRN for increased CPOT scores. NSR, afebrile. Levophed weaned to off. LS coarse/diminished. PS since 1400, tolerating well. abd soft. No BM. Tube feeds started at 10ml/hr, with plan to advance Q8hrs. Clark in place, low UOP neuro-crit aware. Family at bedside, attentive to pt.   Plan: continue POC  For vital signs and complete assessments, please see documentation flowsheets.

## 2023-02-28 NOTE — PROGRESS NOTES
Major Shift Events:  Neuro: Arouses to voice, does not follow commands, gets restless with nursing cares. Sedated on propofol. Pupils equal and brisk reaction. Moves all 4 spontaneously. BUE wrist restraints in place. Ilay drain with serosanguinous output. Cardiac: Sinus rhythm, 70s-80s. Soft Bp, systolics high 90s-100s. Resp: Sats mid 90s. PS 7/5 overnight, minimal thin secretions. GI/: TF to be advanced at 0800, running at 20/hr. No bm overnight. Minimal urine output.   Plan: Possibly extubate, continue plan of care.   For vital signs and complete assessments, please see documentation flowsheets.

## 2023-03-01 ENCOUNTER — APPOINTMENT (OUTPATIENT)
Dept: OCCUPATIONAL THERAPY | Facility: CLINIC | Age: 58
End: 2023-03-01
Attending: NURSE PRACTITIONER
Payer: COMMERCIAL

## 2023-03-01 ENCOUNTER — APPOINTMENT (OUTPATIENT)
Dept: PHYSICAL THERAPY | Facility: CLINIC | Age: 58
End: 2023-03-01
Attending: NURSE PRACTITIONER
Payer: COMMERCIAL

## 2023-03-01 ENCOUNTER — DOCUMENTATION ONLY (OUTPATIENT)
Dept: ORTHOPEDICS | Facility: CLINIC | Age: 58
End: 2023-03-01

## 2023-03-01 ENCOUNTER — APPOINTMENT (OUTPATIENT)
Dept: GENERAL RADIOLOGY | Facility: CLINIC | Age: 58
End: 2023-03-01
Attending: NURSE PRACTITIONER
Payer: COMMERCIAL

## 2023-03-01 LAB
ALBUMIN SERPL BCG-MCNC: 2.6 G/DL (ref 3.5–5.2)
ALP SERPL-CCNC: 113 U/L (ref 35–104)
ALT SERPL W P-5'-P-CCNC: 27 U/L (ref 10–35)
AMMONIA PLAS-SCNC: 70 UMOL/L (ref 11–51)
ANION GAP SERPL CALCULATED.3IONS-SCNC: 7 MMOL/L (ref 7–15)
APTT PPP: 35 SECONDS (ref 22–38)
APTT PPP: 36 SECONDS (ref 22–38)
AST SERPL W P-5'-P-CCNC: 84 U/L (ref 10–35)
ATRIAL RATE - MUSE: 93 BPM
BILIRUB SERPL-MCNC: 1.9 MG/DL
BUN SERPL-MCNC: 21.3 MG/DL (ref 8–23)
CALCIUM SERPL-MCNC: 8.6 MG/DL (ref 8.6–10)
CF REDUC 60M P MA LENFR BLD TEG: 2.5 % (ref 0–15)
CFT BLD TEG: 1.5 MINUTE (ref 1–3)
CHLORIDE SERPL-SCNC: 115 MMOL/L (ref 98–107)
CI (COAGULATION INDEX)(Z) NON NATIVE: 0.2 (ref -3–3)
CLOT ANGLE BLD TEG: 69.7 DEGREES (ref 53–72)
CLOT INIT BLD TEG: 5.1 MINUTE (ref 5–10)
CLOT LYSIS 30M P MA LENFR BLD TEG: 0.3 % (ref 0–8)
CLOT STRENGTH BLD TEG: 5.8 KD/SC (ref 4.5–11)
CREAT SERPL-MCNC: 0.74 MG/DL (ref 0.51–0.95)
DEPRECATED HCO3 PLAS-SCNC: 25 MMOL/L (ref 22–29)
DIASTOLIC BLOOD PRESSURE - MUSE: NORMAL MMHG
ERYTHROCYTE [DISTWIDTH] IN BLOOD BY AUTOMATED COUNT: 20.4 % (ref 10–15)
FIBRINOGEN PPP-MCNC: 168 MG/DL (ref 170–490)
FIBRINOGEN PPP-MCNC: 171 MG/DL (ref 170–490)
GFR SERPL CREATININE-BSD FRML MDRD: >90 ML/MIN/1.73M2
GLUCOSE BLDC GLUCOMTR-MCNC: 121 MG/DL (ref 70–99)
GLUCOSE SERPL-MCNC: 119 MG/DL (ref 70–99)
HCT VFR BLD AUTO: 26.6 % (ref 35–47)
HGB BLD-MCNC: 8.3 G/DL (ref 11.7–15.7)
INR PPP: 1.82 (ref 0.85–1.15)
INR PPP: 2.01 (ref 0.85–1.15)
INTERPRETATION ECG - MUSE: NORMAL
MAGNESIUM SERPL-MCNC: 1.9 MG/DL (ref 1.7–2.3)
MCF BLD TEG: 53.7 MM (ref 50–70)
MCH RBC QN AUTO: 31.1 PG (ref 26.5–33)
MCHC RBC AUTO-ENTMCNC: 31.2 G/DL (ref 31.5–36.5)
MCV RBC AUTO: 100 FL (ref 78–100)
P AXIS - MUSE: 44 DEGREES
PHOSPHATE SERPL-MCNC: 2.6 MG/DL (ref 2.5–4.5)
PLATELET # BLD AUTO: 90 10E3/UL (ref 150–450)
POTASSIUM SERPL-SCNC: 4.6 MMOL/L (ref 3.4–5.3)
PR INTERVAL - MUSE: 128 MS
PROT SERPL-MCNC: 5 G/DL (ref 6.4–8.3)
QRS DURATION - MUSE: 82 MS
QT - MUSE: 378 MS
QTC - MUSE: 469 MS
R AXIS - MUSE: 19 DEGREES
RBC # BLD AUTO: 2.67 10E6/UL (ref 3.8–5.2)
SODIUM SERPL-SCNC: 147 MMOL/L (ref 136–145)
SYSTOLIC BLOOD PRESSURE - MUSE: NORMAL MMHG
T AXIS - MUSE: 45 DEGREES
VENTRICULAR RATE- MUSE: 93 BPM
WBC # BLD AUTO: 5.2 10E3/UL (ref 4–11)

## 2023-03-01 PROCEDURE — 71045 X-RAY EXAM CHEST 1 VIEW: CPT

## 2023-03-01 PROCEDURE — 250N000011 HC RX IP 250 OP 636: Performed by: NURSE PRACTITIONER

## 2023-03-01 PROCEDURE — 999N000226 HC STATISTIC SLP IP EVAL DEFER

## 2023-03-01 PROCEDURE — 250N000011 HC RX IP 250 OP 636: Performed by: PSYCHIATRY & NEUROLOGY

## 2023-03-01 PROCEDURE — 97165 OT EVAL LOW COMPLEX 30 MIN: CPT | Mod: GO

## 2023-03-01 PROCEDURE — 200N000002 HC R&B ICU UMMC

## 2023-03-01 PROCEDURE — 85730 THROMBOPLASTIN TIME PARTIAL: CPT

## 2023-03-01 PROCEDURE — 999N000128 HC STATISTIC PERIPHERAL IV START W/O US GUIDANCE

## 2023-03-01 PROCEDURE — 250N000013 HC RX MED GY IP 250 OP 250 PS 637: Performed by: NURSE PRACTITIONER

## 2023-03-01 PROCEDURE — 84100 ASSAY OF PHOSPHORUS: CPT | Performed by: NURSE PRACTITIONER

## 2023-03-01 PROCEDURE — 258N000003 HC RX IP 258 OP 636: Performed by: PSYCHIATRY & NEUROLOGY

## 2023-03-01 PROCEDURE — 250N000013 HC RX MED GY IP 250 OP 250 PS 637

## 2023-03-01 PROCEDURE — 94642 AEROSOL INHALATION TREATMENT: CPT

## 2023-03-01 PROCEDURE — 80053 COMPREHEN METABOLIC PANEL: CPT | Performed by: NURSE PRACTITIONER

## 2023-03-01 PROCEDURE — 71045 X-RAY EXAM CHEST 1 VIEW: CPT | Mod: 26 | Performed by: RADIOLOGY

## 2023-03-01 PROCEDURE — 250N000009 HC RX 250: Performed by: PSYCHIATRY & NEUROLOGY

## 2023-03-01 PROCEDURE — 36415 COLL VENOUS BLD VENIPUNCTURE: CPT | Performed by: STUDENT IN AN ORGANIZED HEALTH CARE EDUCATION/TRAINING PROGRAM

## 2023-03-01 PROCEDURE — 85014 HEMATOCRIT: CPT | Performed by: NURSE PRACTITIONER

## 2023-03-01 PROCEDURE — 85610 PROTHROMBIN TIME: CPT

## 2023-03-01 PROCEDURE — 85384 FIBRINOGEN ACTIVITY: CPT

## 2023-03-01 PROCEDURE — 99292 CRITICAL CARE ADDL 30 MIN: CPT | Performed by: PSYCHIATRY & NEUROLOGY

## 2023-03-01 PROCEDURE — 85730 THROMBOPLASTIN TIME PARTIAL: CPT | Performed by: STUDENT IN AN ORGANIZED HEALTH CARE EDUCATION/TRAINING PROGRAM

## 2023-03-01 PROCEDURE — 83735 ASSAY OF MAGNESIUM: CPT | Performed by: NURSE PRACTITIONER

## 2023-03-01 PROCEDURE — 85610 PROTHROMBIN TIME: CPT | Performed by: STUDENT IN AN ORGANIZED HEALTH CARE EDUCATION/TRAINING PROGRAM

## 2023-03-01 PROCEDURE — 85384 FIBRINOGEN ACTIVITY: CPT | Performed by: STUDENT IN AN ORGANIZED HEALTH CARE EDUCATION/TRAINING PROGRAM

## 2023-03-01 PROCEDURE — 94640 AIRWAY INHALATION TREATMENT: CPT | Mod: 76

## 2023-03-01 PROCEDURE — 272N000202 HC AEROBIKA WITH MANOMETER

## 2023-03-01 PROCEDURE — 97530 THERAPEUTIC ACTIVITIES: CPT | Mod: GP | Performed by: PHYSICAL THERAPIST

## 2023-03-01 PROCEDURE — 999N000157 HC STATISTIC RCP TIME EA 10 MIN

## 2023-03-01 PROCEDURE — 99291 CRITICAL CARE FIRST HOUR: CPT | Mod: 24 | Performed by: NURSE PRACTITIONER

## 2023-03-01 PROCEDURE — 85396 CLOTTING ASSAY WHOLE BLOOD: CPT

## 2023-03-01 PROCEDURE — 82140 ASSAY OF AMMONIA: CPT | Performed by: NURSE PRACTITIONER

## 2023-03-01 PROCEDURE — 250N000011 HC RX IP 250 OP 636: Performed by: STUDENT IN AN ORGANIZED HEALTH CARE EDUCATION/TRAINING PROGRAM

## 2023-03-01 PROCEDURE — 97535 SELF CARE MNGMENT TRAINING: CPT | Mod: GO

## 2023-03-01 PROCEDURE — 97161 PT EVAL LOW COMPLEX 20 MIN: CPT | Mod: GP | Performed by: PHYSICAL THERAPIST

## 2023-03-01 PROCEDURE — 94640 AIRWAY INHALATION TREATMENT: CPT

## 2023-03-01 RX ORDER — IPRATROPIUM BROMIDE AND ALBUTEROL SULFATE 2.5; .5 MG/3ML; MG/3ML
3 SOLUTION RESPIRATORY (INHALATION)
Status: DISCONTINUED | OUTPATIENT
Start: 2023-03-01 | End: 2023-03-01

## 2023-03-01 RX ORDER — LEVETIRACETAM 500 MG/1
500 TABLET ORAL 2 TIMES DAILY
Status: DISCONTINUED | OUTPATIENT
Start: 2023-03-01 | End: 2023-03-01

## 2023-03-01 RX ORDER — LEVALBUTEROL INHALATION SOLUTION 1.25 MG/3ML
1.25 SOLUTION RESPIRATORY (INHALATION) EVERY 4 HOURS PRN
Status: DISCONTINUED | OUTPATIENT
Start: 2023-03-01 | End: 2023-03-07 | Stop reason: HOSPADM

## 2023-03-01 RX ORDER — BENZTROPINE MESYLATE 1 MG/1
1 TABLET ORAL ONCE
Status: COMPLETED | OUTPATIENT
Start: 2023-03-01 | End: 2023-03-01

## 2023-03-01 RX ORDER — MAGNESIUM SULFATE HEPTAHYDRATE 40 MG/ML
2 INJECTION, SOLUTION INTRAVENOUS ONCE
Status: COMPLETED | OUTPATIENT
Start: 2023-03-01 | End: 2023-03-01

## 2023-03-01 RX ORDER — IPRATROPIUM BROMIDE AND ALBUTEROL SULFATE 2.5; .5 MG/3ML; MG/3ML
3 SOLUTION RESPIRATORY (INHALATION) EVERY 4 HOURS PRN
Status: DISCONTINUED | OUTPATIENT
Start: 2023-03-01 | End: 2023-03-01

## 2023-03-01 RX ORDER — FUROSEMIDE 10 MG/ML
20 INJECTION INTRAMUSCULAR; INTRAVENOUS ONCE
Status: COMPLETED | OUTPATIENT
Start: 2023-03-01 | End: 2023-03-01

## 2023-03-01 RX ORDER — LEVETIRACETAM 500 MG/1
500 TABLET ORAL 2 TIMES DAILY
Status: COMPLETED | OUTPATIENT
Start: 2023-03-01 | End: 2023-03-05

## 2023-03-01 RX ORDER — DIAZEPAM 10 MG/2ML
5 INJECTION, SOLUTION INTRAMUSCULAR; INTRAVENOUS ONCE
Status: COMPLETED | OUTPATIENT
Start: 2023-03-01 | End: 2023-03-01

## 2023-03-01 RX ADMIN — GABAPENTIN 600 MG: 600 TABLET, FILM COATED ORAL at 21:27

## 2023-03-01 RX ADMIN — RIFAXIMIN 550 MG: 550 TABLET ORAL at 08:10

## 2023-03-01 RX ADMIN — SODIUM PHOSPHATE, MONOBASIC, MONOHYDRATE AND SODIUM PHOSPHATE, DIBASIC, ANHYDROUS 9 MMOL: 276; 142 INJECTION, SOLUTION INTRAVENOUS at 06:41

## 2023-03-01 RX ADMIN — RIFAXIMIN 550 MG: 550 TABLET ORAL at 20:20

## 2023-03-01 RX ADMIN — DEXAMETHASONE SODIUM PHOSPHATE 4 MG: 4 INJECTION, SOLUTION INTRA-ARTICULAR; INTRALESIONAL; INTRAMUSCULAR; INTRAVENOUS; SOFT TISSUE at 02:04

## 2023-03-01 RX ADMIN — LACTULOSE 20 G: 20 SOLUTION ORAL at 08:10

## 2023-03-01 RX ADMIN — THIAMINE HYDROCHLORIDE 100 MG: 100 INJECTION, SOLUTION INTRAMUSCULAR; INTRAVENOUS at 07:47

## 2023-03-01 RX ADMIN — MAGNESIUM SULFATE IN WATER 2 G: 40 INJECTION, SOLUTION INTRAVENOUS at 05:30

## 2023-03-01 RX ADMIN — FUROSEMIDE 20 MG: 10 INJECTION, SOLUTION INTRAVENOUS at 10:33

## 2023-03-01 RX ADMIN — LEVETIRACETAM 500 MG: 500 TABLET, FILM COATED ORAL at 20:20

## 2023-03-01 RX ADMIN — LEVETIRACETAM 500 MG: 500 TABLET, FILM COATED ORAL at 08:14

## 2023-03-01 RX ADMIN — DEXAMETHASONE SODIUM PHOSPHATE 4 MG: 4 INJECTION, SOLUTION INTRA-ARTICULAR; INTRALESIONAL; INTRAMUSCULAR; INTRAVENOUS; SOFT TISSUE at 07:45

## 2023-03-01 RX ADMIN — LEVALBUTEROL HYDROCHLORIDE 1.25 MG: 1.25 SOLUTION RESPIRATORY (INHALATION) at 21:06

## 2023-03-01 RX ADMIN — RACEPINEPHRINE HYDROCHLORIDE 0.5 ML: 11.25 SOLUTION RESPIRATORY (INHALATION) at 06:00

## 2023-03-01 RX ADMIN — FOLIC ACID 1 MG: 5 INJECTION, SOLUTION INTRAMUSCULAR; INTRAVENOUS; SUBCUTANEOUS at 07:47

## 2023-03-01 RX ADMIN — LEVALBUTEROL HYDROCHLORIDE 1.25 MG: 1.25 SOLUTION RESPIRATORY (INHALATION) at 16:44

## 2023-03-01 RX ADMIN — RACEPINEPHRINE HYDROCHLORIDE 0.5 ML: 11.25 SOLUTION RESPIRATORY (INHALATION) at 02:54

## 2023-03-01 RX ADMIN — BENZTROPINE MESYLATE 1 MG: 1 TABLET ORAL at 10:31

## 2023-03-01 RX ADMIN — RACEPINEPHRINE HYDROCHLORIDE 0.5 ML: 11.25 SOLUTION RESPIRATORY (INHALATION) at 16:45

## 2023-03-01 ASSESSMENT — ACTIVITIES OF DAILY LIVING (ADL)
ADLS_ACUITY_SCORE: 49
ADLS_ACUITY_SCORE: 48
ADLS_ACUITY_SCORE: 45
ADLS_ACUITY_SCORE: 45
ADLS_ACUITY_SCORE: 48
ADLS_ACUITY_SCORE: 48
ADLS_ACUITY_SCORE: 49
ADLS_ACUITY_SCORE: 48
ADLS_ACUITY_SCORE: 48
ADLS_ACUITY_SCORE: 49

## 2023-03-01 ASSESSMENT — VISUAL ACUITY
OU: OTHER (SEE COMMENT)

## 2023-03-01 NOTE — PROGRESS NOTES
Neurocritical Care Progress Note    Reason for critical care admission: Subdural hematoma   Admitting Team: Neurocritical Care team   Date of Service:  03/01/2023  Date of Admission:  2/26/2023  Hospital Day: 4    Assessment/Plan  Sulma Ramos is a 57 year old female with a past medical history of anxiety, insomnia, RLS, cirrhosis 2/2 liver cancer, portal hypertension, hepatic encephalopathy, seizure, gastric bypass, and portal vein thrombus on Eliquis who was admitted on 2/26/2023 from [facility] after presenting with altered mental status found to have acute subdural hematoma with midline shift and signs of uncal herniation on CT head.    24 hour events: Following commands post extubation. Required racepinephrine given x 2 at 0300 & 0600 with improvement.     Neuro  #Subdural hematoma  #Brain compression ~ improved post evacuation   #Uncal herniation ~ improved post evacuation   #POD3 for left bur holes for subdural hematoma evacuation  -NSGY following      -Neurochecks Q4h    -Keppra 500 mg Q12h x 7 days   -SBP goal < 140 mmHg  -HOB > 30   -PT/OT       #Analgesics & sedation  #Chronic pain  RASS goal: 0 to -1  -Nothing ordered at this time, denies pain     #Hx of seizures  One time seizure, noted back in 2011. Was thought to be do from sleep deprivation. Was not started on any AEDs.      #RLS  -Continue PTA Gabapentin 600 mg at bedtime    #Extrapyramidal symptoms noted this am  -Give one time dose of Benzatropine 1 mg      Psych   #Anxity  #Insomnia   -Holding PTA Ambien 10 mg at bedtime PRN     CV  #Prolonged Qtc ~ improved   -Repeat EKG; 469  -Avoid prolonging Qt medications    #Hypertension on admission ~ resolved    -Cardiac monitoring  -SBP goal < 140 mmHg  -PRN Labetalol and Hydralazine  -TTE completed on 2/27, EF 60-65%      Resp   #Wheezing post extubation  -Decadron 4 mg Q6h x 1 day - completed    #Moderate amount of right pleural effusion and atelectasis on CT CAP  -2/27 CT chest w/o contrast:  Moderate to large right pleural effusion is increased in size compared to CT from 2/26/2023.  -Discussed with SICU on whether to drain or place pigtail, was advised that no intervention is required at this time. Patient is oxygenating well and requiring very minimal ventilator support.   Oxygen/vent: NC/2L - wean off  -CXR this morning  -Lasix 20 mg IV x1   -Continuous pulse ox  -Maintain O2 saturations greater than 92%     GI  #Cirrhosis and Portal hypertension from prior alcohol use  #Ascities  #Esophageal varices       #Hx of HCC s/p ablation 2009  #Portal vein thrombus (on Eliquis)  -Holding PTA Eliquis 5 mg BID  -Continue PTA Rifaximin 550 mg BID   -Holding PTA Nadolol 20 mg every evening   -2/26 US Abd Hepatic & Portal Vasculature Cmpl: Echogenic material noted within the main portal vein with limited sluggish flow, suggestive of portal vein thrombus. The right and left portal veins are patent with antegrade flow.  -Ammonia; 45, repeat Ammonia today   -ALT 27, AST 84       #Hx of gastric bypass (Beto-en Y)  -Continue PTA Thiamine 100 mg daily and Folic acid 1 mg daily  -Continue PTA Cyanocobalamin 1000 mcg every 30 days   #GERD  Diet: Regular diet; thin liquids   Last BM: 2/28  GI prophylaxis: Pantoprazole 40 mg daily (Takes omeprazole at home)  -Bowel regimen: PTA Lactulose 20 gm daily (takes every other day at home), stop Miralax daily      Renal/  #Hypernatremi; 147  #Hyperchloremia; 115   -Stop daily CMP -> Daily BMP starting tomorrow   -IV fluids: None  -Electrolyte replacement protocol     Endo  #Hypothyroidism  -TSH; 1.11   -Continue PTA Levothyroxine 25 mcg every other day      -Hgb A1c; 5.3   -Monitor glucose levels      Heme  #Normocyctic anemia; 8.3 - Chronic disease on acute blood loss from OR  #Thrombocytopenia   -Holding PTA Ferrous sulfate  -Daily CBC  -Repeat INR/PTT/Fibrinoen on 3/2 prior to KATIA removal per NSGY  -NSGY would like Plt goal > 100k prior to EVD removal tomorrow. Will consider 1  "unit platelets tonight with recheck in the morning.   -Hgb goal >7, plt goal >50k  -Transfuse to meet Hgb and plt goals     #Coagulopathy  #Hx Portal vein thrombus (on Eliquis)  -Holding PTA Eliquis 5 mg BID  -INR 1.65 on 2/27  -Kcentra given at [facility]  -2/26 TEG w/o heparinase with slightly decreased R time (4.7)        ID  -Afebrile   -Daily CBC  -Follow temperature curve     Musculoskeletal   #Multiple falls  #Hx of L1 compression fracture  -Where's TLSO brace at home - ok to wear   Last fall on 1/19/2023 resulting in a Lg hematoma to left forearm. Xray without fracture.  -C-spine without fracture; trauma cleared C-collar through imaging.  -Trauma service following       ICU Checklist  Lines/tubes/drains: PIV x2, remove R internal jugular/CVC, left scalp KATIA bulb ->NSGY to remove tomorrow   FEN: TF's, electrolyte replacement   PPX: DVT - SCDs; GI - Pantoprazole.  Code: Full  Dispo: ICU - NCC      Clinically Significant Risk Factors         # Hypernatremia: Highest Na = 147 mmol/L in last 2 days, will monitor as appropriate      # Hypoalbuminemia: Lowest albumin = 2.3 g/dL at 2/28/2023  3:33 AM, will monitor as appropriate   # Thrombocytopenia: Lowest platelets = 77 in last 2 days, will monitor for bleeding          # Overweight: Estimated body mass index is 28.46 kg/m  as calculated from the following:    Height as of this encounter: 1.626 m (5' 4\").    Weight as of this encounter: 75.2 kg (165 lb 12.6 oz)., PRESENT ON ADMISSION       TIME SPENT ON THIS ENCOUNTER   I spent 52 minutes of critical care time on the unit/floor managing the care of Sulma Ramos excluding time performing procedures. Upon evaluation, this patient had a high probability of imminent or life-threatening deterioration due to subdural hematoma s/p errol holes for hematoma evacuation, which required my direct attention, intervention, and personal management. Greater than 50% of my time was spent at the bedside counseling the patient " "and/or coordinating care including chart review, history, exam, documentation, and further activities per this note. I have personally reviewed the following data/imaging over the past 24 hours.     The patient was seen and discussed with the NCC attending, Dr. Davis.    Ibis LEMONS CNP  Neurocritical care nurse practitioner  Pager: [phone]  Ascom: [phone] available M- 0700 to 1700    24 Hour Vital Signs Summary:  Temp: 97.8  F (36.6  C) Temp  Min: 97.5  F (36.4  C)  Max: 98.8  F (37.1  C)  Resp: (!) 35 Resp  Min: 12  Max: 35  SpO2: 95 % SpO2  Min: 87 %  Max: 100 %  Pulse: 86 Pulse  Min: 79  Max: 97  BP: 129/81 Systolic (24hrs), Av , Min:106 , Max:156   Diastolic (24hrs), Av, Min:60, Max:91    Respiratory monitoring:   NC/2L    I/O last 3 completed shifts:  In: 896.67 [I.V.:566.67; NG/GT:190]  Out: 1225 [Urine:1005; Drains:220]    Current Medications:    cyanocobalamin  1,000 mcg Intramuscular Q30 Days     dexamethasone  4 mg Intravenous Q6H     folic acid  1 mg Intravenous Daily     gabapentin  600 mg Oral At Bedtime     lactulose  20 g Oral Daily     levETIRAcetam  500 mg Intravenous Q12H     [Held by provider] levETIRAcetam  500 mg Oral or Feeding Tube BID     levothyroxine  25 mcg Oral Every Other Day     polyethylene glycol  17 g Oral or Feeding Tube Daily     rifaximin  550 mg Oral BID     sodium phosphate  9 mmol Intravenous Once     thiamine  100 mg Intravenous Daily       PRN Medications:  glucose **OR** dextrose **OR** glucagon, hydrALAZINE, labetalol, naloxone **OR** naloxone **OR** naloxone **OR** naloxone, ondansetron **OR** ondansetron, prochlorperazine **OR** prochlorperazine **OR** prochlorperazine, racEPINEPHrine    Infusions:      Allergies   Allergen Reactions     Clarithromycin      Clindamycin Base      Droperidol        Physical Examination:  Vitals: /81   Pulse 86   Temp 97.8  F (36.6  C) (Oral)   Resp (!) 35   Ht 1.626 m (5' 4\")   Wt 75.2 kg (165 lb 12.6 " oz)   LMP 01/19/2004   SpO2 95%   BMI 28.46 kg/m    General: Adult female patient, lying in bed  HEENT: Normocephalic, atraumatic, bilateral icterus  Cardiac: Sinus rhythm on bedside monitor  Pulm: Appears unlabored, expansion symmetric, no retractions or use of accessory muscles   Abdomen: Firm distended abdomen, ascites   Extremities: Warm, no edema, appears adequately perfused  Skin: Bruising  Psych: Intermittent restlessness   Neuro:  Mental status: Awake, alert, intermittent confusion, intermittent command following. Language is fluent and coherent with intact comprehension of complex commands, naming and repetition.  Cranial nerves: PERRL, conjugate gaze, EOMI, facial sensation intact, face symmetric, shoulder shrug strong, tongue midline, no dysarthria.   Motor: Normal bulk and tone. No abnormal movements. 5/5 strength in 4/4 extremities with purposeful movement.   Sensory: Intact to light touch x 4 extremities.    Coordination: EULA, unable to follow commands.  Gait: EULA, deferred.    Labs and Imaging:    Results for orders placed or performed during the hospital encounter of 02/26/23 (from the past 24 hour(s))   CT Head w/o Contrast    Narrative    EXAM: CT HEAD W/O CONTRAST  2/28/2023 10:37 AM     HISTORY:  follow SDH on left       COMPARISON:  2/27/2023    TECHNIQUE: Using multidetector thin collimation helical acquisition  technique, axial, coronal and sagittal CT images from the skull base  to the vertex were obtained without intravenous contrast.   (topogram) image(s) also obtained and reviewed.    FINDINGS:  Left greater than right subdural hematomas, on the left measures  maximally 19 mm, stable when measured similarly. Subdural hematoma  extends along the falx and bilateral tentorial leaflets. Stable  position overlying extra-axial drain. Stable left frontal subdural  pocket of air. Stable overlying skin staples. Stable mild compression  of the left lateral ventricle and 6 mm of rightward  midline shift.     No acute loss of gray-white matter differentiation in the cerebral  hemispheres. Ventricles are proportionate to the cerebral sulci. Clear  basal cisterns.    The bony calvaria and the bones of the skull base are otherwise  normal. Maxillary sinus thickening, otherwise the visualized portions  of the paranasal sinuses and mastoid air cells are clear. Grossly  normal orbits.       Impression    IMPRESSION: Stable left greater than right subdural hematomas and  stable position of subdural catheter. Unchanged rightward midline  shift.    I have personally reviewed the examination and initial interpretation  and I agree with the findings.    REHANA ELIZABETH MD         SYSTEM ID:  [id number]   EKG 12-lead, complete   Result Value Ref Range    Systolic Blood Pressure  mmHg    Diastolic Blood Pressure  mmHg    Ventricular Rate 93 BPM    Atrial Rate 93 BPM    ID Interval 128 ms    QRS Duration 82 ms     ms    QTc 469 ms    P Axis 44 degrees    R AXIS 19 degrees    T Axis 45 degrees    Interpretation ECG       Sinus rhythm  Nonspecific ST and T wave abnormality  Abnormal ECG  When compared with ECG of 26-FEB-2023 22:38,  QT has shortened     Glucose by meter   Result Value Ref Range    GLUCOSE BY METER POCT 108 (H) 70 - 99 mg/dL   Glucose by meter   Result Value Ref Range    GLUCOSE BY METER POCT 108 (H) 70 - 99 mg/dL   Glucose by meter   Result Value Ref Range    GLUCOSE BY METER POCT 110 (H) 70 - 99 mg/dL   Comprehensive metabolic panel   Result Value Ref Range    Sodium 147 (H) 136 - 145 mmol/L    Potassium 4.6 3.4 - 5.3 mmol/L    Chloride 115 (H) 98 - 107 mmol/L    Carbon Dioxide (CO2) 25 22 - 29 mmol/L    Anion Gap 7 7 - 15 mmol/L    Urea Nitrogen 21.3 8.0 - 23.0 mg/dL    Creatinine 0.74 0.51 - 0.95 mg/dL    Calcium 8.6 8.6 - 10.0 mg/dL    Glucose 119 (H) 70 - 99 mg/dL    Alkaline Phosphatase 113 (H) 35 - 104 U/L    AST 84 (H) 10 - 35 U/L    ALT 27 10 - 35 U/L    Protein Total 5.0 (L) 6.4 - 8.3  g/dL    Albumin 2.6 (L) 3.5 - 5.2 g/dL    Bilirubin Total 1.9 (H) <=1.2 mg/dL    GFR Estimate >90 >60 mL/min/1.73m2   CBC with platelets   Result Value Ref Range    WBC Count 5.2 4.0 - 11.0 10e3/uL    RBC Count 2.67 (L) 3.80 - 5.20 10e6/uL    Hemoglobin 8.3 (L) 11.7 - 15.7 g/dL    Hematocrit 26.6 (L) 35.0 - 47.0 %     78 - 100 fL    MCH 31.1 26.5 - 33.0 pg    MCHC 31.2 (L) 31.5 - 36.5 g/dL    RDW 20.4 (H) 10.0 - 15.0 %    Platelet Count 90 (L) 150 - 450 10e3/uL   Magnesium   Result Value Ref Range    Magnesium 1.9 1.7 - 2.3 mg/dL   Phosphorus   Result Value Ref Range    Phosphorus 2.6 2.5 - 4.5 mg/dL   Glucose by meter   Result Value Ref Range    GLUCOSE BY METER POCT 121 (H) 70 - 99 mg/dL       All relevant imaging and laboratory values personally reviewed.

## 2023-03-01 NOTE — PLAN OF CARE
N: q4 neuro checks. Cogentin X1 for EPS, no change. Alert, answering questions and following commands with prompting. Moves all extremities with strength. Denies pain. KATIA with serosang output.   C: NSR, no ectopy noted. MAP > 65 without intervention. Pulses palpable. Afebrile.   R: Chest xray completed. Exp wheezes, diminished in bases and right side - denies shortness of breath or difficulty breathing. PRN nebs added. RA/2L NC when resting. Does not follow command to cough, spontaneous - fair, non productive/congested. Aerobika and IS at bedside, does not follow command to use.   GI: Passed RN bedside swallow - regular diet. Bites of breakfast, otherwise poor appetite. 50% dinner. Scheduled lactulose   : Lasix X1 - up to commode, large volume mixed urine and stool. Also incontinent of urine and stool. I&O not accurate.  ACCESS: PIV X2, RIJ CVC removed   ACTIVITY:  alerted staff that pt had been wearing a back brace provided by [employer] while OOB since previous fall in January. At bedside, on when OOB. Upright xrays ordered - to be completed.   SOCIAL: , son and daughter at bedside. Attendant also at bedside.     PLAN: Platelets tonight to prep for KATIA removal tomorrow. Then likely transfer to general care. Standing xrays when able to tolerate. Monitor closely, notify MD of changes/concerns.

## 2023-03-01 NOTE — PROGRESS NOTES
CLINICAL NUTRITION SERVICES - BRIEF NOTE    Nutrition Prescription    RECOMMENDATIONS FOR MDs/PROVIDERS TO ORDER:  - Encouragement of PO  - Continue B vitamins as ordered. Recommend additional MVI if PO decreased. Micronutrient regimen post-discharge as indicated per bariatric recommendations (see below)    Recommendations already ordered by Registered Dietitian (RD):  - None currently. TF orders already discontinued by team.     Future/Additional Recommendations:  - PO adequacy   - Add supplements and MVI if PO poor     Micronutrient recs post Gastric Bypass in long-term/post-discharge or per bariatrics:  -- Multivitamin/minerals: adult dose 1-2 times daily  -- Iron: 45-60 mg elemental daily (18-36 mg daily if low risk) - may partly or fully be covered in multivitamin   -- Calcium Citrate containing vitamin D: 500 mg 3 times daily or 600 mg 2 times daily  -- Vitamin B12: sublingual form of at least 500 mcg daily or injection of 1000 mcg monthly  -- B-50 Complex daily      Pt initiated on EN while vented with OGT. Pt extubated yesterday. Diet has been advanced to regular.     Nutrition Progress Note - f/u for progress towards previous nutrition POC (see previous reassessment for details)     Hiral Connolly RD, LD, [facility]  Neuro ICU  Pager: [phone]

## 2023-03-01 NOTE — PLAN OF CARE
"N: Head CT completed this AM. Post extubation, pt constantly restless - thrashing side to side and making animal noises. Now intermittent noises in addition to more logical speech. Answering yes/no questions. Also frequently agitated and swearing. Only oriented to person, occassionally hospital. Inconsistently following commands but moving all extremities. Soft mitts ongoing for reaching for lines, drain.    C: NSR, no ectopy noted. MAP > 65 without intervention. Pulses palpable. Afebrile. Pt appreciates warm blankets.   R: Extubated to RA/2L NC. Frequent gargle/ throat clearing noise - per , \"does this at home\". Does not follow command to cough, but does so spontaneously. No observed sputum. Lungs diminished L>R  GI: OGT removed with extubation. Inappropriate for swallow eval. Abdomen rounded but soft. Scheduled lactulose. Frequent incontinent loose stools  : Clark removed per NCC @ 1700. Purewick in place. Inaccurate I&O.  SKIN: Bruising all over body. Left forearm hematoma. Incision dressing CDI. KATIA with serosang output   ACCESS: RIJ, PIV  ACTIVITY: Repositioning self off pillows, requiring frequent boosts and straightening.   SOCIAL:  and son at bedside. [facility] report filed today by social work, see note. Sitter at bedside starting this afternoon.     PLAN: Continue to monitor closely. Notify MD of changes/concerns.   "

## 2023-03-01 NOTE — PROGRESS NOTES
03/01/23 1050   Appointment Info   Signing Clinician's Name / Credentials (OT) Chelsi Adan OTR/L   Rehab Comments (OT) crani precautions, impulsive       Present no   Living Environment   People in Home spouse   Current Living Arrangements house   Home Accessibility stairs to enter home   Number of Stairs, Main Entrance 2   Transportation Anticipated car, drives self;family or friend will provide   Living Environment Comments Pt poor historian this date, per pt spouse, pt lives in house ~2 MYRNA, all needs met on main level.   Self-Care   Usual Activity Tolerance good   Current Activity Tolerance poor   Regular Exercise Yes   Equipment Currently Used at Home none   Fall history within last six months yes   Number of times patient has fallen within last six months 1  (at gym, unknown if other falls)   Activity/Exercise/Self-Care Comment Per spouse report, pt IND with ADL tasks at home, very active, recent fall at gym (1/17/23), received brace from [facility] orthopedic due to T/L compression fractures. Will follow up with neurosurgery   Instrumental Activities of Daily Living (IADL)   Previous Responsibilities meal prep;housekeeping;driving;laundry   IADL Comments Per spouse, pt IADL IND at baseline, enjoys going to gym.   General Information   Onset of Illness/Injury or Date of Surgery 02/26/23   Referring Physician Ibis Sanchez, CNP   Patient/Family Therapy Goal Statement (OT) Did not state   Additional Occupational Profile Info/Pertinent History of Current Problem Per EMR, Sulma Ramos 57 year old with a PMH of liver failure secondary to HCC s/p ablation, portal vein thrombosis on Apixaban, ascites, s/p Beto-en Y, who was evaluated at the ED for alerted mental status found to have a large left SDH with mass effect. Patient was intubated and transferred to [facility], S/P errol hole for SDH evacuation, C CAP T T/L spine with L1 subacute compression deformity and moderate right effusion,   remains intubated.   Existing Precautions/Restrictions fall;lifting;brace worn when out of bed;spinal;other (see comments)  (craniotomy)   Limitations/Impairments safety/cognitive   Left Upper Extremity (Weight-bearing Status) partial weight-bearing (PWB)  (no > 10 lb)   Right Upper Extremity (Weight-bearing Status) partial weight-bearing (PWB)  (no > 10 lb)   Left Lower Extremity (Weight-bearing Status) full weight-bearing (FWB)   Right Lower Extremity (Weight-bearing Status) full weight-bearing (FWB)   General Observations and Info Activity: Up with assist, brace on when OOB   Cognitive Status Examination   Orientation Status person   Cognitive Status Comments Pt oriented to self only, extremely restless despite frequent re-orientation; will continue to monitor   Visual Perception   Impact of Vision Impairment on Function (Vision) unable to formally assess due to pt cognitive status, will monitor   Sensory   Sensory Comments unable to formally assess due to pt cognitive status, will monitor   Pain Assessment   Patient Currently in Pain No   Posture   Posture Comments intermittent forward head position while sitting unsupported, TLSO brace from OSH when OOB   Range of Motion Comprehensive   General Range of Motion bilateral upper extremity ROM WFL   Strength Comprehensive (MMT)   Comment, General Manual Muscle Testing (MMT) Assessment B UE strength grossly deconditioned, formal MMT not assessed due to post-surgical precautions and cognitive status   Muscle Tone Assessment   Muscle Tone Quick Adds No deficits were identified   Coordination   Coordination Comments B UE GMC and FMC limited by pt cognitive status; Pt with increased attention to L-side compared to R-side   Bed Mobility   Bed Mobility supine-sit   Supine-Sit [place] (Bed Mobility) moderate assist (50% patient effort);2 person assist;verbal cues   Comment (Bed Mobility) per clinical judgement   Transfers   Transfers sit-stand transfer;toilet transfer    Sit-Stand Transfer   Sit/Stand Transfer Comments min A x 2 STS from EOB   Toilet Transfer   Toilet Transfer Comments min-mod A x 2 STS from BSC, one instance of knee buckling   Balance   Balance Assessment sitting balance: static   Balance Comments min-mod A for unsupported sitting balance, poor safety awareness   Activities of Daily Living   BADL Assessment/Intervention bathing;lower body dressing;toileting   Bathing Assessment/Intervention   [place] Level (Bathing) maximum assist (25% patient effort);verbal cues;set up   Comment, (Bathing) per clinical judgement   Lower Body Dressing Assessment/Training   Comment, (Lower Body Dressing) per clinical judgement   [place] Level (Lower Body Dressing) dependent (less than 25% patient effort)   Toileting   Comment, (Toileting) per clinical judgement   [place] Level (Toileting) maximum assist (25% patient effort);verbal cues   Clinical Impression   Criteria for Skilled Therapeutic Interventions Met (OT) Yes, treatment indicated   OT Diagnosis Decreased ADL IND/safety, cognition, functional mobility, activity tolerance, muscular strength/endurance, post-surgical precautions   OT Problem List-Impairments impacting ADL problems related to;activity tolerance impaired;balance;cognition;communication;coordination;mobility;motor control;range of motion (ROM);strength;pain;post-surgical precautions;postural control   Assessment of Occupational Performance 5 or more Performance Deficits   Identified Performance Deficits dressing, bathing, toileting, standing g/h tasks, cognition, home mgmt, IADL tasks, driving, leisure   Planned Therapy Interventions (OT) ADL retraining;IADL retraining;cognition;motor coordination training;neuromuscular re-education;ROM;orthoic fitting/training;transfer training;strengthening;progressive activity/exercise   Clinical Decision Making Complexity (OT) low complexity   Anticipated Equipment Needs Upon Discharge (OT) other (see  comments)  (TBD)   Risk & Benefits of therapy have been explained evaluation/treatment results reviewed;care plan/treatment goals reviewed;participants included;patient;spouse/significant other   Clinical Impression Comments Pt below baseline function and will benefit from continued skilled OT during IP stay to progress ADL IND/safety, cognition, and return to PLOF.   OT Total Evaluation Time   OT Eval, Low Complexity Minutes (51932) 6   OT Goals   Therapy Frequency (OT) 6 times/wk   OT Predicted Duration/Target Date for Goal Attainment 03/18/23   OT Goals Hygiene/Grooming;Lower Body Dressing;Lower Body Bathing;Toilet Transfer/Toileting;Cognition   OT: Hygiene/Grooming supervision/stand-by assist;within precautions;while standing   OT: Lower Body Dressing Supervision/stand-by assist;within precautions;including set-up/clothing retrieval   OT: Lower Body Bathing Supervision/stand-by assist;with precautions   OT: Toilet Transfer/Toileting Supervision/stand-by assist;cleaning and garment management;toilet transfer;within precautions   OT: Cognitive Patient/caregiver will verbalize understanding of cognitive assessment results/recommendations as needed for safe discharge planning   OT Discharge Planning   OT Plan OT: monitor cognition, seated ADLs, command-following, functional transfers, strength/endurance   OT Discharge Recommendation (DC Rec) Transitional Care Facility   OT Rationale for DC Rec Pt below baseline function for ADL tasks, functional mobility, cognition. Currently requiring A x 2 for bed mobility, functional transfers, and toileting tasks, extremely restless and disoriented this date; Would recommend continued rehab at TCU to progress IND/safety and return to PLOF.   OT Brief overview of current status A x 2 for transfers/bed mobility   Total Session Time   Total Session Time (sum of timed and untimed services) 6

## 2023-03-01 NOTE — PROGRESS NOTES
[facility], [place]  Neurosurgery Progress Note:  03/01/2023      Interval History: Extubated yesterday.  On Decadron.  Subdural KATIA in place.  CT scan obtained yesterday stable.  We will plan for CT tomorrow.  Will need platelets and repeat coags for today.    Assessment:  57 year old female with liver cancer and coagulopathy with thrombocytopenia and elevated INR, presenting with 2 days of AMS in setting of acute-on-subacute large left convexity SDH.    She underwent left-sided bur holes craniotomy for evacuation on 2/26/2023 with Dr. Mayo.  Postop head CT obtained showing decompression with residual left-sided convexity collection.  Subdural KATIA drain left in place to thumbprint-will require significant stripping.  Postoperatively INR increased 1.7, received 2 units of FFP.  Bedside echo performed by cardiology showing no obvious akinesis (with a focal low global).  Lactate and troponins obtained slightly elevated.  Received 1 unit of PRBCs for hemoglobin of 7.  Sedation switched from Precedex to propofol.    Pre-operative anticoagulation/antiplatelet: Eliquis    Plan:  1 unit of platelets  CT tomorrow  Repeat coags today  Follow KATIA drain output, to remain in place for today  Echo completed  Continue Keppra  Serial neuro exams  Pain control  HOB > 30 degrees  Advance diet as tolerated  Bowel regimen  PRN antiemetics  IVF until taking adequate PO  PT/OT  SCDs for DVT proph    -----------------------------------  James Hardin MD  Neurosurgery resident, PGY-3  -----------------------------------  General: Cirrhotic appearing, icteric sclera,  Pulm: Breathing comfortably on room air  Neuro:  Eyes open, conversant, confused, A+ O x2  Symmetric appearing strength wise however limited by command following.    --------------------------------------------------------------------------------------------------------------------------    Clinically Significant Risk Factors         #  "Hypernatremia: Highest Na = 147 mmol/L in last 2 days, will monitor as appropriate      # Hypoalbuminemia: Lowest albumin = 2.3 g/dL at 2/28/2023  3:33 AM, will monitor as appropriate   # Thrombocytopenia: Lowest platelets = 77 in last 2 days, will monitor for bleeding          # Overweight: Estimated body mass index is 28.46 kg/m  as calculated from the following:    Height as of this encounter: 1.626 m (5' 4\").    Weight as of this encounter: 75.2 kg (165 lb 12.6 oz)., PRESENT ON ADMISSION       # Compression of brain         Objective:   Temp:  [97.5  F (36.4  C)-98.3  F (36.8  C)] 98.3  F (36.8  C)  Pulse:  [79-97] 82  Resp:  [12-35] 31  BP: (106-156)/(67-91) 129/80  SpO2:  [87 %-100 %] 96 %  I/O last 3 completed shifts:  In: 896.67 [I.V.:566.67; NG/GT:190]  Out: 1225 [Urine:1005; Drains:220]        LABS:  Recent Labs   Lab 03/01/23 0334 03/01/23 0328 02/28/23 2341 02/28/23 0334 02/28/23 0333 02/27/23 0341 02/27/23 0335   NA  --  147*  --   --  142  --  141   POTASSIUM  --  4.6  --   --  3.7  --  3.5   CHLORIDE  --  115*  --   --  112*  --  110*   CO2  --  25  --   --  23  --  17*   ANIONGAP  --  7  --   --  7  --  14   * 119* 110*   < > 110*   < > 130*   BUN  --  21.3  --   --  29.1*  --  26.2*   CR  --  0.74  --   --  1.02*  --  1.09*   CHERRIE  --  8.6  --   --  8.5*  --  8.5*    < > = values in this interval not displayed.       Recent Labs   Lab 03/01/23 0328   WBC 5.2   RBC 2.67*   HGB 8.3*   HCT 26.6*      MCH 31.1   MCHC 31.2*   RDW 20.4*   PLT 90*       IMAGING:  Recent Results (from the past 24 hour(s))   CT Head w/o Contrast    Narrative    EXAM: CT HEAD W/O CONTRAST  2/28/2023 10:37 AM     HISTORY:  follow SDH on left       COMPARISON:  2/27/2023    TECHNIQUE: Using multidetector thin collimation helical acquisition  technique, axial, coronal and sagittal CT images from the skull base  to the vertex were obtained without intravenous contrast.   (topogram) image(s) also obtained " and reviewed.    FINDINGS:  Left greater than right subdural hematomas, on the left measures  maximally 19 mm, stable when measured similarly. Subdural hematoma  extends along the falx and bilateral tentorial leaflets. Stable  position overlying extra-axial drain. Stable left frontal subdural  pocket of air. Stable overlying skin staples. Stable mild compression  of the left lateral ventricle and 6 mm of rightward midline shift.     No acute loss of gray-white matter differentiation in the cerebral  hemispheres. Ventricles are proportionate to the cerebral sulci. Clear  basal cisterns.    The bony calvaria and the bones of the skull base are otherwise  normal. Maxillary sinus thickening, otherwise the visualized portions  of the paranasal sinuses and mastoid air cells are clear. Grossly  normal orbits.       Impression    IMPRESSION: Stable left greater than right subdural hematomas and  stable position of subdural catheter. Unchanged rightward midline  shift.    I have personally reviewed the examination and initial interpretation  and I agree with the findings.    REHANA ELIZABETH MD         SYSTEM ID:  [id number]     I have seen this patient with the resident and formulated a plan and agree with this note.  AMP

## 2023-03-01 NOTE — PROGRESS NOTES
Order received for a TLSO.  Pt has a custom TLSO from [facility] at bedside, UU [id number].

## 2023-03-01 NOTE — PROGRESS NOTES
Major Shift Events:  Neuro: Alert to self, inconsistently follows commands, perrla. Speech is spontaneous however not logical. Intermittently restless, calls out and makes noises. Ilya drain with serosanguinous output. Cardiac: Sinus rhythm. BP within parameters. Resp: On 2-4 L nasal cannula, sats in the 90s. Lungs sound wheezy, occasionally labored breathing. Very weak, nonproductive cough. Neb given with relief. GI/: NPO not cleared. Loose stools overnight. Did not void all shift, straight cath for 600.    Plan: Monitor neuro status, continue plan of care  For vital signs and complete assessments, please see documentation flowsheets.

## 2023-03-01 NOTE — PROGRESS NOTES
Care Management Follow Up    Length of Stay (days): 3    Expected Discharge Date: 03/04/2023     Concerns to be Addressed:     Vulnerable Adult-APS report follow up  Patient plan of care discussed at interdisciplinary rounds: Yes    Anticipated Discharge Disposition:       Anticipated Discharge Services:    Anticipated Discharge DME:      Patient/family educated on Medicare website which has current facility and service quality ratings:    Education Provided on the Discharge Plan:    Patient/Family in Agreement with the Plan:      Referrals Placed by CM/SW:    Private pay costs discussed: Not applicable    Additional Information:  SW received follow call from [facility] looking for additional information on report filed on 2/28/23. SW called Yasmani at [facility]  [phone], provided update on pt current status. APS is continuing to review case, unclear if it will be opened.       ENRRIQUE Ferris   Social Work 4 A&E ICU  [phone]

## 2023-03-01 NOTE — PLAN OF CARE
Speech Language Pathology: Orders received. Per discussion with RN, pt passed nursing swallow screen and is tolerating a regular diet without difficulty. RN reported pt's speech/language skills are at baseline. Speech consult not indicated; will defer and complete orders. Discussed with MD.

## 2023-03-02 ENCOUNTER — APPOINTMENT (OUTPATIENT)
Dept: CT IMAGING | Facility: CLINIC | Age: 58
End: 2023-03-02
Attending: NURSE PRACTITIONER
Payer: COMMERCIAL

## 2023-03-02 ENCOUNTER — APPOINTMENT (OUTPATIENT)
Dept: PHYSICAL THERAPY | Facility: CLINIC | Age: 58
End: 2023-03-02
Payer: COMMERCIAL

## 2023-03-02 ENCOUNTER — APPOINTMENT (OUTPATIENT)
Dept: OCCUPATIONAL THERAPY | Facility: CLINIC | Age: 58
End: 2023-03-02
Payer: COMMERCIAL

## 2023-03-02 LAB
AMMONIA PLAS-SCNC: 44 UMOL/L (ref 11–51)
ANION GAP SERPL CALCULATED.3IONS-SCNC: 11 MMOL/L (ref 7–15)
APTT PPP: 34 SECONDS (ref 22–38)
BLD PROD TYP BPU: NORMAL
BLOOD COMPONENT TYPE: NORMAL
BUN SERPL-MCNC: 21.2 MG/DL (ref 8–23)
CALCIUM SERPL-MCNC: 9.1 MG/DL (ref 8.6–10)
CHLORIDE SERPL-SCNC: 110 MMOL/L (ref 98–107)
CODING SYSTEM: NORMAL
CREAT SERPL-MCNC: 0.71 MG/DL (ref 0.51–0.95)
DEPRECATED HCO3 PLAS-SCNC: 24 MMOL/L (ref 22–29)
ERYTHROCYTE [DISTWIDTH] IN BLOOD BY AUTOMATED COUNT: 20.2 % (ref 10–15)
FIBRINOGEN PPP-MCNC: 158 MG/DL (ref 170–490)
GFR SERPL CREATININE-BSD FRML MDRD: >90 ML/MIN/1.73M2
GLUCOSE SERPL-MCNC: 102 MG/DL (ref 70–99)
HCT VFR BLD AUTO: 27.3 % (ref 35–47)
HGB BLD-MCNC: 8.4 G/DL (ref 11.7–15.7)
HOLD SPECIMEN: NORMAL
INR PPP: 2.07 (ref 0.85–1.15)
ISSUE DATE AND TIME: NORMAL
MAGNESIUM SERPL-MCNC: 2.3 MG/DL (ref 1.7–2.3)
MCH RBC QN AUTO: 31.8 PG (ref 26.5–33)
MCHC RBC AUTO-ENTMCNC: 30.8 G/DL (ref 31.5–36.5)
MCV RBC AUTO: 103 FL (ref 78–100)
PHOSPHATE SERPL-MCNC: 2.3 MG/DL (ref 2.5–4.5)
PLATELET # BLD AUTO: 101 10E3/UL (ref 150–450)
POTASSIUM SERPL-SCNC: 4.2 MMOL/L (ref 3.4–5.3)
RBC # BLD AUTO: 2.64 10E6/UL (ref 3.8–5.2)
SODIUM SERPL-SCNC: 145 MMOL/L (ref 136–145)
UNIT ABO/RH: NORMAL
UNIT NUMBER: NORMAL
UNIT STATUS: NORMAL
UNIT TYPE ISBT: 6200
WBC # BLD AUTO: 7.1 10E3/UL (ref 4–11)

## 2023-03-02 PROCEDURE — 97530 THERAPEUTIC ACTIVITIES: CPT | Mod: GP | Performed by: PHYSICAL THERAPIST

## 2023-03-02 PROCEDURE — 97116 GAIT TRAINING THERAPY: CPT | Mod: GP | Performed by: PHYSICAL THERAPIST

## 2023-03-02 PROCEDURE — 85730 THROMBOPLASTIN TIME PARTIAL: CPT | Performed by: STUDENT IN AN ORGANIZED HEALTH CARE EDUCATION/TRAINING PROGRAM

## 2023-03-02 PROCEDURE — 250N000009 HC RX 250: Performed by: PSYCHIATRY & NEUROLOGY

## 2023-03-02 PROCEDURE — 85610 PROTHROMBIN TIME: CPT | Performed by: STUDENT IN AN ORGANIZED HEALTH CARE EDUCATION/TRAINING PROGRAM

## 2023-03-02 PROCEDURE — 36415 COLL VENOUS BLD VENIPUNCTURE: CPT | Performed by: STUDENT IN AN ORGANIZED HEALTH CARE EDUCATION/TRAINING PROGRAM

## 2023-03-02 PROCEDURE — 94640 AIRWAY INHALATION TREATMENT: CPT | Mod: 76

## 2023-03-02 PROCEDURE — 99233 SBSQ HOSP IP/OBS HIGH 50: CPT | Performed by: PHYSICIAN ASSISTANT

## 2023-03-02 PROCEDURE — 250N000013 HC RX MED GY IP 250 OP 250 PS 637

## 2023-03-02 PROCEDURE — P9037 PLATE PHERES LEUKOREDU IRRAD: HCPCS

## 2023-03-02 PROCEDURE — 97530 THERAPEUTIC ACTIVITIES: CPT | Mod: GO

## 2023-03-02 PROCEDURE — 97535 SELF CARE MNGMENT TRAINING: CPT | Mod: GO

## 2023-03-02 PROCEDURE — 70450 CT HEAD/BRAIN W/O DYE: CPT

## 2023-03-02 PROCEDURE — 250N000013 HC RX MED GY IP 250 OP 250 PS 637: Performed by: NURSE PRACTITIONER

## 2023-03-02 PROCEDURE — 85384 FIBRINOGEN ACTIVITY: CPT | Performed by: STUDENT IN AN ORGANIZED HEALTH CARE EDUCATION/TRAINING PROGRAM

## 2023-03-02 PROCEDURE — 70450 CT HEAD/BRAIN W/O DYE: CPT | Mod: 26 | Performed by: RADIOLOGY

## 2023-03-02 PROCEDURE — 83735 ASSAY OF MAGNESIUM: CPT | Performed by: NURSE PRACTITIONER

## 2023-03-02 PROCEDURE — 250N000011 HC RX IP 250 OP 636: Performed by: STUDENT IN AN ORGANIZED HEALTH CARE EDUCATION/TRAINING PROGRAM

## 2023-03-02 PROCEDURE — 85014 HEMATOCRIT: CPT | Performed by: NURSE PRACTITIONER

## 2023-03-02 PROCEDURE — 250N000013 HC RX MED GY IP 250 OP 250 PS 637: Performed by: PHYSICIAN ASSISTANT

## 2023-03-02 PROCEDURE — 250N000013 HC RX MED GY IP 250 OP 250 PS 637: Performed by: PSYCHIATRY & NEUROLOGY

## 2023-03-02 PROCEDURE — 84100 ASSAY OF PHOSPHORUS: CPT | Performed by: NURSE PRACTITIONER

## 2023-03-02 PROCEDURE — P9059 PLASMA, FRZ BETWEEN 8-24HOUR: HCPCS | Performed by: STUDENT IN AN ORGANIZED HEALTH CARE EDUCATION/TRAINING PROGRAM

## 2023-03-02 PROCEDURE — 99418 PROLNG IP/OBS E/M EA 15 MIN: CPT | Performed by: PHYSICIAN ASSISTANT

## 2023-03-02 PROCEDURE — 999N000157 HC STATISTIC RCP TIME EA 10 MIN

## 2023-03-02 PROCEDURE — 36415 COLL VENOUS BLD VENIPUNCTURE: CPT | Performed by: NURSE PRACTITIONER

## 2023-03-02 PROCEDURE — 200N000002 HC R&B ICU UMMC

## 2023-03-02 PROCEDURE — 82140 ASSAY OF AMMONIA: CPT | Performed by: NURSE PRACTITIONER

## 2023-03-02 PROCEDURE — 80048 BASIC METABOLIC PNL TOTAL CA: CPT | Performed by: NURSE PRACTITIONER

## 2023-03-02 RX ORDER — MULTIPLE VITAMINS W/ MINERALS TAB 9MG-400MCG
1 TAB ORAL DAILY
Status: DISCONTINUED | OUTPATIENT
Start: 2023-03-03 | End: 2023-03-07 | Stop reason: HOSPADM

## 2023-03-02 RX ADMIN — Medication 1 PACKET: at 14:22

## 2023-03-02 RX ADMIN — LEVETIRACETAM 500 MG: 500 TABLET, FILM COATED ORAL at 20:12

## 2023-03-02 RX ADMIN — LEVETIRACETAM 500 MG: 500 TABLET, FILM COATED ORAL at 09:01

## 2023-03-02 RX ADMIN — LEVALBUTEROL HYDROCHLORIDE 1.25 MG: 1.25 SOLUTION RESPIRATORY (INHALATION) at 14:33

## 2023-03-02 RX ADMIN — RIFAXIMIN 550 MG: 550 TABLET ORAL at 20:11

## 2023-03-02 RX ADMIN — LACTULOSE 20 G: 20 SOLUTION ORAL at 09:01

## 2023-03-02 RX ADMIN — Medication 1 PACKET: at 06:26

## 2023-03-02 RX ADMIN — Medication 3 TABLET: at 21:58

## 2023-03-02 RX ADMIN — FOLIC ACID 1 MG: 5 INJECTION, SOLUTION INTRAMUSCULAR; INTRAVENOUS; SUBCUTANEOUS at 09:02

## 2023-03-02 RX ADMIN — RIFAXIMIN 550 MG: 550 TABLET ORAL at 09:01

## 2023-03-02 RX ADMIN — LEVALBUTEROL HYDROCHLORIDE 1.25 MG: 1.25 SOLUTION RESPIRATORY (INHALATION) at 04:45

## 2023-03-02 RX ADMIN — LEVOTHYROXINE SODIUM 25 MCG: 0.03 TABLET ORAL at 09:01

## 2023-03-02 RX ADMIN — GABAPENTIN 600 MG: 600 TABLET, FILM COATED ORAL at 21:58

## 2023-03-02 RX ADMIN — Medication 1 PACKET: at 10:35

## 2023-03-02 RX ADMIN — THIAMINE HYDROCHLORIDE 100 MG: 100 INJECTION, SOLUTION INTRAMUSCULAR; INTRAVENOUS at 09:24

## 2023-03-02 RX ADMIN — LEVALBUTEROL HYDROCHLORIDE 1.25 MG: 1.25 SOLUTION RESPIRATORY (INHALATION) at 10:19

## 2023-03-02 RX ADMIN — RACEPINEPHRINE HYDROCHLORIDE 0.5 ML: 11.25 SOLUTION RESPIRATORY (INHALATION) at 04:57

## 2023-03-02 ASSESSMENT — ACTIVITIES OF DAILY LIVING (ADL)
ADLS_ACUITY_SCORE: 47
ADLS_ACUITY_SCORE: 47
ADLS_ACUITY_SCORE: 45
ADLS_ACUITY_SCORE: 47
ADLS_ACUITY_SCORE: 42
ADLS_ACUITY_SCORE: 49
ADLS_ACUITY_SCORE: 42
ADLS_ACUITY_SCORE: 47
ADLS_ACUITY_SCORE: 47
ADLS_ACUITY_SCORE: 45
ADLS_ACUITY_SCORE: 47
ADLS_ACUITY_SCORE: 43

## 2023-03-02 ASSESSMENT — VISUAL ACUITY: OU: OTHER (SEE COMMENT)

## 2023-03-02 NOTE — PLAN OF CARE
Goal Outcome Evaluation:  Major Shift Events:    N: Oriented to self. Frequently yells out when awake.  PERRL, intermittently restless but redirectable. Answers yes/no questions.  Ilya drain 30-60 ml q2 hour output, serosanguinous.    C:SR w/ occasional PVCs. SBP goal <170 maintained, Afebrile and pulses palpable.   R: Coarse lung sounds, intermitttent non-productive cough. Weezing treated with PRN nebs x2. SpO2 90-95% on 3L NC.   GI/: Incontinent of loose stool x1. Bladder scanned x2.   One unit platelets given and am routine head CT   Plan: continue to monitor and notify MD of concerns.   For vital signs and complete assessments, please see documentation flowsheets.

## 2023-03-02 NOTE — PROGRESS NOTES
2x PRN nebs given today. Pt notably has upper airway wheeze. No significant change in respiratory status pre/post neb tx's. Diminished/coarse slight expiratory wheeze bilaterally in lung fields.   Remains on 2LPM NC.     Will continue to monitor.     Emma Villegas, LRT, BSRT-RRT

## 2023-03-02 NOTE — PROGRESS NOTES
Neurocritical Care Progress Note    Reason for critical care admission: Subdural hematoma   Admitting Team: Neurocritical Care team   Date of Service:  03/02/2023  Date of Admission:  2/26/2023  Hospital Day: 5    Assessment/Plan  Sulma Ramos is a 57 year old female with a past medical history of anxiety, insomnia, RLS, liver cirrhosis 2/2 ETOH, portal hypertension, hepatic encephalopathy, HCC s/p ablation (2009), seizure (2011), gastric bypass, and portal vein thrombus on Eliquis who was admitted on 2/26/2023 from [facility] after presenting with altered mental status for two days found to have an acute subdural hematoma with midline shift and signs of uncal herniation on CT head.    24 hour events: One unit Platelets and FFP given early this morning in preparation for EVD removal. Continues to have intermittent restlessness, easily redirectable.      Neuro  #Subdural hematoma  #Brain compression ~ improved post evacuation   #Uncal herniation ~ improved post evacuation   #POD4 for left bur holes for subdural hematoma evacuation  -NSGY following      -Neurochecks Q4h    -Keppra 500 mg Q12h x 7 days   -SBP goal < 140 mmHg  -HOB > 30   -PT/OT       #Analgesics & sedation  #Chronic pain  RASS goal: 0 to -1  -Nothing ordered at this time, denies pain     #Hx of seizures  One time seizure, noted back in 2011. Was thought to be do from sleep deprivation. Was not started on any AEDs.      #RLS  -Continue PTA Gabapentin 600 mg at bedtime    #Extrapyramidal symptoms noted this am  -Received one time dose of Benzatropine 1 mg on 3/1     Psych   #Anxity  #Insomnia   -Holding PTA Ambien 10 mg at bedtime PRN     CV  #Hypertension on admission ~ resolved    -Cardiac monitoring  -SBP goal < 140 mmHg  -PRN Labetalol and Hydralazine  -TTE completed on 2/27, EF 60-65%      Resp   #Wheezing post extubation  -Decadron 4 mg Q6h x 1 day - completed  -Duo nebs Q6h x 48 hrs    #Moderate amount of right pleural effusion and atelectasis  on CT CAP  -2/27 CT chest w/o contrast: Moderate to large right pleural effusion is increased in size compared to CT from 2/26/2023.  -Discussed with SICU on whether to drain or place pigtail, was advised that no intervention is required at this time. Patient is oxygenating well and requiring very minimal ventilator support.   Oxygen/vent: NC/2L - wean off  -Diuresis as needed   -Continuous pulse ox  -Maintain O2 saturations greater than 92%     GI  #Cirrhosis and Portal hypertension from prior alcohol use  #Ascities  #Esophageal varices       #Hx of HCC s/p ablation 2009  #Portal vein thrombus (on Eliquis)  -Holding PTA Eliquis 5 mg BID  -Continue PTA Rifaximin 550 mg BID   -Holding PTA Nadolol 20 mg every evening   -2/26 US Abd Hepatic & Portal Vasculature Cmpl: Echogenic material noted within the main portal vein with limited sluggish flow, suggestive of portal vein thrombus. The right and left portal veins are patent with antegrade flow.  -Ammonia; 70, repeat Ammonia today 44  -Last ALT 27, AST 84 on 3/1      #Hx of gastric bypass (Beto-en Y)  -Continue PTA Thiamine 100 mg daily and Folic acid 1 mg daily  -Continue PTA Cyanocobalamin 1000 mcg every 30 days   #GERD  Diet: Regular diet; thin liquids   Last BM: 3/2  GI prophylaxis: Pantoprazole 40 mg daily (Takes omeprazole at home)  -Bowel regimen: PTA Lactulose 20 gm daily (takes every other day at home)     Renal/  #Hyperchloremia; 110  #Hypophosphorus; 2.3   -Daily BMP   -IV fluids: None  -Electrolyte replacement protocol     Endo  #Hypothyroidism  -TSH; 1.11   -Continue PTA Levothyroxine 25 mcg every other day      -Hgb A1c; 5.3   -Monitor glucose levels      Heme  #Normocyctic anemia; 8.3 - Chronic disease on acute blood loss from OR  #Thrombocytopenia   -Holding PTA Ferrous sulfate  -Daily CBC  -Repeated INR/PTT/Fibrinogen today before KATIA removal  -INR 2.07, PTT 34, Fibrinogen 158   -1 unit Platelet given x1, 1 unit FFP given x2    -Hgb goal >7, plt goal  ">50k  -Transfuse to meet Hgb and plt goals     #Coagulopathy  #Hx Portal vein thrombus (on Eliquis)  -Holding PTA Eliquis 5 mg BID  -INR 2.07 on 3/2  -Kcentra given at [facility]  -2/26 TEG w/o heparinase with slightly decreased R time (4.7)        ID  -Afebrile   -Daily CBC  -Follow temperature curve     Musculoskeletal   #Multiple falls  #Hx of L1 compression fracture  -Where's TLSO brace at home - ok to wear   Last fall on 1/19/2023 resulting in a Lg hematoma to left forearm. Xray without fracture.  -C-spine without fracture; trauma cleared C-collar through imaging.  -Trauma service following       ICU Checklist  Lines/tubes/drains: PIV x2, remove left scalp KATIA bulb today  FEN: TF's, electrolyte replacement   PPX: DVT - SCDs; GI - Pantoprazole.  Code: Full  Dispo: ICU - NCC      Clinically Significant Risk Factors         # Hypernatremia: Highest Na = 147 mmol/L in last 2 days, will monitor as appropriate   # Hypercalcemia: corrected calcium is >10.1, will monitor as appropriate    # Hypoalbuminemia: Lowest albumin = 2.3 g/dL at 2/28/2023  3:33 AM, will monitor as appropriate   # Thrombocytopenia: Lowest platelets = 90 in last 2 days, will monitor for bleeding          # Overweight: Estimated body mass index is 27.93 kg/m  as calculated from the following:    Height as of this encounter: 1.626 m (5' 4\").    Weight as of this encounter: 73.8 kg (162 lb 11.2 oz)., PRESENT ON ADMISSION       TIME SPENT ON THIS ENCOUNTER   I spent 52 minutes of critical care time on the unit/floor managing the care of Sulma Raoms excluding time performing procedures. Upon evaluation, this patient had a high probability of imminent or life-threatening deterioration due to subdural hematoma s/p errol holes for hematoma evacuation, which required my direct attention, intervention, and personal management. Greater than 50% of my time was spent at the bedside counseling the patient and/or coordinating care including chart review, " "history, exam, documentation, and further activities per this note. I have personally reviewed the following data/imaging over the past 24 hours.     The patient was seen and discussed with the NCC attending, Dr. Davis.    Ibis LEMONS CNP  Neurocritical care nurse practitioner  Pager: [phone]  Ascom: [phone] available M- 0700 to 1700    24 Hour Vital Signs Summary:  Temp: 98.8  F (37.1  C) Temp  Min: 97.6  F (36.4  C)  Max: 98.8  F (37.1  C)  Resp: 24 Resp  Min: 8  Max: 31  SpO2: 95 % SpO2  Min: 87 %  Max: 100 %  Pulse: 84 Pulse  Min: 76  Max: 94  BP: 133/74 Systolic (24hrs), Av , Min:84 , Max:140   Diastolic (24hrs), Av, Min:59, Max:89    Respiratory monitoring:   NC/2L    I/O last 3 completed shifts:  In: 321 [P.O.:240; I.V.:3]  Out: 1672 [Drains:322; Other:1350]    Current Medications:    cyanocobalamin  1,000 mcg Intramuscular Q30 Days     folic acid  1 mg Intravenous Daily     gabapentin  600 mg Oral At Bedtime     lactulose  20 g Oral Daily     levETIRAcetam  500 mg Oral or Feeding Tube BID     levothyroxine  25 mcg Oral Every Other Day     potassium & sodium phosphates  1 packet Oral or Feeding Tube Q4H     rifaximin  550 mg Oral BID     thiamine  100 mg Intravenous Daily       PRN Medications:  sodium chloride 0.9%, glucose **OR** dextrose **OR** glucagon, hydrALAZINE, ipratropium **AND** levalbuterol, labetalol, naloxone **OR** naloxone **OR** naloxone **OR** naloxone, ondansetron **OR** ondansetron, prochlorperazine **OR** prochlorperazine **OR** prochlorperazine, racEPINEPHrine    Infusions:  None    Allergies   Allergen Reactions     Clarithromycin      Clindamycin Base      Droperidol        Physical Examination:  Vitals: /74   Pulse 84   Temp 98.8  F (37.1  C) (Oral)   Resp 24   Ht 1.626 m (5' 4\")   Wt 73.8 kg (162 lb 11.2 oz)   LMP 2004   SpO2 95%   BMI 27.93 kg/m    General: Adult female patient, lying in bed  HEENT: Normocephalic, atraumatic, bilateral " icterus  Cardiac: Sinus rhythm on bedside monitor  Pulm: Appears unlabored, expansion symmetric, no retractions or use of accessory muscles   Abdomen: Firm distended abdomen, ascites   Extremities: Warm, no edema, appears adequately perfused  Skin: Bruising  Psych: Intermittent restlessness   Neuro:  Mental status: Awake, alert, intermittent confusion, following all commands. Language is fluent and coherent with intact comprehension of complex commands, naming and repetition.  Cranial nerves: PERRL, conjugate gaze, EOMI, facial sensation intact, face symmetric, shoulder shrug strong, tongue midline, no dysarthria.   Motor: Normal bulk and tone. No abnormal movements. 5/5 strength in 4/4 extremities with purposeful movement.   Sensory: Intact to light touch x 4 extremities.    Coordination: EULA, unable to follow commands.  Gait: EULA, deferred.    Labs and Imaging:    Results for orders placed or performed during the hospital encounter of 02/26/23 (from the past 24 hour(s))   TEG without Heparinase   Result Value Ref Range    R (Time until clot forms) 5.1 5.0 - 10.0 Minute    K ( Time to Spec. clot strength) 1.5 1.0 - 3.0 Minute    Angle (Rate of Clot Growth) 69.7 53.0 - 72.0 Degrees    MA ( Maximum Clot Strength) 53.7 50.0 - 70.0 mm    CI (coagulation index) 0.2 -3.0 - 3.0    G (actual clot strength) 5.8 4.5 - 11.0 Kd/sc    LY30 (lysis at 30 minutes) 0.3 0.0 - 8.0 %    LY60 (lysis at 60 minutes) 2.5 0.0 - 15.0 %   INR   Result Value Ref Range    INR 1.82 (H) 0.85 - 1.15   Fibrinogen activity   Result Value Ref Range    Fibrinogen Activity 168 (L) 170 - 490 mg/dL   Partial thromboplastin time   Result Value Ref Range    aPTT 36 22 - 38 Seconds   XR Chest Port 1 View    Narrative    Chest one view portable    HISTORY: Postextubation hypoxic    COMPARISON STUDY: 2/27/2023    FINDINGS: Large right pleural effusion similar to previous.  Endotracheal tube and enteric tube have been removed.  Cardiac  silhouette is  nonenlarged. Right IJ line tip in the mid SVC. Left lung  is clear. Right upper lobe atelectasis.      Impression    IMPRESSION:  1. Large right pleural effusion.  2. Right upper lobe collapse. Consider bronchoscopy or pulmonary  toilet.  3. Removal of endotracheal tube and enteric tube.    RHEA HEATH MD         SYSTEM ID:  [id number]   Ammonia   Result Value Ref Range    Ammonia 70 (H) 11 - 51 umol/L   INR   Result Value Ref Range    INR 2.01 (H) 0.85 - 1.15   Partial thromboplastin time   Result Value Ref Range    aPTT 35 22 - 38 Seconds   Fibrinogen activity   Result Value Ref Range    Fibrinogen Activity 171 170 - 490 mg/dL   Prepare pheresed platelets (unit)   Result Value Ref Range    Blood Component Type Platelets     Product Code [id number]     Unit Status Transfused     Unit Number [id number]     CODING SYSTEM [id number]     ISSUE DATE AND TIME 82089534978521     UNIT ABO/RH A+     UNIT TYPE ISBT 6200    CT Head w/o Contrast    Narrative    CT HEAD W/O CONTRAST 3/2/2023 4:18 AM    History: left SDH s/p errol hole placement     Comparison: 2/28/2023    Technique: Using multidetector thin collimation helical acquisition  technique, axial, coronal and sagittal CT images from the skull base  to the vertex were obtained without intravenous contrast.   (topogram) image(s) also obtained and reviewed.    Findings:     Slightly decreased size of the left greater than right subdural  hematoma measuring up to 17 mm in thickness on the left, previously 19  mm in thickness when measured similarly and 8 mm in thickness on the  right, previously 10 mm when measured similarly. Similar extension of  subdural hematoma along the falx and right greater than left  tentorium. Similar positioning of the overlying left extra-axial drain  with decreased pneumocephalus. Stable mild compression of the left  lateral ventricle and stable approximately 4 mm rightward midline  shift.    No acute loss of gray-white matter  differentiation. Ventricles are  proportionate to the cerebral sulci. The basal cisterns are clear.    Postprocedural changes of left frontal errol hole. Skin staples  overlying the left frontal convexity. Mucosal thickening in the left  maxillary sinus. The mastoid air cells are clear. Orbits are grossly  unremarkable.       Impression    Impression: Slightly decreased size of the bilateral, left greater  than right, subdural hematomas. Similar 4 mm rightward midline shift.  No new or worsening intracranial hemorrhage.    I have personally reviewed the examination and initial interpretation  and I agree with the findings.    MARIA C KELLEY MD         SYSTEM ID:  [id number]   Prepare plasma (unit)   Result Value Ref Range    Blood Component Type Plasma     Product Code M7714F75     Unit Status Transfused     Unit Number [id number]     CODING SYSTEM [id number]     ISSUE DATE AND TIME 11295822879192     UNIT ABO/RH A+     UNIT TYPE ISBT 6200    Prepare plasma (unit)   Result Value Ref Range    Blood Component Type Plasma     Product Code A1133H08     Unit Status Transfused     Unit Number [id number]     CODING SYSTEM [id number]     ISSUE DATE AND TIME [id number]     UNIT ABO/RH A+     UNIT TYPE ISBT 6200    Extra Tube ([place] Draw)    Narrative    The following orders were created for panel order Extra Tube ([place] Draw).  Procedure                               Abnormality         Status                     ---------                               -----------         ------                     Extra Purple Top Tube[[id number]]                            Final result                 Please view results for these tests on the individual orders.   Extra Purple Top Tube   Result Value Ref Range    Hold Specimen [facility]    CBC with platelets   Result Value Ref Range    WBC Count 7.1 4.0 - 11.0 10e3/uL    RBC Count 2.64 (L) 3.80 - 5.20 10e6/uL    Hemoglobin 8.4 (L) 11.7 - 15.7 g/dL    Hematocrit 27.3 (L) 35.0 - 47.0 %    MCV  103 (H) 78 - 100 fL    MCH 31.8 26.5 - 33.0 pg    MCHC 30.8 (L) 31.5 - 36.5 g/dL    RDW 20.2 (H) 10.0 - 15.0 %    Platelet Count 101 (L) 150 - 450 10e3/uL   Magnesium   Result Value Ref Range    Magnesium 2.3 1.7 - 2.3 mg/dL   Phosphorus   Result Value Ref Range    Phosphorus 2.3 (L) 2.5 - 4.5 mg/dL   Basic metabolic panel   Result Value Ref Range    Sodium 145 136 - 145 mmol/L    Potassium 4.2 3.4 - 5.3 mmol/L    Chloride 110 (H) 98 - 107 mmol/L    Carbon Dioxide (CO2) 24 22 - 29 mmol/L    Anion Gap 11 7 - 15 mmol/L    Urea Nitrogen 21.2 8.0 - 23.0 mg/dL    Creatinine 0.71 0.51 - 0.95 mg/dL    Calcium 9.1 8.6 - 10.0 mg/dL    Glucose 102 (H) 70 - 99 mg/dL    GFR Estimate >90 >60 mL/min/1.73m2   INR   Result Value Ref Range    INR 2.07 (H) 0.85 - 1.15   Partial thromboplastin time   Result Value Ref Range    aPTT 34 22 - 38 Seconds   Fibrinogen activity   Result Value Ref Range    Fibrinogen Activity 158 (L) 170 - 490 mg/dL   Ammonia   Result Value Ref Range    Ammonia 44 11 - 51 umol/L       All relevant imaging and laboratory values personally reviewed.

## 2023-03-02 NOTE — PLAN OF CARE
D/I/A: patient remains in the Surgical/Neuro ICU.   Shift Events  Writer assumed cares [id number]  Up to chair  Worked with PT/OT  Received 1unit plasma given per orders  KATIA removed by MD     Neuro- alert, confused, oriented to self consistently, occasionally oriented to place and situation. Following commands   CV- sinus rhythm, HR 90s. BP 130s/60s.    Pulm- lungs coarse, wheezing noted in upper airway -PRN neb x2 with little improvement. 2L O2 via NC.   GI- loose watery BM today. Receiving scheduled lactulose. Regular diet -poor appetite.  - voided in commode.   Skin-  left head dressing removed by MD  Pain- denied pain throughout this shift  Activity- bedrest with frequent independent turns. Up to chair with SBA of two and gait belt.   Drains- KATIA removed by MD  Social- spouse and son at bedside, updated about POC, questions answered.  See flow sheets for further details and assessments.    P: continue to monitor, notify MD of significant changes.  Goal Outcome Evaluation:      Plan of Care Reviewed With: patient, spouse    Overall Patient Progress: improvingOverall Patient Progress: improving    Outcome Evaluation: up to chair. following commands. VSS.

## 2023-03-02 NOTE — PROGRESS NOTES
Care Management Follow Up    Length of Stay (days): 4    Expected Discharge Date: 03/04/2023     Concerns to be Addressed:       Patient plan of care discussed at interdisciplinary rounds: Yes    Anticipated Discharge Disposition:  TCU     Anticipated Discharge Services:    Anticipated Discharge DME:      Patient/family educated on Medicare website which has current facility and service quality ratings:  yes  Education Provided on the Discharge Plan:  yes  Patient/Family in Agreement with the Plan:  yes    Referrals Placed by CM/SW:    Private pay costs discussed: insurance costs of TCU    Additional Information:  SW provided pt and family with medicare web site options for TCU. Pt stated she would prefer to go home, however expressed understanding and is agreeable to review TCU list with family. SW will follow up with pt and family tomorrow for preferences.       ENRRIQUE Alexander, JESSESW  4A & 4E ICU   Pager: [phone]  Phone: [phone]

## 2023-03-02 NOTE — PROGRESS NOTES
[facility], [place]  Neurosurgery Progress Note:  03/02/2023      Interval History: NAEO. 2U FFP yesterday, 1 U platelets.     Assessment:  57 year old female with liver cancer and coagulopathy with thrombocytopenia and elevated INR, presenting with 2 days of AMS in setting of acute-on-subacute large left convexity SDH.    She underwent left-sided bur holes craniotomy for evacuation on 2/26/2023 with Dr. Mayo.  Postop head CT obtained showing decompression with residual left-sided convexity collection.  Subdural KATIA drain left in place to thumbprint-will require significant stripping.  Postoperatively INR increased 1.7, received 2 units of FFP.  Bedside echo performed by cardiology showing no obvious akinesis (with a focal low global).  Lactate and troponins obtained slightly elevated.  Received 1 unit of PRBCs for hemoglobin of 7.  Sedation switched from Precedex to propofol.    Pre-operative anticoagulation/antiplatelet: Eliquis    Plan:  UXR  TLSO brace whenever HOB > 30 or OOB  Follow KATIA drain output  Echo completed  Continue Keppra  Serial neuro exams  Pain control  HOB > 30 degrees  Advance diet as tolerated  Bowel regimen  PRN antiemetics  IVF until taking adequate PO  PT/OT  SCDs for DVT proph    -----------------------------------  James Hardin MD  Neurosurgery resident, PGY-3  -----------------------------------  General: Cirrhotic appearing, icteric sclera,  Pulm: Breathing comfortably on room air  Neuro:  Eyes open, conversant, confused, A+ O x2  Symmetric appearing strength wise however limited by command following.    --------------------------------------------------------------------------------------------------------------------------    Clinically Significant Risk Factors         # Hypernatremia: Highest Na = 147 mmol/L in last 2 days, will monitor as appropriate   # Hypercalcemia: corrected calcium is >10.1, will monitor as appropriate    # Hypoalbuminemia: Lowest  "albumin = 2.3 g/dL at 2/28/2023  3:33 AM, will monitor as appropriate   # Thrombocytopenia: Lowest platelets = 90 in last 2 days, will monitor for bleeding          # Overweight: Estimated body mass index is 27.93 kg/m  as calculated from the following:    Height as of this encounter: 1.626 m (5' 4\").    Weight as of this encounter: 73.8 kg (162 lb 11.2 oz)., PRESENT ON ADMISSION       # Compression of brain         Objective:   Temp:  [97.6  F (36.4  C)-98.8  F (37.1  C)] 98.8  F (37.1  C)  Pulse:  [76-94] 84  Resp:  [8-31] 24  BP: ()/(59-89) 133/74  SpO2:  [87 %-100 %] 95 %  I/O last 3 completed shifts:  In: 321 [P.O.:240; I.V.:3]  Out: 1672 [Drains:322; Other:1350]        LABS:  Recent Labs   Lab 03/02/23  0526 03/01/23  0334 03/01/23  0328 02/28/23  0334 02/28/23  0333     --  147*  --  142   POTASSIUM 4.2  --  4.6  --  3.7   CHLORIDE 110*  --  115*  --  112*   CO2 24  --  25  --  23   ANIONGAP 11  --  7  --  7   * 121* 119*   < > 110*   BUN 21.2  --  21.3  --  29.1*   CR 0.71  --  0.74  --  1.02*   CHERRIE 9.1  --  8.6  --  8.5*    < > = values in this interval not displayed.       Recent Labs   Lab 03/02/23  0526   WBC 7.1   RBC 2.64*   HGB 8.4*   HCT 27.3*   *   MCH 31.8   MCHC 30.8*   RDW 20.2*   *       IMAGING:  Recent Results (from the past 24 hour(s))   XR Chest Port 1 View    Narrative    Chest one view portable    HISTORY: Postextubation hypoxic    COMPARISON STUDY: 2/27/2023    FINDINGS: Large right pleural effusion similar to previous.  Endotracheal tube and enteric tube have been removed.  Cardiac  silhouette is nonenlarged. Right IJ line tip in the mid SVC. Left lung  is clear. Right upper lobe atelectasis.      Impression    IMPRESSION:  1. Large right pleural effusion.  2. Right upper lobe collapse. Consider bronchoscopy or pulmonary  toilet.  3. Removal of endotracheal tube and enteric tube.    RHEA HEATH MD         SYSTEM ID:  [id number]   CT Head w/o Contrast    " Narrative    CT HEAD W/O CONTRAST 3/2/2023 4:18 AM    History: left SDH s/p errol hole placement     Comparison: 2/28/2023    Technique: Using multidetector thin collimation helical acquisition  technique, axial, coronal and sagittal CT images from the skull base  to the vertex were obtained without intravenous contrast.   (topogram) image(s) also obtained and reviewed.    Findings:     Slightly decreased size of the left greater than right subdural  hematoma measuring up to 17 mm in thickness on the left, previously 19  mm in thickness when measured similarly and 8 mm in thickness on the  right, previously 10 mm when measured similarly. Similar extension of  subdural hematoma along the falx and right greater than left  tentorium. Similar positioning of the overlying left extra-axial drain  with decreased pneumocephalus. Stable mild compression of the left  lateral ventricle and stable approximately 4 mm rightward midline  shift.    No acute loss of gray-white matter differentiation. Ventricles are  proportionate to the cerebral sulci. The basal cisterns are clear.    Postprocedural changes of left frontal errol hole. Skin staples  overlying the left frontal convexity. Mucosal thickening in the left  maxillary sinus. The mastoid air cells are clear. Orbits are grossly  unremarkable.       Impression    Impression: Slightly decreased size of the bilateral, left greater  than right, subdural hematomas. Similar 4 mm rightward midline shift.  No new or worsening intracranial hemorrhage.    I have personally reviewed the examination and initial interpretation  and I agree with the findings.    MARIA C KELLEY MD         SYSTEM ID:  [id number]     I have seen this patient with the resident and formulated a plan and agree with this note.  AMP

## 2023-03-02 NOTE — PROGRESS NOTES
Brief Neurosurgery Progress Note:    KATIA drain removal     Drain not deemed to be necessary with low output. Drain site was prepped in usual sterile fashion with chloraprep. The drain was taken off suction. The sutures securing the drain were cut and the site was re-prepped. The drain was removed with tip intact. A monocryl suture was used to place a figure of 8 stitch. No immediate complication was observed and the patient tolerated the procedure well.      Brianne Ramos MD  Neurosurgery, PGY-2     Please contact neurosurgery resident on call with questions.

## 2023-03-02 NOTE — PROGRESS NOTES
[facility]    Medicine Progress Note - Hospitalist Service    Date of Admission:  2/26/2023    Assessment & Plan   Sulma Ramos is a 57 year old female with cirrhosis c/b HE, ascites, portal hypertension, portal thrombus, seizure disorder, history of gastric bypass, anticoagulation on Eliquis.  She initially presented to [facility] 2/26/2023 with AMS, found to have acute SDH c/b herniation and underwent urgent neurosurgical errol hole decompression.     Subdural hematoma s/p left bur hole evacuation (2/26/2023)  Brain compression ~ improved post evacuation   Uncal herniation ~ improved post evacuation   Hx seizures  Extrapyramidal symptoms noted this am.  Patient transfers from neuro ICU and has been followed by neurosurgery and neurology since admission.  Most recent CT head (3/2) with interval improvement in the bilateral left greater than right subdural hematomas.   -Received one time dose of Benzatropine 1 mg on 3/1  -Appreciate neurosurgery and neurology input  -Monitor cranial bur hole site and drain sites for evidence of bleeding  -Anticoagulation plan to be determined with neurosurgery team  - FEN: passed SLP bedside swallow eval, reg diet   - follow up head CT per neurosurgery     Cirrhosis 2/2 EtOH c/b Portal hypertension w/ portal vein thrombus (PTA on Eliquis) ascites  Hx of HCC s/p ablation 2009  LFTs stable. Ammonia improved 70 --> 44. MELD and MELD-Na 17.  -Holding PTA Eliquis 5 mg BID  -HE ptx: Continue PTA Rifaximin 550 mg BID, continue PTA lactulose  -Holding PTA Nadolol 20 mg every evening   -2/26 US Abd Hepatic & Portal Vasculature Cmpl: Echogenic material noted within the main portal vein with limited sluggish flow, suggestive of portal vein thrombus. The right and left portal veins are patent with antegrade flow.    Acute hypoxic respiratory failure, improved  Wheezing post extubation  Large right pleural effusion  CT chest non-con (2/27)  and CXR 3/1 with large right pleural effusion.  Pleural effusion also confirmed on bedside US and is very large with compressive atelectasis, trace B-lines on the left base.    - consider CAPS consult right pleural effusion drainage-- patient's  requests discuss w/ Primary GI Dr. Ortiz ([phone]) prior to obtaining   -Decadron 4 mg Q6h x 1 day - completed  -Duo nebs Q6h x 48 hrs and prn   -prior team d/w SICU on whether to drain or place pigtail, was advised that no intervention is required at this time. Patient is oxygenating well and requiring very minimal ventilator support.   Oxygen/vent: NC/2L - wean as able  -Diuresis as needed   -Continuous pulse ox  -Maintain O2 saturations greater than 92%    Normocyctic anemia, acute   Thrombocytopenia, chronic   Coagulopathy (secondary to DOAC and synthetic liver function)  Portal vein thrombus, PTA on Eliquis  hgb stable at 8.4 (BL 11-12s) plt stable in low 100s and at BL.  -Holding PTA Ferrous sulfate, consider resuming   -Daily CBC  -received 1 unit PRBC and fibrinogen before KATIA removal (3/2)  -Hgb goal >7, plt goal >50k  -Transfuse to meet Hgb and plt goals    Multiple falls  Hx of L1 compression fracture  Where's TLSO brace at home. Last fall on 1/19/2023 resulting in a Lg hematoma to left forearm. Xray without fracture.  -C-spine without fracture; trauma cleared C-collar through imaging.  -Trauma service following   - appreciate orthotist input Re: TLSO  -INR 2.07 on 3/2  -Kcentra given at [facility]  -2/26 TEG w/o heparinase with slightly decreased R time (4.7)     - Lumbar XR when able to stand      Hx of gastric bypass (Beto-en Y)  GERD  Tolerating regular diet, recently started prior to transfer.  LBM 3/2   -Continue PTA Thiamine 100 mg daily and Folic acid 1 mg daily  -Continue PTA Cyanocobalamin 1000 mcg every 30 days   - Diet: Regular diet; thin liquids   - GI prophylaxis: Pantoprazole 40 mg daily (Takes omeprazole at home)  - Bowel  "regimen: PTA Lactulose 20 gm daily (takes every other day at home)  -- Multivitamin/minerals: adult dose 1-2 times daily  -- Iron: 45-60 mg elemental daily (18-36 mg daily if low risk) - may partly or fully be covered in multivitamin, not yet ordered  -- Calcium Citrate containing vitamin D: 500 mg 3 times daily or 600 mg 2 times daily  -- Vitamin B12: sublingual form of at least 500 mcg daily or injection of 1000 mcg monthly  -- B-50 Complex daily, not yet ordered    Hyperchloremia; 110, stable  Hypophosphorus, downtrending  -Daily BMP, I&O and weights  -Electrolyte replacement protocol    RLS -Continue PTA Gabapentin 600 mg at bedtime     Diet: Regular Diet Adult    DVT Prophylaxis: holding DOAC   Clark Catheter: Not present  Lines: None     Cardiac Monitoring: ACTIVE order. Indication: ICU  Code Status: Full Code      Clinically Significant Risk Factors         # Hypernatremia: Highest Na = 147 mmol/L in last 2 days, will monitor as appropriate   # Hypercalcemia: corrected calcium is >10.1, will monitor as appropriate    # Hypoalbuminemia: Lowest albumin = 2.3 g/dL at 2/28/2023  3:33 AM, will monitor as appropriate   # Thrombocytopenia: Lowest platelets = 90 in last 2 days, will monitor for bleeding         # Overweight: Estimated body mass index is 27.93 kg/m  as calculated from the following:    Height as of this encounter: 1.626 m (5' 4\").    Weight as of this encounter: 73.8 kg (162 lb 11.2 oz).          Disposition Plan     Expected Discharge Date: 03/04/2023      Destination: other (comment) (unknown)          The patient's care was discussed with the Attending Physician, Dr. Muñoz, Bedside Nurse, Patient and Patient's Family.    Yumiko Packer PA-C  Hospitalist Service  [facility]  Securely message with [employer] (more info)  Text page via [facility] Paging/Directory   ______________________________________________________________________    Interval History  "   Rowan notes that she is feeling better in general.  She does not appreciate any confusion or change in her mental status.  Her  notes that compared to yesterday she has markedly improved and earlier today was not able to recall the year and is able to recall the precise date at this time.    She reports tolerating eating and drinking without issues and is voiding without difficulties.    Of note she continues to have bilateral upper extremity jerking motions that is new versus her baseline.     Denies N/V, F/C, chest pain, sob, palpitations, dizziness, vision changes, loss of appetite, abdominal pain, rashes, lumps, lesions, urinary complaints (including dysuria), changes to bowels (including diarrhea, constipation), bleeding (including from drain sites) or other complaints.     Physical Exam   Vital Signs: Temp: 97.8  F (36.6  C) Temp src: Oral BP: 129/77 Pulse: 98   Resp: 20 SpO2: 94 % O2 Device: Nasal cannula Oxygen Delivery: 2 LPM  Weight: 162 lbs 11.19 oz  GENERAL: Alert and oriented x 3. NAD.  Able to roll to left side unassisted. Cooperative.   HEENT: Anicteric sclera. Mucous membranes dry. NC. AT. EOMI. PERRLA  CV: Tachycardic, RRR. S1, S2. No murmurs appreciated.   RESPIRATORY: Effort normal on 2 L NC. Lungs CTAB with no wheezing, rales, rhonchi.   GI: Abdomen soft, NT, NABS  NEUROLOGICAL: Choreiform movements noted of the bilateral upper extremities that is present at rest and with movement.  No clonus.  No focal deficits. Moves all extremities.  CN 2-12 grossly intact.  EXTREMITIES: No peripheral edema. Intact bilateral pedal pulses.   SKIN: Small superficial bruising on right lower extremity.  No jaundice. No rashes on exposed skin.  BACK: no lesions visible in the upper portion of back      Medical Decision Making       75 MINUTES SPENT BY ME on the date of service doing chart review, history, exam, documentation & further activities per the note.      Data     I have personally reviewed the  following data over the past 24 hrs:    7.1  \   8.4 (L)   / 101 (L)     145 110 (H) 21.2 /  102 (H)   4.2 24 0.71 \       INR:  2.07 (H) PTT:  34   D-dimer:  N/A Fibrinogen:  158 (L)

## 2023-03-03 ENCOUNTER — APPOINTMENT (OUTPATIENT)
Dept: CT IMAGING | Facility: CLINIC | Age: 58
End: 2023-03-03
Payer: COMMERCIAL

## 2023-03-03 ENCOUNTER — APPOINTMENT (OUTPATIENT)
Dept: PHYSICAL THERAPY | Facility: CLINIC | Age: 58
End: 2023-03-03
Payer: COMMERCIAL

## 2023-03-03 LAB
ANION GAP SERPL CALCULATED.3IONS-SCNC: 9 MMOL/L (ref 7–15)
APTT PPP: 32 SECONDS (ref 22–38)
BACTERIA BLD CULT: NO GROWTH
BACTERIA BLD CULT: NO GROWTH
BUN SERPL-MCNC: 16.4 MG/DL (ref 8–23)
CALCIUM SERPL-MCNC: 9.1 MG/DL (ref 8.6–10)
CHLORIDE SERPL-SCNC: 106 MMOL/L (ref 98–107)
CREAT SERPL-MCNC: 0.63 MG/DL (ref 0.51–0.95)
DEPRECATED HCO3 PLAS-SCNC: 25 MMOL/L (ref 22–29)
ERYTHROCYTE [DISTWIDTH] IN BLOOD BY AUTOMATED COUNT: 20 % (ref 10–15)
FIBRINOGEN PPP-MCNC: 186 MG/DL (ref 170–490)
GFR SERPL CREATININE-BSD FRML MDRD: >90 ML/MIN/1.73M2
GLUCOSE BLDC GLUCOMTR-MCNC: 78 MG/DL (ref 70–99)
GLUCOSE SERPL-MCNC: 83 MG/DL (ref 70–99)
HCT VFR BLD AUTO: 27.5 % (ref 35–47)
HGB BLD-MCNC: 8.6 G/DL (ref 11.7–15.7)
INR PPP: 1.82 (ref 0.85–1.15)
MAGNESIUM SERPL-MCNC: 2 MG/DL (ref 1.7–2.3)
MCH RBC QN AUTO: 32 PG (ref 26.5–33)
MCHC RBC AUTO-ENTMCNC: 31.3 G/DL (ref 31.5–36.5)
MCV RBC AUTO: 102 FL (ref 78–100)
PHOSPHATE SERPL-MCNC: 2.3 MG/DL (ref 2.5–4.5)
PLATELET # BLD AUTO: 63 10E3/UL (ref 150–450)
POTASSIUM SERPL-SCNC: 3.6 MMOL/L (ref 3.4–5.3)
RBC # BLD AUTO: 2.69 10E6/UL (ref 3.8–5.2)
SODIUM SERPL-SCNC: 140 MMOL/L (ref 136–145)
WBC # BLD AUTO: 3.3 10E3/UL (ref 4–11)

## 2023-03-03 PROCEDURE — 82310 ASSAY OF CALCIUM: CPT | Performed by: NURSE PRACTITIONER

## 2023-03-03 PROCEDURE — 250N000011 HC RX IP 250 OP 636: Performed by: NURSE PRACTITIONER

## 2023-03-03 PROCEDURE — 70450 CT HEAD/BRAIN W/O DYE: CPT

## 2023-03-03 PROCEDURE — 36415 COLL VENOUS BLD VENIPUNCTURE: CPT | Performed by: STUDENT IN AN ORGANIZED HEALTH CARE EDUCATION/TRAINING PROGRAM

## 2023-03-03 PROCEDURE — 250N000011 HC RX IP 250 OP 636: Performed by: STUDENT IN AN ORGANIZED HEALTH CARE EDUCATION/TRAINING PROGRAM

## 2023-03-03 PROCEDURE — 250N000009 HC RX 250: Performed by: PSYCHIATRY & NEUROLOGY

## 2023-03-03 PROCEDURE — 250N000013 HC RX MED GY IP 250 OP 250 PS 637: Performed by: NURSE PRACTITIONER

## 2023-03-03 PROCEDURE — 200N000002 HC R&B ICU UMMC

## 2023-03-03 PROCEDURE — 97116 GAIT TRAINING THERAPY: CPT | Mod: GP | Performed by: PHYSICAL THERAPIST

## 2023-03-03 PROCEDURE — 83735 ASSAY OF MAGNESIUM: CPT | Performed by: NURSE PRACTITIONER

## 2023-03-03 PROCEDURE — 85610 PROTHROMBIN TIME: CPT | Performed by: STUDENT IN AN ORGANIZED HEALTH CARE EDUCATION/TRAINING PROGRAM

## 2023-03-03 PROCEDURE — 85384 FIBRINOGEN ACTIVITY: CPT | Performed by: STUDENT IN AN ORGANIZED HEALTH CARE EDUCATION/TRAINING PROGRAM

## 2023-03-03 PROCEDURE — 999N000157 HC STATISTIC RCP TIME EA 10 MIN

## 2023-03-03 PROCEDURE — 97530 THERAPEUTIC ACTIVITIES: CPT | Mod: GP | Performed by: PHYSICAL THERAPIST

## 2023-03-03 PROCEDURE — 250N000011 HC RX IP 250 OP 636: Performed by: PHYSICIAN ASSISTANT

## 2023-03-03 PROCEDURE — 250N000013 HC RX MED GY IP 250 OP 250 PS 637: Performed by: STUDENT IN AN ORGANIZED HEALTH CARE EDUCATION/TRAINING PROGRAM

## 2023-03-03 PROCEDURE — 94640 AIRWAY INHALATION TREATMENT: CPT

## 2023-03-03 PROCEDURE — 84100 ASSAY OF PHOSPHORUS: CPT | Performed by: NURSE PRACTITIONER

## 2023-03-03 PROCEDURE — 99233 SBSQ HOSP IP/OBS HIGH 50: CPT | Performed by: STUDENT IN AN ORGANIZED HEALTH CARE EDUCATION/TRAINING PROGRAM

## 2023-03-03 PROCEDURE — 85027 COMPLETE CBC AUTOMATED: CPT | Performed by: NURSE PRACTITIONER

## 2023-03-03 PROCEDURE — 250N000013 HC RX MED GY IP 250 OP 250 PS 637: Performed by: PHYSICIAN ASSISTANT

## 2023-03-03 PROCEDURE — 85730 THROMBOPLASTIN TIME PARTIAL: CPT | Performed by: STUDENT IN AN ORGANIZED HEALTH CARE EDUCATION/TRAINING PROGRAM

## 2023-03-03 RX ORDER — POTASSIUM CHLORIDE 750 MG/1
20 TABLET, EXTENDED RELEASE ORAL ONCE
Status: COMPLETED | OUTPATIENT
Start: 2023-03-03 | End: 2023-03-03

## 2023-03-03 RX ORDER — PANTOPRAZOLE SODIUM 40 MG/1
40 TABLET, DELAYED RELEASE ORAL
Status: DISCONTINUED | OUTPATIENT
Start: 2023-03-03 | End: 2023-03-07 | Stop reason: HOSPADM

## 2023-03-03 RX ORDER — NADOLOL 20 MG/1
20 TABLET ORAL EVERY EVENING
Status: DISCONTINUED | OUTPATIENT
Start: 2023-03-03 | End: 2023-03-07 | Stop reason: HOSPADM

## 2023-03-03 RX ORDER — MAGNESIUM SULFATE HEPTAHYDRATE 40 MG/ML
2 INJECTION, SOLUTION INTRAVENOUS ONCE
Status: COMPLETED | OUTPATIENT
Start: 2023-03-03 | End: 2023-03-03

## 2023-03-03 RX ADMIN — RIFAXIMIN 550 MG: 550 TABLET ORAL at 20:35

## 2023-03-03 RX ADMIN — LABETALOL HYDROCHLORIDE 10 MG: 5 INJECTION, SOLUTION INTRAVENOUS at 13:13

## 2023-03-03 RX ADMIN — Medication 3 TABLET: at 20:36

## 2023-03-03 RX ADMIN — LABETALOL HYDROCHLORIDE 10 MG: 5 INJECTION, SOLUTION INTRAVENOUS at 16:08

## 2023-03-03 RX ADMIN — GABAPENTIN 600 MG: 600 TABLET, FILM COATED ORAL at 20:44

## 2023-03-03 RX ADMIN — LEVALBUTEROL HYDROCHLORIDE 1.25 MG: 1.25 SOLUTION RESPIRATORY (INHALATION) at 16:39

## 2023-03-03 RX ADMIN — MAGNESIUM SULFATE IN WATER 2 G: 40 INJECTION, SOLUTION INTRAVENOUS at 05:48

## 2023-03-03 RX ADMIN — PANTOPRAZOLE SODIUM 40 MG: 40 TABLET, DELAYED RELEASE ORAL at 09:57

## 2023-03-03 RX ADMIN — Medication 1 PACKET: at 09:57

## 2023-03-03 RX ADMIN — Medication 1 TABLET: at 08:26

## 2023-03-03 RX ADMIN — Medication 1 PACKET: at 13:13

## 2023-03-03 RX ADMIN — LABETALOL HYDROCHLORIDE 10 MG: 5 INJECTION, SOLUTION INTRAVENOUS at 20:44

## 2023-03-03 RX ADMIN — THIAMINE HYDROCHLORIDE 100 MG: 100 INJECTION, SOLUTION INTRAMUSCULAR; INTRAVENOUS at 08:26

## 2023-03-03 RX ADMIN — RIFAXIMIN 550 MG: 550 TABLET ORAL at 08:26

## 2023-03-03 RX ADMIN — Medication 1 PACKET: at 05:48

## 2023-03-03 RX ADMIN — NADOLOL 20 MG: 20 TABLET ORAL at 20:35

## 2023-03-03 RX ADMIN — FOLIC ACID 1 MG: 5 INJECTION, SOLUTION INTRAMUSCULAR; INTRAVENOUS; SUBCUTANEOUS at 08:26

## 2023-03-03 RX ADMIN — LEVETIRACETAM 500 MG: 500 TABLET, FILM COATED ORAL at 20:35

## 2023-03-03 RX ADMIN — Medication 3 TABLET: at 08:28

## 2023-03-03 RX ADMIN — LACTULOSE 20 G: 20 SOLUTION ORAL at 08:26

## 2023-03-03 RX ADMIN — LEVETIRACETAM 500 MG: 500 TABLET, FILM COATED ORAL at 08:26

## 2023-03-03 RX ADMIN — POTASSIUM CHLORIDE 20 MEQ: 750 TABLET, EXTENDED RELEASE ORAL at 05:48

## 2023-03-03 ASSESSMENT — ACTIVITIES OF DAILY LIVING (ADL)
ADLS_ACUITY_SCORE: 45
ADLS_ACUITY_SCORE: 47
ADLS_ACUITY_SCORE: 43
ADLS_ACUITY_SCORE: 47
ADLS_ACUITY_SCORE: 45
ADLS_ACUITY_SCORE: 40
ADLS_ACUITY_SCORE: 43
ADLS_ACUITY_SCORE: 43

## 2023-03-03 NOTE — PROGRESS NOTES
[facility], [place]  Neurosurgery Progress Note:  03/03/2023      Interval History: No acute events overnight.  Exam steadily improving.  Able to participate more in conversation yesterday with .  Improving mental status-not fully oriented.  Will occasionally inject nonsensical words and statements and/or confabulations.    Assessment:  57 year old female with liver cancer and coagulopathy with thrombocytopenia and elevated INR, presenting with 2 days of AMS in setting of acute-on-subacute large left convexity SDH.    She underwent left-sided bur holes craniotomy for evacuation on 2/26/2023 with Dr. Mayo.  Postop head CT obtained showing decompression with residual left-sided convexity collection.  Subdural KATIA drain left in place to thumbprint-will require significant stripping.  Postoperatively INR increased 1.7, received 2 units of FFP.  Bedside echo performed by cardiology showing no obvious akinesis (with a focal low global).  Lactate and troponins obtained slightly elevated.  Received 1 unit of PRBCs for hemoglobin of 7.  Sedation switched from Precedex to propofol.    Pre-operative anticoagulation/antiplatelet: Eliquis    Plan:  KATIA removed on 3/2/2023  Head CT weekly while inpatient  Head CT in 2 weeks (3/17) to determine whether safe to restart Eliquis  Needs upright x-rays for burst fracture management, please page neurosurgery when these x-rays are completed  Neurosurgery follow peripherally    Please page NSGY with any new change in neurological exam or imaging.  -----------------------------------  James Hardin MD  Neurosurgery resident, PGY-3    Brianne Ramos MD, PhD  PGY-2 Neurosurgery  Please page NSGY on call with questions    -----------------------------------  General: Cirrhotic appearing, icteric sclera,  Pulm: Breathing comfortably on room air  Neuro:  Eyes open, conversant, intermittent confabulations, alert and oriented x3  Full strength, no apparent  "neglect, full sensation    --------------------------------------------------------------------------------------------------------------------------    Clinically Significant Risk Factors              # Hypoalbuminemia: Lowest albumin = 2.3 g/dL at 2/28/2023  3:33 AM, will monitor as appropriate   # Thrombocytopenia: Lowest platelets = 63 in last 2 days, will monitor for bleeding          # Overweight: Estimated body mass index is 27.93 kg/m  as calculated from the following:    Height as of this encounter: 1.626 m (5' 4\").    Weight as of this encounter: 73.8 kg (162 lb 11.2 oz).        # Compression of brain         Objective:   Temp:  [97.7  F (36.5  C)-98.5  F (36.9  C)] 97.7  F (36.5  C)  Pulse:  [] 97  Resp:  [15-47] 20  BP: ()/(57-97) 96/57  SpO2:  [90 %-100 %] 92 %  I/O last 3 completed shifts:  In: 1260 [P.O.:840]  Out: 37 [Drains:37]        LABS:  Recent Labs   Lab 03/03/23  0434 03/02/23  0526 03/01/23  0334 03/01/23  0328    145  --  147*   POTASSIUM 3.6 4.2  --  4.6   CHLORIDE 106 110*  --  115*   CO2 25 24  --  25   ANIONGAP 9 11  --  7   GLC 83 102* 121* 119*   BUN 16.4 21.2  --  21.3   CR 0.63 0.71  --  0.74   CHERRIE 9.1 9.1  --  8.6       Recent Labs   Lab 03/03/23 0434   WBC 3.3*   RBC 2.69*   HGB 8.6*   HCT 27.5*   *   MCH 32.0   MCHC 31.3*   RDW 20.0*   PLT 63*       IMAGING:  Recent Results (from the past 24 hour(s))   CT Head w/o Contrast    Narrative    CT HEAD W/O CONTRAST 3/3/2023 4:22 AM    History: f/u SDH s/p drain removal     Comparison: 3/2/2023    Technique: Using multidetector thin collimation helical acquisition  technique, axial, coronal and sagittal CT images from the skull base  to the vertex were obtained without intravenous contrast.   (topogram) image(s) also obtained and reviewed.    Findings:   Interval removal of the left extra-axial drain. Slightly increased  pneumocephalus overlying the left frontal lobe status post drain  removal. Stable " bilateral subdural hematomas measuring up to 17 mm in  thickness on the left and 8 mm in thickness on the right. Similar  extension of subdural hematoma along the falx and right to left  tentorium. Unchanged minimal rightward midline shift. No  hydrocephalus. No acute loss of gray-white matter differentiation. The  basal cisterns are clear.    Postprocedural changes of left frontal errol holes. Skin stables  overlying the left scalp. Mucosal thickening in the left maxillary  sinus. Additional paranasal sinuses and mastoid air cells are clear.  Orbits are grossly unremarkable.       Impression    Impression:  Stable left greater than right subdural hematoma status post  left-sided drain removal. Similar mass effect with 3 mm rightward  midline shift. No new or worsening intracranial hemorrhage.    I have personally reviewed the examination and initial interpretation  and I agree with the findings.    REHANA ELIZABETH MD         SYSTEM ID:  [id number]     I have seen this patient with the resident and formulated a plan and agree with this note.  AMP

## 2023-03-03 NOTE — PLAN OF CARE
Goal Outcome Evaluation:  Major Shift Events:    N: A&Ox4 and following commands. Pt intermittently restless but redirectable. No pain overnight.   C: NSR w/ rare PVCs. SBP <140.  Resp: Coarse, intermittent wheezing. Non productive cough. 2L NC SpO2 >92-95%.   GI/: Loose BM x1. Bladder scan x3.   Skin: Generalized bruising. CHG bath overnight  Labs: WBC 3.3, PLT 63, INR 1.82 MD notified   Plan: Continue to monitor and notify MD w/ concerns   For vital signs and complete assessments, please see documentation flowsheets.

## 2023-03-03 NOTE — PROGRESS NOTES
Physician Attestation   I, Amada aL MD, was present with the medical/JEISON student who participated in the service and in the documentation of the note.  I have verified the history and personally performed the physical exam and medical decision making.  I agree with the assessment and plan of care as documented in the note.      Key findings:   Transferred out of ICU, improving.  Per  and son, pt is mentally approaching baseline though remains physically below previous functionality.  Patient anxious to get out of the hospital.  Discussed with neurosurgery --> needs serial CT scans, follow up ~3/12, continue to hold anticoagulation until at least 3/17.  Resumed PPI, nadolol.  Continue to work with PT/OT, currently rec TCU vs ARU though pt preference would be to go home if able.    Please see A&P for additional details of medical decision making.    I have personally reviewed the following data over the past 24 hrs:    3.3 (L)  \   8.6 (L)   / 63 (L)     140 106 16.4 /  83   3.6 25 0.63 \       INR:  1.82 (H) PTT:  32   D-dimer:  N/A Fibrinogen:  186         Amada La MD  Date of Service (when I saw the patient): 03/03/23    [facility]    Progress Note - Hospitalist Service, GOLD TEAM 9       Date of Admission:  2/26/2023    Assessment & Plan   Sulma Ramos is a 57 year old female with cirrhosis c/b HE, ascites, portal hypertension, portal thrombus, on anticoagulation on Eliquis. history of Beto-en Y gastric bypass. She initially presented to [facility] 2/26/2023 with AMS, found to have acute SDH c/b herniation and underwent urgent neurosurgical errol hole decompression. She is transferred from neuro ICU.     Changes today  - Resumed PPI  - Resumed PTA nadolol    Subdural hematoma s/p left bur hole craniotomy for evacuation (2/26/2023)  Brain compression, improved post evacuation   Uncal herniation,  improved post evacuation   Hx  seizures  Transferred out of neuro ICU after presenting to [facility] 2/26 with 2 days of AMS, found to have acute SDH (b/l, L>R) c/b midline shift and uncal herniation. Of note, hx multiple falls, most recently had ED visit 1/19 after reported fall on elliptical, head CT negative at that time. Remote hx of 1 seizure in 2011 in setting of sleep deprivation, not on AEDs. She was transferred to [facility] and underwent left sided bur hole craniotomy for evacuation 2/26/23 w/ Dr. Mayo, post-op CT showing decompression w/ residual left sided convexity. KATIA drain initially left in place, removed 3/2. Head CT 3/3 showed stable left greater than right subdural hematoma with similar midline shift. Course complicated by development of extrapyramidal symptoms 3/1, given 1 dose benzatropine 1mg. Mental status improving per family, but not fully oriented, distractable, and evidence of akathisia and dysdiadochokinesia on exam.   - Neurosurgery following peripherally   - Head CT weekly (next due for 3/10), can be done outpatient   - Head CT 3/17 to determine if safe to restart Eliquis, can be done outpatient   - Will have follow up with neurosurgery 2 weeks post op (3/12) for wound check  - Neurology following  - Keppra 500mg Q12hr x7 days (end 3/5)  - SBP goal < 140 mmHg  - HOB > 30   - Monitor cranial bur hole site and drain sites for evidence of bleeding    Cirrhosis 2/2 EtOH c/b portal hypertension w/ portal vein thrombus (PTA on Eliquis), ascites, HE, varices   Hx of HCC s/p ablation 2009  Follows outpatient at [facility]. US Abd Hepatic & Portal Vasculature 2/26 with echogenic material noted within the main portal vein with limited sluggish flow, suggestive of portal vein thrombus, left and right portal veins patent. Ammonia 70 3/1, improved to 44 on 3/2.    MELD-Na score: 15 at 3/3/2023  4:34 AM  MELD score: 15 at 3/3/2023  4:34 AM  Calculated from:  Serum Creatinine: 0.63 mg/dL (Using min of 1 mg/dL) at 3/3/2023  4:34  AM  Serum Sodium: 140 mmol/L (Using max of 137 mmol/L) at 3/3/2023  4:34 AM  Total Bilirubin: 1.9 mg/dL at 3/1/2023  3:28 AM  INR(ratio): 1.82 at 3/3/2023  4:34 AM  Age: 57 years  - Holding PTA Eliquis 5 mg BID in setting of SDH  - Resume PTA Nadolol 20 mg every evening for variceal bleeds  - Continue PTA Rifaximin 550 mg BID, continue PTA lactulose    Acute hypoxic respiratory failure, improved  Wheezing post extubation  Large right pleural effusion  S/p decadron for post-extubation wheezing. CT chest non-con 2/27 and CXR 3/1 with large right pleural effusion.  Pleural effusion also confirmed on bedside US 3/2, and is very large with compressive atelectasis, trace B-lines on the left base. Prior team d/w SICU on whether to drain or place pigtail, was advised that no intervention is required at this time. Patient is oxygenating well and requiring minimal supplemental oxygen support.   - Can consider CAPS consult right pleural effusion drainage-- patient's  requests discuss w/ Primary GI Dr. Ortiz ([phone]) prior to obtaining   - Duo nebs Q4hr PRN  - Diuresis as needed   - Continuous pulse ox  - Supplemental oxygen as needed  - Maintain O2 saturations greater than 92%     Normocyctic anemia, acute   Hx thrombocytopenia, chronic   Hx coagulopathy (secondary to DOAC and synthetic liver dysfunction)  Hx portal vein thrombus, PTA on Eliquis  Baseline hgb 11-12. Baseline platelets in low 100s. Kcentra given at [facility]. Received 2 units FFP post-op for IR 1.7 and 1 unit pRBC for hgb 7. Received 1 unit platelet and 2 unit FFP prior to KATIA drain removal 3/2.   - Holding PTA Ferrous sulfate, consider resuming   - Daily CBC  - Hgb goal >7, plt goal >50k     Hx multiple falls  Hx L1 compression fracture  Received care for L1 compression fracture at [facility]. Wears TLSO brace at home. Last fall on 1/19/2023 resulting in a hematoma to left forearm, xray forearm without fracture. C-spine without fracture on  admission.   - Lumbar XR when able to stand, page neurosurgery when completed      Hx of gastric bypass (Beto-en Y)  GERD  Tolerating regular diet.  - Continue PTA Thiamine 100 mg daily and Folic acid 1 mg daily  - Continue PTA Cyanocobalamin 1000 mcg every 30 days   - Pantoprazole 40 mg daily (Takes omeprazole at home)  - Calcium Citrate containing vitamin D 600 mg 2 times daily  - Vitamin B12 1000 mcg monthly  - B-50 Complex daily, not yet ordered  - Iron: 45-60 mg elemental daily (18-36 mg daily if low risk) - may partly or fully be covered in multivitamin, not yet ordered     Hyperchloremia, resolved  Hypophosphatemia   - Daily BMP, I&O and weights  - Electrolyte replacement protocol     RLS   - Continue PTA Gabapentin 600 mg at bedtime    Hypothyroidism  - PTA levothyroxine     Diet: Regular Diet Adult    DVT Prophylaxis: Holding PTA eliquis  Clark Catheter: Not present  Fluids: None  Lines: None     Cardiac Monitoring: ACTIVE order. Indication: ICU  Code Status: Full Code      Disposition Plan     Expected Discharge Date: 03/04/2023      Destination: other (comment) (unknown)          The patient's care was discussed with the Attending Physician, Dr. Amada Klein.    Mesha Parker  Medical Student  Hospitalist Service, [address]  Securely message with [employer] (more info)  Text page via [employer] Paging/Directory   See signed in provider for up to date coverage information  ______________________________________________________________________    Interval History   No acute events overnight. Nursing notes reviewed.     This morning Sulma is doing well. Denies pain. Has difficulty sleeping in the hospital. Denies headache, dizziness, or vision changes. Tolerating PO intake. Wondering when she can go home. It is her strong preference to go home vs go to TCU or rehab.    Physical Exam   Vital Signs: Temp: 97.7  F (36.5  C) Temp src: Axillary BP: (!) 134/91  "Pulse: 93   Resp: 17 SpO2: 93 % O2 Device: Nasal cannula Oxygen Delivery: 2 LPM  Weight: 162 lbs 11.19 oz    Constitutional: Alert and overall oriented. Sitting in chair, crushing up a muffin and eating some of it.  Respiratory: Breathing comfortably with NC on. No increased work of breathing. No audible breath sounds, wheezes, or ronchi.   Cardiovascular: Well perfused.   GI: Not assessed due to pt wearing TLSO brace  Musculoskeletal: Changes c/w chronic lymphedema in bilateral lower extremity.   Neurologic: Alert and oriented to name, location, and time. When asked to spell \"world\" backwards, says \"D-L... I can't do this.\" Cranial nerves II-XII intact. Strength 5/5 in upper extremity flexion and extension at shoulder, elbow, wrist, and MCP.  strength intact and equal bilaterally. Strength 5/5 in hip flexion, knee flexion and extension, and dorsi/plantarflexion bilaterally. Difficulty w/ rapidly pronation/supination of hands- slow and error-filed. Finger nose finger w/o tremor, but switching hands she uses. Intermittently has difficulty following/understanding instructions.     Data     I have personally reviewed the following data over the past 24 hrs:    3.3 (L)  \   8.6 (L)   / 63 (L)     140 106 16.4 /  83   3.6 25 0.63 \       INR:  1.82 (H) PTT:  32   D-dimer:  N/A Fibrinogen:  186       Imaging results reviewed over the past 24 hrs:   Recent Results (from the past 24 hour(s))   CT Head w/o Contrast    Narrative    CT HEAD W/O CONTRAST 3/3/2023 4:22 AM    History: f/u SDH s/p drain removal     Comparison: 3/2/2023    Technique: Using multidetector thin collimation helical acquisition  technique, axial, coronal and sagittal CT images from the skull base  to the vertex were obtained without intravenous contrast.   (topogram) image(s) also obtained and reviewed.    Findings:   Interval removal of the left extra-axial drain. Slightly increased  pneumocephalus overlying the left frontal lobe status post " drain  removal. Stable bilateral subdural hematomas measuring up to 17 mm in  thickness on the left and 8 mm in thickness on the right. Similar  extension of subdural hematoma along the falx and right to left  tentorium. Unchanged minimal rightward midline shift. No  hydrocephalus. No acute loss of gray-white matter differentiation. The  basal cisterns are clear.    Postprocedural changes of left frontal errol holes. Skin stables  overlying the left scalp. Mucosal thickening in the left maxillary  sinus. Additional paranasal sinuses and mastoid air cells are clear.  Orbits are grossly unremarkable.       Impression    Impression:  Stable left greater than right subdural hematoma status post  left-sided drain removal. Similar mass effect with 3 mm rightward  midline shift. No new or worsening intracranial hemorrhage.    I have personally reviewed the examination and initial interpretation  and I agree with the findings.    REHANA ELIZABETH MD         SYSTEM ID:  [id number]

## 2023-03-03 NOTE — PROGRESS NOTES
Major Shift Events:  AOx4. Following commands, cooperative. Intermittently restless but redirectable. Denies pain. Ax1-2 w/ GB/walker to chair, commode. TLSO brace when OOB. Walked in halls w/ PT. PRN labetalol x2 to maintain SBP < 140. LS coarse w/ exp wheezes; PRN neb x1 w/ little improvement. Denying SOB. 1-2L O2 NC, unable to wean to RA. IS use encouraged. Several loose BMs. Voiding in commode. RD w/ poor appetite. Pt son & spouse updated at bedside.  Plan: Do not disturb 11p-7a. Transfer to floor when bed available.  For vital signs and complete assessments, please see documentation flowsheets.

## 2023-03-03 NOTE — PROGRESS NOTES
CLINICAL NUTRITION SERVICES - BRIEF NOTE    Nutrition Prescription    RECOMMENDATIONS FOR MDs/PROVIDERS TO ORDER:  - Encourage adequate PO. May tolerate smaller meals/snacks    Recommendations already ordered by Registered Dietitian (RD):  - Ordered oral nutrition juan j supplements: Ensure Max    Future/Additional Recommendations:  - PO/supp tolerance      Pt on regular diet. Reports tolerating but per flowsheets intake decreased.   Pt on micronutrient regimen     Nutrition Progress Note - f/u for progress towards previous nutrition POC (see previous reassessment for details)     Hiral Connolly RD, LD, [facility]  Neuro ICU  Pager: [phone]

## 2023-03-03 NOTE — PROGRESS NOTES
"Care Management Follow Up    Length of Stay (days): 5    Expected Discharge Date: 03/04/2023     Concerns to be Addressed:       Patient plan of care discussed at interdisciplinary rounds: Yes    Anticipated Discharge Disposition:       Anticipated Discharge Services:    Anticipated Discharge DME:      Patient/family educated on Medicare website which has current facility and service quality ratings:    Education Provided on the Discharge Plan:    Patient/Family in Agreement with the Plan:      Referrals Placed by CM/SW:    Private pay costs discussed: not applicable    Additional Information:  SW followed up with pt and son regarding TCU placement preferences. Son stated he and pt  had questions regarding the goals and benchmarks of PT an OT care as it seem pt is improving. SW connected with PT/OT who relayed that due to pt brain injury, basic safety and pt not able to do ADL's , in patient care is needed an ARU is now appropriate placement. SW relayed information to pt an family-both son and  present, and expressed going home is preferred, and would like to better understand the \"roadmap\" to clients recovery and overall improvement. SW also did spend time answering pt and family questions about different discharge dispositions. Pt and family provided preferences for TCU and also okayed [place] [facility] referral.     ENRRIQUE Alexander, LANA  4A & 4E ICU   Pager: [phone]  Phone: [phone]      "

## 2023-03-04 ENCOUNTER — APPOINTMENT (OUTPATIENT)
Dept: INTERVENTIONAL RADIOLOGY/VASCULAR | Facility: CLINIC | Age: 58
End: 2023-03-04
Attending: PHYSICIAN ASSISTANT
Payer: COMMERCIAL

## 2023-03-04 ENCOUNTER — APPOINTMENT (OUTPATIENT)
Dept: GENERAL RADIOLOGY | Facility: CLINIC | Age: 58
End: 2023-03-04
Attending: STUDENT IN AN ORGANIZED HEALTH CARE EDUCATION/TRAINING PROGRAM
Payer: COMMERCIAL

## 2023-03-04 ENCOUNTER — APPOINTMENT (OUTPATIENT)
Dept: GENERAL RADIOLOGY | Facility: CLINIC | Age: 58
End: 2023-03-04
Payer: COMMERCIAL

## 2023-03-04 LAB
ALLEN'S TEST: YES
ANION GAP SERPL CALCULATED.3IONS-SCNC: 8 MMOL/L (ref 7–15)
APPEARANCE FLD: CLEAR
BASE EXCESS BLDA CALC-SCNC: 7.2 MMOL/L (ref -9–1.8)
BUN SERPL-MCNC: 13.2 MG/DL (ref 8–23)
CALCIUM SERPL-MCNC: 9.1 MG/DL (ref 8.6–10)
CELL COUNT BODY FLUID SOURCE: NORMAL
CHLORIDE SERPL-SCNC: 106 MMOL/L (ref 98–107)
COLOR FLD: YELLOW
CREAT SERPL-MCNC: 0.58 MG/DL (ref 0.51–0.95)
DEPRECATED HCO3 PLAS-SCNC: 24 MMOL/L (ref 22–29)
ERYTHROCYTE [DISTWIDTH] IN BLOOD BY AUTOMATED COUNT: 20.2 % (ref 10–15)
FIBRINOGEN PPP-MCNC: 219 MG/DL (ref 170–490)
GFR SERPL CREATININE-BSD FRML MDRD: >90 ML/MIN/1.73M2
GLUCOSE SERPL-MCNC: 108 MG/DL (ref 70–99)
GRAM STAIN RESULT: NORMAL
GRAM STAIN RESULT: NORMAL
HCO3 BLD-SCNC: 32 MMOL/L (ref 21–28)
HCT VFR BLD AUTO: 31.1 % (ref 35–47)
HGB BLD-MCNC: 9.6 G/DL (ref 11.7–15.7)
HOLD SPECIMEN: NORMAL
INR PPP: 1.82 (ref 0.85–1.15)
LD BODY BODY FLUID SOURCE: NORMAL
LDH FLD L TO P-CCNC: 51 U/L
LYMPHOCYTES NFR FLD MANUAL: 3 %
MAGNESIUM SERPL-MCNC: 2.1 MG/DL (ref 1.7–2.3)
MCH RBC QN AUTO: 32 PG (ref 26.5–33)
MCHC RBC AUTO-ENTMCNC: 30.9 G/DL (ref 31.5–36.5)
MCV RBC AUTO: 104 FL (ref 78–100)
MONOS+MACROS NFR FLD MANUAL: NORMAL %
NEUTS BAND NFR FLD MANUAL: 9 %
O2/TOTAL GAS SETTING VFR VENT: 50 %
OTHER CELLS FLD MANUAL: 88 %
OXYHGB MFR BLD: 98 % (ref 92–100)
PCO2 BLD: 43 MM HG (ref 35–45)
PH BLD: 7.47 [PH] (ref 7.35–7.45)
PHOSPHATE SERPL-MCNC: 3.3 MG/DL (ref 2.5–4.5)
PLATELET # BLD AUTO: 89 10E3/UL (ref 150–450)
PO2 BLD: 110 MM HG (ref 80–105)
POTASSIUM SERPL-SCNC: 5.3 MMOL/L (ref 3.4–5.3)
PROT FLD-MCNC: 0.4 G/DL
PROT SERPL-MCNC: 6.3 G/DL (ref 6.4–8.3)
PROTEIN BODY FLUID SOURCE: NORMAL
RBC # BLD AUTO: 3 10E6/UL (ref 3.8–5.2)
SODIUM SERPL-SCNC: 138 MMOL/L (ref 136–145)
WBC # BLD AUTO: 8.4 10E3/UL (ref 4–11)
WBC # FLD AUTO: 145 /UL

## 2023-03-04 PROCEDURE — 83615 LACTATE (LD) (LDH) ENZYME: CPT | Performed by: STUDENT IN AN ORGANIZED HEALTH CARE EDUCATION/TRAINING PROGRAM

## 2023-03-04 PROCEDURE — 250N000011 HC RX IP 250 OP 636: Performed by: STUDENT IN AN ORGANIZED HEALTH CARE EDUCATION/TRAINING PROGRAM

## 2023-03-04 PROCEDURE — 999N000065 XR CHEST PORT 1 VIEW

## 2023-03-04 PROCEDURE — 36600 WITHDRAWAL OF ARTERIAL BLOOD: CPT

## 2023-03-04 PROCEDURE — 36415 COLL VENOUS BLD VENIPUNCTURE: CPT | Performed by: NURSE PRACTITIONER

## 2023-03-04 PROCEDURE — 84100 ASSAY OF PHOSPHORUS: CPT | Performed by: NURSE PRACTITIONER

## 2023-03-04 PROCEDURE — 999N000127 HC STATISTIC PERIPHERAL IV START W US GUIDANCE

## 2023-03-04 PROCEDURE — 250N000013 HC RX MED GY IP 250 OP 250 PS 637: Performed by: PHYSICIAN ASSISTANT

## 2023-03-04 PROCEDURE — 89051 BODY FLUID CELL COUNT: CPT | Performed by: STUDENT IN AN ORGANIZED HEALTH CARE EDUCATION/TRAINING PROGRAM

## 2023-03-04 PROCEDURE — 85610 PROTHROMBIN TIME: CPT | Performed by: STUDENT IN AN ORGANIZED HEALTH CARE EDUCATION/TRAINING PROGRAM

## 2023-03-04 PROCEDURE — 84157 ASSAY OF PROTEIN OTHER: CPT | Performed by: STUDENT IN AN ORGANIZED HEALTH CARE EDUCATION/TRAINING PROGRAM

## 2023-03-04 PROCEDURE — 250N000013 HC RX MED GY IP 250 OP 250 PS 637: Performed by: STUDENT IN AN ORGANIZED HEALTH CARE EDUCATION/TRAINING PROGRAM

## 2023-03-04 PROCEDURE — 36415 COLL VENOUS BLD VENIPUNCTURE: CPT | Performed by: STUDENT IN AN ORGANIZED HEALTH CARE EDUCATION/TRAINING PROGRAM

## 2023-03-04 PROCEDURE — 85027 COMPLETE CBC AUTOMATED: CPT | Performed by: NURSE PRACTITIONER

## 2023-03-04 PROCEDURE — 83735 ASSAY OF MAGNESIUM: CPT | Performed by: NURSE PRACTITIONER

## 2023-03-04 PROCEDURE — 71045 X-RAY EXAM CHEST 1 VIEW: CPT | Mod: 26 | Performed by: RADIOLOGY

## 2023-03-04 PROCEDURE — 250N000013 HC RX MED GY IP 250 OP 250 PS 637: Performed by: NURSE PRACTITIONER

## 2023-03-04 PROCEDURE — 32555 ASPIRATE PLEURA W/ IMAGING: CPT

## 2023-03-04 PROCEDURE — 272N000502 HC NEEDLE CR3

## 2023-03-04 PROCEDURE — 250N000011 HC RX IP 250 OP 636: Performed by: NURSE PRACTITIONER

## 2023-03-04 PROCEDURE — A7035 POS AIRWAY PRESS HEADGEAR: HCPCS

## 2023-03-04 PROCEDURE — 87070 CULTURE OTHR SPECIMN AEROBIC: CPT | Performed by: STUDENT IN AN ORGANIZED HEALTH CARE EDUCATION/TRAINING PROGRAM

## 2023-03-04 PROCEDURE — 999N000157 HC STATISTIC RCP TIME EA 10 MIN

## 2023-03-04 PROCEDURE — 84155 ASSAY OF PROTEIN SERUM: CPT | Performed by: STUDENT IN AN ORGANIZED HEALTH CARE EDUCATION/TRAINING PROGRAM

## 2023-03-04 PROCEDURE — 80048 BASIC METABOLIC PNL TOTAL CA: CPT | Performed by: NURSE PRACTITIONER

## 2023-03-04 PROCEDURE — 94660 CPAP INITIATION&MGMT: CPT

## 2023-03-04 PROCEDURE — 99233 SBSQ HOSP IP/OBS HIGH 50: CPT | Performed by: STUDENT IN AN ORGANIZED HEALTH CARE EDUCATION/TRAINING PROGRAM

## 2023-03-04 PROCEDURE — 32555 ASPIRATE PLEURA W/ IMAGING: CPT | Mod: RT | Performed by: PHYSICIAN ASSISTANT

## 2023-03-04 PROCEDURE — 71045 X-RAY EXAM CHEST 1 VIEW: CPT

## 2023-03-04 PROCEDURE — 200N000002 HC R&B ICU UMMC

## 2023-03-04 PROCEDURE — 87205 SMEAR GRAM STAIN: CPT | Performed by: STUDENT IN AN ORGANIZED HEALTH CARE EDUCATION/TRAINING PROGRAM

## 2023-03-04 PROCEDURE — 85384 FIBRINOGEN ACTIVITY: CPT | Performed by: STUDENT IN AN ORGANIZED HEALTH CARE EDUCATION/TRAINING PROGRAM

## 2023-03-04 PROCEDURE — 82805 BLOOD GASES W/O2 SATURATION: CPT | Performed by: PHYSICIAN ASSISTANT

## 2023-03-04 PROCEDURE — 0W993ZZ DRAINAGE OF RIGHT PLEURAL CAVITY, PERCUTANEOUS APPROACH: ICD-10-PCS | Performed by: PHYSICIAN ASSISTANT

## 2023-03-04 RX ORDER — AMLODIPINE BESYLATE 5 MG/1
5 TABLET ORAL DAILY
Status: DISCONTINUED | OUTPATIENT
Start: 2023-03-04 | End: 2023-03-05

## 2023-03-04 RX ORDER — LIDOCAINE HYDROCHLORIDE 10 MG/ML
10 INJECTION, SOLUTION EPIDURAL; INFILTRATION; INTRACAUDAL; PERINEURAL ONCE
Status: COMPLETED | OUTPATIENT
Start: 2023-03-04 | End: 2023-03-04

## 2023-03-04 RX ORDER — CARBOXYMETHYLCELLULOSE SODIUM 5 MG/ML
1 SOLUTION/ DROPS OPHTHALMIC
Status: DISCONTINUED | OUTPATIENT
Start: 2023-03-04 | End: 2023-03-07 | Stop reason: HOSPADM

## 2023-03-04 RX ORDER — FUROSEMIDE 10 MG/ML
40 INJECTION INTRAMUSCULAR; INTRAVENOUS ONCE
Status: COMPLETED | OUTPATIENT
Start: 2023-03-04 | End: 2023-03-04

## 2023-03-04 RX ADMIN — PANTOPRAZOLE SODIUM 40 MG: 40 TABLET, DELAYED RELEASE ORAL at 07:51

## 2023-03-04 RX ADMIN — LEVETIRACETAM 500 MG: 500 TABLET, FILM COATED ORAL at 19:42

## 2023-03-04 RX ADMIN — Medication 3 TABLET: at 19:42

## 2023-03-04 RX ADMIN — RIFAXIMIN 550 MG: 550 TABLET ORAL at 07:51

## 2023-03-04 RX ADMIN — Medication 3 TABLET: at 07:52

## 2023-03-04 RX ADMIN — Medication 1 TABLET: at 07:51

## 2023-03-04 RX ADMIN — FUROSEMIDE 40 MG: 10 INJECTION, SOLUTION INTRAVENOUS at 10:12

## 2023-03-04 RX ADMIN — LEVOTHYROXINE SODIUM 25 MCG: 0.03 TABLET ORAL at 07:51

## 2023-03-04 RX ADMIN — LABETALOL HYDROCHLORIDE 10 MG: 5 INJECTION, SOLUTION INTRAVENOUS at 02:21

## 2023-03-04 RX ADMIN — THIAMINE HYDROCHLORIDE 100 MG: 100 INJECTION, SOLUTION INTRAMUSCULAR; INTRAVENOUS at 07:52

## 2023-03-04 RX ADMIN — GABAPENTIN 600 MG: 600 TABLET, FILM COATED ORAL at 21:43

## 2023-03-04 RX ADMIN — NADOLOL 20 MG: 20 TABLET ORAL at 19:43

## 2023-03-04 RX ADMIN — LEVETIRACETAM 500 MG: 500 TABLET, FILM COATED ORAL at 07:51

## 2023-03-04 RX ADMIN — FOLIC ACID 1 MG: 5 INJECTION, SOLUTION INTRAMUSCULAR; INTRAVENOUS; SUBCUTANEOUS at 07:54

## 2023-03-04 RX ADMIN — RIFAXIMIN 550 MG: 550 TABLET ORAL at 19:42

## 2023-03-04 RX ADMIN — LACTULOSE 20 G: 20 SOLUTION ORAL at 07:51

## 2023-03-04 RX ADMIN — AMLODIPINE BESYLATE 5 MG: 5 TABLET ORAL at 07:51

## 2023-03-04 ASSESSMENT — ACTIVITIES OF DAILY LIVING (ADL)
ADLS_ACUITY_SCORE: 34
WEAR_GLASSES_OR_BLIND: NO
ADLS_ACUITY_SCORE: 34
NUMBER_OF_TIMES_PATIENT_HAS_FALLEN_WITHIN_LAST_SIX_MONTHS: 2
ADLS_ACUITY_SCORE: 34
ADLS_ACUITY_SCORE: 29
WALKING_OR_CLIMBING_STAIRS_DIFFICULTY: NO
DOING_ERRANDS_INDEPENDENTLY_DIFFICULTY: YES
CONCENTRATING,_REMEMBERING_OR_MAKING_DECISIONS_DIFFICULTY: NO
ADLS_ACUITY_SCORE: 29
FALL_HISTORY_WITHIN_LAST_SIX_MONTHS: YES
ADLS_ACUITY_SCORE: 34
DIFFICULTY_EATING/SWALLOWING: NO
DRESSING/BATHING_DIFFICULTY: NO
ADLS_ACUITY_SCORE: 29
CHANGE_IN_FUNCTIONAL_STATUS_SINCE_ONSET_OF_CURRENT_ILLNESS/INJURY: YES
TOILETING_ISSUES: NO
ADLS_ACUITY_SCORE: 29

## 2023-03-04 NOTE — PROGRESS NOTES
Patient Name: Sulma Ramos  Medical Record Number: [id number]  Today's Date: 3/4/2023    Procedure: Diagnostic and therapeutic thoracentesis.  Proceduralist: BETINA Perez.  Pathology present: n/a    Procedure Start: 1612  Procedure end: 1620  Sedation medications administered: none     Report given to: 4A,RN  : n/a    Other Notes:  Team at bedside for procedure.. Consent reviewed. Pt denies any questions or concerns regarding procedure. Pt positioned sidelying and monitored per protocol. Pt tolerated procedure without any noted complications. 1260 mls removed and specimens sent to lab.    Violeta Cortez RN

## 2023-03-04 NOTE — CONSULTS
"    Interventional Radiology Consult Service Note    Patient is on IR schedule 3/4/2023 for a right-sided therapeutic paracentesis.  Hemoglobin 9.6, platelet 89, INR 1.82.  No NPO required.  Consent will be done prior to procedure.     Please contact the IR charge RN at [phone] for estimated time of procedure.     Case discussed with Dr. Ledesma. This is a 57 year old female with cirrhosis and portal thrombus on anticoagulation (Eliquis) who was found to have acute SDH c/b herniation and underwent urgent decompression.  Patient developed acute hypoxic respiratory failure with large right pleural effusion.  CAPS was not able to perform thoracentesis at bedside due to L1 compression fracture and continued positive pressure ventilation.  IR was consulted for therapeutic right thoracentesis.    Expected date of discharge: TBD.    Vitals:   /72 (BP Location: Right arm)   Pulse 88   Temp 98.2  F (36.8  C) (Axillary)   Resp 26   Ht 1.626 m (5' 4\")   Wt 73.8 kg (162 lb 11.2 oz)   LMP 01/19/2004   SpO2 100%   BMI 27.93 kg/m      Pertinent Labs:     Lab Results   Component Value Date    WBC 8.4 03/04/2023    WBC 3.3 (L) 03/03/2023    WBC 7.1 03/02/2023    WBC 3.3 (L) 09/07/2013    WBC 5.9 09/06/2013    WBC 2.6 (L) 05/17/2011       Lab Results   Component Value Date    HGB 9.6 03/04/2023    HGB 8.6 03/03/2023    HGB 8.4 03/02/2023    HGB 12.0 09/07/2013    HGB 13.0 09/06/2013    HGB 9.7 05/17/2011       Lab Results   Component Value Date    PLT 89 03/04/2023    PLT 63 03/03/2023     03/02/2023    PLT 80 09/07/2013     09/06/2013    PLT 90 05/17/2011       Lab Results   Component Value Date    INR 1.82 (H) 03/04/2023    INR 1.57 (H) 05/15/2011    PTT 32 03/03/2023    PTT 40 (H) 05/14/2009       Lab Results   Component Value Date    POTASSIUM 5.3 03/04/2023    POTASSIUM 3.6 02/26/2023    POTASSIUM 3.4 09/07/2013        Elio Cheng MD  Interventional " Radiology    ---------------------------------------------------    Discussed with Margaret Cheng and Baltazar. Agree with assessment and plan.  Pager: [phone]

## 2023-03-04 NOTE — PROCEDURES
[facility]    Procedure: IR THORACENTESIS    Date/Time: 3/4/2023 4:29 PM  Performed by: Estrada Perez PA-C  Authorized by: Estrada Perez PA-C       UNIVERSAL PROTOCOL   Site Marked: No  Prior Images Obtained and Reviewed:  Yes  Required items: Required blood products, implants, devices and special equipment available    Patient identity confirmed:  Verbally with patient, provided demographic data, hospital-assigned identification number and arm band  NA - No sedation, light sedation, or local anesthesia  Confirmation Checklist:  Patient's identity using two indicators, relevant allergies, procedure was appropriate and matched the consent or emergent situation and correct equipment/implants were available  Time out: Immediately prior to the procedure a time out was called    Universal Protocol: the Joint Commission Universal Protocol was followed    Preparation: Patient was prepped and draped in usual sterile fashion       ANESTHESIA    Anesthesia: Local infiltration  Local Anesthetic:  Lidocaine 1% without epinephrine  Anesthetic Total (mL):  3      SEDATION    Patient Sedated: No    See dictated procedure note for full details.  Findings: Tolerated local only well    Specimens: fluid and/or tissue for laboratory analysis and culture    Complications: None    Condition: Stable    Plan: Per primary team. Drainage stopped due to risk of reexpansion pulmonary edema. Consult CAPS for repeat thoracentesis and they will defer to IR as needed.      PROCEDURE  Describe Procedure: Diagnostic and therapeutic right thoracentesis bedside in ICU. 1200 mL clear yellow fluid aspirated from the right chest. Large effusion remains, drainage stopped due to risk of reexpansion pulmonary edema.  Patient Tolerance:  Patient tolerated the procedure well with no immediate complications  Length of time physician/provider present for 1:1 monitoring during sedation: 0

## 2023-03-04 NOTE — PLAN OF CARE
Goal Outcome Evaluation:    Major Shift Events:  Patient alert and oriented, following commands but restless this shift.  Voiced frustration of having to be in the hospital still. Craniotomy site/errol holes clean, dry and open to air.  In NSR 70's-90's.  Labetolol given x2 overnight for SBP >140.  Lungs coarse and wheezy, audible wheeze with breathing.  Tachypneaic.  Appears to be SOB but denies difficulty of breathing.  On 3L O2 via NC.  1 Loose BM overnight.   .    Plan: Maintain no wake from [id number] as able, transfer out of ICU     For vital signs and complete assessments, please see documentation flowsheets.

## 2023-03-04 NOTE — PROGRESS NOTES
[facility]    Medicine Progress Note - Hospitalist Service, GOLD TEAM 9    Date of Admission:  2/26/2023    Assessment & Plan   Sulma Ramos is a 57 year old female with cirrhosis c/b HE, ascites, portal hypertension, portal thrombus, on anticoagulation on Eliquis. history of Beto-en Y gastric bypass. She initially presented to [facility] 2/26/2023 with AMS, found to have acute SDH c/b herniation and underwent urgent neurosurgical errol hole decompression. She is transferred from neuro ICU to medicine on 3/2.     Subdural hematoma s/p left bur hole craniotomy for evacuation (2/26/2023)  Brain compression, improved post evacuation   Uncal herniation, improved post evacuation   Hx seizures  Presented to [facility] 2/26 with AMS, found to have acute SDH (b/l, L>R) c/b midline shift and uncal herniation. Transferred to [facility] and underwent left sided bur hole craniotomy for evacuation 2/26/23 w/ Dr. Mayo, post-op CT showing decompression w/ residual left sided convexity. KATIA drain initially left in place, removed 3/2. Head CT 3/3 showed stable subdural hematoma with similar midline shift. Mental status improving per family, but patient remains not fully oriented and distractable.   - Neurosurgery following peripherally (OK for discharge from this standpoint assuming below follow up is arranged):   - Head CT weekly (next due for 3/10)   - Continue to Eliquis, neurosurg to consider restarting pending results of 3/17 CT   - Follow up with neurosurgery 2 weeks post op (3/12) for wound check  - Keppra 500mg Q12hr x7 days (ends 3/5, stop date in order)  - SBP goal < 140 mmHg per neuro crit team; using prn labetalol to maintain   - Need IV labetalol x4 in last 24 hours --> start amlodipine 5 mg  - HOB > 30     Acute hypoxic respiratory failure  Large right pleural effusion  Pulmonary edema  Patient initially improving after extubation, but had desaturations after exertion  on 3/4 requiring increase in O2. Subsequently also developed increased WOB and placed back on BiPAP. CXR revealed increased size of pleural effusion as well as diffuse left lung opacities suggestive of pulm edema.   - Duo nebs Q4hr PRN  - Continuous pulse ox  - Continue BiPAP/NC as needed for WOB and to keep sats >92%  - CXR this morning  - 40 mg IV lasix x1, reassess for additional doses this afternoon  - Plan for thoracentesis:   - Discussed with CAPS, unable to do at bedside due to challenges with positioning and positive pressure ventilation   - IR consulted for thora   - Diagnostic labs ordered    Cirrhosis 2/2 EtOH c/b portal hypertension, PV thrombus, ascites, HE, varices   Hx of HCC s/p ablation 2009  Follows outpatient at [facility]. On apixaban for hx of PV thrombus, though [facility] documentation shows that this had resolved as of imaging 5/2022. US on admission here shows echogenic material in the PV suggestive of thrombus presence again. Currently no signs of HE.  - HE: continue rifaximin, lactulose  - Varices: continue nadolol  - PV thrombus: HOLDING apixaban in setting of SDH, see above for AC management    Hx multiple falls  Hx L1 compression fracture  Received care for L1 compression fracture at [facility]. Wears TLSO brace at home. Last fall on 1/19/2023 resulting in a hematoma to left forearm, xray forearm without fracture. C-spine without fracture on admission.   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 Baseline platelets in low 100s. Kcentra given at [facility]. Received 2 units FFP post-op for INR 1.7 and 1 unit pRBC for hgb 7. Received 1 unit platelet and 2 unit FFP prior to KATIA drain removal 3/2.   - Holding  "PTA Ferrous sulfate, consider resuming   - Daily CBC  - Hgb goal >7, plt goal >50k (per neuro crit)      Hx of gastric bypass (Beto-en Y)  GERD  Tolerating regular diet.  - Continue pta supplements  - Continue pta PPI     Hyperchloremia, resolved  Hypophosphatemia   - Daily BMP, I&O and weights  - Electrolyte replacement protocol     RLS   - Continue PTA Gabapentin 600 mg at bedtime    Hypothyroidism  - PTA levothyroxine       Diet: Regular Diet Adult  Snacks/Supplements Adult: Ensure Max Protein (bariatric); Between Meals    DVT Prophylaxis: Pneumatic Compression Devices  Clark Catheter: Not present  Lines: None     Cardiac Monitoring: ACTIVE order. Indication: ICU  Code Status: Full Code      Clinically Significant Risk Factors              # Hypoalbuminemia: Lowest albumin = 2.3 g/dL at 2/28/2023  3:33 AM, will monitor as appropriate   # Thrombocytopenia: Lowest platelets = 63 in last 2 days, will monitor for bleeding         # Overweight: Estimated body mass index is 27.93 kg/m  as calculated from the following:    Height as of this encounter: 1.626 m (5' 4\").    Weight as of this encounter: 73.8 kg (162 lb 11.2 oz).          Disposition Plan      Expected Discharge Date: 03/06/2023      Destination: other (comment) (unknown)  Discharge Comments: still getting IV BP meds, need upright XR.  needs TCU vs ARU.          Amada La MD  Hospitalist Service, GOLD TEAM 9  [facility]  Securely message with bookjam (more info)  Text page via [facility] Paging/Directory   See signed in provider for up to date coverage information  ______________________________________________________________________    Interval History   No acute events overnight. This morning patient walked with therapy, but became hypoxic to the 80s while trying to get back to bed. Placed on 7 L oxymask with recovery. Complains of feeling congested and having more trouble breathing today compared to " previous. Denies other new complaints or changes. WOB continued to increase and patient ultimately placed on BiPAP with improvement.    Physical Exam   Vital Signs: Temp: 97.5  F (36.4  C) Temp src: Oral BP: 137/77 Pulse: 74   Resp: 22 SpO2: 95 % O2 Device: Nasal cannula Oxygen Delivery: 3 LPM  Weight: 162 lbs 11.19 oz    Constitutional: lying in bed  HEENT: left side of head shaved with large stapled wound from craniotomy  Respiratory: in mild resp distress, slightly tachypnic, minimal audible breath sounds on right, crackles on the left  Cardiovascular: RRR  GI: soft, non-distended, non-tender  Musculoskeletal: no lower extremity edema present  Neurologic: alert and oriented x3, no asterixis, constant fidgeting      Medical Decision Making       **CLEAR ALL SELECTIONS**      Data     I have personally reviewed the following data over the past 24 hrs:    8.4  \   9.6 (L)   / 89 (L)     138 106 13.2 /  108 (H)   5.3 24 0.58 \       ALT: N/A AST: N/A AP: N/A TBILI: N/A   ALB: N/A TOT PROTEIN: 6.3 (L) LIPASE: N/A       INR:  1.82 (H) PTT:  N/A   D-dimer:  N/A Fibrinogen:  219       Imaging results reviewed over the past 24 hrs:   Recent Results (from the past 24 hour(s))   XR Chest Port 1 View    Narrative    Exam: XR CHEST PORT 1 VIEW, 3/4/2023 9:41 AM    Indication: increased wheezing, follow up on pleural effusion    Comparison: 3/1/2023 and 2/27/2023 chest x-rays    Findings:   Upright, frontal view the chest. Markedly increased size of right  pleural effusion with likely lobar atelectasis of the right middle and  right lower lobes. The right upper lobe is somewhat aerated. Diffuse  interstitial and airspace opacities throughout the left lung. No  significant left-sided pleural effusion. No pneumothorax.       Impression    Impression: Markedly increased right pleural effusion with likely  lobar atelectasis of the right middle and lower lobes. Increased left  lung opacities likely pulmonary edema.    I have  personally reviewed the examination and initial interpretation  and I agree with the findings.    BROCK MENDIOLA MD         SYSTEM ID:  [id number]

## 2023-03-05 ENCOUNTER — APPOINTMENT (OUTPATIENT)
Dept: PHYSICAL THERAPY | Facility: CLINIC | Age: 58
End: 2023-03-05
Payer: COMMERCIAL

## 2023-03-05 ENCOUNTER — APPOINTMENT (OUTPATIENT)
Dept: OCCUPATIONAL THERAPY | Facility: CLINIC | Age: 58
End: 2023-03-05
Payer: COMMERCIAL

## 2023-03-05 LAB
ANION GAP SERPL CALCULATED.3IONS-SCNC: 8 MMOL/L (ref 7–15)
BLD PROD TYP BPU: NORMAL
BLOOD COMPONENT TYPE: NORMAL
BUN SERPL-MCNC: 15.9 MG/DL (ref 8–23)
CALCIUM SERPL-MCNC: 8.6 MG/DL (ref 8.6–10)
CHLORIDE SERPL-SCNC: 103 MMOL/L (ref 98–107)
CODING SYSTEM: NORMAL
CREAT SERPL-MCNC: 0.61 MG/DL (ref 0.51–0.95)
CRP SERPL-MCNC: 44.3 MG/L
DEPRECATED HCO3 PLAS-SCNC: 25 MMOL/L (ref 22–29)
ERYTHROCYTE [DISTWIDTH] IN BLOOD BY AUTOMATED COUNT: 19.9 % (ref 10–15)
GFR SERPL CREATININE-BSD FRML MDRD: >90 ML/MIN/1.73M2
GLUCOSE SERPL-MCNC: 83 MG/DL (ref 70–99)
HCT VFR BLD AUTO: 25.7 % (ref 35–47)
HGB BLD-MCNC: 7.8 G/DL (ref 11.7–15.7)
ISSUE DATE AND TIME: NORMAL
MAGNESIUM SERPL-MCNC: 2.1 MG/DL (ref 1.7–2.3)
MCH RBC QN AUTO: 31.7 PG (ref 26.5–33)
MCHC RBC AUTO-ENTMCNC: 30.4 G/DL (ref 31.5–36.5)
MCV RBC AUTO: 105 FL (ref 78–100)
PHOSPHATE SERPL-MCNC: 2.2 MG/DL (ref 2.5–4.5)
PLATELET # BLD AUTO: 49 10E3/UL (ref 150–450)
POTASSIUM SERPL-SCNC: 3.8 MMOL/L (ref 3.4–5.3)
PROCALCITONIN SERPL IA-MCNC: 0.29 NG/ML
RBC # BLD AUTO: 2.46 10E6/UL (ref 3.8–5.2)
SODIUM SERPL-SCNC: 136 MMOL/L (ref 136–145)
UNIT ABO/RH: NORMAL
UNIT NUMBER: NORMAL
UNIT STATUS: NORMAL
UNIT TYPE ISBT: 6200
WBC # BLD AUTO: 2.7 10E3/UL (ref 4–11)

## 2023-03-05 PROCEDURE — 999N000157 HC STATISTIC RCP TIME EA 10 MIN

## 2023-03-05 PROCEDURE — 85027 COMPLETE CBC AUTOMATED: CPT | Performed by: NURSE PRACTITIONER

## 2023-03-05 PROCEDURE — 97530 THERAPEUTIC ACTIVITIES: CPT | Mod: GO

## 2023-03-05 PROCEDURE — 250N000011 HC RX IP 250 OP 636: Performed by: STUDENT IN AN ORGANIZED HEALTH CARE EDUCATION/TRAINING PROGRAM

## 2023-03-05 PROCEDURE — 250N000013 HC RX MED GY IP 250 OP 250 PS 637: Performed by: PHYSICIAN ASSISTANT

## 2023-03-05 PROCEDURE — 94660 CPAP INITIATION&MGMT: CPT

## 2023-03-05 PROCEDURE — 97116 GAIT TRAINING THERAPY: CPT | Mod: GP | Performed by: PHYSICAL THERAPIST

## 2023-03-05 PROCEDURE — 250N000013 HC RX MED GY IP 250 OP 250 PS 637: Performed by: NURSE PRACTITIONER

## 2023-03-05 PROCEDURE — P9037 PLATE PHERES LEUKOREDU IRRAD: HCPCS | Performed by: STUDENT IN AN ORGANIZED HEALTH CARE EDUCATION/TRAINING PROGRAM

## 2023-03-05 PROCEDURE — 84100 ASSAY OF PHOSPHORUS: CPT | Performed by: NURSE PRACTITIONER

## 2023-03-05 PROCEDURE — 83735 ASSAY OF MAGNESIUM: CPT | Performed by: NURSE PRACTITIONER

## 2023-03-05 PROCEDURE — 84145 PROCALCITONIN (PCT): CPT | Performed by: HOSPITALIST

## 2023-03-05 PROCEDURE — 200N000002 HC R&B ICU UMMC

## 2023-03-05 PROCEDURE — 250N000013 HC RX MED GY IP 250 OP 250 PS 637: Performed by: STUDENT IN AN ORGANIZED HEALTH CARE EDUCATION/TRAINING PROGRAM

## 2023-03-05 PROCEDURE — 86140 C-REACTIVE PROTEIN: CPT | Performed by: HOSPITALIST

## 2023-03-05 PROCEDURE — 99232 SBSQ HOSP IP/OBS MODERATE 35: CPT | Performed by: STUDENT IN AN ORGANIZED HEALTH CARE EDUCATION/TRAINING PROGRAM

## 2023-03-05 PROCEDURE — 82310 ASSAY OF CALCIUM: CPT | Performed by: NURSE PRACTITIONER

## 2023-03-05 PROCEDURE — 36415 COLL VENOUS BLD VENIPUNCTURE: CPT | Performed by: NURSE PRACTITIONER

## 2023-03-05 PROCEDURE — 97530 THERAPEUTIC ACTIVITIES: CPT | Mod: GP | Performed by: PHYSICAL THERAPIST

## 2023-03-05 PROCEDURE — 97535 SELF CARE MNGMENT TRAINING: CPT | Mod: GO

## 2023-03-05 RX ORDER — POTASSIUM CHLORIDE 750 MG/1
20 TABLET, EXTENDED RELEASE ORAL ONCE
Status: COMPLETED | OUTPATIENT
Start: 2023-03-05 | End: 2023-03-05

## 2023-03-05 RX ORDER — FUROSEMIDE 10 MG/ML
40 INJECTION INTRAMUSCULAR; INTRAVENOUS ONCE
Status: COMPLETED | OUTPATIENT
Start: 2023-03-05 | End: 2023-03-05

## 2023-03-05 RX ADMIN — Medication 1 TABLET: at 08:18

## 2023-03-05 RX ADMIN — NADOLOL 20 MG: 20 TABLET ORAL at 20:35

## 2023-03-05 RX ADMIN — PANTOPRAZOLE SODIUM 40 MG: 40 TABLET, DELAYED RELEASE ORAL at 08:18

## 2023-03-05 RX ADMIN — GABAPENTIN 600 MG: 600 TABLET, FILM COATED ORAL at 20:35

## 2023-03-05 RX ADMIN — Medication 3 TABLET: at 08:19

## 2023-03-05 RX ADMIN — RIFAXIMIN 550 MG: 550 TABLET ORAL at 20:35

## 2023-03-05 RX ADMIN — POTASSIUM & SODIUM PHOSPHATES POWDER PACK 280-160-250 MG 1 PACKET: 280-160-250 PACK at 05:08

## 2023-03-05 RX ADMIN — LEVETIRACETAM 500 MG: 500 TABLET, FILM COATED ORAL at 08:18

## 2023-03-05 RX ADMIN — LEVETIRACETAM 500 MG: 500 TABLET, FILM COATED ORAL at 20:36

## 2023-03-05 RX ADMIN — POTASSIUM & SODIUM PHOSPHATES POWDER PACK 280-160-250 MG 1 PACKET: 280-160-250 PACK at 08:18

## 2023-03-05 RX ADMIN — LACTULOSE 20 G: 20 SOLUTION ORAL at 08:18

## 2023-03-05 RX ADMIN — FOLIC ACID 1 MG: 5 INJECTION, SOLUTION INTRAMUSCULAR; INTRAVENOUS; SUBCUTANEOUS at 08:18

## 2023-03-05 RX ADMIN — POTASSIUM CHLORIDE 20 MEQ: 750 TABLET, EXTENDED RELEASE ORAL at 05:08

## 2023-03-05 RX ADMIN — THIAMINE HYDROCHLORIDE 100 MG: 100 INJECTION, SOLUTION INTRAMUSCULAR; INTRAVENOUS at 08:18

## 2023-03-05 RX ADMIN — RIFAXIMIN 550 MG: 550 TABLET ORAL at 08:18

## 2023-03-05 RX ADMIN — POTASSIUM & SODIUM PHOSPHATES POWDER PACK 280-160-250 MG 1 PACKET: 280-160-250 PACK at 12:22

## 2023-03-05 RX ADMIN — Medication 3 TABLET: at 20:35

## 2023-03-05 RX ADMIN — FUROSEMIDE 40 MG: 10 INJECTION, SOLUTION INTRAVENOUS at 09:21

## 2023-03-05 ASSESSMENT — ACTIVITIES OF DAILY LIVING (ADL)
ADLS_ACUITY_SCORE: 34
ADLS_ACUITY_SCORE: 32
ADLS_ACUITY_SCORE: 32
ADLS_ACUITY_SCORE: 34
ADLS_ACUITY_SCORE: 32
ADLS_ACUITY_SCORE: 34

## 2023-03-05 NOTE — PROGRESS NOTES
Major Shift Events: AOx4. Weaned from BiPAP to RA after 40mg lasix per orders. Denying dyspnea; coughing & deep breathing encouraged. Activity tolerance improved throughout the day. Up to chair for most of the day, ambulated in halls w/ PT. TLSO brace when OOB. SBP < 140, MAP > 65 w/o intervention. Loose BM x1. Incontinent of urine, purewick in place & large unmeasured void. 1 unit platelets given.  Plan: Continue to monitor respiratory status. Transfer to floor when bed available.   For vital signs and complete assessments, please see documentation flowsheets.

## 2023-03-05 NOTE — PROGRESS NOTES
[facility]    Medicine Progress Note - Hospitalist Service, GOLD TEAM 9    Date of Admission:  2/26/2023    Assessment & Plan   Sulma Ramos is a 57 year old female with cirrhosis c/b HE, ascites, portal hypertension, portal thrombus, on anticoagulation on Eliquis. history of Beto-en Y gastric bypass. She initially presented to [facility] 2/26/2023 with AMS, found to have acute SDH c/b herniation and underwent urgent neurosurgical errol hole decompression. She is transferred from neuro ICU to medicine on 3/2.     Subdural hematoma s/p left bur hole craniotomy for evacuation (2/26/2023)  Brain compression, improved post evacuation   Uncal herniation, improved post evacuation   Hx seizures  Presented to [facility] 2/26 with AMS, found to have acute SDH (b/l, L>R) c/b midline shift and uncal herniation. Transferred to [facility] and underwent left sided bur hole craniotomy for evacuation 2/26/23 w/ Dr. Mayo, post-op CT showing decompression w/ residual left sided convexity. KATIA drain initially left in place, removed 3/2. Head CT 3/3 showed stable subdural hematoma with similar midline shift. Mental status improving per family, but patient remains not fully oriented and distractable.   - Neurosurgery following peripherally (OK for discharge from this standpoint assuming below follow up is arranged):   - Head CT weekly (next due for 3/10)   - Continue to Eliquis, neurosurg to consider restarting pending results of 3/17 CT   - Follow up with neurosurgery 2 weeks post op (3/12) for wound check  - Keppra 500mg Q12hr x7 days (ends 3/5, stop date in order)  - SBP goal < 140 mmHg per neuro crit team; using prn labetalol to maintain   - Softer pressures last 24 hours, stop amlodipine  - HOB > 30     Acute hypoxic respiratory failure  Large right pleural effusion  Pulmonary edema  Patient initially improving after extubation, but had desaturations after exertion on 3/4  requiring increase in O2. Subsequently also developed increased WOB and placed back on BiPAP. CXR revealed increased size of pleural effusion as well as diffuse left lung opacities suggestive of pulm edema.   - Duo nebs Q4hr PRN  - Continuous pulse ox  - Continue BiPAP/NC as needed for WOB and to keep sats >92%, wean as able  - S/p thora for 1/2 L out on 3/4  - 40 mg IV lasix x1 again today    Cirrhosis 2/2 EtOH c/b portal hypertension, PV thrombus, ascites, HE, varices   Hx of HCC s/p ablation 2009  Follows outpatient at [facility]. On apixaban for hx of PV thrombus, though [facility] documentation shows that this had resolved as of imaging 5/2022. US on admission here shows echogenic material in the PV suggestive of thrombus presence again. Currently no signs of HE.  - HE: continue rifaximin, lactulose  - Varices: continue nadolol  - PV thrombus: HOLDING apixaban in setting of SDH, see above for AC management    Hx multiple falls  Hx L1 compression fracture  Received care for L1 compression fracture at [facility]. Wears TLSO brace at home. Last fall on 1/19/2023 resulting in a hematoma to left forearm, xray forearm without fracture. C-spine without fracture on admission.   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 Baseline platelets in low 100s. Kcentra given at [facility]. Received 2 units FFP post-op for INR 1.7 and 1 unit pRBC for hgb 7. Received 1 unit platelet and 2 unit FFP prior to KATIA drain removal 3/2.   - Holding PTA Ferrous sulfate, consider resuming   - Daily CBC  - Hgb goal >7, plt goal >50k (per neuro crit)      Hx of gastric bypass (Beto-en Y)  GERD  Tolerating regular diet.  - Continue pta supplements  - Continue pta  "PPI     Hyperchloremia, resolved  Hypophosphatemia   - Daily BMP, I&O and weights  - Electrolyte replacement protocol     RLS   - Continue PTA Gabapentin 600 mg at bedtime    Hypothyroidism  - PTA levothyroxine         Diet: Regular Diet Adult  Snacks/Supplements Adult: Ensure Max Protein (bariatric); Between Meals    DVT Prophylaxis: Pneumatic Compression Devices  Clark Catheter: Not present  Lines: None     Cardiac Monitoring: ACTIVE order. Indication: ICU  Code Status: Full Code      Clinically Significant Risk Factors              # Hypoalbuminemia: Lowest albumin = 2.3 g/dL at 2/28/2023  3:33 AM, will monitor as appropriate   # Thrombocytopenia: Lowest platelets = 49 in last 2 days, will monitor for bleeding         # Overweight: Estimated body mass index is 26.79 kg/m  as calculated from the following:    Height as of this encounter: 1.626 m (5' 4\").    Weight as of this encounter: 70.8 kg (156 lb 1.4 oz).          Disposition Plan     Expected Discharge Date: 03/06/2023      Destination: other (comment) (unknown)  Discharge Comments: still getting IV BP meds, need upright XR.  needs TCU vs ARU.          Amada La MD  Hospitalist Service, GOLD TEAM 9  [facility]  Securely message with [employer] (more info)  Text page via [facility] Paging/Directory   See signed in provider for up to date coverage information  ______________________________________________________________________    Interval History   Patient had 1.2 L out by thora yesterday afternoon. Slept really well on BiPAP after procedure. On oxymask this morning and breathing is much more comfortable. No longer feeling congested. Still anxious to get out of the hospital.    Physical Exam   Vital Signs: Temp: 98.1  F (36.7  C) Temp src: Axillary BP: 93/55 Pulse: 67   Resp: 16 SpO2: 100 % O2 Device: BiPAP/CPAP (12/8) Oxygen Delivery: 8 LPM  Weight: 156 lbs 1.37 oz    Constitutional: lying in bed  HEENT: left " side of head shaved with large stapled wound from craniotomy  Respiratory: no resp distress, breath sounds on right about snf down back, improved from yestrday, mild crackles notable on the left  Cardiovascular: RRR  GI: soft, non-distended, non-tender  Neurologic: alert and oriented x3, no asterixis, constant fidgeting       Medical Decision Making       **CLEAR ALL SELECTIONS**      Data     I have personally reviewed the following data over the past 24 hrs:    2.7 (L)  \   7.8 (L)   / 49 (LL)     136 103 15.9 /  83   3.8 25 0.61 \       ALT: N/A AST: N/A AP: N/A TBILI: N/A   ALB: N/A TOT PROTEIN: N/A LIPASE: N/A       Procal: 0.29 (H) CRP: 44.30 (H) Lactic Acid: N/A       INR:  1.82 (H) PTT:  N/A   D-dimer:  N/A Fibrinogen:  219       Imaging results reviewed over the past 24 hrs:   Recent Results (from the past 24 hour(s))   XR Chest Port 1 View    Narrative    Exam: XR CHEST PORT 1 VIEW, 3/4/2023 9:41 AM    Indication: increased wheezing, follow up on pleural effusion    Comparison: 3/1/2023 and 2/27/2023 chest x-rays    Findings:   Upright, frontal view the chest. Markedly increased size of right  pleural effusion with likely lobar atelectasis of the right middle and  right lower lobes. The right upper lobe is somewhat aerated. Diffuse  interstitial and airspace opacities throughout the left lung. No  significant left-sided pleural effusion. No pneumothorax.       Impression    Impression: Markedly increased right pleural effusion with likely  lobar atelectasis of the right middle and lower lobes. Increased left  lung opacities likely pulmonary edema.    I have personally reviewed the examination and initial interpretation  and I agree with the findings.    BROCK MENDIOLA MD         SYSTEM ID:  [id number]   POC US Guide for Thorcentesis    Impression    Limited point of care pleural/lung ultrasound to evaluate for thoracentesis.    Thoracentesis Indication:pleural effusion     Views/Acquisition: Right   pleural space(s): upright and supine.      Findings/Interpretation: 5cm pocket of pleural fluid identified. Unable to perform thora as patient on BiPAP with 50% FiO2 and concern for safety of procedure in supine position and pocket very close to mid axillary line and need for continuous positive pressure ventillation    Kirsten Kellogg MD     XR Chest Port 1 View    Narrative    EXAM: XR CHEST PORT 1 VIEW  3/4/2023 7:46 PM      HISTORY: ACEVEDO s/p thora    COMPARISON: X-ray 3/1/2023    FINDINGS: Single view of the chest. Interval decrease in right pleural  effusion. No left pleural effusion. Question small right lateral  pleural line, likely projectional. No left pneumothorax. Unchanged  cardiac silhouette. Bilateral mixed interstitial and airspace  opacities with apical predominance.    Cholecystectomy clips. Visualized upper abdomen is otherwise  unremarkable. No aggressive osseous lesions.      Impression    IMPRESSION:  1. Bilateral mixed interstitial and airspace opacities with apical  predominance, which may be representative of atelectasis versus  infection or volume overload.  2. Decreased right-sided pleural effusion.   3. Small right lateral pleural line is likely projectional. Attention  on follow-up.     I have personally reviewed the examination and initial interpretation  and I agree with the findings.    LALIT MONSON MD         SYSTEM ID:  [id number]

## 2023-03-05 NOTE — PROVIDER NOTIFICATION
Pt's plt's 49 this AM- down from 89 yesterday; WBC also 2.7.  BPs still soft 93/55, MAP 68.     Paged to [employer] at [phone].     Addendum: Per Dr. Quijano, no change in plan.  Watch for bleeding/ infection s/s.      Also, just continue to monitor blood pressures.  No evidence that she volume deficit at this time.

## 2023-03-05 NOTE — PLAN OF CARE
"Goal Outcome Evaluation:    Major Shift Events:  Patient alert and oriented.  Follows commands and more calm this shift.  Having soft BPs with systolics in the 90's but MAPS >65.  MD notified and advised to continue to monitor.  Afebrile.  On 7-9L oxymask (weaning down) before putting on BiPap at HS.  12/8, now at 40%. Patient tolerating BiPap and reports that breathing feels \"so much better\" resulting in \"best sleep she's  gotten in days.\"  Lungs clear to diminished.  Denies SOB, and minimally noted by writer when BiPap off.  Breathing appears non labored.  RR <25.  Received Lasix yesterday with large outputs that slowed down overnight.  Electrolytes replaced.   Plts this morning 49, and WBC 2.7- MD notified.  Advised to watch for bleeding and s/s of infection.    Plan: Wean O2 needs, watch for signs of bleeding and infection, monitor BP  For vital signs and complete assessments, please see documentation flowsheets.        "

## 2023-03-05 NOTE — PROGRESS NOTES
Major Shift Events: AOx4. Following commands, restless at times. Increased wheezing, dyspnea, tachypnea, O2 requirements this AM after ambulation w/ sats in 70-80s; difficulty recovering after activity. BiPAP placed w/ improvement. 40 mg lasix given w/ good response; several large unmeasured voids. Thoracentesis performed at bedside. Tolerating 10L oxymask after procedure, activity tolerance remains low. Several loose BMs. Purewick in place. Pt family & 1:1 sitter present at bedside throughout the day.  Plan: Continue to monitor respiratory status. Do not disturb 11p-7a.   For vital signs and complete assessments, please see documentation flowsheets.

## 2023-03-05 NOTE — PROVIDER NOTIFICATION
FYI Having softer pressures, last 90/52 MAP 68 at 0300. Had thora today (1L) and got 40mg of Lasix. Is sleeping pretty heavily, with BiPap, for first time in several nights.   Eliana, [phone]     Paged to [employer] at [phone]

## 2023-03-06 ENCOUNTER — APPOINTMENT (OUTPATIENT)
Dept: GENERAL RADIOLOGY | Facility: CLINIC | Age: 58
End: 2023-03-06
Payer: COMMERCIAL

## 2023-03-06 ENCOUNTER — APPOINTMENT (OUTPATIENT)
Dept: GENERAL RADIOLOGY | Facility: CLINIC | Age: 58
End: 2023-03-06
Attending: STUDENT IN AN ORGANIZED HEALTH CARE EDUCATION/TRAINING PROGRAM
Payer: COMMERCIAL

## 2023-03-06 ENCOUNTER — APPOINTMENT (OUTPATIENT)
Dept: OCCUPATIONAL THERAPY | Facility: CLINIC | Age: 58
End: 2023-03-06
Payer: COMMERCIAL

## 2023-03-06 LAB
ANION GAP SERPL CALCULATED.3IONS-SCNC: 6 MMOL/L (ref 7–15)
BUN SERPL-MCNC: 16.9 MG/DL (ref 8–23)
CALCIUM SERPL-MCNC: 8.7 MG/DL (ref 8.6–10)
CHLORIDE SERPL-SCNC: 100 MMOL/L (ref 98–107)
CREAT SERPL-MCNC: 0.67 MG/DL (ref 0.51–0.95)
DEPRECATED HCO3 PLAS-SCNC: 30 MMOL/L (ref 22–29)
ERYTHROCYTE [DISTWIDTH] IN BLOOD BY AUTOMATED COUNT: 19.9 % (ref 10–15)
GFR SERPL CREATININE-BSD FRML MDRD: >90 ML/MIN/1.73M2
GLUCOSE SERPL-MCNC: 90 MG/DL (ref 70–99)
HCT VFR BLD AUTO: 26.6 % (ref 35–47)
HGB BLD-MCNC: 8.2 G/DL (ref 11.7–15.7)
MAGNESIUM SERPL-MCNC: 1.8 MG/DL (ref 1.7–2.3)
MCH RBC QN AUTO: 32.5 PG (ref 26.5–33)
MCHC RBC AUTO-ENTMCNC: 30.8 G/DL (ref 31.5–36.5)
MCV RBC AUTO: 106 FL (ref 78–100)
PHOSPHATE SERPL-MCNC: 2.4 MG/DL (ref 2.5–4.5)
PLATELET # BLD AUTO: 51 10E3/UL (ref 150–450)
POTASSIUM SERPL-SCNC: 3.7 MMOL/L (ref 3.4–5.3)
RBC # BLD AUTO: 2.52 10E6/UL (ref 3.8–5.2)
SODIUM SERPL-SCNC: 136 MMOL/L (ref 136–145)
WBC # BLD AUTO: 2.5 10E3/UL (ref 4–11)

## 2023-03-06 PROCEDURE — 250N000011 HC RX IP 250 OP 636: Performed by: STUDENT IN AN ORGANIZED HEALTH CARE EDUCATION/TRAINING PROGRAM

## 2023-03-06 PROCEDURE — 71046 X-RAY EXAM CHEST 2 VIEWS: CPT | Mod: 26 | Performed by: RADIOLOGY

## 2023-03-06 PROCEDURE — 250N000013 HC RX MED GY IP 250 OP 250 PS 637: Performed by: STUDENT IN AN ORGANIZED HEALTH CARE EDUCATION/TRAINING PROGRAM

## 2023-03-06 PROCEDURE — 99232 SBSQ HOSP IP/OBS MODERATE 35: CPT | Performed by: STUDENT IN AN ORGANIZED HEALTH CARE EDUCATION/TRAINING PROGRAM

## 2023-03-06 PROCEDURE — 97535 SELF CARE MNGMENT TRAINING: CPT | Mod: GO

## 2023-03-06 PROCEDURE — 85014 HEMATOCRIT: CPT | Performed by: NURSE PRACTITIONER

## 2023-03-06 PROCEDURE — 72100 X-RAY EXAM L-S SPINE 2/3 VWS: CPT

## 2023-03-06 PROCEDURE — 200N000002 HC R&B ICU UMMC

## 2023-03-06 PROCEDURE — 250N000013 HC RX MED GY IP 250 OP 250 PS 637: Performed by: NURSE PRACTITIONER

## 2023-03-06 PROCEDURE — 83735 ASSAY OF MAGNESIUM: CPT | Performed by: NURSE PRACTITIONER

## 2023-03-06 PROCEDURE — 72100 X-RAY EXAM L-S SPINE 2/3 VWS: CPT | Mod: 26 | Performed by: SURGERY

## 2023-03-06 PROCEDURE — 97530 THERAPEUTIC ACTIVITIES: CPT | Mod: GO

## 2023-03-06 PROCEDURE — 36415 COLL VENOUS BLD VENIPUNCTURE: CPT | Performed by: NURSE PRACTITIONER

## 2023-03-06 PROCEDURE — 250N000013 HC RX MED GY IP 250 OP 250 PS 637: Performed by: PHYSICIAN ASSISTANT

## 2023-03-06 PROCEDURE — 71046 X-RAY EXAM CHEST 2 VIEWS: CPT

## 2023-03-06 PROCEDURE — 80048 BASIC METABOLIC PNL TOTAL CA: CPT | Performed by: NURSE PRACTITIONER

## 2023-03-06 PROCEDURE — 84100 ASSAY OF PHOSPHORUS: CPT | Performed by: NURSE PRACTITIONER

## 2023-03-06 RX ORDER — ZOLPIDEM TARTRATE 5 MG/1
10 TABLET ORAL
Status: DISCONTINUED | OUTPATIENT
Start: 2023-03-06 | End: 2023-03-07 | Stop reason: HOSPADM

## 2023-03-06 RX ORDER — POTASSIUM CHLORIDE 750 MG/1
20 TABLET, EXTENDED RELEASE ORAL ONCE
Status: COMPLETED | OUTPATIENT
Start: 2023-03-06 | End: 2023-03-06

## 2023-03-06 RX ORDER — MAGNESIUM SULFATE HEPTAHYDRATE 40 MG/ML
2 INJECTION, SOLUTION INTRAVENOUS ONCE
Status: COMPLETED | OUTPATIENT
Start: 2023-03-06 | End: 2023-03-06

## 2023-03-06 RX ADMIN — RIFAXIMIN 550 MG: 550 TABLET ORAL at 19:53

## 2023-03-06 RX ADMIN — LACTULOSE 20 G: 20 SOLUTION ORAL at 08:21

## 2023-03-06 RX ADMIN — POTASSIUM & SODIUM PHOSPHATES POWDER PACK 280-160-250 MG 1 PACKET: 280-160-250 PACK at 13:10

## 2023-03-06 RX ADMIN — LEVOTHYROXINE SODIUM 25 MCG: 0.03 TABLET ORAL at 08:21

## 2023-03-06 RX ADMIN — POTASSIUM & SODIUM PHOSPHATES POWDER PACK 280-160-250 MG 1 PACKET: 280-160-250 PACK at 05:35

## 2023-03-06 RX ADMIN — Medication 3 TABLET: at 08:22

## 2023-03-06 RX ADMIN — Medication 1 TABLET: at 08:21

## 2023-03-06 RX ADMIN — RIFAXIMIN 550 MG: 550 TABLET ORAL at 08:21

## 2023-03-06 RX ADMIN — ZOLPIDEM TARTRATE 10 MG: 5 TABLET ORAL at 19:53

## 2023-03-06 RX ADMIN — POTASSIUM & SODIUM PHOSPHATES POWDER PACK 280-160-250 MG 1 PACKET: 280-160-250 PACK at 08:58

## 2023-03-06 RX ADMIN — Medication 3 TABLET: at 19:53

## 2023-03-06 RX ADMIN — GABAPENTIN 600 MG: 600 TABLET, FILM COATED ORAL at 22:28

## 2023-03-06 RX ADMIN — NADOLOL 20 MG: 20 TABLET ORAL at 19:53

## 2023-03-06 RX ADMIN — THIAMINE HYDROCHLORIDE 100 MG: 100 INJECTION, SOLUTION INTRAMUSCULAR; INTRAVENOUS at 08:28

## 2023-03-06 RX ADMIN — MAGNESIUM SULFATE IN WATER 2 G: 40 INJECTION, SOLUTION INTRAVENOUS at 05:36

## 2023-03-06 RX ADMIN — POTASSIUM CHLORIDE 20 MEQ: 750 TABLET, EXTENDED RELEASE ORAL at 05:35

## 2023-03-06 RX ADMIN — PANTOPRAZOLE SODIUM 40 MG: 40 TABLET, DELAYED RELEASE ORAL at 08:21

## 2023-03-06 RX ADMIN — FOLIC ACID 1 MG: 5 INJECTION, SOLUTION INTRAMUSCULAR; INTRAVENOUS; SUBCUTANEOUS at 08:56

## 2023-03-06 ASSESSMENT — ACTIVITIES OF DAILY LIVING (ADL)
ADLS_ACUITY_SCORE: 32
ADLS_ACUITY_SCORE: 36
ADLS_ACUITY_SCORE: 36
ADLS_ACUITY_SCORE: 32
ADLS_ACUITY_SCORE: 36
ADLS_ACUITY_SCORE: 32
ADLS_ACUITY_SCORE: 32
ADLS_ACUITY_SCORE: 36
ADLS_ACUITY_SCORE: 32
ADLS_ACUITY_SCORE: 32

## 2023-03-06 ASSESSMENT — VISUAL ACUITY: OU: BASELINE

## 2023-03-06 NOTE — PROGRESS NOTES
CLINICAL NUTRITION SERVICES - REASSESSMENT NOTE   Nutrition Prescription    Malnutrition Status:    Moderate malnutrition in the context of acute illness    Recommendations already ordered by Registered Dietitian (RD):  Supplements:  -Given inadequate PO, transitioning to higher-kcal Ensure (writer aware higher-CHO and pt with h/o GB): Ensure Enlive @ 10am & 2pm (vary flavors)  -Special K Bar @ 2pm    Future/Additional Recommendations:  Monitor PO adequacy and tolerance to new ONS     EVALUATION OF THE PROGRESS TOWARD GOALS   Diet: Regular + Ensure Max between meals    Oral Intake: Variable PO with 25%-100% of small items/meals per RN flowsheet. Pt reports appetite is improving and she's taking a couple Ensure Max drinks daily - her spouse at bedside corroborates. She enjoys Ensure and likes both chocolate and vanilla flavors - strawberry is her preference, but unfortunately Nutrition is out of this flavor for the foreseeable future d/t supply issues. She in interested in starting Special K bars too. Pt was working on an applesauce and blueberry muffin at time of writer's visit, in good spirits.    Nutrition Support: EN  2/27 - 2/28: Pam Adamson Peptide 1.5 @ 40 mL/hr (960 mL/day) to provide 1476 kcal (25 kcal/kg), 71 g protein (1.2 g/kg), 132 g CHO, 9 g fiber, 74 g fat (40% from MCTs), and 672 mL free water daily + Prosource x 2 = 1636 kcals (28 kcals/kg) and 111 gm PRO (1.9 gm/kg)     NEW FINDINGS   -Wt trends: ~4.3% wt loss over past week  03/05/23 0000 70.8 kg (156 lb 1.4 oz) Bed scale   03/04/23 0400 72.2 kg (159 lb 2.8 oz) Bed scale   03/02/23 0400 -- Bed scale   03/02/23 0000 73.8 kg (162 lb 11.2 oz) Bed scale   03/01/23 0400 75.2 kg (165 lb 12.6 oz) Bed scale   02/28/23 0400 74.6 kg (164 lb 7.4 oz) Bed scale   02/26/23 1710 74.1 kg (163 lb 5.8 oz) Bed scale     -Labs: Reviewed, notable for:     Phos 2.4 (L) - on repletion    -GI: H/o Beto-en-Y GB. Cirrhosis 2/2 EtOH c/b ascites, varices, and HCC s/p ablation  2009    0-8 BMs daily over past week per I/Os, last BM yesterday per chart review    -Neuro: Brain compression and uncal herniation in setting of subdural hematoma s/p left bur hole craniotomy for evacuation (2/26/2023)    -Meds & Vitamin/Mineral Supplementation: Reviewed, notable for:     Citracal    Vitamin B12    Folic acid    Lactulose    Thera-vit-M    Neutra-phos    KCl    Thiamine    NEW Dosing Weight: 58 kg (adjusted weight using current/admit weight of 70.8 kg on 3/5 and IBW of 54 kg)     UPDATED ASSESSED NUTRITION NEEDS   Estimated Energy Needs: 9891-6796 kcals/day (25 - 30 kcals/kg)   Justification: Maintenance   Estimated Protein Needs:  grams protein/day (1.5 - 2 grams of pro/kg)   Justification: Increased needs   Estimated Fluid Needs: (1 mL/kcal)   Justification: Maintenance and Per provider pending fluid status    MALNUTRITION  % Intake: < 75% for > 7 days (moderate)  % Weight Loss: > 2% in 1 week - counting toward moderate malnutrition d/t lack of other severe criteria  Subcutaneous Fat Loss: None observed  Muscle Loss: Thoracic region (clavicle, acromium bone, deltoid, trapezius, pectoral):  Mild - TSLO brace  Fluid Accumulation/Edema: Does not meet criteria  Malnutrition Diagnosis: Moderate malnutrition in the context of acute illness    Previous Goals   Total avg nutritional intake to meet a minimum of 25 kcal/kg and 1.5 g PRO/kg daily (per dosing wt 59 kg).  Evaluation: Unable to evaluate - TF discontinued 2/28    Previous Nutrition Diagnosis  Inadequate oral intake related to inability to take oral PO/vented as evidenced by NPO and meeting 0% nutrition needs at present     Evaluation: Improving    CURRENT NUTRITION DIAGNOSIS  Inadequate oral intake related to decreased/variable appetite as evidenced by pt reports of relying on Ensure drinks to support PO, ~4.3% wt loss this admit, and only eating small items at meals at this time.      INTERVENTIONS  Implementation  -Medical food  supplement therapy: Ordered as above per pt preference  -Nutrition education: Encouraged continued use of Ensure, but switching to higher kcal version. Encouraged PO, particularly protein.     Goals  Patient to consume % of nutritionally adequate meal trays TID, or the equivalent with supplements/snacks.    Monitoring/Evaluation  Progress toward goals will be monitored and evaluated per protocol.     Linda Porter RD, LD  Pager: [phone]

## 2023-03-06 NOTE — PLAN OF CARE
Goal Outcome Evaluation:    Major Shift Events:  Patient alert and oriented.  Follows commands.  Speech is fluent.  Moving all extremities equally. Restless at times.  Up with A1-2, TLSO, walker and gait belt.  SBP in 100's-110's overnight with adequate MAPs.  Afebrile.  Denies HA, lightheadedness or dizziness.  1L NC overnight.  Lungs diminished in bases with crackles in upper lobes.  Denies SOB.  Non-labored.  Ambulated to bathroom on RA with sats dropping from 95% at rest to 88%.  Did return quickly to >90% upon sitting. 1 loose watery BM.  Voided unmeasured amount d/t mixture with stool.  All electrolytes replaced with rechecks tomorrow AM per protocol.  Plts remain low at 51 and WBC low at 2.2.  No signs of bleeding or infection noted.      Plan: Transfer to [facility] when bed available, continue to encourage activity and PO intake.  Monitor respiratory status/O2 needs.    For vital signs and complete assessments, please see documentation flowsheets.

## 2023-03-06 NOTE — PLAN OF CARE
Major Shift Events:  Pt A&O x4, able to make her needs known. Neuro intact, no deficits noted. Sitter still at bedside until case is reviewed and closed. Up right X-rays taken, Neuro surg notified. On RA, O2 sats stable. Intermittent non productive cough. IS education given and encouraged. LS have crackles in upper airways and diminished in the bases. Normal sinus during shift. SBP within goal range, no interventions needed. Afebrile. Tolerating reg diet. Voiding spontaneously. New transfer orders placed, cardiac monitoring not needed.   Plan: Transfer pt out of ICU when bed becomes available. Notify team of any acute changes.   For vital signs and complete assessments, please see documentation flowsheets.     Problem: Plan of Care - These are the overarching goals to be used throughout the patient stay.    Goal: Optimal Comfort and Wellbeing  Outcome: Progressing  Problem: Plan of Care - These are the overarching goals to be used throughout the patient stay.    Goal: Readiness for Transition of Care  Outcome: Progressing

## 2023-03-06 NOTE — PROGRESS NOTES
[facility]    Medicine Progress Note - Hospitalist Service, GOLD TEAM 9    Date of Admission:  2/26/2023    Assessment & Plan   Sulma Ramos is a 57 year old female with cirrhosis c/b HE, ascites, portal hypertension, portal thrombus, on anticoagulation on Eliquis. history of Beto-en Y gastric bypass. She initially presented to [facility] 2/26/2023 with AMS, found to have acute SDH c/b herniation and underwent urgent neurosurgical errol hole decompression. She is transferred from neuro ICU to medicine on 3/2.     Subdural hematoma s/p left bur hole craniotomy for evacuation (2/26/2023)  Brain compression, improved post evacuation   Uncal herniation, improved post evacuation   Hx seizures  Presented to [facility] 2/26 with AMS, found to have acute SDH (b/l, L>R) c/b midline shift and uncal herniation. Transferred to [facility] and underwent left sided bur hole craniotomy for evacuation 2/26/23 w/ Dr. Mayo, post-op CT showing decompression w/ residual left sided convexity. KATIA drain initially left in place, removed 3/2. Head CT 3/3 showed stable subdural hematoma with similar midline shift. Mental status improving per family, but patient remains not fully oriented and distractable.   - Neurosurgery following peripherally (OK for discharge from this standpoint assuming below follow up is arranged):   - Head CT weekly (next due for 3/10)   - Continue to Eliquis, neurosurg to consider restarting pending results of 3/17 CT   - Follow up with neurosurgery 2 weeks post op (3/12) for wound check  - Completed keppra 500mg Q12hr x7 days for seizure ppx  - SBP goal < 140 mmHg per neuro crit team; labetalol prn has been discontinued  - HOB > 30     Acute hypoxic respiratory failure  Large right pleural effusion  Acute pulmonary edema  Patient initially improving after extubation, but had desaturations after exertion on 3/4 requiring increase in O2. Subsequently also developed  increased WOB and placed back on BiPAP. CXR revealed increased size of pleural effusion as well as diffuse left lung opacities suggestive of pulm edema. S/p thora for 1.2 L out on 3/4 and 2 days of IV diuresis with improvement in respiratory status.  - Duo nebs Q4hr PRN  - Continuous pulse ox  - CXR today --> improvement in pulmonary edema    Cirrhosis 2/2 EtOH c/b portal hypertension, PV thrombus, ascites, HE, varices   Hx of HCC s/p ablation 2009  Follows outpatient at [facility]. On apixaban for hx of PV thrombus, though [place] documentation shows that this had resolved as of imaging 5/2022. US on admission here shows echogenic material in the PV suggestive of thrombus presence again. Currently no signs of HE.  - HE: continue rifaximin, lactulose  - Varices: continue nadolol  - PV thrombus: HOLDING apixaban in setting of SDH, see above for AC management    Hx multiple falls  Hx L1 compression fracture  Received care for L1 compression fracture at [facility]. Wears TLSO brace at home. Last fall on 1/19/2023 resulting in a hematoma to left forearm, xray forearm without fracture. C-spine without fracture on admission.   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 Baseline platelets in low 100s. Kcentra given at [facility]. Received 2 units FFP post-op for INR 1.7 and 1 unit pRBC for hgb 7. Received 1 unit platelet and 2 unit FFP prior to KATIA drain removal 3/2.   - Holding PTA Ferrous sulfate, consider resuming   - Daily CBC  - Hgb goal >7, plt goal >50k (per neuro crit)      Moderate malnutrition in the context of  "acute illness  Hx of gastric bypass (Beto-en Y)  GERD  Tolerating regular diet.  - Nutrition consulted  - Continue pta supplements  - Continue pta PPI     Hyperchloremia, resolved  Hypophosphatemia   - Daily BMP, I&O and weights  - Electrolyte replacement protocol     RLS   - Continue PTA Gabapentin 600 mg at bedtime    Hypothyroidism  - PTA levothyroxine           Diet: Regular Diet Adult  Snacks/Supplements Adult: Ensure Max Protein (bariatric); Between Meals    DVT Prophylaxis: Pneumatic Compression Devices  Clark Catheter: Not present  Lines: None     Cardiac Monitoring: ACTIVE order. Indication: ICU  Code Status: Full Code      Clinically Significant Risk Factors              # Hypoalbuminemia: Lowest albumin = 2.3 g/dL at 2/28/2023  3:33 AM, will monitor as appropriate   # Thrombocytopenia: Lowest platelets = 49 in last 2 days, will monitor for bleeding         # Overweight: Estimated body mass index is 26.79 kg/m  as calculated from the following:    Height as of this encounter: 1.626 m (5' 4\").    Weight as of this encounter: 70.8 kg (156 lb 1.4 oz).          Disposition Plan      Expected Discharge Date: 03/07/2023      Destination: inpatient rehabilitation facility;home with family  Discharge Comments: Pending stability on room air x24 hours          Amada La MD  Hospitalist Service, GOLD TEAM 9  [facility]  Securely message with [employer] (more info)  Text page via [employer] Paging/Directory   See signed in provider for up to date coverage information  ______________________________________________________________________    Interval History   No acute events. Patient able to be weaned down to room air by the evening yesterday. Working with therapies and able to stand for upright XR this morning. Patient reports her breathing feels great, has no residual congestion or dyspnea.    Physical Exam   Vital Signs: Temp: 97.7  F (36.5  C) Temp src: Oral BP: 104/63 " Pulse: 72   Resp: 15 SpO2: 97 % O2 Device: Nasal cannula Oxygen Delivery: 1 LPM  Weight: 156 lbs 1.37 oz    Constitutional: lying in bed, NAD  HEENT: left side of head shaved with large stapled wound from craniotomy  Respiratory: no resp distress, lungs CTAB except for absent sounds in base of right lung  Cardiovascular: RRR  GI: soft, non-distended, non-tender  Neurologic: alert and oriented x3, no asterixis, constant fidgeting    Medical Decision Making       **CLEAR ALL SELECTIONS**      Data     I have personally reviewed the following data over the past 24 hrs:    2.5 (L)  \   8.2 (L)   / 51 (L)     136 100 16.9 /  90   3.7 30 (H) 0.67 \       Imaging results reviewed over the past 24 hrs:   Recent Results (from the past 24 hour(s))   XR Chest 2 Views    Narrative    Exam: XR CHEST 2 VIEWS, 3/6/2023 10:05 AM    Comparison: Chest x-ray 3/4/2023, CT chest 10/27/2023    History: follow pleural effusion and edema    Findings:   2 views of the chest. Trachea is midline. Stable cardiac silhouette.  Mild mixed interstitial and airspace opacities, decreased from prior  especially in the upper lung zones. Opacification of the inferior half  of the right hemithorax with a meniscus sign, increased from prior  chest x-ray. No facial pneumothorax. The visualized upper abdomen is  unremarkable. Redemonstrated compression deformity of L1 seen on  lateral view. No new or acute osseous abnormality.      Impression    Impression:   1. Increased right pleural effusion with likely atelectasis component.  2. Mild mixed interstitial airspace opacities, improved from prior.    I have personally reviewed the examination and initial interpretation  and I agree with the findings.    KISHA HOOKS MD         SYSTEM ID:  [id number]   XR Lumbar Spine 2/3 Views    Narrative    XR LUMBAR SPINE 2/3 VIEWS  3/6/2023 10:06 AM    History: L1 burst fx follow stability    Technique: Standing  AP and lateral  views of the lumbar spine  were  obtained.    Comparison: 2/26/2023 CT    Findings:  5 lumbar type vertebral bodies.    Unchanged L1 compression fracture with greater than 75% loss of the  vertebral body height. Minimal retropulsion. Mild kyphosis at this  level.  No acute osseous abnormality..  Moderate multilevel spondylosis most prominent L4-L5 and L5-S1.  No spondylolisthesis    Nonobstructive bowel gas pattern.  Cholecystectomy.      Impression    Impression:  1.  Unchanged L1 burst fracture with associated mild kyphosis.  2.  Moderate lumbar spondylosis.    RAMIREZ GARLAND MD         SYSTEM ID:  [id number]

## 2023-03-06 NOTE — PROGRESS NOTES
Brief Neurosurgery Progress Note    XR of lumbar spine stable. Recommend repeat lumbar XR after working with therapies and before discharge. Spoke with Amada Klein of Medicine team regarding this. Patient is ok for discharge from neurosurgical perspective.    Please page us before discharge we will arrange follow up  - She will need follow up with neurosurgery mami with head CT  around 3/17 to determine eliquis restart plan    Neurosurgery will follow peripherally at this time. Please contact with any questions or concerns.    Umm Holt PA-C  Neurosurgery Department  Pager: [phone]
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
## 2023-03-07 ENCOUNTER — APPOINTMENT (OUTPATIENT)
Dept: OCCUPATIONAL THERAPY | Facility: CLINIC | Age: 58
End: 2023-03-07
Payer: COMMERCIAL

## 2023-03-07 ENCOUNTER — APPOINTMENT (OUTPATIENT)
Dept: PHYSICAL THERAPY | Facility: CLINIC | Age: 58
End: 2023-03-07
Payer: COMMERCIAL

## 2023-03-07 VITALS
SYSTOLIC BLOOD PRESSURE: 110 MMHG | HEIGHT: 64 IN | TEMPERATURE: 98.8 F | OXYGEN SATURATION: 94 % | BODY MASS INDEX: 26.76 KG/M2 | DIASTOLIC BLOOD PRESSURE: 91 MMHG | RESPIRATION RATE: 16 BRPM | WEIGHT: 156.75 LBS | HEART RATE: 80 BPM

## 2023-03-07 LAB
ANION GAP SERPL CALCULATED.3IONS-SCNC: 8 MMOL/L (ref 7–15)
BLD PROD TYP BPU: NORMAL
BLOOD COMPONENT TYPE: NORMAL
BUN SERPL-MCNC: 14.2 MG/DL (ref 8–23)
CALCIUM SERPL-MCNC: 8.6 MG/DL (ref 8.6–10)
CHLORIDE SERPL-SCNC: 103 MMOL/L (ref 98–107)
CODING SYSTEM: NORMAL
CREAT SERPL-MCNC: 0.61 MG/DL (ref 0.51–0.95)
DEPRECATED HCO3 PLAS-SCNC: 27 MMOL/L (ref 22–29)
ERYTHROCYTE [DISTWIDTH] IN BLOOD BY AUTOMATED COUNT: 20.1 % (ref 10–15)
GFR SERPL CREATININE-BSD FRML MDRD: >90 ML/MIN/1.73M2
GLUCOSE SERPL-MCNC: 88 MG/DL (ref 70–99)
HCT VFR BLD AUTO: 27.3 % (ref 35–47)
HGB BLD-MCNC: 8.3 G/DL (ref 11.7–15.7)
ISSUE DATE AND TIME: NORMAL
MAGNESIUM SERPL-MCNC: 1.8 MG/DL (ref 1.7–2.3)
MCH RBC QN AUTO: 32.5 PG (ref 26.5–33)
MCHC RBC AUTO-ENTMCNC: 30.4 G/DL (ref 31.5–36.5)
MCV RBC AUTO: 107 FL (ref 78–100)
PHOSPHATE SERPL-MCNC: 2.5 MG/DL (ref 2.5–4.5)
PLATELET # BLD AUTO: 46 10E3/UL (ref 150–450)
POTASSIUM SERPL-SCNC: 3.9 MMOL/L (ref 3.4–5.3)
RBC # BLD AUTO: 2.55 10E6/UL (ref 3.8–5.2)
SODIUM SERPL-SCNC: 138 MMOL/L (ref 136–145)
UNIT ABO/RH: NORMAL
UNIT NUMBER: NORMAL
UNIT STATUS: NORMAL
UNIT TYPE ISBT: 6200
WBC # BLD AUTO: 2.1 10E3/UL (ref 4–11)

## 2023-03-07 PROCEDURE — 99239 HOSP IP/OBS DSCHRG MGMT >30: CPT | Performed by: STUDENT IN AN ORGANIZED HEALTH CARE EDUCATION/TRAINING PROGRAM

## 2023-03-07 PROCEDURE — 36415 COLL VENOUS BLD VENIPUNCTURE: CPT | Performed by: NURSE PRACTITIONER

## 2023-03-07 PROCEDURE — 97116 GAIT TRAINING THERAPY: CPT | Mod: GP | Performed by: PHYSICAL THERAPIST

## 2023-03-07 PROCEDURE — 250N000013 HC RX MED GY IP 250 OP 250 PS 637: Performed by: STUDENT IN AN ORGANIZED HEALTH CARE EDUCATION/TRAINING PROGRAM

## 2023-03-07 PROCEDURE — 80048 BASIC METABOLIC PNL TOTAL CA: CPT | Performed by: NURSE PRACTITIONER

## 2023-03-07 PROCEDURE — 999N000127 HC STATISTIC PERIPHERAL IV START W US GUIDANCE

## 2023-03-07 PROCEDURE — 84100 ASSAY OF PHOSPHORUS: CPT | Performed by: NURSE PRACTITIONER

## 2023-03-07 PROCEDURE — P9037 PLATE PHERES LEUKOREDU IRRAD: HCPCS | Performed by: HOSPITALIST

## 2023-03-07 PROCEDURE — 250N000013 HC RX MED GY IP 250 OP 250 PS 637: Performed by: NURSE PRACTITIONER

## 2023-03-07 PROCEDURE — 250N000013 HC RX MED GY IP 250 OP 250 PS 637: Performed by: PHYSICIAN ASSISTANT

## 2023-03-07 PROCEDURE — 250N000011 HC RX IP 250 OP 636: Performed by: STUDENT IN AN ORGANIZED HEALTH CARE EDUCATION/TRAINING PROGRAM

## 2023-03-07 PROCEDURE — 83735 ASSAY OF MAGNESIUM: CPT | Performed by: NURSE PRACTITIONER

## 2023-03-07 PROCEDURE — 85027 COMPLETE CBC AUTOMATED: CPT | Performed by: NURSE PRACTITIONER

## 2023-03-07 PROCEDURE — 97530 THERAPEUTIC ACTIVITIES: CPT | Mod: GP | Performed by: PHYSICAL THERAPIST

## 2023-03-07 PROCEDURE — 97535 SELF CARE MNGMENT TRAINING: CPT | Mod: GO

## 2023-03-07 RX ORDER — LANOLIN ALCOHOL/MO/W.PET/CERES
100 CREAM (GRAM) TOPICAL DAILY
Qty: 30 TABLET | Refills: 0 | Status: SHIPPED | OUTPATIENT
Start: 2023-03-08 | End: 2023-04-07

## 2023-03-07 RX ORDER — LACTULOSE 10 G/15ML
20 SOLUTION ORAL DAILY
Qty: 900 ML | Refills: 0 | Status: SHIPPED | OUTPATIENT
Start: 2023-03-08 | End: 2023-04-07

## 2023-03-07 RX ORDER — MULTIPLE VITAMINS W/ MINERALS TAB 9MG-400MCG
1 TAB ORAL DAILY
Qty: 30 TABLET | Refills: 0 | Status: SHIPPED | OUTPATIENT
Start: 2023-03-08 | End: 2023-04-07

## 2023-03-07 RX ORDER — MAGNESIUM OXIDE 400 MG/1
400 TABLET ORAL EVERY 4 HOURS
Status: COMPLETED | OUTPATIENT
Start: 2023-03-07 | End: 2023-03-07

## 2023-03-07 RX ORDER — PANTOPRAZOLE SODIUM 40 MG/1
40 TABLET, DELAYED RELEASE ORAL
Qty: 30 TABLET | Refills: 0 | Status: SHIPPED | OUTPATIENT
Start: 2023-03-08 | End: 2023-04-07

## 2023-03-07 RX ORDER — FOLIC ACID 1 MG/1
1 TABLET ORAL DAILY
Status: DISCONTINUED | OUTPATIENT
Start: 2023-03-08 | End: 2023-03-07 | Stop reason: HOSPADM

## 2023-03-07 RX ORDER — FOLIC ACID 1 MG/1
1 TABLET ORAL DAILY
Qty: 30 TABLET | Refills: 0 | Status: SHIPPED | OUTPATIENT
Start: 2023-03-08 | End: 2023-04-07

## 2023-03-07 RX ADMIN — THIAMINE HYDROCHLORIDE 100 MG: 100 INJECTION, SOLUTION INTRAMUSCULAR; INTRAVENOUS at 07:57

## 2023-03-07 RX ADMIN — PANTOPRAZOLE SODIUM 40 MG: 40 TABLET, DELAYED RELEASE ORAL at 06:54

## 2023-03-07 RX ADMIN — POTASSIUM & SODIUM PHOSPHATES POWDER PACK 280-160-250 MG 1 PACKET: 280-160-250 PACK at 07:57

## 2023-03-07 RX ADMIN — MAGNESIUM OXIDE TAB 400 MG (241.3 MG ELEMENTAL MG) 400 MG: 400 (241.3 MG) TAB at 07:57

## 2023-03-07 RX ADMIN — MAGNESIUM OXIDE TAB 400 MG (241.3 MG ELEMENTAL MG) 400 MG: 400 (241.3 MG) TAB at 11:41

## 2023-03-07 RX ADMIN — RIFAXIMIN 550 MG: 550 TABLET ORAL at 07:57

## 2023-03-07 RX ADMIN — FOLIC ACID 1 MG: 5 INJECTION, SOLUTION INTRAMUSCULAR; INTRAVENOUS; SUBCUTANEOUS at 07:56

## 2023-03-07 RX ADMIN — POTASSIUM & SODIUM PHOSPHATES POWDER PACK 280-160-250 MG 1 PACKET: 280-160-250 PACK at 11:41

## 2023-03-07 RX ADMIN — Medication 3 TABLET: at 07:57

## 2023-03-07 RX ADMIN — Medication 1 TABLET: at 07:57

## 2023-03-07 RX ADMIN — LACTULOSE 20 G: 20 SOLUTION ORAL at 07:57

## 2023-03-07 ASSESSMENT — VISUAL ACUITY: OU: BASELINE

## 2023-03-07 ASSESSMENT — ACTIVITIES OF DAILY LIVING (ADL)
ADLS_ACUITY_SCORE: 36
ADLS_ACUITY_SCORE: 32
ADLS_ACUITY_SCORE: 32
ADLS_ACUITY_SCORE: 36
ADLS_ACUITY_SCORE: 32
ADLS_ACUITY_SCORE: 36
ADLS_ACUITY_SCORE: 32
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
## 2023-03-07 NOTE — PROGRESS NOTES
Discharged to: home at 1645  Belongings: Sent with pt, pillow, clothes, and walker  AVS (After Visit Summary) discussed with: patient and  (Eugene)

## 2023-03-07 NOTE — PLAN OF CARE
Major Shift Events: A&Ox4. Follows commands. Neuro intact, can be antsy at times. A of 1 w/ walker and TLSO brace. Denied pain. Pt stated improved sleep overnight. VSS on RA. Intermittent non-productive cough, IS follow up. LS- crackles in uppers, diminished in lowers. Afebrile. SBP goal < 140 maintained w/o interventions overnight. No BM overnight. Voids spont., decreased UO. Sitter still at bedside until case is reviewed and closed.    Platelets critical value = 46. Lab notified RN at 0620 and RN paged [facility] team at 0630. Waiting to hear back from team about intervention.    Plan: Transfer to ARU today. X-rays prior to discharge. Will continue to monitor pt status and will notify the [facility] team w/ any concerns or changes.  For vital signs and complete assessments, please see documentation flowsheets.    Plan of Care Reviewed With: patient  Overall Patient Progress: improving

## 2023-03-07 NOTE — PLAN OF CARE
Physical Therapy Discharge Summary    Reason for therapy discharge:    Discharged to home with home therapy.    Progress towards therapy goal(s). [facility] for goal details.  Goals partially met.  Barriers to achieving goals:   discharge from facility.    Therapy recommendation(s):    Continued therapy is recommended.  Rationale/Recommendations:  Home PT to maximize safety and IND.

## 2023-03-07 NOTE — PROGRESS NOTES
Care Management Follow Up    Length of Stay (days): 9    Expected Discharge Date: 03/07/2023     Concerns to be Addressed: all concerns addressed in this encounter, discharge planning, physical/sexual safety     Patient plan of care discussed at interdisciplinary rounds: Yes    Anticipated Discharge Disposition: home with th meriadolfo Anticipated Discharge Services: None  Anticipated Discharge DME: None    Patient/family educated on Medicare website which has current facility and service quality ratings: yes  Education Provided on the Discharge Plan:    Patient/Family in Agreement with the Plan: yes    Referrals Placed by CM/SW: External Care Coordination, Post Acute Facilities  Private pay costs discussed: Not applicable    Additional Information:        Home Care Agency Referrals  Per MSW patient declined ARU. This provicer called and sent multiple referrals to all listed below [facility] agencies. No Home Care Provider have been found that will accept patient's insurance and provide care in [place], [place].     Home Care Agencies Declined  [facility] - don't take insurance  [facility] (ph:[phone] fax [phone]) - No services, at capacity for insurance   [facility] ([phone] fx:[phone]) - Only take [facility] PCP  [facility] not taking referrals (ph:[phone] fx:[phone])     [facility] (ph:[phone] fx: [phone]) -not taking referrals  [facility]  ([phone] fx:[phone]) not accepting insurance   [facility] - don't take insurance  [facility]  - don't take insurance  [facility] (ph:[phone] fx:[phone]) (BCBS medicare advantage only)  [facility] - don't take insurance   [facility]  -take insurance, as of 1/11/23 only taking [facility] referrals  [facility]  - don't take insurance  [facility] (ph:[phone] fx:[phone]) ([place], PMAP & [facility] no)  [facility]  (ph:[phone] fx:[phone]) (BSBS Medicare adv only)  [facility] (ph:[phone] fx: [phone]) (RN only, no lab, lots of med set up)   [facility]  (ph:[phone] fx:[phone]) sent referral via Epic . Not in Network.   [facility] (ph:[phone] fx:[phone]) (RN only)  [facility] (ph:[phone] fx: [phone]) as of 1/11/23 must have PCP within their system  [facility] ([phone]:[phone]) not accepting insurance    [facility] (ph:[phone] fx:[phone])not accepting insurance     [facility] (ph:[phone]:[phone]) not accepting insurance    [facility] ([phone] fx:[phone]) - not accepting insurance  [facility] ([facility])- not accepting insurance  (ph:[phone] fx:[phone])   [facility] -no staffing  [facility]  -no staffing  [facility] ([facility]: [facility] (ph:[phone] fx:[phone])-  No staffing       [facility] (ph:[phone]      fx: [phone])- no staffing    [facility] (ph:[phone]   fx:[phone]) - intake left for the day.      [email or web address] (ph:[phone] fx:[phone]) - don't service [facility]    [facility] (ph:[phone] fx:[phone]) - no staffing    [facility] (ph:[phone] fx:[phone]) - they would need to review referral, would need referral faxed    [facility] (ph:[phone] fx:[phone]) - no staffing  [facility] (ph: [phone] fx:[phone]) - no staffing  [facility]  (ph:[phone] fx: [phone])- no staffing  [facility] (ph:[phone] fx: [phone]) -  no staffing     Discussed with the patient and her  the challenge to find the [facility] provider. Discussed current therapy recs for ARU. Patient declined ARU and  wants to be discharged home with FWW and continue therapy outpatient.     Requesting Ams[facility]ila, Evening [facility] to make outpt therapy referral and hand off to PCP team.     Tessa Pittman, MSN, MA, CCM, RN  4C/4A ICU Care Coordinator  Phone:[phone]  Pager: [phone]

## 2023-03-08 NOTE — PLAN OF CARE
Occupational Therapy Discharge Summary    Reason for therapy discharge:    Discharged to home with home therapy.    Progress towards therapy goal(s). See goals on Care Plan in [facility] electronic health record for goal details.  Goals partially met.  Barriers to achieving goals:   discharge from facility.    Therapy recommendation(s):    Continued therapy is recommended.  Rationale/Recommendations:  Recommending home with assist from spouse and home OT to progress ADL IND/safety, functional mobility, overall cognition.

## 2023-03-09 ENCOUNTER — PATIENT OUTREACH (OUTPATIENT)
Dept: CARE COORDINATION | Facility: CLINIC | Age: 58
End: 2023-03-09
Payer: COMMERCIAL

## 2023-03-09 LAB — BACTERIA PLR CULT: NO GROWTH

## 2023-03-09 NOTE — PROGRESS NOTES
Clinic Care Coordination Contact  [facility]/Voicemail       Clinical Data: Care Coordinator Outreach  Outreach attempted x 2.  Left message on patient's voicemail with call back information and requested return call.  Plan: Care Coordinator will make no further outreaches at this time.    ALEA Gamino   Social Work Clinic Care Coordinator   [facility]  PH: [phone]  [email or web address]

## 2023-03-09 NOTE — DISCHARGE SUMMARY
[facility]  Hospitalist Discharge Summary      Date of Admission:  2/26/2023  Date of Discharge:  3/7/2023  4:57 PM  Discharging Provider: Luis Hoskins MD  Discharge Service: Hospitalist Service, GOLD TEAM 9    Discharge Diagnoses   Subdural hematoma  Brain compression  Uncal herniation  Acute hypoxic respiratory failure  Acute pulmonary edema  Large right pleural effusion  Moderate malnutrition in the context of acute illness    Follow-ups Needed After Discharge   Follow-up Appointments     Follow Up ([facility])      Follow up with Neurosurgery  for hospital follow- up. The following   labs/tests are recommended: CT head 3/17, with appointment to follow.    Appointments on [place] and/or [facility] (with [facility] or [facility]   provider or service). Call [phone] if you haven't heard regarding   these appointments within 7 days of discharge.             Unresulted Labs Ordered in the Past 30 Days of this Admission     Date and Time Order Name Status Description    2/26/2023  9:29 PM Prepare plasma (unit) Preliminary     2/26/2023 12:23 PM Prepare red blood cells (unit) Preliminary     2/26/2023 12:23 PM Prepare red blood cells (unit) Preliminary           Discharge Disposition   Discharged to home  Condition at discharge: Stable    Hospital Course   Sulma Ramos is a 57 year old female with cirrhosis c/b HE, ascites, portal hypertension, portal thrombus, on anticoagulation on Eliquis. history of Beto-en Y gastric bypass. She initially presented to [facility] 2/26/2023 with AMS, found to have acute SDH c/b herniation and underwent urgent neurosurgical errol hole decompression. She is transferred from neuro ICU to medicine on 3/2.      Subdural hematoma s/p left bur hole craniotomy for evacuation (2/26/2023)  Brain compression, improved post evacuation   Uncal herniation, improved post evacuation   Hx seizures  Presented to [facility] 2/26 with  AMS, found to have acute SDH (b/l, L>R) c/b midline shift and uncal herniation. Transferred to [facility] and underwent left sided bur hole craniotomy for evacuation 2/26/23 w/ Dr. Mayo, post-op CT showing decompression w/ residual left sided convexity. KATIA drain initially left in place, removed 3/2. Head CT 3/3 showed stable subdural hematoma with similar midline shift. Mental status improved though still below baseline. She completed 7 days of keppra for seizure prophylaxis. Initial recommendation was for discharge to acute rehab. However, patient improved clinically to the point where her and her  felt comfortable with discharging home with outpatient PT and OT. Patient will need a repeat head CT on 3/17, and follow up with neurosurgery to discuss timing for resuming Eliquis and wound check.     Acute hypoxic respiratory failure  Large right pleural effusion  Acute pulmonary edema  Patient initially improving after extubation, but had desaturations after exertion on 3/4 requiring increase in O2. Subsequently also developed increased WOB and placed back on BiPAP. CXR revealed increased size of pleural effusion as well as diffuse left lung opacities suggestive of pulm edema. S/p thora for 1.2 L out on 3/4 and 2 days of IV diuresis with improvement in respiratory status. On room air at time of discharge.     Cirrhosis 2/2 EtOH c/b portal hypertension, PV thrombus, ascites, HE, varices   Hx of HCC s/p ablation 2009  Follows outpatient at [facility]. On apixaban for hx of PV thrombus, though [place] documentation shows that this had resolved as of imaging 5/2022. US on admission here showed echogenic material in the PV suggestive of thrombus presence again. Currently no signs of HE.  - HE: continue rifaximin, lactulose  - Varices: continue nadolol  - PV thrombus: HOLDING apixaban in setting of SDH  - Follow up with GI at [place] (already established) for discussions regarding resuming Eliquis once clear from a  neurosurgical standpoint.      Hx multiple falls  Hx L1 compression fracture  Received care for L1 compression fracture at [facility]. Wears TLSO brace at home. Last fall on 1/19/2023 resulting in a hematoma to left forearm, xray forearm without fracture. C-spine without fracture on admission.   - Upright lumbar XR completed prior to discharge, stable per neurosurgery  - PT/OT referral as outpatient.      Extrapyramidal symptoms  - S/p 1x benzatropine on 3/1     Normocyctic anemia, acute   Hx thrombocytopenia, chronic   Hx coagulopathy (secondary to DOAC and synthetic liver dysfunction)  Hx portal vein thrombus, PTA on Eliquis  Baseline hgb 11-12. Baseline platelets in low 100s. Kcentra given at [facility]. Received 2 units FFP post-op for INR 1.7 and 1 unit pRBC for hgb 7. Received 1 unit platelet and 2 unit FFP prior to KATIA drain removal 3/2. Resume PTA ferrous sulfate on discharge.       Moderate malnutrition in the context of acute illness  Hx of gastric bypass (Beto-en Y)  GERD  Tolerating regular diet.  - Nutrition consulted  - Continue pta supplements  - Continue pta PPI     RLS   - Continue PTA Gabapentin 600 mg at bedtime     Hypothyroidism  - PTA levothyroxine    Consultations This Hospital Stay   NUTRITION SERVICES ADULT IP CONSULT  PHARMACY IP CONSULT  CARE MANAGEMENT / SOCIAL WORK IP CONSULT  PHYSICAL THERAPY ADULT IP CONSULT  OCCUPATIONAL THERAPY ADULT IP CONSULT  SPEECH LANGUAGE PATH ADULT IP CONSULT  NURSING TO CONSULT FOR VASCULAR ACCESS CARE IP CONSULT  ORTHOSIS BRACE IP CONSULT  INTERNAL MEDICINE PROCEDURE TEAM ADULT IP CONSULT [facility] - THORACENTESIS  INTERVENTIONAL RADIOLOGY ADULT/PEDS IP CONSULT  NURSING TO CONSULT FOR VASCULAR ACCESS CARE IP CONSULT  NURSING TO CONSULT FOR VASCULAR ACCESS CARE IP CONSULT    Code Status   Prior    Time Spent on this Encounter   I, Luis Hoskins MD, personally saw the patient today and spent greater than 30 minutes discharging this patient.       Luis  Hermelindo Hoskins MD  [facility] [address] [address]  Phone: [phone]  ______________________________________________________________________    Physical Exam   Vital Signs:                   Weight: 156 lbs 11.95 oz  Constitutional: lying in bed, NAD  HEENT: left side of head shaved with large stapled wound from craniotomy  Respiratory: no resp distress, lungs CTAB except for absent sounds in base of right lung  Cardiovascular: RRR  GI: soft, non-distended, non-tender  Neurologic: alert and oriented x3, no asterixis, constant fidgeting       Primary Care Physician   Raudel Ortiz    Discharge Orders      CT Head w/o contrast*     Physical Therapy Referral      Occupational Therapy Referral      Reason for your hospital stay    Dear Sulma Ramos    Your were hospitalized at [facility] with subdural hematoma and treated with neurosurgical intervention.  Over your hospitalization your condition improved and today you are ready to be discharged.  If you continue physical and occupational therapy you should continue to improve but if you develop fever, shortness of breath, light headedness, chest pain or nausea/vomiting please seek medical attention.    We are suggesting the following medication changes:  STOP Apixaban until instructed to resume by your outpatient medical team    Please get the following tests done:  CT Scan on 3/17    Please set up an appointment with:  Neurosurgery, they will call you to schedule this appointment    It was a pleasure meeting with you today. Thank you for allowing me and my team the privilege of caring for you today. You are the reason we are here, and I truly hope we provided you with the excellent service you deserve. Please let us know if there is anything else we can do for you so that we can be sure you are leaving completely satisfied with your care experience.    Be well,     BETH Garcia  MD [place]  Internal Medicine-Pediatrics     Activity    Your activity upon discharge: activity as tolerated     Follow Up ([facility])    Follow up with Neurosurgery  for hospital follow- up. The following labs/tests are recommended: CT head 3/17, with appointment to follow.    Appointments on [place] and/or [facility] (with [facility] or [facility] provider or service). Call [phone] if you haven't heard regarding these appointments within 7 days of discharge.     [place] Order    I, the undersigned, certify that the above prescribed supplies are medically necessary for this patient and is both reasonable and necessary in reference to accepted standards of medical and necessary in reference to accepted standards of medical practice in the treatment of this patient's condition and is not prescribed as a convenience.      Diet    Follow this diet upon discharge: Orders Placed This Encounter      Snacks/Supplements Adult: Ensure Enlive; Between Meals      Snacks/Supplements Adult: Other; 2pm: Special K bar; Between Meals      Regular Diet Adult       Significant Results and Procedures   Most Recent 3 CBC's:Recent Labs   Lab Test 03/07/23 0530 03/06/23 0414 03/05/23  0342   WBC 2.1* 2.5* 2.7*   HGB 8.3* 8.2* 7.8*   * 106* 105*   PLT 46* 51* 49*     Most Recent 3 BMP's:Recent Labs   Lab Test 03/07/23 0530 03/06/23 0414 03/05/23  0342    136 136   POTASSIUM 3.9 3.7 3.8   CHLORIDE 103 100 103   CO2 27 30* 25   BUN 14.2 16.9 15.9   CR 0.61 0.67 0.61   ANIONGAP 8 6* 8   CHERRIE 8.6 8.7 8.6   GLC 88 90 83   ,   Results for orders placed or performed during the hospital encounter of 02/26/23   Lumbar spine CT w/o contrast    Narrative    Thoracic and Lumbar spine CT without contrast    History: AMS, s/p possible trauma with bruising.  ICD-10:  Comparison: CT abdomen pelvis 8/2/2007.    Technique: Axial, coronal, and sagittal multiplanar reconstructions  obtained from acquisition of thoracic and lumbar spine CT scan    without contrast.    Findings:  Thoracic spine:   Moderate motion artifact. There is no acute fracture or subluxation of  the thoracic spine. There is no prevertebral edema. There is no spinal  canal or foraminal stenosis at any level. No soft tissue abnormality  in the visualized paraspinous tissues anteriorly. Mild/moderate T7 and  T10 superior endplate Schmorl's node degenerative formation.    Lumbar Spine:   Moderate motion artifact. Moderate/severe compression deformity of L1,  notably within the midportion, with approximately 60-70% vertebral  height loss (not seen in 2007). Trabecular sclerotic changes favoring  acute nature with comminution and fracture lines extending to involve  the mid inferior endplates and anterior superior endplate. No  significant retropulsed fragments. Advanced intervertebral disc space  loss at L4-5 and L5-S1 with associated vacuum disc phenomenon.  Associated opposing endplate subarticular cystic degenerative changes  at these levels. Normal alignment of the lumbar spine without  traumatic subluxation.    There are 5 type lumbar vertebra, used for the purposes of this  dictation .     On a level by level basis, the findings are as follows:  T12-L1: Mild disc bulge without significant neural canal or neural  foraminal narrowing. Moderate/severe compression deformity of L1  without retropulsion, noted above.  L1-2:  No significant neural foraminal neural foraminal narrowing.  L2-3:  Mild disc bulge without neural foraminal or canal narrowing.  L3-4:  Mild/moderate disc bulge without significant neural foraminal  or spinal canal narrowing.  L4-5:  Moderate degenerative facet arthropathy and mild disc bulge  with moderate bilateral neural foraminal narrowing.  L5-S1:  Mild disc bulge and moderate facet arthropathy. Moderate  bilateral neural foraminal narrowing.  The visualized paraspinous tissues anteriorly are unremarkable.      Impression    Impression:   Thoracic spine:    1. No  acute osseous abnormality. No significant neural foraminal or  spinal canal narrowing throughout.   Lumbar Spine:   1. Favored subacute moderate/severe compression fracture deformity of  L1 without retropulsion; further characterized above.  2. Multilevel mild spondylosis with degenerative changes worst at the  L4-S1 levels. Mild disc bulge L5-S1 with mild abutment of the right S1  exiting nerve root.    I have personally reviewed the examination and initial interpretation  and I agree with the findings.    MARIA C KELLEY MD         SYSTEM ID:  [id number]   CT Thoracic Spine w/o Contrast    Narrative    Thoracic and Lumbar spine CT without contrast    History: AMS, s/p possible trauma with bruising.  ICD-10:  Comparison: CT abdomen pelvis 8/2/2007.    Technique: Axial, coronal, and sagittal multiplanar reconstructions  obtained from acquisition of thoracic and lumbar spine CT scan   without contrast.    Findings:  Thoracic spine:   Moderate motion artifact. There is no acute fracture or subluxation of  the thoracic spine. There is no prevertebral edema. There is no spinal  canal or foraminal stenosis at any level. No soft tissue abnormality  in the visualized paraspinous tissues anteriorly. Mild/moderate T7 and  T10 superior endplate Schmorl's node degenerative formation.    Lumbar Spine:   Moderate motion artifact. Moderate/severe compression deformity of L1,  notably within the midportion, with approximately 60-70% vertebral  height loss (not seen in 2007). Trabecular sclerotic changes favoring  acute nature with comminution and fracture lines extending to involve  the mid inferior endplates and anterior superior endplate. No  significant retropulsed fragments. Advanced intervertebral disc space  loss at L4-5 and L5-S1 with associated vacuum disc phenomenon.  Associated opposing endplate subarticular cystic degenerative changes  at these levels. Normal alignment of the lumbar spine without  traumatic  subluxation.    There are 5 type lumbar vertebra, used for the purposes of this  dictation .     On a level by level basis, the findings are as follows:  T12-L1: Mild disc bulge without significant neural canal or neural  foraminal narrowing. Moderate/severe compression deformity of L1  without retropulsion, noted above.  L1-2:  No significant neural foraminal neural foraminal narrowing.  L2-3:  Mild disc bulge without neural foraminal or canal narrowing.  L3-4:  Mild/moderate disc bulge without significant neural foraminal  or spinal canal narrowing.  L4-5:  Moderate degenerative facet arthropathy and mild disc bulge  with moderate bilateral neural foraminal narrowing.  L5-S1:  Mild disc bulge and moderate facet arthropathy. Moderate  bilateral neural foraminal narrowing.  The visualized paraspinous tissues anteriorly are unremarkable.      Impression    Impression:   Thoracic spine:    1. No acute osseous abnormality. No significant neural foraminal or  spinal canal narrowing throughout.   Lumbar Spine:   1. Favored subacute moderate/severe compression fracture deformity of  L1 without retropulsion; further characterized above.  2. Multilevel mild spondylosis with degenerative changes worst at the  L4-S1 levels. Mild disc bulge L5-S1 with mild abutment of the right S1  exiting nerve root.    I have personally reviewed the examination and initial interpretation  and I agree with the findings.    MARIA C KELLEY MD         SYSTEM ID:  [id number]   XR Forearm Port Left 2 Views    Narrative    EXAM: XR FOREARM PORT LEFT 2 VIEWS  LOCATION: [facility]  DATE/TIME: 2/26/2023 9:18 AM    INDICATION: Unknown if fall, deformity. Arm pain.  COMPARISON: None.      Impression    IMPRESSION: Within normal limits. No fracture. Osteopenia.   XR Chest Port 1 View    Narrative    EXAM: Chest radiograph 2/26/2023 9:19 AM    HISTORY: Endotracheal tube placement.     COMPARISON: Same-day chest  radiograph.    TECHNIQUE: Portable supine AP view of the chest.    FINDINGS: Endotracheal tube tip projects over the midthoracic trachea  approximately 3.8 cm and the claudio. Trachea appears midline.  Unchanged cardiomediastinal silhouette. Unchanged hazy right  hemithoracic opacification. Left lung remains clear. No pneumothorax.  Right upper quadrant surgical clips. Gastric bypass sutures left upper  quadrant. Soft tissues unremarkable. No acute osseous abnormality.  Osteopenia.      Impression    IMPRESSION:   1.  Endotracheal tube projects over the midthoracic trachea.  2.  Unchanged moderate to large right pleural effusion.    I have personally reviewed the examination and initial interpretation  and I agree with the findings.    IRON ENNIS MD         SYSTEM ID:  [id number]   CT Head w/o Contrast    Narrative    EXAM: Head CT without contrast 2/26/2023 11:54 AM    HISTORY: Follow-up subdural hemorrhages.    COMPARISON: Same day head CT.    TECHNIQUE: Using multidetector thin collimation helical acquisition  technique, axial, coronal and sagittal CT images from the skull base  to the vertex were obtained without intravenous contrast.   (topogram) image(s) also obtained and reviewed.    FINDINGS:   Large left cerebral convexity mixed density extra-axial fluid  collection with a layering hematocrit level measures 28 mm maximal  diameter, previously 28 mm. There is rightward midline shift measuring  approximately 16 mm, previously 16 mm on same day head CT one  measuring similar fashion at the level of the septum pellucidum. There  is associated local mass effect and sulcal effacement. The left  lateral ventricle is effaced, similar to prior. There is left  parafalcine and left uncal herniation. Small subdural hemorrhages  along the falx and right posterior hemisphere are stable.    Generalized cerebral and cerebellar parenchymal volume loss. Mild  confluent periventricular white matter hypodensities,  presumably  related to chronic cerebral microangiopathy.    The bony calvaria and the bones of the skull base are normal. The  orbits appear unremarkable. Left maxillary sinus polypoid mucosal  thickening. Mastoid air cells are clear. Unremarkable soft tissues.      Impression    IMPRESSION:  1.  Stable large left cerebral convexity subdural hematoma with  rightward midline shift and left uncal and subfalcine herniation.  2.  Stable small falcine and right posterior lobe subdural  hemorrhages.    I have personally reviewed the examination and initial interpretation  and I agree with the findings.    MARIA C KELLEY MD         SYSTEM ID:  [id number]   XR Abdomen Port 1 View    Narrative    EXAM: XR ABDOMEN PORT 1 VIEW  2/26/2023 10:24 AM     HISTORY:  OG placed       TECHNIQUE: Single frontal radiograph of the abdomen    COMPARISON:  CT chest abdomen and pelvis 2/26/2023.    FINDINGS:   AP supine portable abdomen. OG catheter tip projects over the stomach  fundus; sidehole projects over the distal esophagus/gastroesophageal  junction. No obstructive bowel gas pattern. Portions of the right  peripheral-most abdomen are not visualized. Prior cholecystectomy. No  acute osseous abnormality.      Impression    IMPRESSION:  1. OG catheter tip projects over the stomach, however sidehole is  located at the approximate gastroesophageal junction.  2. Nonobstructive bowel gas pattern.    I have personally reviewed the examination and initial interpretation  and I agree with the findings.    IRON ENNIS MD         SYSTEM ID:  [id number]   US Abd Hepatic & Portal Vasculature Cmpl    Narrative    EXAMINATION: US ABD HEPATIC & PORTAL VASCULATURE CMPL, 2/26/2023  9:02 PM     COMPARISON: CT chest abdomen pelvis 2/26/2023    HISTORY: Liver failure patient with history of portal vein thrombus on  August. Please assess portal vein for recurrence    TECHNIQUE:  Grey-scale, color Doppler and spectral flow  analysis.    FINDINGS:    LIVER DOPPLER:  Splenic vein:  Patent continuous normal antegrade direction flow  towards the liver, 17 cm/sec.  Extrahepatic portal vein: Measures 0.8 cm. Echogenic material is noted  on grayscale imaging. There is minimal, spotty flow measuring  approximately 10 cm/s  Right portal vein flow is antegrade, measuring 9 cm/sec.  Left portal vein flow is antegrade, measuring 10 cm/sec.    Inferior vena cava patent with flow toward the heart throughout..    Right, mid, left hepatic veins: Patent with flow towards the inferior  vena cava.    Extrahepatic hepatic artery: Low resistance waveform with flow towards  the liver. 103 cm/sec with resistive index 0.67.  Right hepatic artery: 34 cm/sec with resistive index 0.68.  Left hepatic artery: 33 cm/sec with resistive index 0.64.    Visualized portions of the aorta are unremarkable.    The liver is echogenic.      Impression    Impression:   Echogenic material noted within the main portal vein with limited  sluggish flow, suggestive of portal vein thrombus. The right and left  portal veins are patent with antegrade flow. Liver disease.    Findings discussed with Dr. Pierre by Dr. Samano at 9:10 PM on  2/26/2023    I have personally reviewed the examination and initial interpretation  and I agree with the findings.    KISHA HOOKS MD         SYSTEM ID:  [id number]   XR Abdomen Port 1 View    Narrative    Portable abdomen 1 view 2/26/2023 at 1733 hours    INDICATION: Gastric tube position    COMPARISON: 1018 hours earlier today    FINDINGS: Nonobstructive bowel gas pattern. Enteric tube tip and side  hole both projected within the stomach. Cholecystectomy clips in the  right upper abdomen. When referring to chest radiograph earlier today,  right IJ catheter tip is partially visualized.      Impression    IMPRESSION: Enteric tube tip and sidehole projecting in the stomach.    KISHA HOOKS MD         SYSTEM ID:  [id number]   XR Chest  Port 1 View    Narrative    Portable chest 2/26/2023 at 1733 hours    INDICATION: Endotracheal tube positioning. Central line position.    COMPARISON: 0857 hours earlier today    FINDINGS: Heart size normal. Continued asymmetrical opacification of  the right hemithorax compared to the left. This may indicate effusion  with layering in a somewhat supine position at the upper chest.  There is a new right IJ catheter noted without pneumothorax. Tip  projects at the cavoatrial junction. NG/OG tube progresses beyond  inferior margin of the image. Endotracheal tube there is approximately  1.8 cm above the claudio.      Impression    IMPRESSION: Continued right pleural effusion and possible edema in the  right lung. Right IJ catheter tip at cavoatrial junction. Endotracheal  tube tip just under 2 cm above the claudio.    KISHA HOOKS MD         SYSTEM ID:  [id number]   CT Head w/o Contrast    Narrative    EXAM: CT HEAD W/O CONTRAST  2/26/2023 5:37 PM     HISTORY:  s/p SDH evacuation via errol holes       COMPARISON:  Head CT from 1148 hours    TECHNIQUE: Using multidetector thin collimation helical acquisition  technique, axial, coronal and sagittal CT images from the skull base  to the vertex were obtained without intravenous contrast.   (topogram) image(s) also obtained and reviewed.    FINDINGS:  Interval borehole placement through the left parietal bone and bore  hole through the left frontal bone with drain terminating at the left  cerebral apex. New moderate pneumocephalus. The extra-axial collection  along the left cerebral convexity, now measuring 2.3 cm at greatest  depth compared to 2.8 cm prior, demonstrates hyperdense change  consistent with new blood products, however overall burden is  decreased as compared to prior exam with decompression of the left  lateral ventricle, and reduced midline shift of approximately 1.0 cm,  previously 1.6 cm, and reduced mass effect as demonstrated by a  reduction in  sulcal effacement.  Collection extends craniocaudally  from the left falx down to the anterior left temporal lobe (series 3,  image 26). Stable appearance of thickening of the bilateral tentorial  and falx dura likely represents additional subdural blood products. A  presumably new thin extra-axial collection along the right cerebral  convexity measuring approximately 4 mm (series 6, image 36) is likely  to represent redistribution of old hemorrhage. Subfalcine and uncal  herniations are mildly improved, if not stable.     No acute loss of gray-white matter differentiation in the cerebral  hemispheres. Ventricles are proportionate to the cerebral sulci. Clear  basal cisterns.    The remaining bony calvaria and the bones of the skull base are  normal. Mucosal thickening within the left maxillary sinus. The  remaining visualized portions of the paranasal sinuses and mastoid air  cells are clear. Grossly normal orbits.       Impression    IMPRESSION:  1. Overall decreased left cerebral convexity subdural hemorrhage with  improved mass effect and herniation post evacuation and drain  placement. However, there is increased density and increased mass  effect exerted by the posterior portion of the subdural hemorrhage.  2. New small right cerebral convexity subdural hemorrhage.    I have personally reviewed the examination and initial interpretation  and I agree with the findings.    MARIA C KELLEY MD         SYSTEM ID:  [id number]   CT Head w/o Contrast    Narrative    CT HEAD W/O CONTRAST 2/27/2023 11:31 AM    Provided History: follow up SDH.  ICD-10: Subdural hemorrhage.    Comparison: CT head 2/26/2023, 1/19/2023.    Technique: Using multidetector thin collimation helical acquisition  technique, axial, coronal and sagittal CT images from the skull base  to the vertex were obtained without intravenous contrast.     Findings:    Postsurgical changes of left frontal subdural drain placement and left  parietal errol hole with  decreased pneumocephalus and stable bilateral  left greater than right subdural hemorrhages extending along the left  falx and tentorium measuring up to 20 mm in thickness along the left  parietal lobe and 5 mm on the right. Stable subdural hemorrhage  tracking along the left tentorium and falx measuring up to 3 and 5 mm  respectively. No new intracranial hemorrhage.    Unchanged partial  effacement of the left lateral ventricle and 8 mm  of rightward shift. The gray to white matter differentiation of the  cerebral hemispheres is preserved. Ventricles are proportionate to the  sulci. No sulcal effacement.  The basal cisterns are patent.    Mucosal thickening and inspissated debris in the left maxillary sinus.  The mastoid air cells are clear. Orbits appear unremarkable. No acute  fracture.      Impression    Impression:   1.  Stable left greater than right subdural hemorrhages with stable  left frontal subdural drain with decreased pneumocephalus. No new  intracranial hemorrhage.  2.  Stable mass effect on the left lateral ventricle and unchanged 8  mm of rightward shift. No hydrocephalus.  3.  Inspissated debris in the left maxillary sinus as can be seen in  the setting of chronic sinusitis.    I have personally reviewed the examination and initial interpretation  and I agree with the findings.    KARLA CHOW MD         SYSTEM ID:  [id number]   CT Chest w/o Contrast    Narrative    EXAMINATION: CT CHEST W/O CONTRAST, 2/27/2023 11:27 AM    TECHNIQUE:  Helical CT images from the thoracic inlet through the  upper abdomen were obtained without intravenous contrast.  Images are  displayed at 1 and 5 mm intervals. Images reviewed in lung, soft  tissue, and bone windows.    Radiation Dose (DLP): 1152 mGy*cm    COMPARISON: CT 2/26/2023, chest x-ray 2/26/2023    HISTORY: follow up large R pleural effusion    FINDINGS:    Lungs: The tip of the endotracheal tube is in the lower thoracic  trachea, approximately 1 cm above  the claudio. Tracheal  secretions/debris in the trachea. There is narrowing of the right  mainstem bronchus, upper lobe bronchus, and bronchus intermedius, due  to compression from right pleural effusion. Moderate to large right  pleural effusion is increased in size compared to 2/26/2023. Small  left pleural effusion. No pneumothorax. There is complete atelectasis  of the right upper lobe, increased compared to prior. Additional  decompressive atelectasis in the right lower and middle lobes. Mild  left basilar atelectasis.    Mediastinum: Right IJ central venous catheter tip in the lower SVC.  The visualized thyroid gland is atrophic. The heart is not enlarged.  No significant pericardial effusion. Coronary artery calcification.  The ascending aorta and main pulmonary artery are normal in caliber.  No mediastinal or axillary lymphadenopathy.    Upper abdomen: The enteric tube courses into the stomach. Cirrhotic  appearing liver with nodular contour of the hepatic surface. Partially  calcified 2.4 cm hypodense lesion in the subcapsular right hepatic  lobe. Small ascites in the upper abdomen. Paraesophageal varices. The  kidneys appear mildly atrophic. Multiple prominent mesenteric lymph  nodes in the upper abdomen. Hazy opacities throughout the mesentery.  Cholecystectomy clips. The spleen is partially visualized, but appears  enlarged, measuring at least 13.5 cm. Calcification and enlargement of  what appears to be the proximal portal vein.    Bones/soft tissues: Body wall anasarca. Chronic appearing right  anterior rib fracture deformities. Redemonstration of compression  fracture deformities of T7, T10, and L1. There is mild posterior  retropulsion of bony fragment into the spinal canal at L1.      Impression    IMPRESSION:   1. Moderate to large right pleural effusion is increased in size  compared to CT from 2/26/2023. Increased compressive atelectasis of  the right upper, middle, and lower lobes.  2. Small left  pleural effusion.  3. Cirrhotic appearing liver with sequelae of portal hypertension,  including splenomegaly, ascites, and paraesophageal varices. Hazy  opacities throughout the mesentery with prominent mesenteric lymph  nodes, likely representing mesenteric congestion.  4. The tip of the endotracheal tube is in the low thoracic trachea,  approximately 1 cm above the claudio. Recommend retracting.    I have personally reviewed the examination and initial interpretation  and I agree with the findings.    KISHA HOOKS MD         SYSTEM ID:  [id number]   CT Head w/o Contrast    Narrative    EXAM: CT HEAD W/O CONTRAST  2/28/2023 10:37 AM     HISTORY:  follow SDH on left       COMPARISON:  2/27/2023    TECHNIQUE: Using multidetector thin collimation helical acquisition  technique, axial, coronal and sagittal CT images from the skull base  to the vertex were obtained without intravenous contrast.   (topogram) image(s) also obtained and reviewed.    FINDINGS:  Left greater than right subdural hematomas, on the left measures  maximally 19 mm, stable when measured similarly. Subdural hematoma  extends along the falx and bilateral tentorial leaflets. Stable  position overlying extra-axial drain. Stable left frontal subdural  pocket of air. Stable overlying skin staples. Stable mild compression  of the left lateral ventricle and 6 mm of rightward midline shift.     No acute loss of gray-white matter differentiation in the cerebral  hemispheres. Ventricles are proportionate to the cerebral sulci. Clear  basal cisterns.    The bony calvaria and the bones of the skull base are otherwise  normal. Maxillary sinus thickening, otherwise the visualized portions  of the paranasal sinuses and mastoid air cells are clear. Grossly  normal orbits.       Impression    IMPRESSION: Stable left greater than right subdural hematomas and  stable position of subdural catheter. Unchanged rightward midline  shift.    I have personally  reviewed the examination and initial interpretation  and I agree with the findings.    REHANA ELIZABETH MD         SYSTEM ID:  [id number]   XR Chest Port 1 View    Narrative    Chest one view portable    HISTORY: Postextubation hypoxic    COMPARISON STUDY: 2/27/2023    FINDINGS: Large right pleural effusion similar to previous.  Endotracheal tube and enteric tube have been removed.  Cardiac  silhouette is nonenlarged. Right IJ line tip in the mid SVC. Left lung  is clear. Right upper lobe atelectasis.      Impression    IMPRESSION:  1. Large right pleural effusion.  2. Right upper lobe collapse. Consider bronchoscopy or pulmonary  toilet.  3. Removal of endotracheal tube and enteric tube.    RHEA HEATH MD         SYSTEM ID:  [id number]   XR Lumbar Spine 2/3 Views    Narrative    XR LUMBAR SPINE 2/3 VIEWS  3/6/2023 10:06 AM    History: L1 burst fx follow stability    Technique: Standing  AP and lateral  views of the lumbar spine were  obtained.    Comparison: 2/26/2023 CT    Findings:  5 lumbar type vertebral bodies.    Unchanged L1 compression fracture with greater than 75% loss of the  vertebral body height. Minimal retropulsion. Mild kyphosis at this  level.  No acute osseous abnormality..  Moderate multilevel spondylosis most prominent L4-L5 and L5-S1.  No spondylolisthesis    Nonobstructive bowel gas pattern.  Cholecystectomy.      Impression    Impression:  1.  Unchanged L1 burst fracture with associated mild kyphosis.  2.  Moderate lumbar spondylosis.    RAMIREZ GARLAND MD         SYSTEM ID:  [id number]   CT Head w/o Contrast    Narrative    CT HEAD W/O CONTRAST 3/2/2023 4:18 AM    History: left SDH s/p errol hole placement     Comparison: 2/28/2023    Technique: Using multidetector thin collimation helical acquisition  technique, axial, coronal and sagittal CT images from the skull base  to the vertex were obtained without intravenous contrast.   (topogram) image(s) also obtained and reviewed.    Findings:      Slightly decreased size of the left greater than right subdural  hematoma measuring up to 17 mm in thickness on the left, previously 19  mm in thickness when measured similarly and 8 mm in thickness on the  right, previously 10 mm when measured similarly. Similar extension of  subdural hematoma along the falx and right greater than left  tentorium. Similar positioning of the overlying left extra-axial drain  with decreased pneumocephalus. Stable mild compression of the left  lateral ventricle and stable approximately 4 mm rightward midline  shift.    No acute loss of gray-white matter differentiation. Ventricles are  proportionate to the cerebral sulci. The basal cisterns are clear.    Postprocedural changes of left frontal errol hole. Skin staples  overlying the left frontal convexity. Mucosal thickening in the left  maxillary sinus. The mastoid air cells are clear. Orbits are grossly  unremarkable.       Impression    Impression: Slightly decreased size of the bilateral, left greater  than right, subdural hematomas. Similar 4 mm rightward midline shift.  No new or worsening intracranial hemorrhage.    I have personally reviewed the examination and initial interpretation  and I agree with the findings.    MARIA C KELLEY MD         SYSTEM ID:  [id number]   CT Head w/o Contrast    Narrative    CT HEAD W/O CONTRAST 3/3/2023 4:22 AM    History: f/u SDH s/p drain removal     Comparison: 3/2/2023    Technique: Using multidetector thin collimation helical acquisition  technique, axial, coronal and sagittal CT images from the skull base  to the vertex were obtained without intravenous contrast.   (topogram) image(s) also obtained and reviewed.    Findings:   Interval removal of the left extra-axial drain. Slightly increased  pneumocephalus overlying the left frontal lobe status post drain  removal. Stable bilateral subdural hematomas measuring up to 17 mm in  thickness on the left and 8 mm in thickness on the right.  Similar  extension of subdural hematoma along the falx and right to left  tentorium. Unchanged minimal rightward midline shift. No  hydrocephalus. No acute loss of gray-white matter differentiation. The  basal cisterns are clear.    Postprocedural changes of left frontal errol holes. Skin stables  overlying the left scalp. Mucosal thickening in the left maxillary  sinus. Additional paranasal sinuses and mastoid air cells are clear.  Orbits are grossly unremarkable.       Impression    Impression:  Stable left greater than right subdural hematoma status post  left-sided drain removal. Similar mass effect with 3 mm rightward  midline shift. No new or worsening intracranial hemorrhage.    I have personally reviewed the examination and initial interpretation  and I agree with the findings.    REHANA ELIZABETH MD         SYSTEM ID:  [id number]   XR Chest Port 1 View    Narrative    Exam: XR CHEST PORT 1 VIEW, 3/4/2023 9:41 AM    Indication: increased wheezing, follow up on pleural effusion    Comparison: 3/1/2023 and 2/27/2023 chest x-rays    Findings:   Upright, frontal view the chest. Markedly increased size of right  pleural effusion with likely lobar atelectasis of the right middle and  right lower lobes. The right upper lobe is somewhat aerated. Diffuse  interstitial and airspace opacities throughout the left lung. No  significant left-sided pleural effusion. No pneumothorax.       Impression    Impression: Markedly increased right pleural effusion with likely  lobar atelectasis of the right middle and lower lobes. Increased left  lung opacities likely pulmonary edema.    I have personally reviewed the examination and initial interpretation  and I agree with the findings.    BROCK MENDIOLA MD         SYSTEM ID:  [id number]   POC US Guide for Thorcentesis    Impression    Limited point of care pleural/lung ultrasound to evaluate for thoracentesis.    Thoracentesis Indication:pleural effusion     Views/Acquisition: Right   pleural space(s): upright and supine.      Findings/Interpretation: 5cm pocket of pleural fluid identified. Unable to perform thora as patient on BiPAP with 50% FiO2 and concern for safety of procedure in supine position and pocket very close to mid axillary line and need for continuous positive pressure ventillation    Kirsten Kellogg MD     IR Thoracentesis    Narrative    DIAGNOSIS: Pleural effusion    PROCEDURE: IR THORACENTESIS    Impression    IMPRESSION: Completed ultrasound-guided diagnostic and therapeutic  right thoracentesis. 1200 mL clear yellow fluid aspirated from the  right pleural space. A sample of fluid was sent to lab for analysis as  requested. Drainage stopped due to risk of reexpansion pulmonary  edema, large effusion remains. No immediate complication.    PLAN: Large effusion remains, please consult inpatient procedural team  ([facility]) to assess if repeat thoracentesis is indicated and they can  defer to IR as needed.     ----------    CLINICAL HISTORY: Cirrhosis, large right pleural effusion. Inpatient  procedural team deferred as patient cannot sit up due to an L1  compression fracture and large thoracic, lumbar brace. IR consult and  for diagnostic and therapeutic right thoracentesis. This will be done  bedside.    PERFORMED BY: Eliud Perez PA-C    CONSENT: Written informed consent was obtained and is documented in  the patient record.    MEDICATIONS: 1% lidocaine for local anesthesia      NURSING: The patient was placed on continuous vital signs monitoring.  Vital signs were monitored by nursing staff under ICU supervision.      DESCRIPTION: Patient rolled onto left side in ICU bed easily after  thoracic lumbar sacral orthosis brace was removed. Limited ultrasound  confirms large right pleural effusion. The skin was prepped and draped  in a timeout was performed. Under continuous ultrasound visualization,  the skin and pleura was anesthetized before a centesis needle/catheter  system was advanced  into the pleural space. The catheter was advanced  off the needle into the fluid and the needle was removed. Sample  collected before catheter was connected to vacuum drainage and fluid  aspirated. Upon completion of drainage, the catheter was removed on  expiration. Occlusive dressing applied. Large effusion remains.    COMPLICATIONS: No immediate concerns, the patient remained stable  throughout the procedure and tolerated it well.    ESTIMATED BLOOD LOSS: Minimal    SPECIMENS: 60 mL pleural fluid    ALONSO ACEVEDO PA-C         SYSTEM ID:  [id number]   XR Chest Port 1 View    Narrative    EXAM: XR CHEST PORT 1 VIEW  3/4/2023 7:46 PM      HISTORY: ACEVEDO s/p thora    COMPARISON: X-ray 3/1/2023    FINDINGS: Single view of the chest. Interval decrease in right pleural  effusion. No left pleural effusion. Question small right lateral  pleural line, likely projectional. No left pneumothorax. Unchanged  cardiac silhouette. Bilateral mixed interstitial and airspace  opacities with apical predominance.    Cholecystectomy clips. Visualized upper abdomen is otherwise  unremarkable. No aggressive osseous lesions.      Impression    IMPRESSION:  1. Bilateral mixed interstitial and airspace opacities with apical  predominance, which may be representative of atelectasis versus  infection or volume overload.  2. Decreased right-sided pleural effusion.   3. Small right lateral pleural line is likely projectional. Attention  on follow-up.     I have personally reviewed the examination and initial interpretation  and I agree with the findings.    LALIT MONSON MD         SYSTEM ID:  [id number]   XR Chest 2 Views    Narrative    Exam: XR CHEST 2 VIEWS, 3/6/2023 10:05 AM    Comparison: Chest x-ray 3/4/2023, CT chest 10/27/2023    History: follow pleural effusion and edema    Findings:   2 views of the chest. Trachea is midline. Stable cardiac silhouette.  Mild mixed interstitial and airspace opacities, decreased from prior  especially in  the upper lung zones. Opacification of the inferior half  of the right hemithorax with a meniscus sign, increased from prior  chest x-ray. No facial pneumothorax. The visualized upper abdomen is  unremarkable. Redemonstrated compression deformity of L1 seen on  lateral view. No new or acute osseous abnormality.      Impression    Impression:   1. Increased right pleural effusion with likely atelectasis component.  2. Mild mixed interstitial airspace opacities, improved from prior.    I have personally reviewed the examination and initial interpretation  and I agree with the findings.    KISHA HOOKS MD         SYSTEM ID:  [id number]   Echo Complete     Value    LVEF  60-65%    Narrative    [id number]  [id number]  [id number]  [id number]^JENNIFER^MYRON     [facility],[place]  Echocardiography Laboratory  [address] [place] [id number]     Name: LAURA LONG  MRN: [id number]  : 1965  Study Date: 2023 08:00 AM  Age: 57 yrs  Gender: Female  Patient Location: [facility]  Reason For Study: Hypertension (HTN)  Ordering Physician: MYRON RODRIGUEZ  Performed By: Karolyn Colunga     BSA: 1.8 m2  Height: 64 in  Weight: 163 lb  HR: 78  BP: 99/63 mmHg  ______________________________________________________________________________  Procedure  Complete Portable Echo Adult.  ______________________________________________________________________________  Interpretation Summary  Technically difficult study with limited views.     Left ventricular size, wall motion and function are normal. The ejection  fraction is 60-65%.  Right ventricular function, chamber size, wall motion, and thickness are  normal.  Unable to assess mean RA pressure given the patient is on a ventilator. No  pericardial effusion is present.     There is no prior study for direct comparison.  ______________________________________________________________________________  Left Ventricle  Left ventricular size, wall motion and  function are normal. The ejection  fraction is 60-65%. Grade I or early diastolic dysfunction.     Right Ventricle  Right ventricular function, chamber size, wall motion, and thickness are  normal.     Atria  Both atria appear normal.     Mitral Valve  Mild mitral annular calcification is present. Trace mitral insufficiency is  present.     Aortic Valve  Aortic valve is normal in structure and function. The aortic valve is  tricuspid.     Tricuspid Valve  The tricuspid valve is normal. Trace tricuspid insufficiency is present. The  right ventricular systolic pressure is approximated at 20.8 mmHg plus the  right atrial pressure.     Pulmonic Valve  The pulmonic valve is normal.     Vessels  The aorta root is normal. Unable to assess mean RA pressure given the patient  is on a ventilator.     Pericardium  No pericardial effusion is present.     Compared to Previous Study  There is no prior study for direct comparison.  ______________________________________________________________________________  MMode/2D Measurements & Calculations     IVSd: 1.1 cm  LVIDd: 3.9 cm  LVIDs: 2.5 cm  LVPWd: 1.0 cm  FS: 36.6 %  LV mass(C)d: 131.7 grams  LV mass(C)dI: 73.5 grams/m2  asc Aorta Diam: 3.2 cm  LA Volume (BP): 45.1 ml  LA Volume Index (BP): 25.2 ml/m2  RWT: 0.52     Doppler Measurements & Calculations  MV E max heather: 65.0 cm/sec  MV A max heather: 88.0 cm/sec  MV E/A: 0.74  MV dec slope: 313.0 cm/sec2  MV dec time: 0.21 sec  TR max heather: 228.0 cm/sec  TR max P.8 mmHg  E/E' av.8  Lateral E/e': 6.0  Medial E/e': 7.7     ______________________________________________________________________________  Report approved by: Denis WESLEY 2023 09:01 AM               Discharge Medications   Discharge Medication List as of 3/7/2023  4:07 PM      START taking these medications    Details   folic acid (FOLVITE) 1 MG tablet Take 1 tablet (1 mg) by mouth daily for 30 days, Disp-30 tablet, R-0, E-Prescribe      multivitamin  w/minerals (THERA-VIT-M) tablet Take 1 tablet by mouth daily for 30 days, Disp-30 tablet, R-0, E-Prescribe      pantoprazole (PROTONIX) 40 MG EC tablet Take 1 tablet (40 mg) by mouth every morning (before breakfast) for 30 days, Disp-30 tablet, R-0, E-Prescribe      thiamine (B-1) 100 MG tablet Take 1 tablet (100 mg) by mouth daily for 30 days, Disp-30 tablet, R-0, E-Prescribe         CONTINUE these medications which have CHANGED    Details   !! lactulose (CHRONULAC) 10 GM/15ML solution Take 30 mLs (20 g) by mouth daily for 30 days, Disp-900 mL, R-0, E-Prescribe       !! - Potential duplicate medications found. Please discuss with provider.      CONTINUE these medications which have NOT CHANGED    Details   calcium-vitamin D (CALTRATE) 600-400 MG-UNIT per tablet Take 1 tablet by mouth daily, 1 tablet, Oral, DAILY, Until Discontinued, Historical      cyanocobalamin 1000 MCG/ML injection Inject 1 mL as directed every 30 days, 1 mL, Injection, EVERY 30 DAYS, Until Discontinued, Historical      ferrous sulfate 325 (65 FE) MG tablet Take 325 mg by mouth daily (with breakfast), 325 mg, Oral, DAILY WITH BREAKFAST, Until Discontinued, Historical      GABAPENTIN PO Take 600 mg by mouth At Bedtime for restless legs, 600 mg, Oral, AT BEDTIME, Until Discontinued, Historical      !! lactulose 20 GM/30ML SOLN Take 20 g by mouth every other day, Disp-30 mL, Historical      LEVOTHYROXINE SODIUM PO Take 25 mcg by mouth every other day, 25 mcg, Oral, EVERY OTHER DAY, Until Discontinued, Historical      NADOLOL PO Take 20 mg by mouth every evening, 20 mg, Oral, EVERY EVENING, Until Discontinued, Historical      omeprazole (PRILOSEC) 20 MG DR capsule Take 20 mg by mouth daily, Historical      pilocarpine (SALAGEN) 5 MG tablet Take 5 mg by mouth 3 times daily, Historical      Rifaximin (XIFAXAN PO) Take 550 mg by mouth 2 times daily, 550 mg, Oral, 2 TIMES DAILY, Until Discontinued, Historical      vitamin A 3 MG (92963 UNITS) capsule  Take 30,000 Units by mouth daily, Historical      Zolpidem Tartrate (AMBIEN PO) Take 10 mg by mouth nightly as needed, 10 mg, Oral, AT BEDTIME PRN, Until Discontinued, Historical       !! - Potential duplicate medications found. Please discuss with provider.      STOP taking these medications       apixaban ANTICOAGULANT (ELIQUIS) 5 MG tablet Comments:   Reason for Stopping:         HYDROcodone-acetaminophen (NORCO) 5-325 MG tablet Comments:   Reason for Stopping:             Allergies   Allergies   Allergen Reactions     Clarithromycin      Clindamycin Base      Droperidol

## 2023-03-10 ENCOUNTER — TELEPHONE (OUTPATIENT)
Dept: NEUROSURGERY | Facility: CLINIC | Age: 58
End: 2023-03-10
Payer: COMMERCIAL

## 2023-03-10 NOTE — TELEPHONE ENCOUNTER
I was contacted by patients insurance ([facility]) to get patient set up with a ED follow up. I was able to get patient set up for a head CT and follow up visit with Hazel Peterson on 3/17. Patient  is aware.        Bobbi Martínez on 3/10/2023 at 12:03 PM

## 2023-03-17 ENCOUNTER — ANCILLARY PROCEDURE (OUTPATIENT)
Dept: CT IMAGING | Facility: CLINIC | Age: 58
End: 2023-03-17
Attending: STUDENT IN AN ORGANIZED HEALTH CARE EDUCATION/TRAINING PROGRAM
Payer: COMMERCIAL

## 2023-03-17 ENCOUNTER — OFFICE VISIT (OUTPATIENT)
Dept: NEUROSURGERY | Facility: CLINIC | Age: 58
End: 2023-03-17
Payer: COMMERCIAL

## 2023-03-17 VITALS
HEART RATE: 81 BPM | DIASTOLIC BLOOD PRESSURE: 84 MMHG | OXYGEN SATURATION: 95 % | SYSTOLIC BLOOD PRESSURE: 137 MMHG | RESPIRATION RATE: 16 BRPM

## 2023-03-17 DIAGNOSIS — S06.5XAA SUBDURAL HEMATOMA (H): Primary | ICD-10-CM

## 2023-03-17 DIAGNOSIS — S06.5XAA SUBDURAL HEMATOMA (H): ICD-10-CM

## 2023-03-17 PROCEDURE — 70450 CT HEAD/BRAIN W/O DYE: CPT | Mod: GC | Performed by: RADIOLOGY

## 2023-03-17 PROCEDURE — 99024 POSTOP FOLLOW-UP VISIT: CPT | Performed by: PHYSICIAN ASSISTANT

## 2023-03-17 ASSESSMENT — PAIN SCALES - GENERAL: PAINLEVEL: NO PAIN (0)

## 2023-03-17 NOTE — PROGRESS NOTES
"  [facility]  Department of Neurosurgery  [facility]    Name: Sulma Ramos  MRN: [id number]  Age: 57 year old  : 2023      Chief Complaint:   Traumatic left subdural hematoma s/p errol holes for evacuation 2023, postoperative visit    History of Present Illness:   Sulma Ramos is a 57 year old female with a history of cirrhosis c/b HE, ascites, portal hypertension with portal thrombus (Eliquis, held currently), and h/o gastric bypass who is seen today for a postoperative visit. She presented to the ED 2023 following a fall while exercising on a elliptical walker. She had a scalp laceration that was repaired in the ED and head CT at that time was negative for bleed. Per her , over the following few weeks Sulma became more and more lethargic and developed \"tics\". He brought her to the Emergency Department again where she was found with a large left sided subdural hematoma with midline shift and uncal herniation. She underwent emergent errol hole evacuation. Today she's accompanied by her , Eugene. She's feeling reasonably well and has been walking at home with assistance of a walker. She's in a TLSO, fractures followed by [facility]. She denies new headaches, seizures, paresthesias, weakness, or lethargy. She finished 7 days of Keppra in hospital. She does note that she had 2 seizures many years ago, was on AED for awhile and monitored by Neurology, eventually tapered off and has been seizure free since then.       Review of Systems:   Pertinent items are noted in HPI or as in patient entered ROS below, remainder of complete ROS is negative.     Physical Exam:   /84   Pulse 81   Resp 16   LMP 2004   SpO2 95%    General: No acute distress.    Eyes: Conjunctivae are normal.  MSK: Moves all extremities.  No obvious deformity.  Neuro: The patient is fully oriented. Speech is normal. Extraocular movements are intact " without nystagmus. Facial sensation is intact in V1, V2, V3 distributions. Facial nerve function is normal, rated as a House Brackmann 1.  Shoulders are full strength. There is no pronator drift. Full strength in all extremities. Sensation intact throughout. No dysmetria with finger-nose-finger testing. Walking with a walker.   Psych: Normal mood and affect. Behavior is normal.    Incision: Her left errol hole incisions are clean, dry, and well healed. Staples removed today without complication.     Imaging:  Head CT reviewed today with patient and her :    FINDINGS:  Continued evolution of the left parietal mixed density subdural  hematoma measures 17 mm in thickness (previously 17 mm), the right  subdural hematoma measures 5 mm (previously 8 mm.) Decreased  attenuation of the previous extension of the subdural hematoma along  the falx and right left tentorium.     Slightly increased in rightward midline shift measured at 5 mm  (previously 3 mm). Decreased pneumocephalus and stable subdural  hematoma along the left frontal convexity. No hydrocephalus. No acute  loss of gray-white matter differentiation. Basal cisterns are clear.  Scattered periventricular areas of hypoattenuation, as can be seen in  chronic small vessel disease.     No acute intracranial hemorrhage. Postsurgical changes of left frontal  errol holes. Skin staples overlying the left scalp. Mucosal thickening  in the left maxillary sinus otherwise the visualized portions of the  paranasal sinuses and mastoid air sinuses are clear. Orbits are  grossly unremarkable.                                                                      IMPRESSION:  Increased size of the anterior hypodense component of the  left cerebral convexity subdural hematoma, with increased rightward  midline shift. The right cerebral convexity and falcine subdural  hemorrhage have decreased in size, with the more hyperdense component  of the left subdural relatively  unchanged.     I have personally reviewed the examination and initial interpretation  and I agree with the findings.     MARIA C KELLEY MD      Assessment:  Traumatic left subdural hematoma s/p errol holes for evacuation 2/26/2023 (Dr. Mayo), postoperative visit    Plan:  Given the ongoing size of the hematoma we'll continue to hold her Eliquis. Discussed signs of evolution on head CT today, which is reassuring. Will plan for close follow up in 2 weeks with head CT prior. We had a long discussion today of warning signs to be aware of and that should bring her to the ED after hours or over the weekend. They understand.        Hazel Peterson PA-C  Department of Neurosurgery

## 2023-03-17 NOTE — PATIENT INSTRUCTIONS
Follow up (virtual or in person) in 2 weeks, with head CT prior to appointment.     Please reach out with new concerns.  Go directly to the Emergency Department with new persistent headache, confusion, lethargy, weakness, or other acute symptoms.

## 2023-03-20 ENCOUNTER — TELEPHONE (OUTPATIENT)
Dept: NEUROSURGERY | Facility: CLINIC | Age: 58
End: 2023-03-20

## 2023-03-26 ENCOUNTER — HEALTH MAINTENANCE LETTER (OUTPATIENT)
Age: 58
End: 2023-03-26

## 2023-03-31 ENCOUNTER — HOSPITAL ENCOUNTER (OUTPATIENT)
Dept: CT IMAGING | Facility: CLINIC | Age: 58
Discharge: HOME | End: 2023-03-31
Attending: PHYSICIAN ASSISTANT | Admitting: PHYSICIAN ASSISTANT
Payer: COMMERCIAL

## 2023-03-31 DIAGNOSIS — S06.5XAA SUBDURAL HEMATOMA (H): ICD-10-CM

## 2023-03-31 PROCEDURE — 70450 CT HEAD/BRAIN W/O DYE: CPT

## 2023-04-02 ENCOUNTER — APPOINTMENT (OUTPATIENT)
Dept: CT IMAGING | Facility: CLINIC | Age: 58
End: 2023-04-02
Attending: EMERGENCY MEDICINE
Payer: COMMERCIAL

## 2023-04-02 ENCOUNTER — APPOINTMENT (OUTPATIENT)
Dept: GENERAL RADIOLOGY | Facility: CLINIC | Age: 58
End: 2023-04-02
Attending: EMERGENCY MEDICINE
Payer: COMMERCIAL

## 2023-04-02 ENCOUNTER — APPOINTMENT (OUTPATIENT)
Dept: ULTRASOUND IMAGING | Facility: CLINIC | Age: 58
End: 2023-04-02
Payer: COMMERCIAL

## 2023-04-02 ENCOUNTER — HOSPITAL ENCOUNTER (INPATIENT)
Facility: CLINIC | Age: 58
LOS: 1 days | Discharge: HOME | End: 2023-04-03
Attending: EMERGENCY MEDICINE | Admitting: STUDENT IN AN ORGANIZED HEALTH CARE EDUCATION/TRAINING PROGRAM
Payer: COMMERCIAL

## 2023-04-02 ENCOUNTER — APPOINTMENT (OUTPATIENT)
Dept: CT IMAGING | Facility: CLINIC | Age: 58
End: 2023-04-02
Attending: STUDENT IN AN ORGANIZED HEALTH CARE EDUCATION/TRAINING PROGRAM
Payer: COMMERCIAL

## 2023-04-02 DIAGNOSIS — Z79.01 LONG TERM (CURRENT) USE OF ANTICOAGULANTS: ICD-10-CM

## 2023-04-02 DIAGNOSIS — R29.712: ICD-10-CM

## 2023-04-02 DIAGNOSIS — Z11.52 ENCOUNTER FOR SCREENING LABORATORY TESTING FOR SEVERE ACUTE RESPIRATORY SYNDROME CORONAVIRUS 2 (SARS-COV-2): ICD-10-CM

## 2023-04-02 DIAGNOSIS — K76.82 HEPATIC ENCEPHALOPATHY (H): ICD-10-CM

## 2023-04-02 DIAGNOSIS — K76.82 ENCEPHALOPATHY, HEPATIC (H): ICD-10-CM

## 2023-04-02 LAB
ALBUMIN SERPL BCG-MCNC: 2.9 G/DL (ref 3.5–5.2)
ALP SERPL-CCNC: 135 U/L (ref 35–104)
ALT SERPL W P-5'-P-CCNC: 32 U/L (ref 10–35)
AMMONIA PLAS-SCNC: 98 UMOL/L (ref 11–51)
ANION GAP SERPL CALCULATED.3IONS-SCNC: 10 MMOL/L (ref 7–15)
AST SERPL W P-5'-P-CCNC: 79 U/L (ref 10–35)
BASOPHILS # BLD AUTO: 0 10E3/UL (ref 0–0.2)
BASOPHILS NFR BLD AUTO: 1 %
BILIRUB SERPL-MCNC: 2.2 MG/DL
BUN SERPL-MCNC: 13 MG/DL (ref 8–23)
CALCIUM SERPL-MCNC: 8.9 MG/DL (ref 8.6–10)
CHLORIDE SERPL-SCNC: 113 MMOL/L (ref 98–107)
CREAT BLD-MCNC: 0.8 MG/DL (ref 0.5–1)
CREAT SERPL-MCNC: 0.92 MG/DL (ref 0.51–0.95)
DEPRECATED HCO3 PLAS-SCNC: 21 MMOL/L (ref 22–29)
EOSINOPHIL # BLD AUTO: 0 10E3/UL (ref 0–0.7)
EOSINOPHIL NFR BLD AUTO: 2 %
ERYTHROCYTE [DISTWIDTH] IN BLOOD BY AUTOMATED COUNT: 18.7 % (ref 10–15)
GFR SERPL CREATININE-BSD FRML MDRD: 71 ML/MIN/1.73M2
GFR SERPL CREATININE-BSD FRML MDRD: >60 ML/MIN/1.73M2
GLUCOSE BLDC GLUCOMTR-MCNC: 74 MG/DL (ref 70–99)
GLUCOSE SERPL-MCNC: 76 MG/DL (ref 70–99)
HCT VFR BLD AUTO: 29.2 % (ref 35–47)
HGB BLD-MCNC: 9 G/DL (ref 11.7–15.7)
HOLD SPECIMEN: NORMAL
IMM GRANULOCYTES # BLD: 0 10E3/UL
IMM GRANULOCYTES NFR BLD: 1 %
INR PPP: 2.16 (ref 0.85–1.15)
LACTATE SERPL-SCNC: 1.6 MMOL/L (ref 0.7–2)
LYMPHOCYTES # BLD AUTO: 0.4 10E3/UL (ref 0.8–5.3)
LYMPHOCYTES NFR BLD AUTO: 19 %
MCH RBC QN AUTO: 31.8 PG (ref 26.5–33)
MCHC RBC AUTO-ENTMCNC: 30.8 G/DL (ref 31.5–36.5)
MCV RBC AUTO: 103 FL (ref 78–100)
MONOCYTES # BLD AUTO: 0.3 10E3/UL (ref 0–1.3)
MONOCYTES NFR BLD AUTO: 17 %
NEUTROPHILS # BLD AUTO: 1.2 10E3/UL (ref 1.6–8.3)
NEUTROPHILS NFR BLD AUTO: 60 %
NRBC # BLD AUTO: 0 10E3/UL
NRBC BLD AUTO-RTO: 0 /100
PLATELET # BLD AUTO: 83 10E3/UL (ref 150–450)
POTASSIUM SERPL-SCNC: 3.9 MMOL/L (ref 3.4–5.3)
PROCALCITONIN SERPL IA-MCNC: 0.07 NG/ML
PROT SERPL-MCNC: 6.1 G/DL (ref 6.4–8.3)
RBC # BLD AUTO: 2.83 10E6/UL (ref 3.8–5.2)
SARS-COV-2 RNA RESP QL NAA+PROBE: NEGATIVE
SODIUM SERPL-SCNC: 144 MMOL/L (ref 136–145)
T4 FREE SERPL-MCNC: 1.01 NG/DL (ref 0.9–1.7)
TROPONIN T SERPL HS-MCNC: 27 NG/L
TROPONIN T SERPL HS-MCNC: 31 NG/L
TSH SERPL DL<=0.005 MIU/L-ACNC: 5.36 UIU/ML (ref 0.3–4.2)
WBC # BLD AUTO: 2 10E3/UL (ref 4–11)

## 2023-04-02 PROCEDURE — 84439 ASSAY OF FREE THYROXINE: CPT

## 2023-04-02 PROCEDURE — 82962 GLUCOSE BLOOD TEST: CPT

## 2023-04-02 PROCEDURE — 87086 URINE CULTURE/COLONY COUNT: CPT | Performed by: STUDENT IN AN ORGANIZED HEALTH CARE EDUCATION/TRAINING PROGRAM

## 2023-04-02 PROCEDURE — 84145 PROCALCITONIN (PCT): CPT | Performed by: STUDENT IN AN ORGANIZED HEALTH CARE EDUCATION/TRAINING PROGRAM

## 2023-04-02 PROCEDURE — 70450 CT HEAD/BRAIN W/O DYE: CPT

## 2023-04-02 PROCEDURE — 36415 COLL VENOUS BLD VENIPUNCTURE: CPT | Performed by: EMERGENCY MEDICINE

## 2023-04-02 PROCEDURE — 99285 EMERGENCY DEPT VISIT HI MDM: CPT | Mod: 25 | Performed by: EMERGENCY MEDICINE

## 2023-04-02 PROCEDURE — 70496 CT ANGIOGRAPHY HEAD: CPT

## 2023-04-02 PROCEDURE — 85025 COMPLETE CBC W/AUTO DIFF WBC: CPT | Performed by: EMERGENCY MEDICINE

## 2023-04-02 PROCEDURE — C9803 HOPD COVID-19 SPEC COLLECT: HCPCS | Performed by: EMERGENCY MEDICINE

## 2023-04-02 PROCEDURE — 999N000127 HC STATISTIC PERIPHERAL IV START W US GUIDANCE

## 2023-04-02 PROCEDURE — 250N000013 HC RX MED GY IP 250 OP 250 PS 637

## 2023-04-02 PROCEDURE — U0003 INFECTIOUS AGENT DETECTION BY NUCLEIC ACID (DNA OR RNA); SEVERE ACUTE RESPIRATORY SYNDROME CORONAVIRUS 2 (SARS-COV-2) (CORONAVIRUS DISEASE [COVID-19]), AMPLIFIED PROBE TECHNIQUE, MAKING USE OF HIGH THROUGHPUT TECHNOLOGIES AS DESCRIBED BY CMS-2020-01-R: HCPCS | Performed by: EMERGENCY MEDICINE

## 2023-04-02 PROCEDURE — 80053 COMPREHEN METABOLIC PANEL: CPT | Performed by: EMERGENCY MEDICINE

## 2023-04-02 PROCEDURE — 82565 ASSAY OF CREATININE: CPT

## 2023-04-02 PROCEDURE — 999N000176 HC STATISTIC STROKE CODE W/O ACCESS

## 2023-04-02 PROCEDURE — 81001 URINALYSIS AUTO W/SCOPE: CPT | Performed by: STUDENT IN AN ORGANIZED HEALTH CARE EDUCATION/TRAINING PROGRAM

## 2023-04-02 PROCEDURE — 99284 EMERGENCY DEPT VISIT MOD MDM: CPT | Mod: 25 | Performed by: EMERGENCY MEDICINE

## 2023-04-02 PROCEDURE — 93975 VASCULAR STUDY: CPT | Mod: 26 | Performed by: RADIOLOGY

## 2023-04-02 PROCEDURE — 93005 ELECTROCARDIOGRAM TRACING: CPT | Performed by: EMERGENCY MEDICINE

## 2023-04-02 PROCEDURE — 82140 ASSAY OF AMMONIA: CPT | Performed by: EMERGENCY MEDICINE

## 2023-04-02 PROCEDURE — 84484 ASSAY OF TROPONIN QUANT: CPT | Performed by: EMERGENCY MEDICINE

## 2023-04-02 PROCEDURE — 84484 ASSAY OF TROPONIN QUANT: CPT

## 2023-04-02 PROCEDURE — 250N000011 HC RX IP 250 OP 636: Performed by: EMERGENCY MEDICINE

## 2023-04-02 PROCEDURE — 36415 COLL VENOUS BLD VENIPUNCTURE: CPT

## 2023-04-02 PROCEDURE — 71045 X-RAY EXAM CHEST 1 VIEW: CPT

## 2023-04-02 PROCEDURE — 84443 ASSAY THYROID STIM HORMONE: CPT

## 2023-04-02 PROCEDURE — 99222 1ST HOSP IP/OBS MODERATE 55: CPT | Mod: GC | Performed by: STUDENT IN AN ORGANIZED HEALTH CARE EDUCATION/TRAINING PROGRAM

## 2023-04-02 PROCEDURE — 84484 ASSAY OF TROPONIN QUANT: CPT | Performed by: STUDENT IN AN ORGANIZED HEALTH CARE EDUCATION/TRAINING PROGRAM

## 2023-04-02 PROCEDURE — 70498 CT ANGIOGRAPHY NECK: CPT

## 2023-04-02 PROCEDURE — 70496 CT ANGIOGRAPHY HEAD: CPT | Mod: 26 | Performed by: RADIOLOGY

## 2023-04-02 PROCEDURE — 70498 CT ANGIOGRAPHY NECK: CPT | Mod: 26 | Performed by: RADIOLOGY

## 2023-04-02 PROCEDURE — 87040 BLOOD CULTURE FOR BACTERIA: CPT | Performed by: EMERGENCY MEDICINE

## 2023-04-02 PROCEDURE — 80307 DRUG TEST PRSMV CHEM ANLYZR: CPT | Performed by: EMERGENCY MEDICINE

## 2023-04-02 PROCEDURE — 120N000002 HC R&B MED SURG/OB UMMC

## 2023-04-02 PROCEDURE — 93975 VASCULAR STUDY: CPT

## 2023-04-02 PROCEDURE — 71045 X-RAY EXAM CHEST 1 VIEW: CPT | Mod: 26 | Performed by: RADIOLOGY

## 2023-04-02 PROCEDURE — 99253 IP/OBS CNSLTJ NEW/EST LOW 45: CPT | Mod: GC | Performed by: PSYCHIATRY & NEUROLOGY

## 2023-04-02 PROCEDURE — 36415 COLL VENOUS BLD VENIPUNCTURE: CPT | Performed by: STUDENT IN AN ORGANIZED HEALTH CARE EDUCATION/TRAINING PROGRAM

## 2023-04-02 PROCEDURE — 250N000013 HC RX MED GY IP 250 OP 250 PS 637: Performed by: EMERGENCY MEDICINE

## 2023-04-02 PROCEDURE — 85610 PROTHROMBIN TIME: CPT | Performed by: EMERGENCY MEDICINE

## 2023-04-02 PROCEDURE — 93010 ELECTROCARDIOGRAM REPORT: CPT | Performed by: EMERGENCY MEDICINE

## 2023-04-02 PROCEDURE — 83605 ASSAY OF LACTIC ACID: CPT | Performed by: EMERGENCY MEDICINE

## 2023-04-02 RX ORDER — LEVOTHYROXINE SODIUM 25 UG/1
25 TABLET ORAL EVERY OTHER DAY
Status: DISCONTINUED | OUTPATIENT
Start: 2023-04-02 | End: 2023-04-03 | Stop reason: HOSPADM

## 2023-04-02 RX ORDER — LACTULOSE 10 G/15ML
20 SOLUTION ORAL DAILY
Status: DISCONTINUED | OUTPATIENT
Start: 2023-04-02 | End: 2023-04-03 | Stop reason: HOSPADM

## 2023-04-02 RX ORDER — NADOLOL 20 MG/1
20 TABLET ORAL EVERY EVENING
Status: DISCONTINUED | OUTPATIENT
Start: 2023-04-02 | End: 2023-04-03 | Stop reason: HOSPADM

## 2023-04-02 RX ORDER — CHOLECALCIFEROL (VITAMIN D3) 125 MCG
30000 CAPSULE ORAL DAILY
Status: DISCONTINUED | OUTPATIENT
Start: 2023-04-02 | End: 2023-04-03 | Stop reason: HOSPADM

## 2023-04-02 RX ORDER — LANOLIN ALCOHOL/MO/W.PET/CERES
3 CREAM (GRAM) TOPICAL
Status: DISCONTINUED | OUTPATIENT
Start: 2023-04-02 | End: 2023-04-03 | Stop reason: HOSPADM

## 2023-04-02 RX ORDER — IOPAMIDOL 755 MG/ML
75 INJECTION, SOLUTION INTRAVASCULAR ONCE
Status: COMPLETED | OUTPATIENT
Start: 2023-04-02 | End: 2023-04-02

## 2023-04-02 RX ORDER — LACTULOSE 10 G/15ML
20 SOLUTION ORAL
Status: DISCONTINUED | OUTPATIENT
Start: 2023-04-02 | End: 2023-04-03 | Stop reason: HOSPADM

## 2023-04-02 RX ORDER — FOLIC ACID 1 MG/1
1 TABLET ORAL DAILY
Status: DISCONTINUED | OUTPATIENT
Start: 2023-04-02 | End: 2023-04-03 | Stop reason: HOSPADM

## 2023-04-02 RX ORDER — LACTULOSE 10 G/10G
20 SOLUTION ORAL ONCE
Status: COMPLETED | OUTPATIENT
Start: 2023-04-02 | End: 2023-04-02

## 2023-04-02 RX ORDER — GABAPENTIN 600 MG/1
600 TABLET ORAL AT BEDTIME
Status: DISCONTINUED | OUTPATIENT
Start: 2023-04-02 | End: 2023-04-03 | Stop reason: HOSPADM

## 2023-04-02 RX ORDER — LIDOCAINE 40 MG/G
CREAM TOPICAL
Status: DISCONTINUED | OUTPATIENT
Start: 2023-04-02 | End: 2023-04-03 | Stop reason: HOSPADM

## 2023-04-02 RX ORDER — LACTULOSE 10 G/15ML
100 SOLUTION ORAL
Status: DISCONTINUED | OUTPATIENT
Start: 2023-04-02 | End: 2023-04-03 | Stop reason: HOSPADM

## 2023-04-02 RX ORDER — PANTOPRAZOLE SODIUM 40 MG/1
40 TABLET, DELAYED RELEASE ORAL
Status: DISCONTINUED | OUTPATIENT
Start: 2023-04-02 | End: 2023-04-03 | Stop reason: HOSPADM

## 2023-04-02 RX ORDER — MULTIPLE VITAMINS W/ MINERALS TAB 9MG-400MCG
1 TAB ORAL DAILY
Status: DISCONTINUED | OUTPATIENT
Start: 2023-04-02 | End: 2023-04-03 | Stop reason: HOSPADM

## 2023-04-02 RX ORDER — PILOCARPINE HYDROCHLORIDE 5 MG/1
5 TABLET, FILM COATED ORAL 3 TIMES DAILY
Status: DISCONTINUED | OUTPATIENT
Start: 2023-04-02 | End: 2023-04-03 | Stop reason: HOSPADM

## 2023-04-02 RX ORDER — ZOLPIDEM TARTRATE 5 MG/1
10 TABLET ORAL
Status: DISCONTINUED | OUTPATIENT
Start: 2023-04-02 | End: 2023-04-03 | Stop reason: HOSPADM

## 2023-04-02 RX ORDER — FERROUS SULFATE 325(65) MG
325 TABLET ORAL
Status: DISCONTINUED | OUTPATIENT
Start: 2023-04-02 | End: 2023-04-03 | Stop reason: HOSPADM

## 2023-04-02 RX ADMIN — LEVOTHYROXINE SODIUM 25 MCG: 0.03 TABLET ORAL at 09:53

## 2023-04-02 RX ADMIN — RIFAXIMIN 550 MG: 550 TABLET ORAL at 09:53

## 2023-04-02 RX ADMIN — LACTULOSE 20 G: 20 SOLUTION ORAL at 17:58

## 2023-04-02 RX ADMIN — FERROUS SULFATE TAB 325 MG (65 MG ELEMENTAL FE) 325 MG: 325 (65 FE) TAB at 09:53

## 2023-04-02 RX ADMIN — IOPAMIDOL 75 ML: 755 INJECTION, SOLUTION INTRAVENOUS at 04:05

## 2023-04-02 RX ADMIN — LACTULOSE 20 G: 10 POWDER, FOR SOLUTION ORAL at 06:25

## 2023-04-02 RX ADMIN — PILOCARPINE HYDROCHLORIDE 5 MG: 5 TABLET, FILM COATED ORAL at 20:16

## 2023-04-02 RX ADMIN — NADOLOL 20 MG: 20 TABLET ORAL at 20:16

## 2023-04-02 RX ADMIN — RIFAXIMIN 550 MG: 550 TABLET ORAL at 20:16

## 2023-04-02 RX ADMIN — PILOCARPINE HYDROCHLORIDE 5 MG: 5 TABLET, FILM COATED ORAL at 14:34

## 2023-04-02 RX ADMIN — Medication 1 TABLET: at 07:26

## 2023-04-02 RX ADMIN — PILOCARPINE HYDROCHLORIDE 5 MG: 5 TABLET, FILM COATED ORAL at 09:53

## 2023-04-02 RX ADMIN — Medication 30000 UNITS: at 09:53

## 2023-04-02 RX ADMIN — THIAMINE HCL TAB 100 MG 100 MG: 100 TAB at 07:26

## 2023-04-02 RX ADMIN — PANTOPRAZOLE SODIUM 40 MG: 40 TABLET, DELAYED RELEASE ORAL at 07:26

## 2023-04-02 RX ADMIN — LACTULOSE 20 G: 20 SOLUTION ORAL at 15:55

## 2023-04-02 RX ADMIN — FOLIC ACID 1 MG: 1 TABLET ORAL at 07:26

## 2023-04-02 RX ADMIN — LACTULOSE 20 G: 20 SOLUTION ORAL at 07:26

## 2023-04-02 RX ADMIN — Medication 3 MG: at 22:39

## 2023-04-02 ASSESSMENT — ACTIVITIES OF DAILY LIVING (ADL)
ADLS_ACUITY_SCORE: 35
WEAR_GLASSES_OR_BLIND: YES
FALL_HISTORY_WITHIN_LAST_SIX_MONTHS: YES
ADLS_ACUITY_SCORE: 32
EQUIPMENT_CURRENTLY_USED_AT_HOME: SHOWER CHAIR
TOILETING_ISSUES: NO
ADLS_ACUITY_SCORE: 37
NUMBER_OF_TIMES_PATIENT_HAS_FALLEN_WITHIN_LAST_SIX_MONTHS: 1
ADLS_ACUITY_SCORE: 35
ADLS_ACUITY_SCORE: 35
ADLS_ACUITY_SCORE: 37
ADLS_ACUITY_SCORE: 26
CHANGE_IN_FUNCTIONAL_STATUS_SINCE_ONSET_OF_CURRENT_ILLNESS/INJURY: YES
DRESSING/BATHING_DIFFICULTY: NO
ADLS_ACUITY_SCORE: 29
WALKING_OR_CLIMBING_STAIRS_DIFFICULTY: NO
CONCENTRATING,_REMEMBERING_OR_MAKING_DECISIONS_DIFFICULTY: YES
DOING_ERRANDS_INDEPENDENTLY_DIFFICULTY: YES
DIFFICULTY_EATING/SWALLOWING: NO
ADLS_ACUITY_SCORE: 29
ADLS_ACUITY_SCORE: 37

## 2023-04-02 NOTE — PHARMACY
Pharmacy Stroke Code Response    Pharmacist responded as part of the Stroke Code Team activation to patient care area ED. The Stroke Team determined that the patient was not a candidate for IV thrombolytic therapy and the pharmacy team was dismissed at 3:30 AM.    Pawan Knox [facility]

## 2023-04-02 NOTE — CODE/RAPID RESPONSE
Stroke Code Nurse-Responder Note    Arrival Time to Stroke Code: 0320    Stroke Code Team interventions:   - CTA Head Neck with contrast  - De-escalated at 0348 by     ED/Bedside Nurse providing handoff: Imtiaz Echeverria RN/Lam Juárez RN    Time left for CT: 0325    Time arrived to next location (ED/Unit/IR): Return to ED03 at 0340    ED/Bedside Nurse given handoff (name/time): Lam Juárez RN (present throughout stroke code/transport)    Luciana Gonzáles RN

## 2023-04-02 NOTE — ED TRIAGE NOTES
Pt reports today with increasing altered mental status since Friday. Pt had head bleed in recent past. Pt alert on arrival. Pt able to answer question.

## 2023-04-02 NOTE — PROGRESS NOTES
Pt able to swallow pills, pt did not need to urinate even though writer asked multiple times, pt states not hungry, writer will check with pt in one hour to order lunch. Pt currently has a sitter for safety and altered mental status. Lactic acid sent

## 2023-04-02 NOTE — CONSULTS
[facility]    Stroke Consult Note    Reason for Consult: Stroke Code     Chief Complaint: Altered Mental Status      HPI  Sulma Ramos is a 57 year old female with PMH significant for liver cirrhosis, hx of hepatic encephalopathy and ascites, portal hypertension with portal vein thrombosis (on Eliquis, recently held with intracranial bleed), gastric bypass, recent traumatic left subdural hematoma status post evacuation February 2023 who presents to the emergency department via EMS for reported change in mental status.  Stroke code was called due to patient's history of intracranial bleed.    Upon arrival to [facility] patient is restless on bed, moving all extremities in an abnormal twisting manner particularly in upper extremities. Turning head side to side intermittently and scanning room with eyes. Was able to tell me her name, but not where she was or what month or year it was.  She was intermittently following commands with need for frequent prompts.  She is not able to tell me why she was at the hospital, or detail recent hospital admission for subdural hematoma.    Per EMS patient's  reported behavior was similar to how she acted prior to finding out she had a subdural hematoma, which is what prompted him calling EMS.     In the ED, patient was mildly tachycardic with heart rate to 102, blood pressure 130/79.    Imaging Findings  IMPRESSION:   HEAD CT:  1.  Severely motion degraded, near nondiagnostic exam.  2.  The mixed density left hemispheric subdural hemorrhage appears grossly stable in overall volume.  3.  Grossly stable left-to-right midline shift.  4.  No definite gray-white differentiation loss.     HEAD CTA:   1.  Motion degraded exam. Attenuation of the right M2 branches is favored to be due to motion artifact. No definite proximal arterial occlusion.     NECK CTA:  1.  No flow-limiting stenosis or evidence of dissection.  2.  Right  pleural effusion.    Intravenous Thrombolysis  Not given due to:   - history of intracranial hemorrhage  - head trauma/stroke within the past 3 months  - platelet count < 100,000  - surgery/trauma within the past 14 days    Endovascular Treatment  Not initiated due to absence of proximal vessel occlusion    Impression   Sulma Ramos is a 57 year old female with PMH significant for liver cirrhosis c/b hx of hepatic encephalopathy and ascites, portal hypertension with portal vein thrombosis (on Eliquis, recently held with intracranial bleed), gastric bypass, recent traumatic left subdural hematoma s/p evacuation February 2023 who presents to the emergency department via EMS for reported change in mental status.  Stroke code was called due to patient's history of intracranial bleed.    NIHSS 12 largely due to patients altered mental status, not following commands for motor exam, and speech. CT head completed with demonstrated stable left sided SDH. CTA without definitive large vessel occlusion though exam is severely motion degraded. IV thrombolytics not given as patient with several contraindications including hx of head trauma and SDH s/p evacuation 2 months ago, low platelets, and overall low clinical suspicion for acute ischemic stroke as etiology of symptoms. No clear large vessel occlusion for endovascular intervention.     Suspect patient's altered mental status most likely toxic metabolic encephalopathy, with highest suspicion for HE given history.  Also per chart review patient with extrapyramidal symptoms during most recent hospital admission, and these are most likely exacerbated in the setting of acute illness or toxic metabolic insult.    Recommendations  - Toxic metabolic work up, including ammonia  - No further stroke work up at this time. If the above negative and patient continues to be altered, could consider MRI brain though would hold off for now.    Thank you for this consult. No further stroke  evaluation is recommended, so we will sign off. Please contact us with any additional questions.     The patient was discussed with the Stroke Staff Dr. Roberts.    Gracie Alexis MD  Neurology Resident PGY3    RESIDENT ADDENDUM   57 year old female with PMH significant for liver cirrhosis c/b hx of hepatic encephalopathy and ascites, portal hypertension with portal vein thrombosis (on Eliquis, recently held with intracranial bleed), gastric bypass, recent traumatic left subdural hematoma s/p evacuation February 2023 who presents to the emergency department via EMS for reported change in mental status.  Stroke code was called due to patient's history of intracranial bleed. CT head completed with demonstrated stable left sided SDH. CTA without definitive large vessel occlusion though exam is severely motion degraded.     Exam findings of chorea x4 extremities with tongue inward and outward protrusion consistent with chorea. Suspect patient's altered mental status most likely toxic metabolicencephalopathy, with highest suspicion for HE given history.  Ammonia of 98 making HE most likely underlying etiology of AMS and chorea. Also per chart review patient with extrapyramidal symptoms during most recent hospital admission, and these are most likely exacerbated in the setting of acute illness or toxic metabolic insult.     UPDATED PLAN:   -Continue to workup and treat toxic metabolic (high ammonia) vs possible infectious encephalopathy per primary team   -Recommending to hold gabapentin due to possible risks of side effects of encephalopathy and chorea  -- No further stroke work up with MRI brain at this time. If the above negative and patient continues to be encephalopathic with chorea would recommend general neurology consult at that time     Pricilla Valdez MD   Neurology Resident PGY-1  [facility] [id number]     ______________________________________________________    Clinically Significant Risk Factors Present on Admission                              Past Medical History   Past Medical History:   Diagnosis Date     Anxiety state, unspecified      Polyphagia(783.6)      Past Surgical History   Past Surgical History:   Procedure Laterality Date     CRANIOTOMY, EVACUATE HEMATOMA SUBDURAL, COMBINED Left 2023    Procedure: Left errol holes, evacuation of hematoma subdural, combined;  Surgeon: Liz Mayo MD;  Location: [facility] OR     IR THORACENTESIS  3/4/2023     Z NONSPECIFIC PROCEDURE       x 2     [facility] NONSPECIFIC PROCEDURE           Medications   Home Meds  Prior to Admission medications    Medication Sig Start Date End Date Taking? Authorizing Provider   calcium-vitamin D (CALTRATE) 600-400 MG-UNIT per tablet Take 1 tablet by mouth daily    Unknown, Entered By History   cyanocobalamin 1000 MCG/ML injection Inject 1 mL as directed every 30 days    Unknown, Entered By History   ferrous sulfate 325 (65 FE) MG tablet Take 325 mg by mouth daily (with breakfast)    Unknown, Entered By History   folic acid (FOLVITE) 1 MG tablet Take 1 tablet (1 mg) by mouth daily for 30 days 3/8/23 4/7/23  Luis Hoskins MD   GABAPENTIN PO Take 600 mg by mouth At Bedtime for restless legs    Unknown, Entered By History   lactulose (CHRONULAC) 10 GM/15ML solution Take 30 mLs (20 g) by mouth daily for 30 days 3/8/23 4/7/23  Luis Hoskins MD   lactulose 20 GM/30ML SOLN Take 20 g by mouth every other day 13   Aubrie Rolon MD   LEVOTHYROXINE SODIUM PO Take 25 mcg by mouth every other day    Unknown, Entered By History   multivitamin w/minerals (THERA-VIT-M) tablet Take 1 tablet by mouth daily for 30 days 3/8/23 4/7/23  Luis Hoskins MD   NADOLOL PO Take 20 mg by mouth every evening    Unknown, Entered By History   omeprazole (PRILOSEC) 20 MG DR capsule Take 20 mg by mouth daily    Unknown, Entered By History   pantoprazole (PROTONIX) 40 MG EC tablet Take 1 tablet (40 mg) by mouth every morning (before  breakfast) for 30 days 3/8/23 4/7/23  Luis Hoskins MD   pilocarpine (SALAGEN) 5 MG tablet Take 5 mg by mouth 3 times daily    Unknown, Entered By History   Rifaximin (XIFAXAN PO) Take 550 mg by mouth 2 times daily    Unknown, Entered By History   thiamine (B-1) 100 MG tablet Take 1 tablet (100 mg) by mouth daily for 30 days 3/8/23 4/7/23  Luis Hoskins MD   vitamin A 3 MG (41611 UNITS) capsule Take 30,000 Units by mouth daily    Unknown, Entered By History   Zolpidem Tartrate (AMBIEN PO) Take 10 mg by mouth nightly as needed    Unknown, Entered By History       Scheduled Meds      Infusion Meds      PRN Meds      Allergies   Allergies   Allergen Reactions     Clarithromycin      Clindamycin Base      Droperidol      Family History   Family History   Problem Relation Age of Onset     Hypertension Mother      Hypertension Paternal Grandfather      Thyroid Disease Paternal Grandfather      Arthritis Father         gout     Arthritis Paternal Grandmother         gout     Arthritis Paternal Grandfather         gout     C.A.D. Paternal Grandfather          of MI in 70s     C.A.D. Mother         atrial fibrillation     Social History   Social History     Tobacco Use     Smoking status: Never   Substance Use Topics     Alcohol use: Yes     Comment: 8-10/week     Drug use: No       Review of Systems   Review of systems not obtained due to patient factors - mental status       PHYSICAL EXAMINATION  Pulse:  [102] 102  Resp:  [16] 16  BP: (130)/(79) 130/79  SpO2:  [97 %] 97 %     General Exam  General:  laying in bed, restless    HEENT:  normocephalic/atraumatic, prior left errol hole site intact  Cardio:  tachycardic  Pulmonary:  no respiratory distress  Abdomen:  soft, non-tender  Extremities:  edema in both lower extremities  Skin:  intact, warm/dry     Neuro Exam  Mental Status:  awake, looking around room, able to tell me her name, not able to state place, month, or situation, intermittently following  commands,   Cranial Nerves:  visual fields intact to threat, pupils 4 mm and reactive, EOM intact to looking around the room (did not follow formal testing), facial movements symmetric, hearing not formally tested but intact to conversation, mild dysarthria, tongue protrusion midline  Motor:  moves all extremities anti-gravity but fall to bed before 10 seconds, spontaneous movements of all extremities with twisting posture in upper extremities  Reflexes:  toes down-going  Sensory:  withdraws to noxious stimuli throughout  Coordination:  does not follow commands  Station/Gait:  deferred    Dysphagia Screen  Dysarthria or facial droop present - Maintain NPO, consult SLP    Stroke Scales    NIHSS  1a. Level of Consciousness 0-->Alert, keenly responsive   1b. LOC Questions 1-->Answers one question correctly   1c. LOC Commands 1-->Performs one task correctly   2.   Best Gaze 0-->Normal   3.   Visual 0-->No visual loss   4.   Facial Palsy 0-->Normal symmetrical movements   5a. Motor Arm, Left 2-->Some effort against gravity, limb cannot get to or maintain (if cued) 90 (or 45) degrees, drifts down to bed, but has some effort against gravity   5b. Motor Arm, Right 2-->Some effort against gravity, limb cannot get to or maintain (if cued) 90 (or 45) degrees, drifts down to bed, but has some effort against gravity   6a. Motor Leg, Left 2-->Some effort against gravity, leg falls to bed by 5 secs, but has some effort against gravity   6b. Motor Leg, right 2-->Some effort against gravity, leg falls to bed by 5 secs, but has some effort against gravity   7.   Limb Ataxia 0-->Absent   8.   Sensory 0-->Normal, no sensory loss   9.   Best Language 1-->Mild-to-moderate aphasia, some obvious loss of fluency or facility of comprehension, without significant limitation on ideas expressed or form of expression. Reduction of speech and/or comprehension, however, makes conversation. . . (see row details)   10. Dysarthria  1-->Mild-to-moderate dysarthria, patient slurs at least some words and, at worst, can be understood with some difficulty   11. Extinction and Inattention  0-->No abnormality   Total 12 (04/02/23 0340)       Modified [place] Score (Pre-morbid)  3-Moderate disability; requiring some help, but able to walk without assistance    Imaging  I personally reviewed all imaging; relevant findings per HPI.     Lab Results Data   CBC  No results for input(s): WBC, RBC, HGB, HCT, PLT in the last 168 hours.  Basic Metabolic Panel    Recent Labs   Lab 04/02/23  0325 04/02/23  0322   CR 0.8  --    GLC  --  74     Liver Panel  No results for input(s): PROTTOTAL, ALBUMIN, BILITOTAL, ALKPHOS, AST, ALT, BILIDIRECT in the last 168 hours.  INR    Recent Labs   Lab Test 03/04/23  1045 03/03/23  0434 03/02/23  0526   INR 1.82* 1.82* 2.07*      Lipid Profile  No lab results found.  A1C    Recent Labs   Lab Test 02/27/23  0335   A1C 5.3     Troponin  No results for input(s): CTROPT, TROPONINIS, TROPONINI, GHTROP in the last 168 hours.       Stroke Code Data Data   Stroke Code Data  (for stroke code without tele)  Stroke code activated 04/02/23   0307   First stroke provider response 04/02/23   0310   Last known normal 04/02/23   0100   Time of discovery   (or onset of symptoms) 04/02/23   0100   Head CT read by Stroke Neuro Dr/Provider 04/02/23   0358   Was stroke code de-escalated? Yes 04/02/23 0347

## 2023-04-02 NOTE — LETTER
Transition Communication Hand-off for Care Transitions to Next Level of Care Provider    Name: Sulma Ramos  : 1965  MRN #: [id number]  Primary Care Provider: Raudel Ortiz     Primary Clinic: [facility] [address] [facility], [place] [address]     Reason for Hospitalization:  Hepatic encephalopathy (H) [K76.82]  Admit Date/Time: 2023  3:20 AM  Discharge Date: 4/3/2023  Payor Source: Payor: BCBS / Plan: [employer] [place] / Product Type: Indemnity /     Readmission Assessment Measure (MATTHEW) Risk Score/category: 21%         Reason for Communication Hand-off Referral: Avoidable readmission within 30 days    Discharge Plan: Patient will discharge home       Concern for non-adherence with plan of care:   Y/N No  Discharge Needs Assessment:  Needs      Flowsheet Row Most Recent Value   Equipment Currently Used at Home shower chair            Follow-up specialty is recommended: No  Follow-up plan:    Future Appointments   Date Time Provider Department Center   4/3/2023  3:00 PM Chandni Gandhi, PT [facility]   2023  8:00 AM Negrita Simpson, GERTRUDER [facility]   2023 10:00 AM Hazel Peterson PA-C [facility]   2023  1:30 PM Nic Levi, PT [facility]       Any outstanding tests or procedures:              Key Recommendations:  External handoff    Suhas Arturo    AVS/Discharge Summary is the source of truth; this is a helpful guide for improved communication of patient story

## 2023-04-02 NOTE — PROGRESS NOTES
[facility]    Medicine Progress Note - Hospitalist Service, GOLD TEAM 8    Date of Admission:  4/2/2023    Assessment & Plan   Sulma Ramos is a 57 year old female admitted on 4/2/2023. She has a PMH significant for decompensated liver cirrhosis, hx of hepatic encephalopathy and ascites, portal hypertension with portal vein thrombosis (on Eliquis, recently held with intracranial bleed), gastric bypass, recent traumatic left subdural hematoma status post evacuation February 2023 who presents for worsening confusion and found to have elevated ammonia on admission concerning for HE.       #Hepatic Encephalopathy   #Hx of recent traumatic left subdural hematoma status post evacuation (2/2023)  Presents with altered mental status. Ammonia elevated on admission concerning for HE. Patient's  states he helps with medications however he does not know if she takes them, and often fights him on medications when she is starting to get confused, so etiology appears to be more likely medication non compliance. In regards to workup for additional causes of HE, infectious etiology vs. Intracranial pathology remains on differential. CTA with no flow-limiting stenosis or evidence of dissection and CT head with grossly stable findings. Evaluated by Neurology in ER and indicated no acute concern for stroke/bleed given imaging. CXR with possible left lobe consolidation, which could contribute to trigger however patient currently has no respiratory symptoms.   -blood cultures pending   -pro calcitonin @ 0.07, will hold off on treatment currently - pt w/no respiratory symptoms    -can consider further characterization of right sided pleural effusion mentioned on read for CTA neck if need to further workup infectious etiologies is needed   -UDS pending   -UA pending   -continue PTA Lactulose  -[place] Protocol   -Liver U/S w/ doppler to evaluate for PVT as anticoagulation was  recently held : completed on 4/2, poor image quality 2/2 chorea  -can consider GI consult if HE not improving   -PTA Rifaximin   -TSH given hx of hypothyroidism      #Decompensated liver cirrhosis (+HE, +Ascites)  #Hx of portal hypertension with portal vein thrombosis (on Eliquis, recently held with intracranial bleed)  -PTA lactulose and Rifaximin as mentioned above   -PTA Nadolol, possible for EV? Unclear if patient has hx of this      #Chorea  Patient noted to have extrapyramidal symptoms on examination. Unclear chronicity. May be 2/2 medications vs. Encephalopathy. Per , choreaform movements have always manifested w/ her HE. Discussed w/ neurology who recommended discontinuing gabapentin as it could worsen symptoms  -CTM   - discuss w/ neurology if chorea-form movements persist after HE has improved.      ~chronic conditions~      #Hyperbilirubinemia   #Mild Elevation in AST  #Leukopenia (baseline ~2-3)   #Macrocytic Anemia (baseline ~8-9)  #Thrombocytopenia   #Coagulopathy   Appears chronic, likely 2/2 cirrhosis.   -PTA folic acid  -PTA Iron, Vitamin A, Vitamin B1    #GERD: PTA Pantoprazole 40   #Hypothyroidism: PTA Levothyroxine      #Mood: holding PTA Zolpidem and Ambien in the setting of AMS   #Pain: holding PTA Gabapentin in the setting of AMS, should discontinue on discharge as could be contributing to chorea-form movements          Diet: Regular Diet Adult    DVT Prophylaxis: SCD  Clark Catheter: Not present  Lines: None     Cardiac Monitoring: None  Code Status: Full Code      Clinically Significant Risk Factors Present on Admission              # Hypoalbuminemia: Lowest albumin = 2.9 g/dL at 4/2/2023  3:23 AM, will monitor as appropriate  # Coagulation Defect: INR = 2.16 (Ref range: 0.85 - 1.15) and/or PTT = 32 Seconds (Ref range: 22 - 38 Seconds), will monitor for bleeding  # Thrombocytopenia: Lowest platelets = 83 in last 2 days, will monitor for bleeding     # Acute Respiratory Failure:  Documented O2 saturation < 91%.  Continue supplemental oxygen as needed             Disposition Plan      Expected Discharge Date: 04/04/2023                  Meagan Sheehan MD  Hospitalist Service, GOLD TEAM 8  [facility]  Securely message with [employer] (more info)  Text page via [employer] Paging/Directory   See signed in provider for up to date coverage information  ______________________________________________________________________    Interval History   Improved mentation per  at bedside. She was able to answer her name and location. Denies any pain.     Physical Exam   Vital Signs: Temp: 98.7  F (37.1  C) Temp src: Oral BP: 135/83 Pulse: 97   Resp: 22 SpO2: 98 % O2 Device: None (Room air)    Weight: 155 lbs 0 oz    Gen: AAOX2, awake and answering questions. Chorea form movements constantly   HEENT: normal conjunctivae, EOMI  Nares: No discharge noted from nares bilaterally   CV: RRR, no murmurs  Pulm: CTBA, no crackles or wheezing  Abd: soft, nl BS, NT, no HSM  Msk: no deformities  Extremities: mild edema bilaterally   Neuro: moving all extremities spontaneously   Skin: no rashes, dry      Medical Decision Making       45 MINUTES SPENT BY ME on the date of service doing chart review, history, exam, documentation & further activities per the note.      Data     I have personally reviewed the following data over the past 24 hrs:    2.0 (L)  \   9.0 (L)   / 83 (L)     144 113 (H) 13.0 /  76   3.9 21 (L) 0.8 \       ALT: 32 AST: 79 (H) AP: 135 (H) TBILI: 2.2 (H)   ALB: 2.9 (L) TOT PROTEIN: 6.1 (L) LIPASE: N/A       Trop: 27 (H) BNP: N/A       TSH: 5.36 (H) T4: 1.01 A1C: N/A       Procal: 0.07 (H) CRP: N/A Lactic Acid: 1.6       INR:  2.16 (H) PTT:  N/A   D-dimer:  N/A Fibrinogen:  N/A       Imaging results reviewed over the past 24 hrs:   Recent Results (from the past 24 hour(s))   CT Head w/o Contrast    Narrative    EXAM: CTA HEAD NECK WITH CONTRAST, CT HEAD WITHOUT  CONTRAST  LOCATION: [facility]  DATE/TIME: 04/02/2023, 4:05 AM    INDICATION: Altered mental status and agitation. Stroke alert.  COMPARISON: Head CT dated 03/31/2023.  CONTRAST: iopamidol (Isovue 370) solution 75 mL.     TECHNIQUE: Head and neck CT angiogram with IV contrast. Noncontrast head CT followed by axial helical CT images of the head and neck vessels obtained during the arterial phase of intravenous contrast administration. Axial 2D reconstructed images and   multiplanar 3D MIP reconstructed images of the head and neck vessels were performed by the technologist. Dose reduction techniques were used. All stenosis measurements made according to NASCET criteria unless otherwise specified.    FINDINGS: Severely degraded, near nondiagnostic exams.    NONCONTRAST HEAD CT:   INTRACRANIAL CONTENTS: Redemonstrated mixed density left hemispheric subdural hemorrhage, grossly stable in overall volume. Left to right midline shift measures approximately 6 mm, similar to the prior exam. No definite gray-white differentiation loss.   Mild presumed chronic small vessel ischemic changes. Mild generalized volume loss. No hydrocephalus.     VISUALIZED ORBITS/SINUSES/MASTOIDS: No intraorbital abnormality. No paranasal sinus mucosal disease. No middle ear or mastoid effusion.    BONES/SOFT TISSUES: Left parietal errol holes.    HEAD CTA: Motion degraded exam.  ANTERIOR CIRCULATION: Nonflow-limiting atherosclerotic plaque in the internal carotid arteries. No definite proximal arterial occlusion. Attenuation of the right M2 branches, favored to be due to motion degradation. Fetal origin of the right posterior   cerebral artery from the anterior circulation. Small right A1 segment with a patent anterior communicating artery.    POSTERIOR CIRCULATION: No stenosis/occlusion, aneurysm, or high-flow vascular malformation. Dominant right and smaller left vertebral artery contribute to a  normal basilar artery.     DURAL VENOUS SINUSES: Expected enhancement of the major dural venous sinuses.    NECK CTA:  RIGHT CAROTID: No measurable stenosis or dissection.    LEFT CAROTID: No measurable stenosis or dissection.    VERTEBRAL ARTERIES: No focal stenosis or dissection. Dominant right and smaller left vertebral arteries.    AORTIC ARCH: Classic aortic arch anatomy with no significant stenosis at the origin of the great vessels.    NONVASCULAR STRUCTURES: Degenerative changes of C5-C6. No high-grade osseous spinal canal or neural foraminal narrowing. Right-sided pleural effusion.      Impression    IMPRESSION:   HEAD CT:  1.  Severely motion degraded, near nondiagnostic exam.  2.  The mixed density left hemispheric subdural hemorrhage appears grossly stable in overall volume.  3.  Grossly stable left-to-right midline shift.  4.  No definite gray-white differentiation loss within the limitations of significant motion artifact.    HEAD CTA:   1.  Motion degraded exam. Attenuation of the right M2 branches is favored to be due to motion artifact. No definite proximal arterial occlusion.    NECK CTA:  1.  No flow-limiting stenosis or evidence of dissection.  2.  Right pleural effusion.    CT head and CTA head and neck findings were discussed with Dr. Montez at 3:58 AM on 04/02/2023.       CTA Head Neck with Contrast    Narrative    EXAM: CTA HEAD NECK WITH CONTRAST, CT HEAD WITHOUT CONTRAST  LOCATION: [facility]  DATE/TIME: 04/02/2023, 4:05 AM    INDICATION: Altered mental status and agitation. Stroke alert.  COMPARISON: Head CT dated 03/31/2023.  CONTRAST: iopamidol (Isovue 370) solution 75 mL.     TECHNIQUE: Head and neck CT angiogram with IV contrast. Noncontrast head CT followed by axial helical CT images of the head and neck vessels obtained during the arterial phase of intravenous contrast administration. Axial 2D reconstructed images and   multiplanar 3D MIP  reconstructed images of the head and neck vessels were performed by the technologist. Dose reduction techniques were used. All stenosis measurements made according to NASCET criteria unless otherwise specified.    FINDINGS: Severely degraded, near nondiagnostic exams.    NONCONTRAST HEAD CT:   INTRACRANIAL CONTENTS: Redemonstrated mixed density left hemispheric subdural hemorrhage, grossly stable in overall volume. Left to right midline shift measures approximately 6 mm, similar to the prior exam. No definite gray-white differentiation loss.   Mild presumed chronic small vessel ischemic changes. Mild generalized volume loss. No hydrocephalus.     VISUALIZED ORBITS/SINUSES/MASTOIDS: No intraorbital abnormality. No paranasal sinus mucosal disease. No middle ear or mastoid effusion.    BONES/SOFT TISSUES: Left parietal errol holes.    HEAD CTA: Motion degraded exam.  ANTERIOR CIRCULATION: Nonflow-limiting atherosclerotic plaque in the internal carotid arteries. No definite proximal arterial occlusion. Attenuation of the right M2 branches, favored to be due to motion degradation. Fetal origin of the right posterior   cerebral artery from the anterior circulation. Small right A1 segment with a patent anterior communicating artery.    POSTERIOR CIRCULATION: No stenosis/occlusion, aneurysm, or high-flow vascular malformation. Dominant right and smaller left vertebral artery contribute to a normal basilar artery.     DURAL VENOUS SINUSES: Expected enhancement of the major dural venous sinuses.    NECK CTA:  RIGHT CAROTID: No measurable stenosis or dissection.    LEFT CAROTID: No measurable stenosis or dissection.    VERTEBRAL ARTERIES: No focal stenosis or dissection. Dominant right and smaller left vertebral arteries.    AORTIC ARCH: Classic aortic arch anatomy with no significant stenosis at the origin of the great vessels.    NONVASCULAR STRUCTURES: Degenerative changes of C5-C6. No high-grade osseous spinal canal or  neural foraminal narrowing. Right-sided pleural effusion.      Impression    IMPRESSION:   HEAD CT:  1.  Severely motion degraded, near nondiagnostic exam.  2.  The mixed density left hemispheric subdural hemorrhage appears grossly stable in overall volume.  3.  Grossly stable left-to-right midline shift.  4.  No definite gray-white differentiation loss within the limitations of significant motion artifact.    HEAD CTA:   1.  Motion degraded exam. Attenuation of the right M2 branches is favored to be due to motion artifact. No definite proximal arterial occlusion.    NECK CTA:  1.  No flow-limiting stenosis or evidence of dissection.  2.  Right pleural effusion.    CT head and CTA head and neck findings were discussed with Dr. Montez at 3:58 AM on 04/02/2023.       XR Chest Port 1 View    Narrative    EXAM: XR CHEST PORT 1 VIEW  LOCATION: [facility]  DATE/TIME: 4/2/2023 5:27 AM    INDICATION: SOB  COMPARISON: PA/lateral chest radiograph 03/06/2023      Impression    IMPRESSION: Veiling asymmetric opacity of the right lung which may represent layering pleural fluid and/or infiltrate. Patchy opacity of the left mid lung also noted. Stable heart size which is within normal range. No definite pneumothorax. No acute   osseous abnormality.   US Abdomen Complete w Doppler Complete    Impression    RESIDENT PRELIMINARY INTERPRETATION  Impression:   1. Limited exam due to patient agitation and motion.  2. Splenomegaly.

## 2023-04-02 NOTE — H&P
[facility]    History and Physical - Medicine Service, MAROON TEAM        Date of Admission:  4/2/2023    Assessment & Plan      Sulma Ramos is a 57 year old female admitted on 4/2/2023. She has a PMH significant for decompensated liver cirrhosis, hx of hepatic encephalopathy and ascites, portal hypertension with portal vein thrombosis (on Eliquis, recently held with intracranial bleed), gastric bypass, recent traumatic left subdural hematoma status post evacuation February 2023 who presents for worsening confusion and found to have elevated ammonia on admission concerning for HE.       #Hepatic Encephalopathy   #Hx of recent traumatic left subdural hematoma status post evacuation (2/2023)  Presents with altered mental status. Ammonia elevated on admission concerning for HE. Patient's  states he helps with medications however he does not know if she takes them, and often fights him on medications when she is starting to get confused, so etiology appears to be more likely medication non compliance. In regards to workup for additional causes of HE, infectious etiology vs. Intracranial pathology remains on differential. CTA with no flow-limiting stenosis or evidence of dissection and CT head with grossly stable findings. Evaluated by Neurology in ER and indicated no acute concern for stroke/bleed given imaging. CXR with possible left lobe consolidation, which could contribute to trigger however patient currently has no respiratory symptoms.   -blood cultures pending   -pro calcitonin, will defer treatment for CAP at this time given patient is asymptomatic from respiratory perspective   -can consider further characterization of right sided pleural effusion mentioned on read for CTA neck if need to further workup infectious etiologies is needed   -UDS pending   -UA pending   -continue PTA Lactulose  -[place] Protocol   -Liver U/S w/ doppler to evaluate for  PVT as anticoagulation was recently held   -can consider GI consult if HE not improving   -PTA Rifaximin   -TSH given hx of hypothyroidism     #Decompensated liver cirrhosis (+HE, +Ascites)  #Hx of portal hypertension with portal vein thrombosis (on Eliquis, recently held with intracranial bleed)  -PTA lactulose and Rifaximin as mentioned above   -PTA Nadolol, possible for EV? Unclear if patient has hx of this     #Concern for Akithisia vs. Tardative Dyskinesia   Patient noted to have extrapyramidal symptoms on examination. Unclear chronicity. May be 2/2 medications vs. Encephalopathy.   -CTM     ~chronic conditions~     #Hyperbilirubinemia   #Mild Elevation in AST  #Leukopenia (baseline ~2-3)   #Macrocytic Anemia (baseline ~8-9)  #Thrombocytopenia   #Coagulopathy   Appears chronic, likely 2/2 cirrhosis.   -PTA folic acid  -PTA Iron, Vitamin A, Vitamin B1    #GERD: PTA Pantoprazole 40   #Hypothyroidism: PTA Levothyroxine     #Mood: holding PTA Zolpidem and Ambien in the setting of AMS   #Pain: holding PTA Gabapentin in the setting of AMS        Diet:  NPO   DVT Prophylaxis: SCD   Clark Catheter: Not present  Fluids: n/a   Lines: None     Cardiac Monitoring: None  Code Status:  full     Clinically Significant Risk Factors Present on Admission              # Hypoalbuminemia: Lowest albumin = 2.9 g/dL at 4/2/2023  3:23 AM, will monitor as appropriate  # Coagulation Defect: INR = 2.16 (Ref range: 0.85 - 1.15) and/or PTT = 32 Seconds (Ref range: 22 - 38 Seconds), will monitor for bleeding  # Thrombocytopenia: Lowest platelets = 83 in last 2 days, will monitor for bleeding                Disposition Plan      Expected Discharge Date: 04/04/2023               Patient to be staffed in the AM.     Pallavi Gomez MD  Medicine Service, [facility]  Securely message with [employer] (more info)  Text page via [employer] Paging/Directory   See signed in provider for up to date  "coverage information  ______________________________________________________________________    Chief Complaint   Confusion     History is obtained from the patient's .     History of Present Illness   Sulma Ramos is a 57 year old female with a PMH significant for decompensated liver cirrhosis, hx of hepatic encephalopathy and ascites, portal hypertension with portal vein thrombosis (on Eliquis, recently held with intracranial bleed), gastric bypass, recent traumatic left subdural hematoma status post evacuation 2023 who presents for worsening confusion.    Unable to obtain history from patient is she is only oriented to self. Patient's  at bedside states he had started to notice she was getting more confused the last 3 days. When this happens he tries to encourage her to take her lactulose and he helps her with medications however he has not home to know if she actually takes them. He says, \"She often fights me on this medications and feels like I keep watching her\". States she was having no other symptoms that she was complaining about the last few days. No recent admissions for HE.       Past Medical History    Past Medical History:   Diagnosis Date     Anxiety state, unspecified      Polyphagia(783.6)        Past Surgical History   Past Surgical History:   Procedure Laterality Date     CRANIOTOMY, EVACUATE HEMATOMA SUBDURAL, COMBINED Left 2023    Procedure: Left errol holes, evacuation of hematoma subdural, combined;  Surgeon: Liz Mayo MD;  Location: [facility] OR     IR THORACENTESIS  3/4/2023     [facility] NONSPECIFIC PROCEDURE       x 2     [facility] NONSPECIFIC PROCEDURE             Prior to Admission Medications   Prior to Admission Medications   Prescriptions Last Dose Informant Patient Reported? Taking?   GABAPENTIN PO   Yes No   Sig: Take 600 mg by mouth At Bedtime for restless legs   LEVOTHYROXINE SODIUM PO  Pharmacy Yes No   Sig: Take 25 mcg by mouth every other day "   NADOLOL PO   Yes No   Sig: Take 20 mg by mouth every evening   Rifaximin (XIFAXAN PO)  Pharmacy Yes No   Sig: Take 550 mg by mouth 2 times daily   Zolpidem Tartrate (AMBIEN PO)  Pharmacy Yes No   Sig: Take 10 mg by mouth nightly as needed   calcium-vitamin D (CALTRATE) 600-400 MG-UNIT per tablet  Self Yes No   Sig: Take 1 tablet by mouth daily   cyanocobalamin 1000 MCG/ML injection  Self Yes No   Sig: Inject 1 mL as directed every 30 days   ferrous sulfate 325 (65 FE) MG tablet  Self Yes No   Sig: Take 325 mg by mouth daily (with breakfast)   folic acid (FOLVITE) 1 MG tablet   No No   Sig: Take 1 tablet (1 mg) by mouth daily for 30 days   lactulose (CHRONULAC) 10 GM/15ML solution   No No   Sig: Take 30 mLs (20 g) by mouth daily for 30 days   lactulose 20 GM/30ML SOLN   Yes No   Sig: Take 20 g by mouth every other day   multivitamin w/minerals (THERA-VIT-M) tablet   No No   Sig: Take 1 tablet by mouth daily for 30 days   omeprazole (PRILOSEC) 20 MG DR capsule   Yes No   Sig: Take 20 mg by mouth daily   pantoprazole (PROTONIX) 40 MG EC tablet   No No   Sig: Take 1 tablet (40 mg) by mouth every morning (before breakfast) for 30 days   pilocarpine (SALAGEN) 5 MG tablet   Yes No   Sig: Take 5 mg by mouth 3 times daily   thiamine (B-1) 100 MG tablet   No No   Sig: Take 1 tablet (100 mg) by mouth daily for 30 days   vitamin A 3 MG (26635 UNITS) capsule   Yes No   Sig: Take 30,000 Units by mouth daily      Facility-Administered Medications: None        Physical Exam   Vital Signs:     BP: 112/83 Pulse: 102   Resp: 24 SpO2: 94 %      Weight: 155 lbs 0 oz  General: patient unable to sit still and moving all limbs in bed, laughing, no acute distress  HEENT: NC, AT, anicteric sclera, dry mucus membranes   Cardiac: RRR, normal S1/S2  Lung: no increased WOB on RA, no wheezing   Abdomen: soft, nontender, mild distension    Extremities: 1+ Le edema, +pedal edema bilaterally, +asterixis   Neuro: oriented to only self, cranial  nerves grossly intact       Medical Decision Making         Data   Labs and imaging reviewed by me.

## 2023-04-02 NOTE — ED PROVIDER NOTES
ED Provider Note  [facility]      History     Chief Complaint   Patient presents with     Altered Mental Status     HPI  Sulma Ramos is a 57 year old female who presents to the emergency department via EMS with altered mental status.  She has a recent history of subdural hematoma with uncal herniation status post errol hole.  She had been anticoagulated on Eliquis prior to this for history of portal vein thrombosis.  She has a history of cirrhosis.  Apparently at 1:00 this morning, the patient became agitated and hyperkinetic with her movements.  Apparently, this is similar to when she had her subdural hematoma.  The patient's  called EMS and the patient was transported to the emergency department as a prehospital activation of a stroke code.  Patient denies any pain.    Further history was obtained from the  after he arrived.  The patient has had progressively worsening altered mental status and confusion for the past 2 to 3 days.  He states that he is unsure how many bowel movements a day she was having prior to her becoming confused but does admit that given her take her lactulose over the past 2 days has been challenging and she likely has not had adequate dosing.  Patient states that when she has had hepatic encephalopathy in the past, she becomes agitated when she reaches a certain level of confusion.  It is also the way she appeared when she had a recent subdural hematoma.  He denies any recent fall or injury.  She has not had any fever.  She has not been coughing.  She has not been vomiting.  The patient is not anticoagulated any longer.    Past Medical History  Past Medical History:   Diagnosis Date     Anxiety state, unspecified      Polyphagia(783.6)      Past Surgical History:   Procedure Laterality Date     CRANIOTOMY, EVACUATE HEMATOMA SUBDURAL, COMBINED Left 2/26/2023    Procedure: Left errol holes, evacuation of hematoma subdural, combined;  Surgeon: Gael  Liz Sanchez MD;  Location: [facility] OR     IR THORACENTESIS  3/4/2023     [facility] NONSPECIFIC PROCEDURE       x 2     [facility] NONSPECIFIC PROCEDURE           calcium-vitamin D (CALTRATE) 600-400 MG-UNIT per tablet  cyanocobalamin 1000 MCG/ML injection  ferrous sulfate 325 (65 FE) MG tablet  folic acid (FOLVITE) 1 MG tablet  GABAPENTIN PO  lactulose (CHRONULAC) 10 GM/15ML solution  lactulose 20 GM/30ML SOLN  LEVOTHYROXINE SODIUM PO  multivitamin w/minerals (THERA-VIT-M) tablet  NADOLOL PO  omeprazole (PRILOSEC) 20 MG DR capsule  pantoprazole (PROTONIX) 40 MG EC tablet  pilocarpine (SALAGEN) 5 MG tablet  Rifaximin (XIFAXAN PO)  thiamine (B-1) 100 MG tablet  vitamin A 3 MG (81136 UNITS) capsule  Zolpidem Tartrate (AMBIEN PO)      Allergies   Allergen Reactions     Clarithromycin      Clindamycin Base      Droperidol      Family History  Family History   Problem Relation Age of Onset     Hypertension Mother      Hypertension Paternal Grandfather      Thyroid Disease Paternal Grandfather      Arthritis Father         gout     Arthritis Paternal Grandmother         gout     Arthritis Paternal Grandfather         gout     C.A.D. Paternal Grandfather          of MI in 70s     C.A.D. Mother         atrial fibrillation     Social History   Social History     Tobacco Use     Smoking status: Never   Substance Use Topics     Alcohol use: Yes     Comment: 8-10/week     Drug use: No         A complete review of systems was attempted but limited due to altered mental status.    Physical Exam   BP: 130/79  Pulse: 102  Temp: 98.3  F (36.8  C)  Resp: 16  Weight: 70.3 kg (155 lb)  SpO2: 97 %  Physical Exam  Vitals and nursing note reviewed.   Constitutional:       Appearance: She is well-developed.   HENT:      Head: Normocephalic and atraumatic.   Eyes:      Extraocular Movements: Extraocular movements intact.      Right eye: No nystagmus.      Left eye: No nystagmus.      Pupils: Pupils are equal, round, and reactive to light.    Cardiovascular:      Rate and Rhythm: Normal rate.      Heart sounds: Normal heart sounds.   Pulmonary:      Effort: Pulmonary effort is normal.      Breath sounds: Normal breath sounds.   Abdominal:      Palpations: Abdomen is soft.      Tenderness: There is no abdominal tenderness.   Musculoskeletal:         General: Normal range of motion.      Cervical back: Normal range of motion.   Skin:     General: Skin is warm and dry.   Neurological:      Mental Status: She is alert. She is disoriented.      GCS: GCS eye subscore is 4. GCS verbal subscore is 4. GCS motor subscore is 6.      Cranial Nerves: No cranial nerve deficit or facial asymmetry.      Motor: No weakness.      Coordination: Coordination normal.      Comments: Mildly agitated.  Unable to answer orientation questions without being given choices.  She is able to accurately identify orientation questions when provided options to choose from.  Mildly agitated.  Moving head and extremities spontaneously and hyperactively in bed.           ED Course, Procedures, & Data      Procedures       ED Course Selections:        EKG Interpretation:      Interpreted by JABARI RAGLAND MD, MD  Time reviewed: 0400  Symptoms at time of EKG: AMS   Rhythm: normal sinus   Rate: 98  Axis: Normal  Ectopy: none  Conduction: normal  ST Segments/ T Waves: Non-specific ST-T wave changes  Q Waves: none  Comparison to prior: Unchanged    Clinical Impression: no acute changes    Results for orders placed or performed during the hospital encounter of 04/02/23   CT Head w/o Contrast     Status: None    Narrative    EXAM: CTA HEAD NECK WITH CONTRAST, CT HEAD WITHOUT CONTRAST  LOCATION: [facility]  DATE/TIME: 04/02/2023, 4:05 AM    INDICATION: Altered mental status and agitation. Stroke alert.  COMPARISON: Head CT dated 03/31/2023.  CONTRAST: iopamidol (Isovue 370) solution 75 mL.     TECHNIQUE: Head and neck CT angiogram with IV contrast.  Noncontrast head CT followed by axial helical CT images of the head and neck vessels obtained during the arterial phase of intravenous contrast administration. Axial 2D reconstructed images and   multiplanar 3D MIP reconstructed images of the head and neck vessels were performed by the technologist. Dose reduction techniques were used. All stenosis measurements made according to NASCET criteria unless otherwise specified.    FINDINGS: Severely degraded, near nondiagnostic exams.    NONCONTRAST HEAD CT:   INTRACRANIAL CONTENTS: Redemonstrated mixed density left hemispheric subdural hemorrhage, grossly stable in overall volume. Left to right midline shift measures approximately 6 mm, similar to the prior exam. No definite gray-white differentiation loss.   Mild presumed chronic small vessel ischemic changes. Mild generalized volume loss. No hydrocephalus.     VISUALIZED ORBITS/SINUSES/MASTOIDS: No intraorbital abnormality. No paranasal sinus mucosal disease. No middle ear or mastoid effusion.    BONES/SOFT TISSUES: Left parietal errol holes.    HEAD CTA: Motion degraded exam.  ANTERIOR CIRCULATION: Nonflow-limiting atherosclerotic plaque in the internal carotid arteries. No definite proximal arterial occlusion. Attenuation of the right M2 branches, favored to be due to motion degradation. Fetal origin of the right posterior   cerebral artery from the anterior circulation. Small right A1 segment with a patent anterior communicating artery.    POSTERIOR CIRCULATION: No stenosis/occlusion, aneurysm, or high-flow vascular malformation. Dominant right and smaller left vertebral artery contribute to a normal basilar artery.     DURAL VENOUS SINUSES: Expected enhancement of the major dural venous sinuses.    NECK CTA:  RIGHT CAROTID: No measurable stenosis or dissection.    LEFT CAROTID: No measurable stenosis or dissection.    VERTEBRAL ARTERIES: No focal stenosis or dissection. Dominant right and smaller left vertebral  arteries.    AORTIC ARCH: Classic aortic arch anatomy with no significant stenosis at the origin of the great vessels.    NONVASCULAR STRUCTURES: Degenerative changes of C5-C6. No high-grade osseous spinal canal or neural foraminal narrowing. Right-sided pleural effusion.      Impression    IMPRESSION:   HEAD CT:  1.  Severely motion degraded, near nondiagnostic exam.  2.  The mixed density left hemispheric subdural hemorrhage appears grossly stable in overall volume.  3.  Grossly stable left-to-right midline shift.  4.  No definite gray-white differentiation loss within the limitations of significant motion artifact.    HEAD CTA:   1.  Motion degraded exam. Attenuation of the right M2 branches is favored to be due to motion artifact. No definite proximal arterial occlusion.    NECK CTA:  1.  No flow-limiting stenosis or evidence of dissection.  2.  Right pleural effusion.    CT head and CTA head and neck findings were discussed with Dr. Montez at 3:58 AM on 04/02/2023.       CTA Head Neck with Contrast     Status: None    Narrative    EXAM: CTA HEAD NECK WITH CONTRAST, CT HEAD WITHOUT CONTRAST  LOCATION: [facility]  DATE/TIME: 04/02/2023, 4:05 AM    INDICATION: Altered mental status and agitation. Stroke alert.  COMPARISON: Head CT dated 03/31/2023.  CONTRAST: iopamidol (Isovue 370) solution 75 mL.     TECHNIQUE: Head and neck CT angiogram with IV contrast. Noncontrast head CT followed by axial helical CT images of the head and neck vessels obtained during the arterial phase of intravenous contrast administration. Axial 2D reconstructed images and   multiplanar 3D MIP reconstructed images of the head and neck vessels were performed by the technologist. Dose reduction techniques were used. All stenosis measurements made according to NASCET criteria unless otherwise specified.    FINDINGS: Severely degraded, near nondiagnostic exams.    NONCONTRAST HEAD CT:   INTRACRANIAL  CONTENTS: Redemonstrated mixed density left hemispheric subdural hemorrhage, grossly stable in overall volume. Left to right midline shift measures approximately 6 mm, similar to the prior exam. No definite gray-white differentiation loss.   Mild presumed chronic small vessel ischemic changes. Mild generalized volume loss. No hydrocephalus.     VISUALIZED ORBITS/SINUSES/MASTOIDS: No intraorbital abnormality. No paranasal sinus mucosal disease. No middle ear or mastoid effusion.    BONES/SOFT TISSUES: Left parietal errol holes.    HEAD CTA: Motion degraded exam.  ANTERIOR CIRCULATION: Nonflow-limiting atherosclerotic plaque in the internal carotid arteries. No definite proximal arterial occlusion. Attenuation of the right M2 branches, favored to be due to motion degradation. Fetal origin of the right posterior   cerebral artery from the anterior circulation. Small right A1 segment with a patent anterior communicating artery.    POSTERIOR CIRCULATION: No stenosis/occlusion, aneurysm, or high-flow vascular malformation. Dominant right and smaller left vertebral artery contribute to a normal basilar artery.     DURAL VENOUS SINUSES: Expected enhancement of the major dural venous sinuses.    NECK CTA:  RIGHT CAROTID: No measurable stenosis or dissection.    LEFT CAROTID: No measurable stenosis or dissection.    VERTEBRAL ARTERIES: No focal stenosis or dissection. Dominant right and smaller left vertebral arteries.    AORTIC ARCH: Classic aortic arch anatomy with no significant stenosis at the origin of the great vessels.    NONVASCULAR STRUCTURES: Degenerative changes of C5-C6. No high-grade osseous spinal canal or neural foraminal narrowing. Right-sided pleural effusion.      Impression    IMPRESSION:   HEAD CT:  1.  Severely motion degraded, near nondiagnostic exam.  2.  The mixed density left hemispheric subdural hemorrhage appears grossly stable in overall volume.  3.  Grossly stable left-to-right midline shift.  4.   No definite gray-white differentiation loss within the limitations of significant motion artifact.    HEAD CTA:   1.  Motion degraded exam. Attenuation of the right M2 branches is favored to be due to motion artifact. No definite proximal arterial occlusion.    NECK CTA:  1.  No flow-limiting stenosis or evidence of dissection.  2.  Right pleural effusion.    CT head and CTA head and neck findings were discussed with Dr. Montez at 3:58 AM on 04/02/2023.       XR Chest Port 1 View     Status: None    Narrative    EXAM: XR CHEST PORT 1 VIEW  LOCATION: [facility]  DATE/TIME: 4/2/2023 5:27 AM    INDICATION: SOB  COMPARISON: PA/lateral chest radiograph 03/06/2023      Impression    IMPRESSION: Veiling asymmetric opacity of the right lung which may represent layering pleural fluid and/or infiltrate. Patchy opacity of the left mid lung also noted. Stable heart size which is within normal range. No definite pneumothorax. No acute   osseous abnormality.   Comprehensive metabolic panel     Status: Abnormal   Result Value Ref Range    Sodium 144 136 - 145 mmol/L    Potassium 3.9 3.4 - 5.3 mmol/L    Chloride 113 (H) 98 - 107 mmol/L    Carbon Dioxide (CO2) 21 (L) 22 - 29 mmol/L    Anion Gap 10 7 - 15 mmol/L    Urea Nitrogen 13.0 8.0 - 23.0 mg/dL    Creatinine 0.92 0.51 - 0.95 mg/dL    Calcium 8.9 8.6 - 10.0 mg/dL    Glucose 76 70 - 99 mg/dL    Alkaline Phosphatase 135 (H) 35 - 104 U/L    AST 79 (H) 10 - 35 U/L    ALT 32 10 - 35 U/L    Protein Total 6.1 (L) 6.4 - 8.3 g/dL    Albumin 2.9 (L) 3.5 - 5.2 g/dL    Bilirubin Total 2.2 (H) <=1.2 mg/dL    GFR Estimate 71 >60 mL/min/1.73m2   INR     Status: Abnormal   Result Value Ref Range    INR 2.16 (H) 0.85 - 1.15   Ammonia     Status: Abnormal   Result Value Ref Range    Ammonia 98 (H) 11 - 51 umol/L   CBC with platelets and differential     Status: Abnormal   Result Value Ref Range    WBC Count 2.0 (L) 4.0 - 11.0 10e3/uL    RBC Count 2.83  (L) 3.80 - 5.20 10e6/uL    Hemoglobin 9.0 (L) 11.7 - 15.7 g/dL    Hematocrit 29.2 (L) 35.0 - 47.0 %     (H) 78 - 100 fL    MCH 31.8 26.5 - 33.0 pg    MCHC 30.8 (L) 31.5 - 36.5 g/dL    RDW 18.7 (H) 10.0 - 15.0 %    Platelet Count 83 (L) 150 - 450 10e3/uL    % Neutrophils 60 %    % Lymphocytes 19 %    % Monocytes 17 %    % Eosinophils 2 %    % Basophils 1 %    % Immature Granulocytes 1 %    NRBCs per 100 WBC 0 <1 /100    Absolute Neutrophils 1.2 (L) 1.6 - 8.3 10e3/uL    Absolute Lymphocytes 0.4 (L) 0.8 - 5.3 10e3/uL    Absolute Monocytes 0.3 0.0 - 1.3 10e3/uL    Absolute Eosinophils 0.0 0.0 - 0.7 10e3/uL    Absolute Basophils 0.0 0.0 - 0.2 10e3/uL    Absolute Immature Granulocytes 0.0 <=0.4 10e3/uL    Absolute NRBCs 0.0 10e3/uL   Glucose by meter     Status: Normal   Result Value Ref Range    GLUCOSE BY METER POCT 74 70 - 99 mg/dL   Creatinine POCT     Status: Normal   Result Value Ref Range    Creatinine POCT 0.8 0.5 - 1.0 mg/dL    GFR, ESTIMATED POCT >60 >60 mL/min/1.73m2   Asymptomatic COVID-19 Virus (Coronavirus) by PCR Nasopharyngeal     Status: Normal    Specimen: Nasopharyngeal; Swab   Result Value Ref Range    SARS CoV2 PCR Negative Negative    Narrative    Testing was performed using the Xpert Xpress SARS-CoV-2 Assay on the Cep[employer] Gene-Xpert Instrument Systems. Additional information about this Emergency Use Authorization (EUA) assay can be found via the Lab Guide. This test should be ordered for the detection of SARS-CoV-2 in individuals who meet SARS-CoV-2 clinical and/or epidemiological criteria as well as from individuals without symptoms or other reasons to suspect COVID-19. Test performance for asymptomatic patients has only been established in anterior nasal swab specimens. This test is for in vitro diagnostic use under the FDA EUA for laboratories certified under CLIA to perform high complexity testing. This test has not been FDA cleared or approved. A negative result does not rule out the  presence of PCR inhibitors in the specimen or target RNA concentration below the limit of detection for the assay. The possibility of a false negative should be considered if the patient's recent exposure or clinical presentation suggests COVID-19. This test was validated by [facility]. These Laboratories are certified under the Clinical Laboratory Improvement Amendments (CLIA) as qualified to perform high complexity testing.     Troponin T, High Sensitivity     Status: Abnormal   Result Value Ref Range    Troponin T, High Sensitivity 27 (H) <=14 ng/L   Procalcitonin     Status: Abnormal   Result Value Ref Range    Procalcitonin 0.07 (H) <0.05 ng/mL   TSH with free T4 reflex     Status: Abnormal   Result Value Ref Range    TSH 5.36 (H) 0.30 - 4.20 uIU/mL   T4 free     Status: Normal   Result Value Ref Range    Free T4 1.01 0.90 - 1.70 ng/dL   EKG 12 lead     Status: None (Preliminary result)   Result Value Ref Range    Systolic Blood Pressure  mmHg    Diastolic Blood Pressure  mmHg    Ventricular Rate 98 BPM    Atrial Rate 98 BPM    FL Interval 128 ms    QRS Duration 82 ms     ms    QTc 528 ms    P Axis 45 degrees    R AXIS 29 degrees    T Axis 120 degrees    Interpretation ECG       Sinus rhythm  Nonspecific T wave abnormality  Abnormal ECG     CBC with platelets differential     Status: Abnormal    Narrative    The following orders were created for panel order CBC with platelets differential.  Procedure                               Abnormality         Status                     ---------                               -----------         ------                     CBC with platelets and d...[[id number]]  Abnormal            Final result                 Please view results for these tests on the individual orders.                           Results for orders placed or performed during the hospital encounter of 04/02/23   CT Head w/o Contrast     Status: None    Narrative    EXAM: CTA HEAD  NECK WITH CONTRAST, CT HEAD WITHOUT CONTRAST  LOCATION: [facility]  DATE/TIME: 04/02/2023, 4:05 AM    INDICATION: Altered mental status and agitation. Stroke alert.  COMPARISON: Head CT dated 03/31/2023.  CONTRAST: iopamidol (Isovue 370) solution 75 mL.     TECHNIQUE: Head and neck CT angiogram with IV contrast. Noncontrast head CT followed by axial helical CT images of the head and neck vessels obtained during the arterial phase of intravenous contrast administration. Axial 2D reconstructed images and   multiplanar 3D MIP reconstructed images of the head and neck vessels were performed by the technologist. Dose reduction techniques were used. All stenosis measurements made according to NASCET criteria unless otherwise specified.    FINDINGS: Severely degraded, near nondiagnostic exams.    NONCONTRAST HEAD CT:   INTRACRANIAL CONTENTS: Redemonstrated mixed density left hemispheric subdural hemorrhage, grossly stable in overall volume. Left to right midline shift measures approximately 6 mm, similar to the prior exam. No definite gray-white differentiation loss.   Mild presumed chronic small vessel ischemic changes. Mild generalized volume loss. No hydrocephalus.     VISUALIZED ORBITS/SINUSES/MASTOIDS: No intraorbital abnormality. No paranasal sinus mucosal disease. No middle ear or mastoid effusion.    BONES/SOFT TISSUES: Left parietal errol holes.    HEAD CTA: Motion degraded exam.  ANTERIOR CIRCULATION: Nonflow-limiting atherosclerotic plaque in the internal carotid arteries. No definite proximal arterial occlusion. Attenuation of the right M2 branches, favored to be due to motion degradation. Fetal origin of the right posterior   cerebral artery from the anterior circulation. Small right A1 segment with a patent anterior communicating artery.    POSTERIOR CIRCULATION: No stenosis/occlusion, aneurysm, or high-flow vascular malformation. Dominant right and smaller left  vertebral artery contribute to a normal basilar artery.     DURAL VENOUS SINUSES: Expected enhancement of the major dural venous sinuses.    NECK CTA:  RIGHT CAROTID: No measurable stenosis or dissection.    LEFT CAROTID: No measurable stenosis or dissection.    VERTEBRAL ARTERIES: No focal stenosis or dissection. Dominant right and smaller left vertebral arteries.    AORTIC ARCH: Classic aortic arch anatomy with no significant stenosis at the origin of the great vessels.    NONVASCULAR STRUCTURES: Degenerative changes of C5-C6. No high-grade osseous spinal canal or neural foraminal narrowing. Right-sided pleural effusion.      Impression    IMPRESSION:   HEAD CT:  1.  Severely motion degraded, near nondiagnostic exam.  2.  The mixed density left hemispheric subdural hemorrhage appears grossly stable in overall volume.  3.  Grossly stable left-to-right midline shift.  4.  No definite gray-white differentiation loss within the limitations of significant motion artifact.    HEAD CTA:   1.  Motion degraded exam. Attenuation of the right M2 branches is favored to be due to motion artifact. No definite proximal arterial occlusion.    NECK CTA:  1.  No flow-limiting stenosis or evidence of dissection.  2.  Right pleural effusion.    CT head and CTA head and neck findings were discussed with Dr. Montez at 3:58 AM on 04/02/2023.       CTA Head Neck with Contrast     Status: None    Narrative    EXAM: CTA HEAD NECK WITH CONTRAST, CT HEAD WITHOUT CONTRAST  LOCATION: [facility]  DATE/TIME: 04/02/2023, 4:05 AM    INDICATION: Altered mental status and agitation. Stroke alert.  COMPARISON: Head CT dated 03/31/2023.  CONTRAST: iopamidol (Isovue 370) solution 75 mL.     TECHNIQUE: Head and neck CT angiogram with IV contrast. Noncontrast head CT followed by axial helical CT images of the head and neck vessels obtained during the arterial phase of intravenous contrast administration.  Axial 2D reconstructed images and   multiplanar 3D MIP reconstructed images of the head and neck vessels were performed by the technologist. Dose reduction techniques were used. All stenosis measurements made according to NASCET criteria unless otherwise specified.    FINDINGS: Severely degraded, near nondiagnostic exams.    NONCONTRAST HEAD CT:   INTRACRANIAL CONTENTS: Redemonstrated mixed density left hemispheric subdural hemorrhage, grossly stable in overall volume. Left to right midline shift measures approximately 6 mm, similar to the prior exam. No definite gray-white differentiation loss.   Mild presumed chronic small vessel ischemic changes. Mild generalized volume loss. No hydrocephalus.     VISUALIZED ORBITS/SINUSES/MASTOIDS: No intraorbital abnormality. No paranasal sinus mucosal disease. No middle ear or mastoid effusion.    BONES/SOFT TISSUES: Left parietal errol holes.    HEAD CTA: Motion degraded exam.  ANTERIOR CIRCULATION: Nonflow-limiting atherosclerotic plaque in the internal carotid arteries. No definite proximal arterial occlusion. Attenuation of the right M2 branches, favored to be due to motion degradation. Fetal origin of the right posterior   cerebral artery from the anterior circulation. Small right A1 segment with a patent anterior communicating artery.    POSTERIOR CIRCULATION: No stenosis/occlusion, aneurysm, or high-flow vascular malformation. Dominant right and smaller left vertebral artery contribute to a normal basilar artery.     DURAL VENOUS SINUSES: Expected enhancement of the major dural venous sinuses.    NECK CTA:  RIGHT CAROTID: No measurable stenosis or dissection.    LEFT CAROTID: No measurable stenosis or dissection.    VERTEBRAL ARTERIES: No focal stenosis or dissection. Dominant right and smaller left vertebral arteries.    AORTIC ARCH: Classic aortic arch anatomy with no significant stenosis at the origin of the great vessels.    NONVASCULAR STRUCTURES: Degenerative  changes of C5-C6. No high-grade osseous spinal canal or neural foraminal narrowing. Right-sided pleural effusion.      Impression    IMPRESSION:   HEAD CT:  1.  Severely motion degraded, near nondiagnostic exam.  2.  The mixed density left hemispheric subdural hemorrhage appears grossly stable in overall volume.  3.  Grossly stable left-to-right midline shift.  4.  No definite gray-white differentiation loss within the limitations of significant motion artifact.    HEAD CTA:   1.  Motion degraded exam. Attenuation of the right M2 branches is favored to be due to motion artifact. No definite proximal arterial occlusion.    NECK CTA:  1.  No flow-limiting stenosis or evidence of dissection.  2.  Right pleural effusion.    CT head and CTA head and neck findings were discussed with Dr. Montez at 3:58 AM on 04/02/2023.       XR Chest Port 1 View     Status: None    Narrative    EXAM: XR CHEST PORT 1 VIEW  LOCATION: [facility]  DATE/TIME: 4/2/2023 5:27 AM    INDICATION: SOB  COMPARISON: PA/lateral chest radiograph 03/06/2023      Impression    IMPRESSION: Veiling asymmetric opacity of the right lung which may represent layering pleural fluid and/or infiltrate. Patchy opacity of the left mid lung also noted. Stable heart size which is within normal range. No definite pneumothorax. No acute   osseous abnormality.   Comprehensive metabolic panel     Status: Abnormal   Result Value Ref Range    Sodium 144 136 - 145 mmol/L    Potassium 3.9 3.4 - 5.3 mmol/L    Chloride 113 (H) 98 - 107 mmol/L    Carbon Dioxide (CO2) 21 (L) 22 - 29 mmol/L    Anion Gap 10 7 - 15 mmol/L    Urea Nitrogen 13.0 8.0 - 23.0 mg/dL    Creatinine 0.92 0.51 - 0.95 mg/dL    Calcium 8.9 8.6 - 10.0 mg/dL    Glucose 76 70 - 99 mg/dL    Alkaline Phosphatase 135 (H) 35 - 104 U/L    AST 79 (H) 10 - 35 U/L    ALT 32 10 - 35 U/L    Protein Total 6.1 (L) 6.4 - 8.3 g/dL    Albumin 2.9 (L) 3.5 - 5.2 g/dL    Bilirubin Total  2.2 (H) <=1.2 mg/dL    GFR Estimate 71 >60 mL/min/1.73m2   INR     Status: Abnormal   Result Value Ref Range    INR 2.16 (H) 0.85 - 1.15   Ammonia     Status: Abnormal   Result Value Ref Range    Ammonia 98 (H) 11 - 51 umol/L   CBC with platelets and differential     Status: Abnormal   Result Value Ref Range    WBC Count 2.0 (L) 4.0 - 11.0 10e3/uL    RBC Count 2.83 (L) 3.80 - 5.20 10e6/uL    Hemoglobin 9.0 (L) 11.7 - 15.7 g/dL    Hematocrit 29.2 (L) 35.0 - 47.0 %     (H) 78 - 100 fL    MCH 31.8 26.5 - 33.0 pg    MCHC 30.8 (L) 31.5 - 36.5 g/dL    RDW 18.7 (H) 10.0 - 15.0 %    Platelet Count 83 (L) 150 - 450 10e3/uL    % Neutrophils 60 %    % Lymphocytes 19 %    % Monocytes 17 %    % Eosinophils 2 %    % Basophils 1 %    % Immature Granulocytes 1 %    NRBCs per 100 WBC 0 <1 /100    Absolute Neutrophils 1.2 (L) 1.6 - 8.3 10e3/uL    Absolute Lymphocytes 0.4 (L) 0.8 - 5.3 10e3/uL    Absolute Monocytes 0.3 0.0 - 1.3 10e3/uL    Absolute Eosinophils 0.0 0.0 - 0.7 10e3/uL    Absolute Basophils 0.0 0.0 - 0.2 10e3/uL    Absolute Immature Granulocytes 0.0 <=0.4 10e3/uL    Absolute NRBCs 0.0 10e3/uL   Glucose by meter     Status: Normal   Result Value Ref Range    GLUCOSE BY METER POCT 74 70 - 99 mg/dL   Creatinine POCT     Status: Normal   Result Value Ref Range    Creatinine POCT 0.8 0.5 - 1.0 mg/dL    GFR, ESTIMATED POCT >60 >60 mL/min/1.73m2   Asymptomatic COVID-19 Virus (Coronavirus) by PCR Nasopharyngeal     Status: Normal    Specimen: Nasopharyngeal; Swab   Result Value Ref Range    SARS CoV2 PCR Negative Negative    Narrative    Testing was performed using the Pyreos Xpress SARS-CoV-2 Assay on the Cepheid Gene-Xpert Instrument Systems. Additional information about this Emergency Use Authorization (EUA) assay can be found via the Lab Guide. This test should be ordered for the detection of SARS-CoV-2 in individuals who meet SARS-CoV-2 clinical and/or epidemiological criteria as well as from individuals without  symptoms or other reasons to suspect COVID-19. Test performance for asymptomatic patients has only been established in anterior nasal swab specimens. This test is for in vitro diagnostic use under the FDA EUA for laboratories certified under CLIA to perform high complexity testing. This test has not been FDA cleared or approved. A negative result does not rule out the presence of PCR inhibitors in the specimen or target RNA concentration below the limit of detection for the assay. The possibility of a false negative should be considered if the patient's recent exposure or clinical presentation suggests COVID-19. This test was validated by [facility]. These Laboratories are certified under the Clinical Laboratory Improvement Amendments (CLIA) as qualified to perform high complexity testing.     Troponin T, High Sensitivity     Status: Abnormal   Result Value Ref Range    Troponin T, High Sensitivity 27 (H) <=14 ng/L   Procalcitonin     Status: Abnormal   Result Value Ref Range    Procalcitonin 0.07 (H) <0.05 ng/mL   TSH with free T4 reflex     Status: Abnormal   Result Value Ref Range    TSH 5.36 (H) 0.30 - 4.20 uIU/mL   T4 free     Status: Normal   Result Value Ref Range    Free T4 1.01 0.90 - 1.70 ng/dL   EKG 12 lead     Status: None (Preliminary result)   Result Value Ref Range    Systolic Blood Pressure  mmHg    Diastolic Blood Pressure  mmHg    Ventricular Rate 98 BPM    Atrial Rate 98 BPM    AL Interval 128 ms    QRS Duration 82 ms     ms    QTc 528 ms    P Axis 45 degrees    R AXIS 29 degrees    T Axis 120 degrees    Interpretation ECG       Sinus rhythm  Nonspecific T wave abnormality  Abnormal ECG     CBC with platelets differential     Status: Abnormal    Narrative    The following orders were created for panel order CBC with platelets differential.  Procedure                               Abnormality         Status                     ---------                                -----------         ------                     CBC with platelets and d...[[id number]]  Abnormal            Final result                 Please view results for these tests on the individual orders.     Medications   lidocaine 1 % 0.1-1 mL (has no administration in time range)   lidocaine (LMX4) cream (has no administration in time range)   sodium chloride (PF) 0.9% PF flush 3 mL (3 mLs Intracatheter $Given 4/2/23 0650)   sodium chloride (PF) 0.9% PF flush 3 mL (has no administration in time range)   melatonin tablet 3 mg (has no administration in time range)   ferrous sulfate (FEROSUL) tablet 325 mg (325 mg Oral $Given 4/2/23 0953)   folic acid (FOLVITE) tablet 1 mg (1 mg Oral $Given 4/2/23 0726)   gabapentin (NEURONTIN) tablet 600 mg ( Oral Automatically Held 4/5/23 2200)   Daily 2 GRAM acetaminophen limit, unless fulminent liver failure or transaminases greater than or equal to 300 - 400, then none (has no administration in time range)   lactulose (CHRONULAC) solution 20 g (has no administration in time range)     Or   lactulose (CHRONULAC) solution for enema prep 100 g (has no administration in time range)   zolpidem (AMBIEN) tablet 10 mg ( Oral Held by provider 4/2/23 0628)   vitamin A capsule 30,000 Units (30,000 Units Oral $Given 4/2/23 0953)   thiamine (B-1) tablet 100 mg (100 mg Oral $Given 4/2/23 0726)   rifaximin (XIFAXAN) tablet 550 mg (550 mg Oral $Given 4/2/23 0953)   pilocarpine (SALAGEN) tablet 5 mg (5 mg Oral $Given 4/2/23 0953)   pantoprazole (PROTONIX) EC tablet 40 mg (40 mg Oral $Given 4/2/23 0726)   nadolol (CORGARD) tablet 20 mg (has no administration in time range)   multivitamin w/minerals (THERA-VIT-M) tablet 1 tablet (1 tablet Oral $Given 4/2/23 0726)   levothyroxine (SYNTHROID/LEVOTHROID) tablet 25 mcg (25 mcg Oral $Given 4/2/23 0953)   lactulose (CHRONULAC) solution 20 g (20 g Oral $Given 4/2/23 7843)   iopamidol (ISOVUE-370) solution 75 mL (75 mLs Intravenous $Given 4/2/23 3774)    lactulose (CEPHULAC) Packet 20 g (20 g Oral $Given 4/2/23 3435)     Labs Ordered and Resulted from Time of ED Arrival to Time of ED Departure   COMPREHENSIVE METABOLIC PANEL - Abnormal       Result Value    Sodium 144      Potassium 3.9      Chloride 113 (*)     Carbon Dioxide (CO2) 21 (*)     Anion Gap 10      Urea Nitrogen 13.0      Creatinine 0.92      Calcium 8.9      Glucose 76      Alkaline Phosphatase 135 (*)     AST 79 (*)     ALT 32      Protein Total 6.1 (*)     Albumin 2.9 (*)     Bilirubin Total 2.2 (*)     GFR Estimate 71     INR - Abnormal    INR 2.16 (*)    AMMONIA - Abnormal    Ammonia 98 (*)    CBC WITH PLATELETS AND DIFFERENTIAL - Abnormal    WBC Count 2.0 (*)     RBC Count 2.83 (*)     Hemoglobin 9.0 (*)     Hematocrit 29.2 (*)      (*)     MCH 31.8      MCHC 30.8 (*)     RDW 18.7 (*)     Platelet Count 83 (*)     % Neutrophils 60      % Lymphocytes 19      % Monocytes 17      % Eosinophils 2      % Basophils 1      % Immature Granulocytes 1      NRBCs per 100 WBC 0      Absolute Neutrophils 1.2 (*)     Absolute Lymphocytes 0.4 (*)     Absolute Monocytes 0.3      Absolute Eosinophils 0.0      Absolute Basophils 0.0      Absolute Immature Granulocytes 0.0      Absolute NRBCs 0.0     TROPONIN T, HIGH SENSITIVITY - Abnormal    Troponin T, High Sensitivity 27 (*)    PROCALCITONIN - Abnormal    Procalcitonin 0.07 (*)    TSH WITH FREE T4 REFLEX - Abnormal    TSH 5.36 (*)    GLUCOSE BY METER - Normal    GLUCOSE BY METER POCT 74     ISTAT CREATININE POCT - Normal    Creatinine POCT 0.8      GFR, ESTIMATED POCT >60     COVID-19 VIRUS (CORONAVIRUS) BY PCR - Normal    SARS CoV2 PCR Negative     T4 FREE - Normal    Free T4 1.01     ROUTINE UA WITH MICROSCOPIC REFLEX TO CULTURE   LACTIC ACID WHOLE BLOOD   LACTIC ACID WHOLE BLOOD   ISTAT CREATININE POCT   BLOOD CULTURE   BLOOD CULTURE     XR Chest Port 1 View   Final Result   RESIDENT PRELIMINARY INTERPRETATION   IMPRESSION:   Layering right small to  moderate pleural effusion with superimposed   atelectasis/edema.         CTA Head Neck with Contrast   Final Result   IMPRESSION:    HEAD CT:   1.  Severely motion degraded, near nondiagnostic exam.   2.  The mixed density left hemispheric subdural hemorrhage appears grossly stable in overall volume.   3.  Grossly stable left-to-right midline shift.   4.  No definite gray-white differentiation loss within the limitations of significant motion artifact.      HEAD CTA:    1.  Motion degraded exam. Attenuation of the right M2 branches is favored to be due to motion artifact. No definite proximal arterial occlusion.      NECK CTA:   1.  No flow-limiting stenosis or evidence of dissection.   2.  Right pleural effusion.      CT head and CTA head and neck findings were discussed with Dr. Montez at 3:58 AM on 04/02/2023.            CT Head w/o Contrast   Final Result   IMPRESSION:    HEAD CT:   1.  Severely motion degraded, near nondiagnostic exam.   2.  The mixed density left hemispheric subdural hemorrhage appears grossly stable in overall volume.   3.  Grossly stable left-to-right midline shift.   4.  No definite gray-white differentiation loss within the limitations of significant motion artifact.      HEAD CTA:    1.  Motion degraded exam. Attenuation of the right M2 branches is favored to be due to motion artifact. No definite proximal arterial occlusion.      NECK CTA:   1.  No flow-limiting stenosis or evidence of dissection.   2.  Right pleural effusion.      CT head and CTA head and neck findings were discussed with Dr. Montez at 3:58 AM on 04/02/2023.            US Abdomen Complete w Doppler Complete    (Results Pending)          Critical care was not performed.     Medical Decision Making  The patient's presentation was of moderate complexity (a chronic illness mild to moderate exacerbation, progression, or side effect of treatment).    The patient's evaluation involved:  an assessment requiring an independent  historian ()  review of external note(s) from 2 sources (Recent ED visit for subdural hematoma at [facility].  Recent ICU and hospitalization here for subdural hematoma as well as right pleural effusion and respiratory failure)  ordering and/or review of 3+ test(s) in this encounter (see separate area of note for details)  review of 3+ test result(s) ordered prior to this encounter (see separate area of note for details)    The patient's management necessitated high risk (a decision regarding hospitalization).      Assessment & Plan    57 year old female with history of recent subdural hematoma while anticoagulated on Eliquis for history of portal vein thrombosis also with history of cirrhosis to the emergency department with altered mental status-specifically, she is exhibiting both mild emotional and psychomotor agitation.  The patient does not have any focal abnormality but a stroke code was activated by EMS in the prehospital phase.  Emergent CT was performed and reveals stable postprocedure subdural hematoma and no acute vascular occlusion.  Without focal neurodeficit, do not suspect stroke.  The patient does appear encephalopathic.  History subsequently taken from her  suggest that she has not been taking her lactulose as directed, especially in the last 2 days.  He is unaware of any other secondary stressors or recent infectious symptoms.  The patient's ammonia level is elevated to 98.  She has a right pleural effusion on her chest radiograph.  The remainder of her labs are at their baseline compared to recent hospitalization.  The patient does have a mild troponin elevation but no acute ischemic change on her EKG.  The significance of this is unclear.  The patient appears to be experiencing hepatic encephalopathy most likely from medication noncompliance.  She will be admitted to the internal medicine service for further evaluation and management.  Lactulose initiated here in the emergency  department.    I have reviewed the nursing notes. I have reviewed the findings, diagnosis, plan and need for follow up with the patient.    New Prescriptions    No medications on file       Final diagnoses:   Hepatic encephalopathy (H)     Chart documentation was completed with Dragon voice-recognition software. Even though reviewed, this chart may still contain some grammatical, spelling, and word errors.       Andi Montez Md  [facility] EMERGENCY DEPARTMENT  4/2/2023     Andi Montez MD  04/02/23 1021

## 2023-04-02 NOTE — PLAN OF CARE
Goal Outcome Evaluation:      Plan of Care Reviewed With: patient    Overall Patient Progress: no change    Outcome Evaluation: Pt admitted from ED for hepatic encephalopathy. VSS on RA. Alert, disroriented to time and intermittently to place. Restless, moves and slides around in bed. Sitter at bedside for safety. Scored 1 on [place], prn lactulose given x2. Only had 1 smear. Needs urine sample. New IV placed.   and son at bedside. Will con tto monitor.    Admission/Transfer from: ED  2 RN skin assessment completed. Yes w/ Aye Marquez RN  Significant findings include: Generalized bruising and scabs  [facility] Nurse Consult Ordered? No

## 2023-04-02 NOTE — PHARMACY-CONSULT NOTE
Pharmacy Delirium Chart Review    Upon chart review, the following medications may contribute to possible patient delirium: gabapentin and zolpidem (both currently held).  Please consult unit pharmacist with further questions.    Victoriano Garcia RPH

## 2023-04-03 ENCOUNTER — APPOINTMENT (OUTPATIENT)
Dept: OCCUPATIONAL THERAPY | Facility: CLINIC | Age: 58
End: 2023-04-03
Payer: COMMERCIAL

## 2023-04-03 ENCOUNTER — APPOINTMENT (OUTPATIENT)
Dept: PHYSICAL THERAPY | Facility: CLINIC | Age: 58
End: 2023-04-03
Payer: COMMERCIAL

## 2023-04-03 VITALS
WEIGHT: 155 LBS | TEMPERATURE: 98.4 F | BODY MASS INDEX: 26.61 KG/M2 | DIASTOLIC BLOOD PRESSURE: 72 MMHG | HEART RATE: 64 BPM | OXYGEN SATURATION: 94 % | RESPIRATION RATE: 16 BRPM | SYSTOLIC BLOOD PRESSURE: 110 MMHG

## 2023-04-03 LAB
ALBUMIN SERPL BCG-MCNC: 2.7 G/DL (ref 3.5–5.2)
ALBUMIN UR-MCNC: 20 MG/DL
ALP SERPL-CCNC: 132 U/L (ref 35–104)
ALT SERPL W P-5'-P-CCNC: 45 U/L (ref 10–35)
AMPHETAMINES UR QL SCN: NORMAL
ANION GAP SERPL CALCULATED.3IONS-SCNC: 10 MMOL/L (ref 7–15)
APPEARANCE UR: CLEAR
AST SERPL W P-5'-P-CCNC: 112 U/L (ref 10–35)
ATRIAL RATE - MUSE: 98 BPM
BACTERIA #/AREA URNS HPF: ABNORMAL /HPF
BARBITURATES UR QL SCN: NORMAL
BENZODIAZ UR QL SCN: NORMAL
BILIRUB SERPL-MCNC: 2.1 MG/DL
BILIRUB UR QL STRIP: NEGATIVE
BUN SERPL-MCNC: 8 MG/DL (ref 8–23)
BZE UR QL SCN: NORMAL
CALCIUM SERPL-MCNC: 8.9 MG/DL (ref 8.6–10)
CANNABINOIDS UR QL SCN: NORMAL
CHLORIDE SERPL-SCNC: 115 MMOL/L (ref 98–107)
COLOR UR AUTO: YELLOW
CREAT SERPL-MCNC: 0.68 MG/DL (ref 0.51–0.95)
DEPRECATED HCO3 PLAS-SCNC: 21 MMOL/L (ref 22–29)
DIASTOLIC BLOOD PRESSURE - MUSE: NORMAL MMHG
ERYTHROCYTE [DISTWIDTH] IN BLOOD BY AUTOMATED COUNT: 19.2 % (ref 10–15)
GFR SERPL CREATININE-BSD FRML MDRD: >90 ML/MIN/1.73M2
GLUCOSE SERPL-MCNC: 68 MG/DL (ref 70–99)
GLUCOSE UR STRIP-MCNC: NEGATIVE MG/DL
HCT VFR BLD AUTO: 30.8 % (ref 35–47)
HGB BLD-MCNC: 9.7 G/DL (ref 11.7–15.7)
HGB UR QL STRIP: NEGATIVE
INR PPP: 2.41 (ref 0.85–1.15)
INTERPRETATION ECG - MUSE: NORMAL
KETONES UR STRIP-MCNC: 10 MG/DL
LEUKOCYTE ESTERASE UR QL STRIP: ABNORMAL
MCH RBC QN AUTO: 33.1 PG (ref 26.5–33)
MCHC RBC AUTO-ENTMCNC: 31.5 G/DL (ref 31.5–36.5)
MCV RBC AUTO: 105 FL (ref 78–100)
MUCOUS THREADS #/AREA URNS LPF: PRESENT /LPF
NITRATE UR QL: NEGATIVE
OPIATES UR QL SCN: NORMAL
P AXIS - MUSE: 45 DEGREES
PH UR STRIP: 5.5 [PH] (ref 5–7)
PLATELET # BLD AUTO: 88 10E3/UL (ref 150–450)
POTASSIUM SERPL-SCNC: 3.6 MMOL/L (ref 3.4–5.3)
PR INTERVAL - MUSE: 128 MS
PROT SERPL-MCNC: 6.1 G/DL (ref 6.4–8.3)
QRS DURATION - MUSE: 82 MS
QT - MUSE: 414 MS
QTC - MUSE: 528 MS
R AXIS - MUSE: 29 DEGREES
RBC # BLD AUTO: 2.93 10E6/UL (ref 3.8–5.2)
RBC URINE: 3 /HPF
SODIUM SERPL-SCNC: 146 MMOL/L (ref 136–145)
SP GR UR STRIP: 1.05 (ref 1–1.03)
SQUAMOUS EPITHELIAL: 1 /HPF
SYSTOLIC BLOOD PRESSURE - MUSE: NORMAL MMHG
T AXIS - MUSE: 120 DEGREES
TROPONIN T SERPL HS-MCNC: 32 NG/L
UROBILINOGEN UR STRIP-MCNC: NORMAL MG/DL
VENTRICULAR RATE- MUSE: 98 BPM
WBC # BLD AUTO: 2.2 10E3/UL (ref 4–11)
WBC URINE: 6 /HPF

## 2023-04-03 PROCEDURE — 80053 COMPREHEN METABOLIC PANEL: CPT

## 2023-04-03 PROCEDURE — 97161 PT EVAL LOW COMPLEX 20 MIN: CPT | Mod: GP

## 2023-04-03 PROCEDURE — 36415 COLL VENOUS BLD VENIPUNCTURE: CPT

## 2023-04-03 PROCEDURE — 250N000013 HC RX MED GY IP 250 OP 250 PS 637

## 2023-04-03 PROCEDURE — 85027 COMPLETE CBC AUTOMATED: CPT

## 2023-04-03 PROCEDURE — 99239 HOSP IP/OBS DSCHRG MGMT >30: CPT | Performed by: STUDENT IN AN ORGANIZED HEALTH CARE EDUCATION/TRAINING PROGRAM

## 2023-04-03 PROCEDURE — 97116 GAIT TRAINING THERAPY: CPT | Mod: GP

## 2023-04-03 PROCEDURE — 97535 SELF CARE MNGMENT TRAINING: CPT | Mod: GO

## 2023-04-03 PROCEDURE — 97165 OT EVAL LOW COMPLEX 30 MIN: CPT | Mod: GO

## 2023-04-03 PROCEDURE — 85610 PROTHROMBIN TIME: CPT

## 2023-04-03 RX ADMIN — PILOCARPINE HYDROCHLORIDE 5 MG: 5 TABLET, FILM COATED ORAL at 14:03

## 2023-04-03 RX ADMIN — Medication 30000 UNITS: at 08:01

## 2023-04-03 RX ADMIN — FOLIC ACID 1 MG: 1 TABLET ORAL at 08:02

## 2023-04-03 RX ADMIN — FERROUS SULFATE TAB 325 MG (65 MG ELEMENTAL FE) 325 MG: 325 (65 FE) TAB at 08:01

## 2023-04-03 RX ADMIN — LACTULOSE 20 G: 20 SOLUTION ORAL at 08:02

## 2023-04-03 RX ADMIN — RIFAXIMIN 550 MG: 550 TABLET ORAL at 08:02

## 2023-04-03 RX ADMIN — THIAMINE HCL TAB 100 MG 100 MG: 100 TAB at 08:02

## 2023-04-03 RX ADMIN — PILOCARPINE HYDROCHLORIDE 5 MG: 5 TABLET, FILM COATED ORAL at 09:54

## 2023-04-03 RX ADMIN — Medication 1 TABLET: at 08:02

## 2023-04-03 RX ADMIN — PANTOPRAZOLE SODIUM 40 MG: 40 TABLET, DELAYED RELEASE ORAL at 08:02

## 2023-04-03 ASSESSMENT — ACTIVITIES OF DAILY LIVING (ADL)
ADLS_ACUITY_SCORE: 32
DEPENDENT_IADLS:: INDEPENDENT
ADLS_ACUITY_SCORE: 32
ADLS_ACUITY_SCORE: 32

## 2023-04-03 NOTE — DISCHARGE INSTRUCTIONS
Please stop taking gabapentin, could make your choreiform movements worse     Reach out to [facility] to follow up with your liver doctor

## 2023-04-03 NOTE — PROVIDER NOTIFICATION
Provider (Mike Colunga PA-C) notified on family and pts request to skip last dose of PRN lactulose. Per family and charting, pt had 3 BM's since lactulose protocol was started (recived 2 doses) making it a total of 7 BM's for the day.  Eugene also did not want additional dose given before bed for fear of sleep interruptions. Provider stated it was okay to hold last dose of lactulose, for pt has had more than 4 BM's today. Will continue to monitor and continue with [place] scoring as needed.

## 2023-04-03 NOTE — PROGRESS NOTES
04/03/23 1211   Appointment Info   Signing Clinician's Name / Credentials (PT) Chandni Gandhi DPT       Present no   Language English   Living Environment   People in Home spouse   Current Living Arrangements house   Home Accessibility stairs to enter home   Number of Stairs, Main Entrance 1   Stair Railings, Main Entrance none   Transportation Anticipated family or friend will provide   Living Environment Comments Patient has 1 small step to enter but then all needs on one level   Self-Care   Usual Activity Tolerance good   Current Activity Tolerance good   Regular Exercise Yes   Equipment Currently Used at Home none   Fall history within last six months yes   Number of times patient has fallen within last six months 1   General Information   Onset of Illness/Injury or Date of Surgery 04/03/23   Referring Physician Filomena De La Rosa MD   Patient/Family Therapy Goals Statement (PT) To be home by Friday to watch her daughter ring the bell for completing her chemo   Pertinent History of Current Problem (include personal factors and/or comorbidities that impact the POC) 57 year old female admitted on 4/2/2023. She has a PMH significant for decompensated liver cirrhosis, hx of hepatic encephalopathy and ascites, portal hypertension with portal vein thrombosis (on Eliquis, recently held with intracranial bleed), gastric bypass, recent traumatic left subdural hematoma status post evacuation February 2023 who presents for worsening confusion and found to have elevated ammonia on admission concerning for HE.   Existing Precautions/Restrictions fall;spinal   Weight-Bearing Status - LUE full weight-bearing   Weight-Bearing Status - RUE full weight-bearing   Weight-Bearing Status - LLE weight-bearing as tolerated   Weight-Bearing Status - RLE weight-bearing as tolerated   General Observations Sitting up in chair upon arrival, pleasant and agreeable   Cognition   Affect/Mental Status (Cognition)  confused  (Mostly WFL, mildly confused, still has periods with difficulty word finding)   Orientation Status (Cognition) oriented x 3   Follows Commands (Cognition) WFL   Pain Assessment   Patient Currently in Pain Yes, see Vital Sign flowsheet   Integumentary/Edema   Integumentary/Edema no deficits were identifed   Posture    Posture Forward head position;Protracted shoulders;Kyphosis   Posture Comments Mild sitting in chair and standing   Range of Motion (ROM)   ROM Comment Did not formally assess, demonstrates functional ROM with mobility   Strength (Manual Muscle Testing)   Strength Comments Did not formally assess, demonstrates functional strength with mobility   Bed Mobility   Comment, (Bed Mobility) Did not assess, do not anticipate difficulty   Transfers   Comment, (Transfers) Supervision sit<>stand transfer from chair   Gait/Stairs (Locomotion)   Distance in Feet (Gait) 300'   Comment, (Gait/Stairs) Ambulates CGA progressing to close SBA, tends to grab for wall at times for support   Balance   Balance Comments Independent sitting balance, SBA for standing balance   Sensory Examination   Sensory Perception WNL   Clinical Impression   Criteria for Skilled Therapeutic Intervention Yes, treatment indicated   PT Diagnosis (PT) impaired functional mobility   Influenced by the following impairments decreased activity tolerance, decreased standing balance   Functional limitations due to impairments impaired tolerance for ambulation   Clinical Presentation (PT Evaluation Complexity) Stable/Uncomplicated   Clinical Presentation Rationale Per clinical judgment   Clinical Decision Making (Complexity) low complexity   Planned Therapy Interventions (PT) balance training;bed mobility training;gait training;home exercise program;strengthening;transfer training;progressive activity/exercise;home program guidelines;risk factor education;neuromuscular re-education   Risk & Benefits of therapy have been explained  evaluation/treatment results reviewed;care plan/treatment goals reviewed;risks/benefits reviewed;current/potential barriers reviewed   PT Total Evaluation Time   PT Eval, Low Complexity Minutes (39258) 7   Physical Therapy Goals   PT Frequency 6x/week   PT Predicted Duration/Target Date for Goal Attainment 04/06/23   PT: Bed Mobility Independent;Supine to/from sit;Within precautions   PT: Transfers Independent;Sit to/from stand;Bed to/from chair   PT: Gait Independent;Greater than 200 feet   PT: Stairs 1 stair;Assistive device;Minimal assist   Therapeutic Activity   Therapeutic Activities: dynamic activities to improve functional performance Minutes (89345) 2   Symptoms Noted During/After Treatment Fatigue   Treatment Detail/Skilled Intervention PT: Up in chair upon arrival, agreeable to PT. Completes sit<>stand transfer with SBA no AD. Ended in chair with spouse present and ready for lunch.   Gait Training   Gait Training Minutes (11079) 14   Symptoms Noted During/After Treatment (Gait Training) fatigue   Treatment Detail/Skilled Intervention PT: TLSO donned throughout. Tolerates ambulating in lewis approximately 300' with CGA progressing to close SBA. Patient with some increased lateral gait deviations. At times tends to grab for wall for some stability but overall patient and spouse verbalized that she is very close to her baseline level of mobility. Took 1 seated rest break at [place].   [place] Level (Gait Training) stand-by assist   Physical Assistance Level (Gait Training) set-up required;supervision;verbal cues   Weight Bearing (Gait Training) weight-bearing as tolerated   Pattern Analysis (Gait Training) swing-through gait   Gait Analysis Deviations decreased lev;increased time in double stance;decreased velocity of limb motion;decreased step length;decreased toe-to-floor clearance   Impairments (Gait Analysis/Training) balance impaired;strength decreased   PT Discharge Planning   PT Plan PT:  Gait, 1 step to enter, standing balance tasks. Anticipate 1-2 days for return to baseline   PT Discharge Recommendation (DC Rec) home with assist;home with home care physical therapy   PT Rationale for DC Rec PT: Recommend home PT for safety evaluation and progression   PT Brief overview of current status PT: SBA for all mobility   Total Session Time   Timed Code Treatment Minutes 16   Total Session Time (sum of timed and untimed services) 23

## 2023-04-03 NOTE — PROGRESS NOTES
Care Management Discharge Note    Discharge Date: 04/03/2023       Discharge Disposition: Home    Discharge Services: None    Discharge DME: None    Discharge Transportation: family or friend will provide    Private pay costs discussed: Not applicable    PAS Confirmation Code:  Not applicable  Patient/family educated on Medicare website which has current facility and service quality ratings:  Not applicable    Education Provided on the Discharge Plan:  Not applicable  Persons Notified of Discharge Plans: Patient, patient's , provider, nursing staff, SW  Patient/Family in Agreement with the Plan:  yes    Handoff Referral Completed: Yes    Additional Information:  Patient will discharge home with transportation provided by her  Eugene. No discharge needs identified.     [facility] King  [address]   Office: [phone]  Pager: [phone]  [email or web address]

## 2023-04-03 NOTE — PLAN OF CARE
Alert, only disoriented to situation. VSS on RA. Up SBA. Denies pain. PIV removed. All belongings packed and sent w/ pt. Pt's  Eugene transported pt home at 1500.   no

## 2023-04-03 NOTE — PLAN OF CARE
Goal Outcome Evaluation: 1900-0700      Plan of Care Reviewed With: patient    Overall Patient Progress: improving     Pt's mentation improving. Currently disoriented to time (the year) and situation (believes she is here because of head injury that did happen previously-easily redirected). Oriented to self and place. Jerking movements are also improved compared to last evening and per  she has significantly improved since being brought to the ED. Attendant still at bedside for pt safety. Total of 3 BM's on this shift, total of 2 for today. Neuro assessments stable, no additional lactulose given per family request overnight- provider was aware (see provider notification note). Per attendant, pt appeared to get 2-3 hrs of sleep. Pt hoping to start back on Ambien tonight. Otherwise, pt denies pain and VSS on RA.     BP (!) 143/76 (BP Location: Left arm)   Pulse 75   Temp 97.9  F (36.6  C) (Oral)   Resp 18   Wt 70.3 kg (155 lb)   LMP 01/19/2004   SpO2 96%   BMI 26.61 kg/m

## 2023-04-03 NOTE — CONSULTS
Care Management Initial Consult    General Information  Assessment completed with: Patient, Spouse or significant other, -chart review,    Type of CM/SW Visit: Initial Assessment    Primary Care Provider verified and updated as needed: Yes   Readmission within the last 30 days: current reason for admission unrelated to previous admission      Reason for Consult: discharge planning, utilization management concerns  Advance Care Planning: Advance Care Planning Reviewed: present on chart, no concerns identified          Communication Assessment  Patient's communication style: spoken language (English or Bilingual)    Hearing Difficulty or Deaf: no   Wear Glasses or Blind: yes    Cognitive  Cognitive/Neuro/Behavioral: .WDL except  Level of Consciousness: confused, alert  Arousal Level: opens eyes spontaneously  Orientation: disoriented to, situation  Mood/Behavior: excitable, cooperative  Best Language: 0 - No aphasia  Speech: clear, rambling, pace/rate variance    Living Environment:   People in home: spouse     Current living Arrangements: other (see comments) (Single level town house)      Able to return to prior arrangements: yes       Family/Social Support:  Care provided by: self, spouse/significant other  Provides care for: no one  Marital Status:   , Children          Description of Support System: Involved, Supportive    Support Assessment: Adequate family and caregiver support    Current Resources:   Patient receiving home care services: No     Community Resources: None  Equipment currently used at home: shower chair  Supplies currently used at home: None    Employment/Financial:  Employment Status: retired        Financial Concerns: No concerns identified   Referral to Financial Worker: No       Lifestyle & Psychosocial Needs:  Social Determinants of Health     Tobacco Use: Unknown (3/5/2023)    Patient History      Smoking Tobacco Use: Never      Smokeless Tobacco Use: Unknown      Passive  Exposure: Not on file   Alcohol Use: Not on file   Financial Resource Strain: Not on file   Food Insecurity: Not on file   Transportation Needs: Not on file   Physical Activity: Not on file   Stress: Not on file   Social Connections: Not on file   Intimate Partner Violence: Not on file   Depression: Not at risk (3/17/2023)    PHQ-2      PHQ-2 Score: 0   Housing Stability: Not on file       Functional Status:  Prior to admission patient needed assistance:   Dependent ADLs:: Independent  Dependent IADLs:: Independent       Mental Health Status:  Mental Health Status: No Current Concerns       Chemical Dependency Status:  Chemical Dependency Status: No Current Concerns             Values/Beliefs:  Spiritual, Cultural Beliefs, Quaker Practices, Values that affect care: yes  Description of Beliefs that Will Affect Care: [place]    Cultural/Quaker Practices Patient Routinely Participates In: prayer       Additional Information:  Sulma Ramos is a 57 year old female admitted on 4/2/2023. She has a PMH significant for decompensated liver cirrhosis, hx of hepatic encephalopathy and ascites, portal hypertension with portal vein thrombosis (on Eliquis, recently held with intracranial bleed), gastric bypass, recent traumatic left subdural hematoma status post evacuation February 2023 who presents for worsening confusion and found to have elevated ammonia on admission concerning for HE    SW met with the patient and the patient's  Eugene at bedside to complete CMA. Patient seemed to still be be experiencing some confusion. Patient thought she was currently admitted for a brain bleed, however the patient's  corrected her. The patient lives in a one level town house with her  Eugene. The patient is independent with her activities of daily living, however her  Eugene can provide additional assistance as needed. Eugene reported that the patient has a walker that she will use as needed. He stated the  patient does not use the walker often. The patient and her  declined having any questions or concerns at this time.     SW will continue to follow for discharge needs.     Suhas Morris, ALEA  [address]   Office: [phone]  Pager: [phone]  [email or web address]

## 2023-04-03 NOTE — PLAN OF CARE
Physical Therapy Discharge Summary    Reason for therapy discharge:    Discharged to home.    Progress towards therapy goal(s). See goals on Care Plan in [facility] electronic health record for goal details.  Goals partially met.  Barriers to achieving goals:   discharge from facility.    Therapy recommendation(s):    Continued therapy is recommended.  Rationale/Recommendations:  to progress overall safety and independence with functional mobility in relation to higher level balance activities.

## 2023-04-04 ENCOUNTER — PATIENT OUTREACH (OUTPATIENT)
Dept: CARE COORDINATION | Facility: CLINIC | Age: 58
End: 2023-04-04

## 2023-04-04 ENCOUNTER — VIRTUAL VISIT (OUTPATIENT)
Dept: NEUROSURGERY | Facility: CLINIC | Age: 58
End: 2023-04-04
Payer: COMMERCIAL

## 2023-04-04 DIAGNOSIS — S06.5XAA SUBDURAL HEMATOMA (H): Primary | ICD-10-CM

## 2023-04-04 LAB — BACTERIA UR CULT: NO GROWTH

## 2023-04-04 PROCEDURE — 99024 POSTOP FOLLOW-UP VISIT: CPT | Mod: 95 | Performed by: PHYSICIAN ASSISTANT

## 2023-04-04 NOTE — NURSING NOTE
Chief Complaint   Patient presents with     Telephone     2wk follow up    Is the patient currently in the state of [place]? YES    Visit mode:TELEPHONE    If the visit is dropped, the patient can be reconnected by: TELEPHONE VISIT: Phone number: [phone]    Will anyone else be joining the visit? NO      How would you like to obtain your AVS? MyChart    Are changes needed to the allergy or medication list? NO    Reason for visit: 2wk follow up

## 2023-04-04 NOTE — LETTER
2023       RE: Sulma Ramos  [address]  [place] [address]     Dear Colleague,    Thank you for referring your patient, Sulma Ramos, to the [facility] [place] [facility]. Please see a copy of my visit note below.    [facility]  Department of Neurosurgery  Center for Skull Base and Pituitary Surgery    Name: Sulma Ramos  MRN: [id number]  Age: 57 year old  : 2023      Chief Complaint:   Traumatic left subdural hematoma s/p errol holes for evacuation 2023, follow up visit    History of Present Illness:   Sulma Ramos is a 57 year old female with a history of liver cirrhosis, hx of hepatic encephalopathy and ascites, portal hypertension with recurrent portal vein thrombosis (Eliquis, currently held), and gastric bypass who is seen today by telephone visit for follow up. Our last visit was 3/17/2023. Unfortunately she just got home from the hospital yesterday, presented to [facility] ED on  with AMS and found to have likely hepatic encephalopathy with an elevated ammonia level. She is doing better and feels well today. Her spouse, Eugene, is on the line as well today. She denies new headaches, falls, paresthesias, or weakness. They have tried to get in touch with her [place] team who manages her cirrhosis but so far has not been able to speak with anyone.       Review of Systems:   Pertinent items are noted in HPI or as in patient entered ROS below, remainder of complete ROS is negative.     Physical Exam:   Exam is limited today by telephone visit. Speech is normal. Answers questions appropriately.    Imaging:  We reviewed the recent head CT both from 2023 and 3/31/2023 which show stable evolving left sided SDH with stable midline shift.      Assessment:  Traumatic left subdural hematoma s/p errol holes for evacuation 2023, follow up visit    Plan:  We had a long  discussion today regarding risks of continuing to hold Sulma's Eliquis vs risks of restarting her anticoagulation. The patient and her  would prefer to wait to hear from their GI specialist at [place] prior to restarting anticoagulation. We'll tentatively plan on follow up in 2 weeks with repeat head CT. Again, we stressed that continuing to hold her Eliquis could put her at greater risk for recurrent thrombus and they understand. They were asked to reach out with new concerns, and we discussed warning signs for which to present to the ED prior to her next appointment.       Discussed with faculty today and ultimately the patient prefers to continue to hold her Eliquis. Will have close follow up and they'll keep attempting to contact her [place] providers.            Again, thank you for allowing me to participate in the care of your patient.      Sincerely,    Hazel Peterson PA-C

## 2023-04-04 NOTE — PROGRESS NOTES
Virtual Visit Details    Type of service:  Telephone Visit   Phone call duration: 11 minutes       [facility]  Department of Neurosurgery  Center for Skull Base and Pituitary Surgery    Name: Sulma Ramos  MRN: [id number]  Age: 57 year old  : 2023      Chief Complaint:   Traumatic left subdural hematoma s/p errol holes for evacuation 2023, follow up visit    History of Present Illness:   Sulma Ramos is a 57 year old female with a history of liver cirrhosis, hx of hepatic encephalopathy and ascites, portal hypertension with recurrent portal vein thrombosis (Eliquis, currently held), and gastric bypass who is seen today by telephone visit for follow up. Our last visit was 3/17/2023. Unfortunately she just got home from the hospital yesterday, presented to [facility] ED on  with AMS and found to have likely hepatic encephalopathy with an elevated ammonia level. She is doing better and feels well today. Her spouse, Eugene, is on the line as well today. She denies new headaches, falls, paresthesias, or weakness. They have tried to get in touch with her [place] team who manages her cirrhosis but so far has not been able to speak with anyone.       Review of Systems:   Pertinent items are noted in HPI or as in patient entered ROS below, remainder of complete ROS is negative.     Physical Exam:   Exam is limited today by telephone visit. Speech is normal. Answers questions appropriately.    Imaging:  We reviewed the recent head CT both from 2023 and 3/31/2023 which show stable evolving left sided SDH with stable midline shift.      Assessment:  Traumatic left subdural hematoma s/p errol holes for evacuation 2023, follow up visit    Plan:  We had a long discussion today regarding risks of continuing to hold Sulma's Eliquis vs risks of restarting her anticoagulation. The patient and her  would prefer to wait to hear from their GI specialist at [place] prior to restarting  anticoagulation. We'll tentatively plan on follow up in 2 weeks with repeat head CT. Again, we stressed that continuing to hold her Eliquis could put her at greater risk for recurrent thrombus and they understand. They were asked to reach out with new concerns, and we discussed warning signs for which to present to the ED prior to her next appointment.       Discussed with faculty today and ultimately the patient prefers to continue to hold her Eliquis. Will have close follow up and they'll keep attempting to contact her [place] providers.        Hazel Peterson PA-C  Department of Neurosurgery

## 2023-04-04 NOTE — PROGRESS NOTES
[facility]    Background: Transitional Care Management program identified per system criteria and reviewed by [facility] Resource [place] team for possible outreach.    Assessment: Upon chart review, [facility] Team member will not proceed with patient outreach related to this episode of Transitional Care Management program due to reason below:    Patient has a follow up appointment with an appropriate provider today for hospital discharge    Plan: Transitional Care Management episode addressed appropriately per reason noted above.        Sydnee Schaffer  Community Health Worker  [facility]    *Connected Care Resource Team does NOT follow patient ongoing. Referrals are identified based on internal discharge reports and the outreach is to ensure patient has an understanding of their discharge instructions.

## 2023-04-04 NOTE — DISCHARGE SUMMARY
[facility]  Hospitalist Discharge Summary      Date of Admission:  4/2/2023  Date of Discharge:  4/3/2023  2:59 PM  Discharging Provider: Meagan Sheehan MD  Discharge Service: Hospitalist Service, GOLD TEAM 8    Discharge Diagnoses   #Hepatic Encephalopathy   #Hx of recent traumatic left subdural hematoma status post evacuation (2/2023)  #Decompensated liver cirrhosis (+HE, +Ascites)  #Hx of portal hypertension with portal vein thrombosis (on Eliquis, recently held with intracranial bleed)  #Chorea  #Hyperbilirubinemia   #Mild Elevation in AST  #Leukopenia (baseline ~2-3)   #Macrocytic Anemia (baseline ~8-9)  #Thrombocytopenia   #Coagulopathy   #GERD   #Hypothyroidism  #Mood  #Pain     Follow-ups Needed After Discharge   Follow-up Appointments     Adult [facility] Follow-up and recommended labs and tests      Please follow up with your GI doctor at [place]    Please follow up with your primary care doctor as regularly scheduled     Appointments on [place] and/or [facility] (with [facility] or [facility]   provider or service). Call [phone] if you haven't heard regarding   these appointments within 7 days of discharge.             Unresulted Labs Ordered in the Past 30 Days of this Admission     Date and Time Order Name Status Description    4/2/2023  5:51 AM Blood Culture Hand, Right Preliminary     4/2/2023  5:51 AM Blood Culture Arm, Right Preliminary     2/26/2023  9:29 PM Prepare plasma (unit) Preliminary     2/26/2023 12:23 PM Prepare red blood cells (unit) Preliminary     2/26/2023 12:23 PM Prepare red blood cells (unit) Preliminary         Discharge Disposition   Discharged to home  Condition at discharge: Stable    Hospital Course   Sulma Ramos is a 57 year old female admitted on 4/2/2023. She has a PMH significant for decompensated liver cirrhosis, hx of hepatic encephalopathy and ascites, portal hypertension with portal vein thrombosis (on Eliquis, recently held  with intracranial bleed), gastric bypass, recent traumatic left subdural hematoma status post evacuation February 2023 who presented for worsening confusion and found to have elevated ammonia on admission concerning for HE.       #Hepatic Encephalopathy   #Hx of recent traumatic left subdural hematoma status post evacuation (2/2023)  Presented with altered mental status. Ammonia elevated on admission concerning for HE. Patient's  states he helps with medications however he does not know if she takes them, and often fights him on medications when she is starting to get confused, so etiology appeared to be more likely medication non compliance. Thorough work up was completed on admission w/ Neurology/stroke team consulted due to recent subdural hematoma. CTA with no flow-limiting stenosis or evidence of dissection  CT head with grossly stable findings. Evaluated by Neurology in ER and indicated no acute concern for stroke/bleed given imaging.  She was initiated on the [facility] protocol and exhibited rapid improvement back to baseline mental status with the corroboration of her primary care taker, Eugene (). A liver ultrasound w/ doppler was ordered but due to motion artifact, was not able to obtain accurate read (obtained due to PVT)  -continue PTA Lactulose, rifaximin  - I discussed w/ [place] on-call GI (per patient preference and request) and they will expedite an outpatient follow up.      #Decompensated liver cirrhosis (+HE, +Ascites)  #Hx of portal hypertension with portal vein thrombosis (on Eliquis, recently held with intracranial bleed)  -PTA Nadolol, possible for EV -continue on discharge     #Chorea  Patient noted to have extrapyramidal symptoms on examination. Unclear chronicity. May be 2/2 medications vs. Encephalopathy. Per , choreiform movements have always manifested w/ her HE. Discussed w/ neurology who recommended discontinuing gabapentin as it could worsen symptoms. Her movements  improved with improvement of mentation.     ~chronic conditions~      #Hyperbilirubinemia   #Mild Elevation in AST  #Leukopenia (baseline ~2-3)   #Macrocytic Anemia (baseline ~8-9)  #Thrombocytopenia   #Coagulopathy   Appears chronic, likely 2/2 cirrhosis.   -PTA folic acid  -PTA Iron, Vitamin A, Vitamin B1    #GERD: PTA Pantoprazole 40   #Hypothyroidism: PTA Levothyroxine   -TSH given hx of hypothyroidism - elevated on admission (5.36)   - recheck at PCP appointment, as pt was acutely ill when this was obtained     #Mood: held PTA Zolpidem and Ambien during admission    #Pain: holding PTA Gabapentin in the setting of AMS, should discontinue on discharge as could be contributing to choreiform movements      Consultations This Hospital Stay   PHARMACY IP CONSULT  OCCUPATIONAL THERAPY ADULT IP CONSULT  PHYSICAL THERAPY ADULT IP CONSULT  NURSING TO CONSULT FOR VASCULAR ACCESS CARE IP CONSULT  CARE MANAGEMENT / SOCIAL WORK IP CONSULT    Code Status   Prior    Time Spent on this Encounter   IMeagan MD, personally saw the patient today and spent greater than 30 minutes discharging this patient.       Meagan Sheehan MD  [facility] [address] [place] [id number]  Phone: [phone]  ______________________________________________________________________    Physical Exam   Vital Signs: Temp: 98.4  F (36.9  C) Temp src: Oral BP: 110/72 Pulse: 64   Resp: 16 SpO2: 94 % O2 Device: None (Room air)    Weight: 155 lbs 0 oz     Gen: awake and answering questions appropriately. Improvement in choreiform movements  HEENT: normal conjunctivae, EOMI  Nares: No discharge noted from nares bilaterally   CV: RRR, no murmurs  Pulm: CTBA, no crackles or wheezing  Abd: soft, nl BS, NT, no HSM  Msk: no deformities  Extremities: no edema  Neuro: moving all extremities spontaneously   Skin: no rashes, dry       Primary Care Physician   Raudel Ortiz    Discharge Orders      Reason for your hospital stay     Eliza was admitted to the hospital for likely hepatic encephalopathy     Activity    Your activity upon discharge: activity as tolerated     Adult [facility] Follow-up and recommended labs and tests    Please follow up with your GI doctor at [place]    Please follow up with your primary care doctor as regularly scheduled     Appointments on [place] and/or [facility] (with [facility] or [facility] provider or service). Call [phone] if you haven't heard regarding these appointments within 7 days of discharge.     Diet    Follow this diet upon discharge: Orders Placed This Encounter      Regular Diet Adult       Significant Results and Procedures   Most Recent 3 CBC's:Recent Labs   Lab Test 04/03/23 0758 04/02/23 0323 03/07/23  0530   WBC 2.2* 2.0* 2.1*   HGB 9.7* 9.0* 8.3*   * 103* 107*   PLT 88* 83* 46*     Most Recent 3 BMP's:Recent Labs   Lab Test 04/03/23 0758 04/02/23 0325 04/02/23 0323 04/02/23 0322 03/07/23  0530   *  --  144  --  138   POTASSIUM 3.6  --  3.9  --  3.9   CHLORIDE 115*  --  113*  --  103   CO2 21*  --  21*  --  27   BUN 8.0  --  13.0  --  14.2   CR 0.68 0.8 0.92  --  0.61   ANIONGAP 10  --  10  --  8   CHERRIE 8.9  --  8.9  --  8.6   GLC 68*  --  76 74 88     Most Recent 2 LFT's:Recent Labs   Lab Test 04/03/23 0758 04/02/23 0323   * 79*   ALT 45* 32   ALKPHOS 132* 135*   BILITOTAL 2.1* 2.2*   ,   Results for orders placed or performed during the hospital encounter of 04/02/23   CT Head w/o Contrast    Narrative    EXAM: CTA HEAD NECK WITH CONTRAST, CT HEAD WITHOUT CONTRAST  LOCATION: [facility]  DATE/TIME: 04/02/2023, 4:05 AM    INDICATION: Altered mental status and agitation. Stroke alert.  COMPARISON: Head CT dated 03/31/2023.  CONTRAST: iopamidol (Isovue 370) solution 75 mL.     TECHNIQUE: Head and neck CT angiogram with IV contrast. Noncontrast head CT followed by axial helical CT images of the head and neck vessels obtained  during the arterial phase of intravenous contrast administration. Axial 2D reconstructed images and   multiplanar 3D MIP reconstructed images of the head and neck vessels were performed by the technologist. Dose reduction techniques were used. All stenosis measurements made according to NASCET criteria unless otherwise specified.    FINDINGS: Severely degraded, near nondiagnostic exams.    NONCONTRAST HEAD CT:   INTRACRANIAL CONTENTS: Redemonstrated mixed density left hemispheric subdural hemorrhage, grossly stable in overall volume. Left to right midline shift measures approximately 6 mm, similar to the prior exam. No definite gray-white differentiation loss.   Mild presumed chronic small vessel ischemic changes. Mild generalized volume loss. No hydrocephalus.     VISUALIZED ORBITS/SINUSES/MASTOIDS: No intraorbital abnormality. No paranasal sinus mucosal disease. No middle ear or mastoid effusion.    BONES/SOFT TISSUES: Left parietal errol holes.    HEAD CTA: Motion degraded exam.  ANTERIOR CIRCULATION: Nonflow-limiting atherosclerotic plaque in the internal carotid arteries. No definite proximal arterial occlusion. Attenuation of the right M2 branches, favored to be due to motion degradation. Fetal origin of the right posterior   cerebral artery from the anterior circulation. Small right A1 segment with a patent anterior communicating artery.    POSTERIOR CIRCULATION: No stenosis/occlusion, aneurysm, or high-flow vascular malformation. Dominant right and smaller left vertebral artery contribute to a normal basilar artery.     DURAL VENOUS SINUSES: Expected enhancement of the major dural venous sinuses.    NECK CTA:  RIGHT CAROTID: No measurable stenosis or dissection.    LEFT CAROTID: No measurable stenosis or dissection.    VERTEBRAL ARTERIES: No focal stenosis or dissection. Dominant right and smaller left vertebral arteries.    AORTIC ARCH: Classic aortic arch anatomy with no significant stenosis at the origin  of the great vessels.    NONVASCULAR STRUCTURES: Degenerative changes of C5-C6. No high-grade osseous spinal canal or neural foraminal narrowing. Right-sided pleural effusion.      Impression    IMPRESSION:   HEAD CT:  1.  Severely motion degraded, near nondiagnostic exam.  2.  The mixed density left hemispheric subdural hemorrhage appears grossly stable in overall volume.  3.  Grossly stable left-to-right midline shift.  4.  No definite gray-white differentiation loss within the limitations of significant motion artifact.    HEAD CTA:   1.  Motion degraded exam. Attenuation of the right M2 branches is favored to be due to motion artifact. No definite proximal arterial occlusion.    NECK CTA:  1.  No flow-limiting stenosis or evidence of dissection.  2.  Right pleural effusion.    CT head and CTA head and neck findings were discussed with Dr. Montez at 3:58 AM on 04/02/2023.       CTA Head Neck with Contrast    Narrative    EXAM: CTA HEAD NECK WITH CONTRAST, CT HEAD WITHOUT CONTRAST  LOCATION: [facility]  DATE/TIME: 04/02/2023, 4:05 AM    INDICATION: Altered mental status and agitation. Stroke alert.  COMPARISON: Head CT dated 03/31/2023.  CONTRAST: iopamidol (Isovue 370) solution 75 mL.     TECHNIQUE: Head and neck CT angiogram with IV contrast. Noncontrast head CT followed by axial helical CT images of the head and neck vessels obtained during the arterial phase of intravenous contrast administration. Axial 2D reconstructed images and   multiplanar 3D MIP reconstructed images of the head and neck vessels were performed by the technologist. Dose reduction techniques were used. All stenosis measurements made according to NASCET criteria unless otherwise specified.    FINDINGS: Severely degraded, near nondiagnostic exams.    NONCONTRAST HEAD CT:   INTRACRANIAL CONTENTS: Redemonstrated mixed density left hemispheric subdural hemorrhage, grossly stable in overall volume.  Left to right midline shift measures approximately 6 mm, similar to the prior exam. No definite gray-white differentiation loss.   Mild presumed chronic small vessel ischemic changes. Mild generalized volume loss. No hydrocephalus.     VISUALIZED ORBITS/SINUSES/MASTOIDS: No intraorbital abnormality. No paranasal sinus mucosal disease. No middle ear or mastoid effusion.    BONES/SOFT TISSUES: Left parietal errol holes.    HEAD CTA: Motion degraded exam.  ANTERIOR CIRCULATION: Nonflow-limiting atherosclerotic plaque in the internal carotid arteries. No definite proximal arterial occlusion. Attenuation of the right M2 branches, favored to be due to motion degradation. Fetal origin of the right posterior   cerebral artery from the anterior circulation. Small right A1 segment with a patent anterior communicating artery.    POSTERIOR CIRCULATION: No stenosis/occlusion, aneurysm, or high-flow vascular malformation. Dominant right and smaller left vertebral artery contribute to a normal basilar artery.     DURAL VENOUS SINUSES: Expected enhancement of the major dural venous sinuses.    NECK CTA:  RIGHT CAROTID: No measurable stenosis or dissection.    LEFT CAROTID: No measurable stenosis or dissection.    VERTEBRAL ARTERIES: No focal stenosis or dissection. Dominant right and smaller left vertebral arteries.    AORTIC ARCH: Classic aortic arch anatomy with no significant stenosis at the origin of the great vessels.    NONVASCULAR STRUCTURES: Degenerative changes of C5-C6. No high-grade osseous spinal canal or neural foraminal narrowing. Right-sided pleural effusion.      Impression    IMPRESSION:   HEAD CT:  1.  Severely motion degraded, near nondiagnostic exam.  2.  The mixed density left hemispheric subdural hemorrhage appears grossly stable in overall volume.  3.  Grossly stable left-to-right midline shift.  4.  No definite gray-white differentiation loss within the limitations of significant motion artifact.    HEAD CTA:    1.  Motion degraded exam. Attenuation of the right M2 branches is favored to be due to motion artifact. No definite proximal arterial occlusion.    NECK CTA:  1.  No flow-limiting stenosis or evidence of dissection.  2.  Right pleural effusion.    CT head and CTA head and neck findings were discussed with Dr. Montez at 3:58 AM on 04/02/2023.       XR Chest Port 1 View    Narrative    EXAM: XR CHEST PORT 1 VIEW  LOCATION: [facility]  DATE/TIME: 4/2/2023 5:27 AM    INDICATION: SOB  COMPARISON: PA/lateral chest radiograph 03/06/2023      Impression    IMPRESSION: Veiling asymmetric opacity of the right lung which may represent layering pleural fluid and/or infiltrate. Patchy opacity of the left mid lung also noted. Stable heart size which is within normal range. No definite pneumothorax. No acute   osseous abnormality.   US Abdomen Complete w Doppler Complete    Narrative    EXAMINATION: US ABDOMEN COMPLETE WITH DOPPLER COMPLETE 4/2/2023 9:01  AM     COMPARISON: Abdominal ultrasound 2/26/2023    HISTORY: History of cirrhosis and portal vein thrombus.    TECHNIQUE: The abdomen was scanned in standard fashion with  specialized ultrasound transducer(s) using both gray-scale, color  Doppler, and spectral flow techniques.    Findings:  Limited exam due to patient agitation/motion.    Liver: The liver was poorly visualized. Doppler evaluation of the  portal venous system is limited due to shadowing bowel gas. The left  hepatic vein is patent with flow towards the heart. No large masses or  obvious dilated intrahepatic bile ducts in this somewhat limited  assessment.     The left hepatic artery is patent with antegrade flow measuring 40  cm/s and resistive index of 0.75.    The splenic vein is patent with flow towards the liver at the spleen.  No flow visualized in the splenic vein at midline, likely artifactual  due to slow flow or technical difficulties.    Pancreas:  Visualized portions of the pancreas are unremarkable.     Kidneys: Both kidneys are of normal echotexture, without mass or  hydronephrosis.   Renal lengths: right- 10.6 cm, left- .    Spleen: The spleen measures 16.2 cm in length.    Fluid: Small volume ascites.      Impression    Impression:   1. Limited exam due to patient agitation and motion.  2. Splenomegaly.    I have personally reviewed the examination and initial interpretation  and I agree with the findings.    KISHA HOOKS MD         SYSTEM ID:  [id number]       Discharge Medications   Discharge Medication List as of 4/3/2023  2:40 PM      CONTINUE these medications which have NOT CHANGED    Details   calcium-vitamin D (CALTRATE) 600-400 MG-UNIT per tablet Take 1 tablet by mouth daily, 1 tablet, Oral, DAILY, Until Discontinued, Historical      cyanocobalamin 1000 MCG/ML injection Inject 1 mL as directed every 30 days, 1 mL, Injection, EVERY 30 DAYS, Until Discontinued, Historical      ferrous sulfate 325 (65 FE) MG tablet Take 325 mg by mouth daily (with breakfast), 325 mg, Oral, DAILY WITH BREAKFAST, Until Discontinued, Historical      folic acid (FOLVITE) 1 MG tablet Take 1 tablet (1 mg) by mouth daily for 30 days, Disp-30 tablet, R-0, E-Prescribe      !! lactulose (CHRONULAC) 10 GM/15ML solution Take 30 mLs (20 g) by mouth daily for 30 days, Disp-900 mL, R-0, E-Prescribe      !! lactulose 20 GM/30ML SOLN Take 20 g by mouth every other day, Disp-30 mL, Historical      LEVOTHYROXINE SODIUM PO Take 25 mcg by mouth every other day, 25 mcg, Oral, EVERY OTHER DAY, Until Discontinued, Historical      multivitamin w/minerals (THERA-VIT-M) tablet Take 1 tablet by mouth daily for 30 days, Disp-30 tablet, R-0, E-Prescribe      NADOLOL PO Take 20 mg by mouth every evening, 20 mg, Oral, EVERY EVENING, Until Discontinued, Historical      omeprazole (PRILOSEC) 20 MG DR capsule Take 20 mg by mouth daily, Historical      pantoprazole (PROTONIX) 40 MG EC tablet Take  1 tablet (40 mg) by mouth every morning (before breakfast) for 30 days, Disp-30 tablet, R-0, E-Prescribe      pilocarpine (SALAGEN) 5 MG tablet Take 5 mg by mouth 3 times daily, Historical      Rifaximin (XIFAXAN PO) Take 550 mg by mouth 2 times daily, 550 mg, Oral, 2 TIMES DAILY, Until Discontinued, Historical      thiamine (B-1) 100 MG tablet Take 1 tablet (100 mg) by mouth daily for 30 days, Disp-30 tablet, R-0, E-Prescribe      vitamin A 3 MG (35438 UNITS) capsule Take 30,000 Units by mouth daily, Historical      Zolpidem Tartrate (AMBIEN PO) Take 10 mg by mouth nightly as needed, 10 mg, Oral, AT BEDTIME PRN, Until Discontinued, Historical       !! - Potential duplicate medications found. Please discuss with provider.      STOP taking these medications       GABAPENTIN PO Comments:   Reason for Stopping:             Allergies   Allergies   Allergen Reactions     Clarithromycin      Clindamycin Base      Droperidol

## 2023-04-04 NOTE — PATIENT INSTRUCTIONS
Follow up in 2 weeks (telephone visit), with head CT prior.     Please go directly to the ER with new symptoms or concerns prior to next follow up

## 2023-04-07 LAB
BACTERIA BLD CULT: NO GROWTH
BACTERIA BLD CULT: NO GROWTH

## 2023-05-24 ENCOUNTER — HOSPITAL ENCOUNTER (EMERGENCY)
Facility: CLINIC | Age: 58
Discharge: OTHER INSTITUTION | End: 2023-05-24
Attending: EMERGENCY MEDICINE | Admitting: EMERGENCY MEDICINE
Payer: COMMERCIAL

## 2023-05-24 ENCOUNTER — APPOINTMENT (OUTPATIENT)
Dept: CT IMAGING | Facility: CLINIC | Age: 58
End: 2023-05-24
Attending: EMERGENCY MEDICINE
Payer: COMMERCIAL

## 2023-05-24 VITALS
HEART RATE: 77 BPM | DIASTOLIC BLOOD PRESSURE: 83 MMHG | OXYGEN SATURATION: 97 % | TEMPERATURE: 97.9 F | SYSTOLIC BLOOD PRESSURE: 126 MMHG | RESPIRATION RATE: 16 BRPM

## 2023-05-24 DIAGNOSIS — S06.5XAA SUBDURAL HEMATOMA (H): ICD-10-CM

## 2023-05-24 DIAGNOSIS — K76.82 HEPATIC ENCEPHALOPATHY (H): ICD-10-CM

## 2023-05-24 DIAGNOSIS — D61.818 PANCYTOPENIA (H): ICD-10-CM

## 2023-05-24 LAB
ABO/RH(D): NORMAL
ALBUMIN SERPL BCG-MCNC: 2.2 G/DL (ref 3.5–5.2)
ALBUMIN UR-MCNC: 10 MG/DL
ALP SERPL-CCNC: 145 U/L (ref 35–104)
ALT SERPL W P-5'-P-CCNC: 44 U/L (ref 10–35)
AMMONIA PLAS-SCNC: 143 UMOL/L (ref 11–51)
ANION GAP SERPL CALCULATED.3IONS-SCNC: 9 MMOL/L (ref 7–15)
ANTIBODY SCREEN: NEGATIVE
APPEARANCE UR: CLEAR
AST SERPL W P-5'-P-CCNC: 90 U/L (ref 10–35)
BASE EXCESS BLDV CALC-SCNC: -0.9 MMOL/L (ref -7.7–1.9)
BASOPHILS # BLD AUTO: 0 10E3/UL (ref 0–0.2)
BASOPHILS NFR BLD AUTO: 1 %
BILIRUB SERPL-MCNC: 2.2 MG/DL
BILIRUB UR QL STRIP: NEGATIVE
BLD PROD TYP BPU: NORMAL
BLOOD COMPONENT TYPE: NORMAL
BUN SERPL-MCNC: 26.1 MG/DL (ref 8–23)
CALCIUM SERPL-MCNC: 8.5 MG/DL (ref 8.6–10)
CHLORIDE SERPL-SCNC: 111 MMOL/L (ref 98–107)
CODING SYSTEM: NORMAL
COLOR UR AUTO: YELLOW
CREAT SERPL-MCNC: 1.8 MG/DL (ref 0.51–0.95)
CROSSMATCH: NORMAL
CROSSMATCH: NORMAL
DEPRECATED HCO3 PLAS-SCNC: 21 MMOL/L (ref 22–29)
EOSINOPHIL # BLD AUTO: 0.2 10E3/UL (ref 0–0.7)
EOSINOPHIL NFR BLD AUTO: 6 %
ERYTHROCYTE [DISTWIDTH] IN BLOOD BY AUTOMATED COUNT: 17.2 % (ref 10–15)
ETHANOL SERPL-MCNC: <0.01 G/DL
GFR SERPL CREATININE-BSD FRML MDRD: 32 ML/MIN/1.73M2
GLUCOSE BLDC GLUCOMTR-MCNC: 100 MG/DL (ref 70–99)
GLUCOSE SERPL-MCNC: 102 MG/DL (ref 70–99)
GLUCOSE UR STRIP-MCNC: NEGATIVE MG/DL
HCO3 BLDV-SCNC: 24 MMOL/L (ref 21–28)
HCT VFR BLD AUTO: 25 % (ref 35–47)
HGB BLD-MCNC: 8.5 G/DL (ref 11.7–15.7)
HGB UR QL STRIP: NEGATIVE
HYALINE CASTS: 3 /LPF
IMM GRANULOCYTES # BLD: 0 10E3/UL
IMM GRANULOCYTES NFR BLD: 1 %
ISSUE DATE AND TIME: NORMAL
KETONES UR STRIP-MCNC: NEGATIVE MG/DL
LEUKOCYTE ESTERASE UR QL STRIP: NEGATIVE
LYMPHOCYTES # BLD AUTO: 0.5 10E3/UL (ref 0.8–5.3)
LYMPHOCYTES NFR BLD AUTO: 19 %
MAGNESIUM SERPL-MCNC: 2 MG/DL (ref 1.7–2.3)
MCH RBC QN AUTO: 32.4 PG (ref 26.5–33)
MCHC RBC AUTO-ENTMCNC: 34 G/DL (ref 31.5–36.5)
MCV RBC AUTO: 95 FL (ref 78–100)
MONOCYTES # BLD AUTO: 0.4 10E3/UL (ref 0–1.3)
MONOCYTES NFR BLD AUTO: 13 %
MUCOUS THREADS #/AREA URNS LPF: PRESENT /LPF
NEUTROPHILS # BLD AUTO: 1.8 10E3/UL (ref 1.6–8.3)
NEUTROPHILS NFR BLD AUTO: 60 %
NITRATE UR QL: NEGATIVE
NRBC # BLD AUTO: 0 10E3/UL
NRBC BLD AUTO-RTO: 0 /100
O2/TOTAL GAS SETTING VFR VENT: 0 %
PCO2 BLDV: 38 MM HG (ref 40–50)
PH BLDV: 7.41 [PH] (ref 7.32–7.43)
PH UR STRIP: 5.5 [PH] (ref 5–7)
PLATELET # BLD AUTO: 98 10E3/UL (ref 150–450)
PO2 BLDV: 43 MM HG (ref 25–47)
POTASSIUM SERPL-SCNC: 4.4 MMOL/L (ref 3.4–5.3)
PROT SERPL-MCNC: 5.8 G/DL (ref 6.4–8.3)
RBC # BLD AUTO: 2.62 10E6/UL (ref 3.8–5.2)
RBC URINE: <1 /HPF
SODIUM SERPL-SCNC: 141 MMOL/L (ref 136–145)
SP GR UR STRIP: 1.02 (ref 1–1.03)
SPECIMEN EXPIRATION DATE: NORMAL
UNIT ABO/RH: NORMAL
UNIT NUMBER: NORMAL
UNIT STATUS: NORMAL
UNIT TYPE ISBT: 6200
UROBILINOGEN UR STRIP-MCNC: NORMAL MG/DL
WBC # BLD AUTO: 2.9 10E3/UL (ref 4–11)
WBC URINE: 3 /HPF

## 2023-05-24 PROCEDURE — 82962 GLUCOSE BLOOD TEST: CPT

## 2023-05-24 PROCEDURE — P9037 PLATE PHERES LEUKOREDU IRRAD: HCPCS | Performed by: EMERGENCY MEDICINE

## 2023-05-24 PROCEDURE — 82077 ASSAY SPEC XCP UR&BREATH IA: CPT | Performed by: EMERGENCY MEDICINE

## 2023-05-24 PROCEDURE — 83735 ASSAY OF MAGNESIUM: CPT | Performed by: EMERGENCY MEDICINE

## 2023-05-24 PROCEDURE — 86850 RBC ANTIBODY SCREEN: CPT | Performed by: EMERGENCY MEDICINE

## 2023-05-24 PROCEDURE — 82803 BLOOD GASES ANY COMBINATION: CPT | Performed by: EMERGENCY MEDICINE

## 2023-05-24 PROCEDURE — 999N000127 HC STATISTIC PERIPHERAL IV START W US GUIDANCE

## 2023-05-24 PROCEDURE — 82140 ASSAY OF AMMONIA: CPT | Performed by: EMERGENCY MEDICINE

## 2023-05-24 PROCEDURE — 81001 URINALYSIS AUTO W/SCOPE: CPT | Performed by: EMERGENCY MEDICINE

## 2023-05-24 PROCEDURE — 36430 TRANSFUSION BLD/BLD COMPNT: CPT

## 2023-05-24 PROCEDURE — 86922 COMPATIBILITY TEST ANTIGLOB: CPT | Performed by: EMERGENCY MEDICINE

## 2023-05-24 PROCEDURE — 36415 COLL VENOUS BLD VENIPUNCTURE: CPT | Performed by: EMERGENCY MEDICINE

## 2023-05-24 PROCEDURE — P9035 PLATELET PHERES LEUKOREDUCED: HCPCS

## 2023-05-24 PROCEDURE — 70450 CT HEAD/BRAIN W/O DYE: CPT

## 2023-05-24 PROCEDURE — 99291 CRITICAL CARE FIRST HOUR: CPT | Mod: 25

## 2023-05-24 PROCEDURE — 250N000009 HC RX 250: Performed by: EMERGENCY MEDICINE

## 2023-05-24 PROCEDURE — 999N000285 HC STATISTIC VASC ACCESS LAB DRAW WITH PIV START

## 2023-05-24 PROCEDURE — 250N000013 HC RX MED GY IP 250 OP 250 PS 637: Performed by: EMERGENCY MEDICINE

## 2023-05-24 PROCEDURE — 80053 COMPREHEN METABOLIC PANEL: CPT | Performed by: EMERGENCY MEDICINE

## 2023-05-24 PROCEDURE — 93005 ELECTROCARDIOGRAM TRACING: CPT

## 2023-05-24 PROCEDURE — 85025 COMPLETE CBC W/AUTO DIFF WBC: CPT | Performed by: EMERGENCY MEDICINE

## 2023-05-24 RX ADMIN — LACTULOSE 200 G: 10 SOLUTION ORAL; RECTAL at 13:55

## 2023-05-24 ASSESSMENT — ACTIVITIES OF DAILY LIVING (ADL)
ADLS_ACUITY_SCORE: 35

## 2023-05-24 NOTE — ED NOTES
Bed: DEON  Expected date: 5/24/23  Expected time: 10:33 AM  Means of arrival:   Comments:  [facility] 57F A [place]

## 2023-05-25 LAB
ATRIAL RATE - MUSE: 85 BPM
DIASTOLIC BLOOD PRESSURE - MUSE: NORMAL MMHG
INTERPRETATION ECG - MUSE: NORMAL
P AXIS - MUSE: 49 DEGREES
PR INTERVAL - MUSE: 116 MS
QRS DURATION - MUSE: 76 MS
QT - MUSE: 402 MS
QTC - MUSE: 478 MS
R AXIS - MUSE: 40 DEGREES
SYSTOLIC BLOOD PRESSURE - MUSE: NORMAL MMHG
T AXIS - MUSE: 6 DEGREES
VENTRICULAR RATE- MUSE: 85 BPM

## 2023-06-18 ENCOUNTER — APPOINTMENT (OUTPATIENT)
Dept: CT IMAGING | Facility: CLINIC | Age: 58
End: 2023-06-18
Attending: EMERGENCY MEDICINE
Payer: COMMERCIAL

## 2023-06-18 ENCOUNTER — HOSPITAL ENCOUNTER (EMERGENCY)
Facility: CLINIC | Age: 58
Discharge: ANOTHER HOSPITAL | End: 2023-06-18
Attending: EMERGENCY MEDICINE | Admitting: EMERGENCY MEDICINE
Payer: COMMERCIAL

## 2023-06-18 ENCOUNTER — APPOINTMENT (OUTPATIENT)
Dept: GENERAL RADIOLOGY | Facility: CLINIC | Age: 58
End: 2023-06-18
Attending: EMERGENCY MEDICINE
Payer: COMMERCIAL

## 2023-06-18 VITALS
DIASTOLIC BLOOD PRESSURE: 67 MMHG | RESPIRATION RATE: 18 BRPM | SYSTOLIC BLOOD PRESSURE: 100 MMHG | OXYGEN SATURATION: 96 % | HEART RATE: 63 BPM | TEMPERATURE: 98.6 F

## 2023-06-18 DIAGNOSIS — K76.82 HEPATIC ENCEPHALOPATHY (H): ICD-10-CM

## 2023-06-18 DIAGNOSIS — K76.7 HEPATORENAL SYNDROME (H): ICD-10-CM

## 2023-06-18 DIAGNOSIS — J90 PLEURAL EFFUSION: ICD-10-CM

## 2023-06-18 DIAGNOSIS — N39.0 URINARY TRACT INFECTION WITHOUT HEMATURIA, SITE UNSPECIFIED: ICD-10-CM

## 2023-06-18 DIAGNOSIS — J81.0 ACUTE PULMONARY EDEMA (H): ICD-10-CM

## 2023-06-18 DIAGNOSIS — I62.03 CHRONIC SUBDURAL HEMATOMA (H): ICD-10-CM

## 2023-06-18 LAB
ALBUMIN SERPL BCG-MCNC: 2.3 G/DL (ref 3.5–5.2)
ALBUMIN UR-MCNC: 30 MG/DL
ALP SERPL-CCNC: 172 U/L (ref 35–104)
ALT SERPL W P-5'-P-CCNC: 45 U/L (ref 0–50)
AMMONIA PLAS-SCNC: 116 UMOL/L (ref 11–51)
ANION GAP SERPL CALCULATED.3IONS-SCNC: 17 MMOL/L (ref 7–15)
APPEARANCE UR: CLEAR
AST SERPL W P-5'-P-CCNC: 78 U/L (ref 0–45)
BASE EXCESS BLDV CALC-SCNC: -11.9 MMOL/L (ref -7.7–1.9)
BASOPHILS # BLD AUTO: 0 10E3/UL (ref 0–0.2)
BASOPHILS NFR BLD AUTO: 0 %
BILIRUB SERPL-MCNC: 1.4 MG/DL
BILIRUB UR QL STRIP: NEGATIVE
BUN SERPL-MCNC: 88.3 MG/DL (ref 8–23)
CALCIUM SERPL-MCNC: 8.2 MG/DL (ref 8.6–10)
CHLORIDE SERPL-SCNC: 105 MMOL/L (ref 98–107)
COLOR UR AUTO: YELLOW
CREAT SERPL-MCNC: 9.23 MG/DL (ref 0.51–0.95)
DEPRECATED HCO3 PLAS-SCNC: 12 MMOL/L (ref 22–29)
EOSINOPHIL # BLD AUTO: 0.2 10E3/UL (ref 0–0.7)
EOSINOPHIL NFR BLD AUTO: 1 %
ERYTHROCYTE [DISTWIDTH] IN BLOOD BY AUTOMATED COUNT: 19.8 % (ref 10–15)
ETHANOL SERPL-MCNC: <0.01 G/DL
GFR SERPL CREATININE-BSD FRML MDRD: 4 ML/MIN/1.73M2
GLUCOSE BLDC GLUCOMTR-MCNC: 100 MG/DL (ref 70–99)
GLUCOSE SERPL-MCNC: 106 MG/DL (ref 70–99)
GLUCOSE UR STRIP-MCNC: NEGATIVE MG/DL
HCO3 BLDV-SCNC: 15 MMOL/L (ref 21–28)
HCT VFR BLD AUTO: 24.7 % (ref 35–47)
HGB BLD-MCNC: 8.4 G/DL (ref 11.7–15.7)
HGB UR QL STRIP: NEGATIVE
HYALINE CASTS: 10 /LPF
IMM GRANULOCYTES # BLD: 0.2 10E3/UL
IMM GRANULOCYTES NFR BLD: 1 %
KETONES UR STRIP-MCNC: NEGATIVE MG/DL
LEUKOCYTE ESTERASE UR QL STRIP: ABNORMAL
LYMPHOCYTES # BLD AUTO: 0.8 10E3/UL (ref 0.8–5.3)
LYMPHOCYTES NFR BLD AUTO: 6 %
MCH RBC QN AUTO: 33.5 PG (ref 26.5–33)
MCHC RBC AUTO-ENTMCNC: 34 G/DL (ref 31.5–36.5)
MCV RBC AUTO: 98 FL (ref 78–100)
MONOCYTES # BLD AUTO: 0.7 10E3/UL (ref 0–1.3)
MONOCYTES NFR BLD AUTO: 5 %
MUCOUS THREADS #/AREA URNS LPF: PRESENT /LPF
NEUTROPHILS # BLD AUTO: 11.6 10E3/UL (ref 1.6–8.3)
NEUTROPHILS NFR BLD AUTO: 87 %
NITRATE UR QL: NEGATIVE
NRBC # BLD AUTO: 0 10E3/UL
NRBC BLD AUTO-RTO: 0 /100
O2/TOTAL GAS SETTING VFR VENT: 0 %
PCO2 BLDV: 34 MM HG (ref 40–50)
PH BLDV: 7.24 [PH] (ref 7.32–7.43)
PH UR STRIP: 5 [PH] (ref 5–7)
PLATELET # BLD AUTO: 104 10E3/UL (ref 150–450)
PO2 BLDV: 52 MM HG (ref 25–47)
POTASSIUM SERPL-SCNC: 4.2 MMOL/L (ref 3.4–5.3)
PROT SERPL-MCNC: 5.7 G/DL (ref 6.4–8.3)
RBC # BLD AUTO: 2.51 10E6/UL (ref 3.8–5.2)
RBC URINE: 5 /HPF
SODIUM SERPL-SCNC: 134 MMOL/L (ref 136–145)
SP GR UR STRIP: 1.02 (ref 1–1.03)
SQUAMOUS EPITHELIAL: 1 /HPF
UROBILINOGEN UR STRIP-MCNC: NORMAL MG/DL
WBC # BLD AUTO: 13.4 10E3/UL (ref 4–11)
WBC URINE: 42 /HPF

## 2023-06-18 PROCEDURE — 96376 TX/PRO/DX INJ SAME DRUG ADON: CPT

## 2023-06-18 PROCEDURE — 74176 CT ABD & PELVIS W/O CONTRAST: CPT

## 2023-06-18 PROCEDURE — 84155 ASSAY OF PROTEIN SERUM: CPT | Performed by: EMERGENCY MEDICINE

## 2023-06-18 PROCEDURE — P9047 ALBUMIN (HUMAN), 25%, 50ML: HCPCS | Performed by: EMERGENCY MEDICINE

## 2023-06-18 PROCEDURE — 82077 ASSAY SPEC XCP UR&BREATH IA: CPT | Performed by: EMERGENCY MEDICINE

## 2023-06-18 PROCEDURE — 84132 ASSAY OF SERUM POTASSIUM: CPT | Performed by: EMERGENCY MEDICINE

## 2023-06-18 PROCEDURE — 96365 THER/PROPH/DIAG IV INF INIT: CPT

## 2023-06-18 PROCEDURE — 82803 BLOOD GASES ANY COMBINATION: CPT | Performed by: EMERGENCY MEDICINE

## 2023-06-18 PROCEDURE — 250N000009 HC RX 250: Performed by: EMERGENCY MEDICINE

## 2023-06-18 PROCEDURE — 250N000011 HC RX IP 250 OP 636: Performed by: EMERGENCY MEDICINE

## 2023-06-18 PROCEDURE — 82140 ASSAY OF AMMONIA: CPT | Performed by: EMERGENCY MEDICINE

## 2023-06-18 PROCEDURE — 99285 EMERGENCY DEPT VISIT HI MDM: CPT | Mod: 25

## 2023-06-18 PROCEDURE — 87040 BLOOD CULTURE FOR BACTERIA: CPT | Performed by: EMERGENCY MEDICINE

## 2023-06-18 PROCEDURE — 36415 COLL VENOUS BLD VENIPUNCTURE: CPT | Performed by: EMERGENCY MEDICINE

## 2023-06-18 PROCEDURE — 87086 URINE CULTURE/COLONY COUNT: CPT | Performed by: EMERGENCY MEDICINE

## 2023-06-18 PROCEDURE — 71045 X-RAY EXAM CHEST 1 VIEW: CPT

## 2023-06-18 PROCEDURE — 70450 CT HEAD/BRAIN W/O DYE: CPT

## 2023-06-18 PROCEDURE — 82962 GLUCOSE BLOOD TEST: CPT

## 2023-06-18 PROCEDURE — 81001 URINALYSIS AUTO W/SCOPE: CPT | Performed by: EMERGENCY MEDICINE

## 2023-06-18 PROCEDURE — 96375 TX/PRO/DX INJ NEW DRUG ADDON: CPT

## 2023-06-18 PROCEDURE — 250N000013 HC RX MED GY IP 250 OP 250 PS 637: Performed by: EMERGENCY MEDICINE

## 2023-06-18 PROCEDURE — 85004 AUTOMATED DIFF WBC COUNT: CPT | Performed by: EMERGENCY MEDICINE

## 2023-06-18 PROCEDURE — 93005 ELECTROCARDIOGRAM TRACING: CPT

## 2023-06-18 RX ORDER — GABAPENTIN 600 MG/1
600 TABLET ORAL AT BEDTIME
COMMUNITY

## 2023-06-18 RX ORDER — CEFTRIAXONE 2 G/1
2 INJECTION, POWDER, FOR SOLUTION INTRAMUSCULAR; INTRAVENOUS ONCE
Status: COMPLETED | OUTPATIENT
Start: 2023-06-18 | End: 2023-06-18

## 2023-06-18 RX ORDER — ALBUMIN (HUMAN) 12.5 G/50ML
25 SOLUTION INTRAVENOUS ONCE
Status: COMPLETED | OUTPATIENT
Start: 2023-06-18 | End: 2023-06-18

## 2023-06-18 RX ORDER — PANTOPRAZOLE SODIUM 40 MG/1
40 TABLET, DELAYED RELEASE ORAL DAILY
COMMUNITY

## 2023-06-18 RX ORDER — OXYCODONE HYDROCHLORIDE 5 MG/1
5 TABLET ORAL EVERY 6 HOURS PRN
COMMUNITY

## 2023-06-18 RX ORDER — IRON ASPGLY/C/B12/FA/CA-TH/SUC 70-150-1MG
1 TABLET ORAL DAILY
COMMUNITY

## 2023-06-18 RX ADMIN — CEFTRIAXONE 2 G: 2 INJECTION, POWDER, FOR SOLUTION INTRAMUSCULAR; INTRAVENOUS at 16:21

## 2023-06-18 RX ADMIN — ALBUMIN HUMAN 25 G: 0.25 SOLUTION INTRAVENOUS at 15:12

## 2023-06-18 RX ADMIN — LACTULOSE 200 G: 10 SOLUTION ORAL at 16:36

## 2023-06-18 RX ADMIN — LACTULOSE 200 G: 10 SOLUTION ORAL at 11:30

## 2023-06-18 RX ADMIN — ALBUMIN HUMAN 25 G: 0.25 SOLUTION INTRAVENOUS at 10:07

## 2023-06-18 ASSESSMENT — ACTIVITIES OF DAILY LIVING (ADL)
ADLS_ACUITY_SCORE: 35

## 2023-06-18 NOTE — PHARMACY-ADMISSION MEDICATION HISTORY
Pharmacist Admission Medication History    Admission medication history is complete. The information provided in this note is only as accurate as the sources available at the time of the update.    Medication reconciliation/reorder completed by provider prior to medication history? No    Information Source(s): Patient and Family member via in-person    Pertinent Information: Pt's  states pt started taking Eliquis at the end of last week - was instructed to start with 2.5 mg bid and increase to 5 mg bid if tolerating (currently still taking 2.5 mg bid). Pt's  states pt was taken off Lasix and spironolactone d/t increased SCr.    Changes made to PTA medication list:    Added: Eliquis, gabapentin, oxycodone, multivitamin with iron, pantoprazole    Deleted: ferrous sulfate, omeprazole    Changed: lactulose 20g every other day --> 20 mg TID prn       Allergies reviewed with patient and updates made in EHR: yes    Medication History Completed By: Betty Rodriguez [facility] 6/18/2023 1:32 PM    Prior to Admission medications    Medication Sig Last Dose Taking? Auth Provider Long Term End Date   apixaban ANTICOAGULANT (ELIQUIS) 5 MG tablet Take 2.5 mg by mouth 2 times daily Increase to 5 mg twice daily if tolerating 2.5 twice daily. 6/17/2023 at x2 2.5 mg bid Yes Unknown, Entered By History No    calcium-vitamin D (CALTRATE) 600-400 MG-UNIT per tablet Take 1 tablet by mouth daily 6/17/2023 at am Yes Unknown, Entered By History     cyanocobalamin 1000 MCG/ML injection Inject 1 mL as directed every 30 days Past Month at due in upcoming week Yes Unknown, Entered By History     gabapentin (NEURONTIN) 600 MG tablet Take 600 mg by mouth At Bedtime 6/17/2023 at hs Yes Unknown, Entered By History No    lactulose 20 GM/30ML SOLN Take 20 g by mouth 3 times daily as needed for constipation prn at prn Yes Aubrie Rolon MD     levothyroxine (SYNTHROID/LEVOTHROID) 25 MCG tablet Take 25 mcg by mouth every other day 6/17/2023 at am  Yes Unknown, Entered By History Yes    multivitamin with iron (CHROMAGEN) TABS half-tab Take 1 tablet by mouth daily 6/17/2023 at am Yes Unknown, Entered By History     nadolol (CORGARD) 20 MG tablet Take 20 mg by mouth every evening 6/17/2023 at pm Yes Unknown, Entered By History Yes    oxyCODONE (ROXICODONE) 5 MG tablet Take 5 mg by mouth every 6 hours as needed for severe pain prn at prn Yes Unknown, Entered By History     pantoprazole (PROTONIX) 40 MG EC tablet Take 40 mg by mouth daily 6/17/2023 at am Yes Unknown, Entered By History     pilocarpine (SALAGEN) 5 MG tablet Take 5 mg by mouth 3 times daily 6/17/2023 at x3 Yes Unknown, Entered By History     Rifaximin (XIFAXAN PO) Take 550 mg by mouth 2 times daily 6/17/2023 at x2 Yes Unknown, Entered By History     vitamin A 3 MG (59682 UNITS) capsule Take 30,000 Units by mouth daily 6/17/2023 at am Yes Unknown, Entered By History     Zolpidem Tartrate (AMBIEN PO) Take 10 mg by mouth nightly as needed prn at prn Yes Unknown, Entered By History

## 2023-06-18 NOTE — ED PROVIDER NOTES
History     Chief Complaint:  Altered Mental Status       HPI     Sulma Ramos is a 57 year old female presents with altered mental status.  Patient's  is present and the primary historian.  He reports that over the last week she has been progressively more fatigued and weak.  She is normally able to ambulate but is having difficulty standing unassisted let alone ambulate.  During that timeframe, she is also becoming increasing more confused.  She is typically oriented x3.  She has a history of hepatic encephalopathy. He reports she has been taking his lactulose that he is aware of and still having loose stool.  There has been no vomiting or fever that he is aware of.    She recently started on Eliquis for history of a portal vein thrombosis.  Otherwise medications have been unchanged.    Independent Historian:     as noted above    Review of External Notes:  Reviewed discharge summary from 5/26/2023 in which patient was admitted at [facility] for hepatic encephalopathy.  Encephalopathy was attributed to patient down titrating her lactulose    Medications:    apixaban ANTICOAGULANT (ELIQUIS) 5 MG tablet  calcium-vitamin D (CALTRATE) 600-400 MG-UNIT per tablet  cyanocobalamin 1000 MCG/ML injection  gabapentin (NEURONTIN) 600 MG tablet  lactulose 20 GM/30ML SOLN  levothyroxine (SYNTHROID/LEVOTHROID) 25 MCG tablet  multivitamin with iron (CHROMAGEN) TABS half-tab  nadolol (CORGARD) 20 MG tablet  oxyCODONE (ROXICODONE) 5 MG tablet  pantoprazole (PROTONIX) 40 MG EC tablet  pilocarpine (SALAGEN) 5 MG tablet  Rifaximin (XIFAXAN PO)  vitamin A 3 MG (48529 UNITS) capsule  Zolpidem Tartrate (AMBIEN PO)        Past Medical History:    Past Medical History:   Diagnosis Date     Anxiety state, unspecified      Polyphagia(783.6)        Past Surgical History:    Past Surgical History:   Procedure Laterality Date     CRANIOTOMY, EVACUATE HEMATOMA SUBDURAL, COMBINED Left 2/26/2023    Procedure: Left errol  holes, evacuation of hematoma subdural, combined;  Surgeon: Liz Mayo MD;  Location: [facility] OR     IR THORACENTESIS  3/4/2023     [facility] NONSPECIFIC PROCEDURE       x 2     [facility] NONSPECIFIC PROCEDURE                Physical Exam     Patient Vitals for the past 24 hrs:   BP Temp Temp src Pulse Resp SpO2   23 1339 -- -- -- 57 12 95 %   23 1324 -- -- -- 57 15 94 %   23 1309 -- -- -- 59 13 98 %   23 1242 -- -- -- 60 24 95 %   23 1227 101/57 -- -- 59 24 96 %   23 1212 100/73 -- -- 60 21 94 %   23 1157 97/64 -- -- 58 14 97 %   23 1148 -- -- -- 59 20 97 %   23 1147 -- -- -- 58 13 97 %   23 1146 -- -- -- 58 14 98 %   23 1145 104/68 -- -- 58 20 100 %   23 1144 -- -- -- 59 29 99 %   23 1143 -- -- -- 59 21 97 %   23 1142 -- -- -- 59 21 99 %   23 1141 -- -- -- 60 14 100 %   23 1139 -- -- -- 60 14 97 %   23 1134 110/71 -- -- 59 15 94 %   23 1130 104/66 -- -- -- -- --   23 1115 (!) 88/58 -- -- -- -- --   23 1105 (!) 88/61 -- -- -- -- --   23 0900 90/53 -- -- -- -- --   23 0800 99/58 -- -- -- -- --   23 0658 118/77 98.6  F (37  C) Oral 60 20 98 %        Physical Exam    HEENT:    Oropharynx is dry  Eyes:    Conjunctiva normal     PERRL  Neck:    Supple, no meningismus.     CV:     Regular rate and rhythm.      No murmurs, rubs or gallops.       No unilateral leg swelling.       2+ radial pulses bilateral.    PULM:    Clear to auscultation bilateral.       No respiratory distress.      Good air exchange.     No rales or wheezing.     No stridor.  ABD:    Soft, non-tender, mildly distended       No pulsatile masses.       No rebound, guarding or rigidity.  MSK:     No gross deformity to all four extremities.   LYMPH:   No cervical lymphadenopathy.  NEURO:   Somnolent     Oriented to self alone      Speech is clear      Strength is globally depressed yet symmetric      No tremor    Skin:    Warm, dry and intact.          Emergency Department Course   ECG  ECG results from 06/18/23   EKG 12-lead, tracing only     Value    Systolic Blood Pressure     Diastolic Blood Pressure     Ventricular Rate 59    Atrial Rate 59    ND Interval 144    QRS Duration 92        QTc 457    P Axis 54    R AXIS 43    T Axis 35    Interpretation ECG      Sinus bradycardia  Otherwise normal ECG  When compared with ECG of 24-MAY-2023 11:09,  Nonspecific T wave abnormality, improved in Inferior leads  Nonspecific T wave abnormality, improved in Anterior leads           Imaging:  CT Abdomen Pelvis w/o Contrast   Final Result   IMPRESSION: Within the limitation of noncontrast exam and the presence of motion artifacts, there is;   1.  3 mm right kidney stone. No left kidney stone. No ureteral stone or hydronephrosis in either kidney.   2.  Cirrhotic liver morphology with evidence of portal hypertension including splenomegaly, enlarged venous collaterals and moderate volume ascites.   3.  Partially visualized large right pleural effusion, with associated basilar atelectasis/consolidation.         XR Chest 1 View   Final Result   IMPRESSION: Moderate-sized right pleural effusion has developed with passive atelectasis in the right lung. Mild pulmonary venous hypertension has developed, suggesting congestive heart failure or fluid overload. Skin fold projects over the right    hemithorax.      Head CT w/o contrast   Final Result   IMPRESSION:       1. Slightly decreased size of the mixed density subdural collection overlying the left frontal convexity measuring up to 9 mm (previously 11 mm) with persistent mass effect on the subjacent brain parenchyma.        Report per radiology    Laboratory:  Labs Ordered and Resulted from Time of ED Arrival to Time of ED Departure   COMPREHENSIVE METABOLIC PANEL - Abnormal       Result Value    Sodium 134 (*)     Potassium 4.2      Chloride 105      Carbon Dioxide (CO2) 12 (*)      Anion Gap 17 (*)     Urea Nitrogen 88.3 (*)     Creatinine 9.23 (*)     Calcium 8.2 (*)     Glucose 106 (*)     Alkaline Phosphatase 172 (*)     AST 78 (*)     ALT 45      Protein Total 5.7 (*)     Albumin 2.3 (*)     Bilirubin Total 1.4 (*)     GFR Estimate 4 (*)    AMMONIA - Abnormal    Ammonia 116 (*)    BLOOD GAS VENOUS - Abnormal    pH Venous 7.24 (*)     pCO2 Venous 34 (*)     pO2 Venous 52 (*)     Bicarbonate Venous 15 (*)     Base Excess/Deficit (+/-) -11.9 (*)     FIO2 0     ROUTINE UA WITH MICROSCOPIC REFLEX TO CULTURE - Abnormal    Color Urine Yellow      Appearance Urine Clear      Glucose Urine Negative      Bilirubin Urine Negative      Ketones Urine Negative      Specific Gravity Urine 1.023      Blood Urine Negative      pH Urine 5.0      Protein Albumin Urine 30 (*)     Urobilinogen Urine Normal      Nitrite Urine Negative      Leukocyte Esterase Urine Moderate (*)     Mucus Urine Present (*)     RBC Urine 5 (*)     WBC Urine 42 (*)     Squamous Epithelials Urine 1      Hyaline Casts Urine 10 (*)    CBC WITH PLATELETS AND DIFFERENTIAL - Abnormal    WBC Count 13.4 (*)     RBC Count 2.51 (*)     Hemoglobin 8.4 (*)     Hematocrit 24.7 (*)     MCV 98      MCH 33.5 (*)     MCHC 34.0      RDW 19.8 (*)     Platelet Count 104 (*)     % Neutrophils 87      % Lymphocytes 6      % Monocytes 5      % Eosinophils 1      % Basophils 0      % Immature Granulocytes 1      NRBCs per 100 WBC 0      Absolute Neutrophils 11.6 (*)     Absolute Lymphocytes 0.8      Absolute Monocytes 0.7      Absolute Eosinophils 0.2      Absolute Basophils 0.0      Absolute Immature Granulocytes 0.2      Absolute NRBCs 0.0     GLUCOSE BY METER - Abnormal    GLUCOSE BY METER POCT 100 (*)    ETHYL ALCOHOL LEVEL - Normal    Alcohol ethyl <0.01     GLUCOSE MONITOR NURSING POCT   URINE CULTURE   BLOOD CULTURE   BLOOD CULTURE     Emergency Department Course & Assessments:        Interventions:  Medications   cefTRIAXone (ROCEPHIN) 2 g vial to  attach to  ml bag for ADULTS or NS 50 ml bag for PEDS (has no administration in time range)   albumin human 25 % injection 25 g (0 g Intravenous Stopped 23 1117)   lactulose (CHRONULAC) 200 g in sterile water (bottle) rectal enema (200 g Rectal $Given 23 1130)        Assessments:  1350 upon recheck patient is still somnolent but more alert.  She is oriented to self.  She continues to follow commands.    Independent Interpretation (X-rays, CTs, rhythm strip):  Independent review of chest x-ray reveals large right pleural effusion and pulmonary edema    Consultations/Discussion of Management or Tests:  [facility] staff    Social Determinants of Health affecting care:  None     Disposition:  The patient was discharged to home.     Impression & Plan        Medical Decision Makin-year-old female with cirrhosis and history of hepatic encephalopathy presents with a 1 week history of generalized weakness and increasing confusion.  Patient's ammonia level is 116 consistent with hepatic encephalopathy.  Patient is somnolent and I do not feel that she is safe to tolerate oral lactulose due to aspiration risk thus required lactulose enema.  Patient will clearly require admission for ongoing management of her hepatic encephalopathy.  It is unclear the exact source of the encephalopathy as  reports she has been taking her lactulose.  She does have an associated UTI.  Cultures pending.  Rocephin provided.    Patient also noted to have marked acute renal failure.  Creatinine is 9.  Clark catheter inserted without signs of obstruction.  CT scan of the abdomen performed without evidence of ureteral obstruction.  I believe this represents hepatorenal syndrome.  Unfortunately she is volume overloaded and has a large right pleural effusion and pulmonary edema.  Patient given albumin. She is not requiring supplemental oxygen or necessitating noninvasive ventilation.    Patient has a history of subdural  hematoma that was previously known and seen on CT scan during the last hospitalization in May.  Subdural hematoma now measuring 9 mm down from 11 mm.  No evidence of acute hemorrhage.    Patient has a pre-existing relationship with the gastroenterology team at [facility].   is present and is requesting transfer to [facility].  [facility] has been consulted and has graciously accepted the patient. We are currently awaiting bed placement under the care of Dr. Cruz.     Diagnosis:    ICD-10-CM    1. Hepatic encephalopathy (H)  K76.82       2. Hepatorenal syndrome (H)  K76.7       3. Pleural effusion  J90       4. Acute pulmonary edema (H)  J81.0       5. Chronic subdural hematoma (H)  I62.03       6. Urinary tract infection without hematuria, site unspecified  N39.0            Discharge Medications:  New Prescriptions    No medications on file          6/18/2023   Skyler Wray MD Matthews, Jeremiah R, MD  06/18/23 1408

## 2023-06-18 NOTE — ED TRIAGE NOTES
Pt arrives via EMS from home,  called due to pt increased confusion and change in mentation. Pt  reports to EMS that pt has had recent changes in the past couple of days including failure to thrive, pt does not want to get out of bed. Pt has hx of liver disease. Pt VSS, BS = 121. Pt alert to only to only self. EMS placed IV in left hand.

## 2023-06-18 NOTE — ED PROVIDER NOTES
This 57 year old female patient was under my care during my shift from 1445 to transfer on 6/18/23.  She is in the emergency department pending transfer to [place] for hepatic encephalopathy with UTI, NEFTALI (creatinine 9), and evidence of pulmonary edema. She received albumin and Rocephin as well as lactulose.  I did not meet with the patient nor did she require any interventions.      6555 I placed an order for PICC per RN request as we have had a hard time establishing good access.  We continue to await transfer to [place].    Patient transferred without issue. It does not appear PICC was placed prior to transfer.     Renea Kumar MD  06/18/23 [id number]

## 2023-06-19 LAB
ATRIAL RATE - MUSE: 59 BPM
BACTERIA UR CULT: NO GROWTH
DIASTOLIC BLOOD PRESSURE - MUSE: NORMAL MMHG
INTERPRETATION ECG - MUSE: NORMAL
P AXIS - MUSE: 54 DEGREES
PR INTERVAL - MUSE: 144 MS
QRS DURATION - MUSE: 92 MS
QT - MUSE: 462 MS
QTC - MUSE: 457 MS
R AXIS - MUSE: 43 DEGREES
SYSTOLIC BLOOD PRESSURE - MUSE: NORMAL MMHG
T AXIS - MUSE: 35 DEGREES
VENTRICULAR RATE- MUSE: 59 BPM

## 2023-06-23 LAB
BACTERIA BLD CULT: NO GROWTH
BACTERIA BLD CULT: NO GROWTH

## 2023-08-28 ENCOUNTER — LAB (OUTPATIENT)
Dept: LAB | Facility: CLINIC | Age: 58
End: 2023-08-28
Payer: COMMERCIAL

## 2023-08-28 DIAGNOSIS — Z79.899 IMMUNODEFICIENCY DUE TO DRUGS (H): ICD-10-CM

## 2023-08-28 DIAGNOSIS — Z79.899 ENCOUNTER FOR LONG-TERM (CURRENT) USE OF MEDICATIONS: ICD-10-CM

## 2023-08-28 DIAGNOSIS — Z94.4 LIVER REPLACED BY TRANSPLANT (H): Primary | ICD-10-CM

## 2023-08-28 DIAGNOSIS — D84.821 IMMUNODEFICIENCY DUE TO DRUGS (H): ICD-10-CM

## 2023-08-28 DIAGNOSIS — Z94.4 TRANSPLANTED LIVER (H): ICD-10-CM

## 2023-08-28 LAB
ALBUMIN SERPL BCG-MCNC: 4 G/DL (ref 3.5–5.2)
ALP SERPL-CCNC: 55 U/L (ref 35–104)
ALT SERPL W P-5'-P-CCNC: 19 U/L (ref 0–50)
ANION GAP SERPL CALCULATED.3IONS-SCNC: 15 MMOL/L (ref 7–15)
AST SERPL W P-5'-P-CCNC: 22 U/L (ref 0–45)
BASOPHILS # BLD AUTO: 0 10E3/UL (ref 0–0.2)
BASOPHILS NFR BLD AUTO: 0 %
BILIRUB DIRECT SERPL-MCNC: <0.2 MG/DL (ref 0–0.3)
BILIRUB SERPL-MCNC: 0.3 MG/DL
BUN SERPL-MCNC: 34.8 MG/DL (ref 8–23)
CALCIUM SERPL-MCNC: 10.1 MG/DL (ref 8.6–10)
CHLORIDE SERPL-SCNC: 114 MMOL/L (ref 98–107)
CREAT SERPL-MCNC: 1.51 MG/DL (ref 0.51–0.95)
DEPRECATED HCO3 PLAS-SCNC: 8 MMOL/L (ref 22–29)
EOSINOPHIL # BLD AUTO: 0.1 10E3/UL (ref 0–0.7)
EOSINOPHIL NFR BLD AUTO: 2 %
ERYTHROCYTE [DISTWIDTH] IN BLOOD BY AUTOMATED COUNT: 21.7 % (ref 10–15)
GFR SERPL CREATININE-BSD FRML MDRD: 39 ML/MIN/1.73M2
GLUCOSE SERPL-MCNC: 102 MG/DL (ref 70–99)
HCT VFR BLD AUTO: 21.8 % (ref 35–47)
HGB BLD-MCNC: 7.4 G/DL (ref 11.7–15.7)
IMM GRANULOCYTES # BLD: 0 10E3/UL
IMM GRANULOCYTES NFR BLD: 1 %
LYMPHOCYTES # BLD AUTO: 0.3 10E3/UL (ref 0.8–5.3)
LYMPHOCYTES NFR BLD AUTO: 7 %
MAGNESIUM SERPL-MCNC: 1.4 MG/DL (ref 1.7–2.3)
MCH RBC QN AUTO: 32.9 PG (ref 26.5–33)
MCHC RBC AUTO-ENTMCNC: 33.9 G/DL (ref 31.5–36.5)
MCV RBC AUTO: 97 FL (ref 78–100)
MONOCYTES # BLD AUTO: 0.4 10E3/UL (ref 0–1.3)
MONOCYTES NFR BLD AUTO: 11 %
NEUTROPHILS # BLD AUTO: 2.7 10E3/UL (ref 1.6–8.3)
NEUTROPHILS NFR BLD AUTO: 79 %
PLATELET # BLD AUTO: 192 10E3/UL (ref 150–450)
POTASSIUM SERPL-SCNC: 4.1 MMOL/L (ref 3.4–5.3)
PROT SERPL-MCNC: 6.3 G/DL (ref 6.4–8.3)
RBC # BLD AUTO: 2.25 10E6/UL (ref 3.8–5.2)
SODIUM SERPL-SCNC: 137 MMOL/L (ref 136–145)
WBC # BLD AUTO: 3.4 10E3/UL (ref 4–11)

## 2023-08-28 PROCEDURE — 36415 COLL VENOUS BLD VENIPUNCTURE: CPT | Performed by: INTERNAL MEDICINE

## 2023-08-28 PROCEDURE — 85025 COMPLETE CBC W/AUTO DIFF WBC: CPT

## 2023-08-28 PROCEDURE — 80053 COMPREHEN METABOLIC PANEL: CPT

## 2023-08-28 PROCEDURE — 36415 COLL VENOUS BLD VENIPUNCTURE: CPT

## 2023-08-28 PROCEDURE — 83735 ASSAY OF MAGNESIUM: CPT

## 2023-08-28 PROCEDURE — 82248 BILIRUBIN DIRECT: CPT

## 2023-08-28 PROCEDURE — 99000 SPECIMEN HANDLING OFFICE-LAB: CPT | Performed by: INTERNAL MEDICINE

## 2023-09-05 ENCOUNTER — LAB (OUTPATIENT)
Dept: LAB | Facility: CLINIC | Age: 58
End: 2023-09-05
Payer: COMMERCIAL

## 2023-09-05 DIAGNOSIS — Z94.4 LIVER REPLACED BY TRANSPLANT (H): Primary | ICD-10-CM

## 2023-09-05 DIAGNOSIS — Z79.899 IMMUNODEFICIENCY DUE TO DRUGS (H): ICD-10-CM

## 2023-09-05 DIAGNOSIS — Z79.899 ENCOUNTER FOR LONG-TERM (CURRENT) USE OF MEDICATIONS: ICD-10-CM

## 2023-09-05 DIAGNOSIS — Z94.4 TRANSPLANTED LIVER (H): ICD-10-CM

## 2023-09-05 DIAGNOSIS — D84.821 IMMUNODEFICIENCY DUE TO DRUGS (H): ICD-10-CM

## 2023-09-05 LAB
ALBUMIN SERPL BCG-MCNC: 4.3 G/DL (ref 3.5–5.2)
ALP SERPL-CCNC: 52 U/L (ref 35–104)
ALT SERPL W P-5'-P-CCNC: 20 U/L (ref 0–50)
ANION GAP SERPL CALCULATED.3IONS-SCNC: 13 MMOL/L (ref 7–15)
AST SERPL W P-5'-P-CCNC: 19 U/L (ref 0–45)
BASOPHILS # BLD AUTO: 0 10E3/UL (ref 0–0.2)
BASOPHILS NFR BLD AUTO: 0 %
BILIRUB DIRECT SERPL-MCNC: <0.2 MG/DL (ref 0–0.3)
BILIRUB SERPL-MCNC: 0.3 MG/DL
BUN SERPL-MCNC: 43.1 MG/DL (ref 8–23)
CALCIUM SERPL-MCNC: 10.4 MG/DL (ref 8.6–10)
CHLORIDE SERPL-SCNC: 113 MMOL/L (ref 98–107)
CREAT SERPL-MCNC: 1.46 MG/DL (ref 0.51–0.95)
DEPRECATED HCO3 PLAS-SCNC: 11 MMOL/L (ref 22–29)
EOSINOPHIL # BLD AUTO: 0.1 10E3/UL (ref 0–0.7)
EOSINOPHIL NFR BLD AUTO: 2 %
ERYTHROCYTE [DISTWIDTH] IN BLOOD BY AUTOMATED COUNT: 19.3 % (ref 10–15)
GFR SERPL CREATININE-BSD FRML MDRD: 41 ML/MIN/1.73M2
GLUCOSE SERPL-MCNC: 101 MG/DL (ref 70–99)
HCT VFR BLD AUTO: 22.8 % (ref 35–47)
HGB BLD-MCNC: 7.5 G/DL (ref 11.7–15.7)
IMM GRANULOCYTES # BLD: 0 10E3/UL
IMM GRANULOCYTES NFR BLD: 1 %
LYMPHOCYTES # BLD AUTO: 0.3 10E3/UL (ref 0.8–5.3)
LYMPHOCYTES NFR BLD AUTO: 7 %
MAGNESIUM SERPL-MCNC: 1.7 MG/DL (ref 1.7–2.3)
MCH RBC QN AUTO: 32.6 PG (ref 26.5–33)
MCHC RBC AUTO-ENTMCNC: 32.9 G/DL (ref 31.5–36.5)
MCV RBC AUTO: 99 FL (ref 78–100)
MONOCYTES # BLD AUTO: 0.5 10E3/UL (ref 0–1.3)
MONOCYTES NFR BLD AUTO: 12 %
NEUTROPHILS # BLD AUTO: 3.3 10E3/UL (ref 1.6–8.3)
NEUTROPHILS NFR BLD AUTO: 78 %
PLATELET # BLD AUTO: 162 10E3/UL (ref 150–450)
POTASSIUM SERPL-SCNC: 4 MMOL/L (ref 3.4–5.3)
PROT SERPL-MCNC: 6.5 G/DL (ref 6.4–8.3)
RBC # BLD AUTO: 2.3 10E6/UL (ref 3.8–5.2)
SODIUM SERPL-SCNC: 137 MMOL/L (ref 136–145)
WBC # BLD AUTO: 4.2 10E3/UL (ref 4–11)

## 2023-09-05 PROCEDURE — 83735 ASSAY OF MAGNESIUM: CPT

## 2023-09-05 PROCEDURE — 80053 COMPREHEN METABOLIC PANEL: CPT

## 2023-09-05 PROCEDURE — 85025 COMPLETE CBC W/AUTO DIFF WBC: CPT

## 2023-09-05 PROCEDURE — 36415 COLL VENOUS BLD VENIPUNCTURE: CPT

## 2023-09-05 PROCEDURE — 82248 BILIRUBIN DIRECT: CPT

## 2023-09-05 PROCEDURE — 99000 SPECIMEN HANDLING OFFICE-LAB: CPT

## 2023-09-11 ENCOUNTER — LAB (OUTPATIENT)
Dept: LAB | Facility: CLINIC | Age: 58
End: 2023-09-11
Payer: COMMERCIAL

## 2023-09-11 DIAGNOSIS — D84.821 IMMUNODEFICIENCY DUE TO DRUGS (H): ICD-10-CM

## 2023-09-11 DIAGNOSIS — Z79.899 IMMUNODEFICIENCY DUE TO DRUGS (H): ICD-10-CM

## 2023-09-11 DIAGNOSIS — Z79.899 ENCOUNTER FOR LONG-TERM (CURRENT) USE OF MEDICATIONS: ICD-10-CM

## 2023-09-11 DIAGNOSIS — Z94.4 LIVER REPLACED BY TRANSPLANT (H): Primary | ICD-10-CM

## 2023-09-11 DIAGNOSIS — Z94.4 TRANSPLANTED LIVER (H): ICD-10-CM

## 2023-09-11 LAB
ALBUMIN SERPL BCG-MCNC: 4 G/DL (ref 3.5–5.2)
ALP SERPL-CCNC: 50 U/L (ref 35–104)
ALT SERPL W P-5'-P-CCNC: 22 U/L (ref 0–50)
ANION GAP SERPL CALCULATED.3IONS-SCNC: 13 MMOL/L (ref 7–15)
AST SERPL W P-5'-P-CCNC: 19 U/L (ref 0–45)
BASOPHILS # BLD AUTO: 0 10E3/UL (ref 0–0.2)
BASOPHILS NFR BLD AUTO: 1 %
BILIRUB DIRECT SERPL-MCNC: <0.2 MG/DL (ref 0–0.3)
BILIRUB SERPL-MCNC: 0.2 MG/DL
BUN SERPL-MCNC: 30.4 MG/DL (ref 8–23)
CALCIUM SERPL-MCNC: 9.7 MG/DL (ref 8.6–10)
CHLORIDE SERPL-SCNC: 110 MMOL/L (ref 98–107)
CREAT SERPL-MCNC: 1.42 MG/DL (ref 0.51–0.95)
DEPRECATED HCO3 PLAS-SCNC: 12 MMOL/L (ref 22–29)
EGFRCR SERPLBLD CKD-EPI 2021: 42 ML/MIN/1.73M2
EOSINOPHIL # BLD AUTO: 0.1 10E3/UL (ref 0–0.7)
EOSINOPHIL NFR BLD AUTO: 3 %
ERYTHROCYTE [DISTWIDTH] IN BLOOD BY AUTOMATED COUNT: 18.1 % (ref 10–15)
GLUCOSE SERPL-MCNC: 99 MG/DL (ref 70–99)
HCT VFR BLD AUTO: 22.6 % (ref 35–47)
HGB BLD-MCNC: 7.4 G/DL (ref 11.7–15.7)
IMM GRANULOCYTES # BLD: 0.1 10E3/UL
IMM GRANULOCYTES NFR BLD: 1 %
LYMPHOCYTES # BLD AUTO: 0.3 10E3/UL (ref 0.8–5.3)
LYMPHOCYTES NFR BLD AUTO: 6 %
MAGNESIUM SERPL-MCNC: 1.8 MG/DL (ref 1.7–2.3)
MCH RBC QN AUTO: 32.6 PG (ref 26.5–33)
MCHC RBC AUTO-ENTMCNC: 32.7 G/DL (ref 31.5–36.5)
MCV RBC AUTO: 100 FL (ref 78–100)
MONOCYTES # BLD AUTO: 0.4 10E3/UL (ref 0–1.3)
MONOCYTES NFR BLD AUTO: 10 %
NEUTROPHILS # BLD AUTO: 3.4 10E3/UL (ref 1.6–8.3)
NEUTROPHILS NFR BLD AUTO: 79 %
PLATELET # BLD AUTO: 194 10E3/UL (ref 150–450)
POTASSIUM SERPL-SCNC: 4.4 MMOL/L (ref 3.4–5.3)
PROT SERPL-MCNC: 6.3 G/DL (ref 6.4–8.3)
RBC # BLD AUTO: 2.27 10E6/UL (ref 3.8–5.2)
SODIUM SERPL-SCNC: 135 MMOL/L (ref 136–145)
WBC # BLD AUTO: 4.3 10E3/UL (ref 4–11)

## 2023-09-11 PROCEDURE — 36415 COLL VENOUS BLD VENIPUNCTURE: CPT | Performed by: INTERNAL MEDICINE

## 2023-09-11 PROCEDURE — 36415 COLL VENOUS BLD VENIPUNCTURE: CPT

## 2023-09-11 PROCEDURE — 99000 SPECIMEN HANDLING OFFICE-LAB: CPT | Performed by: INTERNAL MEDICINE

## 2023-09-11 PROCEDURE — 83735 ASSAY OF MAGNESIUM: CPT

## 2023-09-11 PROCEDURE — 85025 COMPLETE CBC W/AUTO DIFF WBC: CPT

## 2023-09-11 PROCEDURE — 82248 BILIRUBIN DIRECT: CPT

## 2023-09-11 PROCEDURE — 80053 COMPREHEN METABOLIC PANEL: CPT

## 2023-09-18 ENCOUNTER — LAB (OUTPATIENT)
Dept: LAB | Facility: CLINIC | Age: 58
End: 2023-09-18
Payer: COMMERCIAL

## 2023-09-18 DIAGNOSIS — Z79.899 ENCOUNTER FOR LONG-TERM (CURRENT) USE OF MEDICATIONS: ICD-10-CM

## 2023-09-18 DIAGNOSIS — D84.821 IMMUNODEFICIENCY DUE TO DRUGS (H): ICD-10-CM

## 2023-09-18 DIAGNOSIS — Z79.899 IMMUNODEFICIENCY DUE TO DRUGS (H): ICD-10-CM

## 2023-09-18 DIAGNOSIS — Z94.4 LIVER REPLACED BY TRANSPLANT (H): Primary | ICD-10-CM

## 2023-09-18 DIAGNOSIS — R19.7 DIARRHEA: ICD-10-CM

## 2023-09-18 DIAGNOSIS — D84.9 IMMUNODEFICIENCY (H): ICD-10-CM

## 2023-09-18 DIAGNOSIS — Z94.4 TRANSPLANTED LIVER (H): ICD-10-CM

## 2023-09-18 LAB
ALBUMIN SERPL BCG-MCNC: 3.9 G/DL (ref 3.5–5.2)
ALP SERPL-CCNC: 44 U/L (ref 35–104)
ALT SERPL W P-5'-P-CCNC: 17 U/L (ref 0–50)
ANION GAP SERPL CALCULATED.3IONS-SCNC: 9 MMOL/L (ref 7–15)
AST SERPL W P-5'-P-CCNC: 18 U/L (ref 0–45)
BASOPHILS # BLD AUTO: 0 10E3/UL (ref 0–0.2)
BASOPHILS NFR BLD AUTO: 1 %
BILIRUB DIRECT SERPL-MCNC: <0.2 MG/DL (ref 0–0.3)
BILIRUB SERPL-MCNC: 0.2 MG/DL
BUN SERPL-MCNC: 30.8 MG/DL (ref 8–23)
C DIFF TOX B STL QL: NEGATIVE
CALCIUM SERPL-MCNC: 9.2 MG/DL (ref 8.6–10)
CHLORIDE SERPL-SCNC: 113 MMOL/L (ref 98–107)
CREAT SERPL-MCNC: 1.39 MG/DL (ref 0.51–0.95)
DEPRECATED HCO3 PLAS-SCNC: 13 MMOL/L (ref 22–29)
EGFRCR SERPLBLD CKD-EPI 2021: 43 ML/MIN/1.73M2
EOSINOPHIL # BLD AUTO: 0.1 10E3/UL (ref 0–0.7)
EOSINOPHIL NFR BLD AUTO: 3 %
ERYTHROCYTE [DISTWIDTH] IN BLOOD BY AUTOMATED COUNT: 17.4 % (ref 10–15)
GLUCOSE SERPL-MCNC: 116 MG/DL (ref 70–99)
HCT VFR BLD AUTO: 22 % (ref 35–47)
HGB BLD-MCNC: 7.2 G/DL (ref 11.7–15.7)
IMM GRANULOCYTES # BLD: 0.2 10E3/UL
IMM GRANULOCYTES NFR BLD: 4 %
LYMPHOCYTES # BLD AUTO: 0.3 10E3/UL (ref 0.8–5.3)
LYMPHOCYTES NFR BLD AUTO: 5 %
MAGNESIUM SERPL-MCNC: 1.8 MG/DL (ref 1.7–2.3)
MCH RBC QN AUTO: 32.7 PG (ref 26.5–33)
MCHC RBC AUTO-ENTMCNC: 32.7 G/DL (ref 31.5–36.5)
MCV RBC AUTO: 100 FL (ref 78–100)
MONOCYTES # BLD AUTO: 0.4 10E3/UL (ref 0–1.3)
MONOCYTES NFR BLD AUTO: 8 %
NEUTROPHILS # BLD AUTO: 3.7 10E3/UL (ref 1.6–8.3)
NEUTROPHILS NFR BLD AUTO: 80 %
PLATELET # BLD AUTO: 230 10E3/UL (ref 150–450)
POTASSIUM SERPL-SCNC: 3.8 MMOL/L (ref 3.4–5.3)
PROT SERPL-MCNC: 6.1 G/DL (ref 6.4–8.3)
RBC # BLD AUTO: 2.2 10E6/UL (ref 3.8–5.2)
SODIUM SERPL-SCNC: 135 MMOL/L (ref 136–145)
WBC # BLD AUTO: 4.7 10E3/UL (ref 4–11)

## 2023-09-18 PROCEDURE — 83735 ASSAY OF MAGNESIUM: CPT

## 2023-09-18 PROCEDURE — 80053 COMPREHEN METABOLIC PANEL: CPT

## 2023-09-18 PROCEDURE — 36415 COLL VENOUS BLD VENIPUNCTURE: CPT

## 2023-09-18 PROCEDURE — 87507 IADNA-DNA/RNA PROBE TQ 12-25: CPT

## 2023-09-18 PROCEDURE — 99000 SPECIMEN HANDLING OFFICE-LAB: CPT

## 2023-09-18 PROCEDURE — 87493 C DIFF AMPLIFIED PROBE: CPT | Mod: 59

## 2023-09-18 PROCEDURE — 85025 COMPLETE CBC W/AUTO DIFF WBC: CPT

## 2023-09-18 PROCEDURE — 82248 BILIRUBIN DIRECT: CPT

## 2023-09-19 LAB
ADV 40+41 DNA STL QL NAA+NON-PROBE: NEGATIVE
ASTRO TYP 1-8 RNA STL QL NAA+NON-PROBE: NEGATIVE
C CAYETANENSIS DNA STL QL NAA+NON-PROBE: NEGATIVE
CAMPYLOBACTER DNA SPEC NAA+PROBE: NEGATIVE
CRYPTOSP DNA STL QL NAA+NON-PROBE: NEGATIVE
E COLI O157 DNA STL QL NAA+NON-PROBE: NORMAL
E HISTOLYT DNA STL QL NAA+NON-PROBE: NEGATIVE
EAEC ASTA GENE ISLT QL NAA+PROBE: NEGATIVE
EC STX1+STX2 GENES STL QL NAA+NON-PROBE: NEGATIVE
EPEC EAE GENE STL QL NAA+NON-PROBE: NEGATIVE
ETEC LTA+ST1A+ST1B TOX ST NAA+NON-PROBE: NEGATIVE
G LAMBLIA DNA STL QL NAA+NON-PROBE: NEGATIVE
NOROVIRUS GI+II RNA STL QL NAA+NON-PROBE: NEGATIVE
P SHIGELLOIDES DNA STL QL NAA+NON-PROBE: NEGATIVE
RVA RNA STL QL NAA+NON-PROBE: NEGATIVE
SALMONELLA SP RPOD STL QL NAA+PROBE: NEGATIVE
SAPO I+II+IV+V RNA STL QL NAA+NON-PROBE: NEGATIVE
SHIGELLA SP+EIEC IPAH ST NAA+NON-PROBE: NEGATIVE
V CHOLERAE DNA SPEC QL NAA+PROBE: NEGATIVE
VIBRIO DNA SPEC NAA+PROBE: NEGATIVE
Y ENTEROCOL DNA STL QL NAA+PROBE: NEGATIVE

## 2023-09-25 ENCOUNTER — LAB (OUTPATIENT)
Dept: LAB | Facility: CLINIC | Age: 58
End: 2023-09-25
Payer: COMMERCIAL

## 2023-09-25 DIAGNOSIS — Z79.899 ENCOUNTER FOR LONG-TERM (CURRENT) USE OF MEDICATIONS: ICD-10-CM

## 2023-09-25 DIAGNOSIS — Z94.4 TRANSPLANTED LIVER (H): Primary | ICD-10-CM

## 2023-09-25 DIAGNOSIS — Z79.899 IMMUNODEFICIENCY DUE TO DRUGS (H): ICD-10-CM

## 2023-09-25 DIAGNOSIS — D84.821 IMMUNODEFICIENCY DUE TO DRUGS (H): ICD-10-CM

## 2023-09-25 LAB
ALBUMIN SERPL BCG-MCNC: 3.9 G/DL (ref 3.5–5.2)
ALP SERPL-CCNC: 40 U/L (ref 35–104)
ALT SERPL W P-5'-P-CCNC: 21 U/L (ref 0–50)
ANION GAP SERPL CALCULATED.3IONS-SCNC: 10 MMOL/L (ref 7–15)
AST SERPL W P-5'-P-CCNC: 18 U/L (ref 0–45)
BASOPHILS # BLD AUTO: 0 10E3/UL (ref 0–0.2)
BASOPHILS NFR BLD AUTO: 1 %
BILIRUB DIRECT SERPL-MCNC: <0.2 MG/DL (ref 0–0.3)
BILIRUB SERPL-MCNC: 0.2 MG/DL
BUN SERPL-MCNC: 22.7 MG/DL (ref 8–23)
CALCIUM SERPL-MCNC: 8.9 MG/DL (ref 8.6–10)
CHLORIDE SERPL-SCNC: 112 MMOL/L (ref 98–107)
CREAT SERPL-MCNC: 1.2 MG/DL (ref 0.51–0.95)
DEPRECATED HCO3 PLAS-SCNC: 16 MMOL/L (ref 22–29)
EGFRCR SERPLBLD CKD-EPI 2021: 52 ML/MIN/1.73M2
EOSINOPHIL # BLD AUTO: 0.1 10E3/UL (ref 0–0.7)
EOSINOPHIL NFR BLD AUTO: 1 %
ERYTHROCYTE [DISTWIDTH] IN BLOOD BY AUTOMATED COUNT: 16.9 % (ref 10–15)
FERRITIN SERPL-MCNC: 839 NG/ML (ref 11–328)
GLUCOSE SERPL-MCNC: 108 MG/DL (ref 70–99)
HCT VFR BLD AUTO: 22.8 % (ref 35–47)
HGB BLD-MCNC: 7.2 G/DL (ref 11.7–15.7)
IMM GRANULOCYTES # BLD: 0.1 10E3/UL
IMM GRANULOCYTES NFR BLD: 2 %
IRON BINDING CAPACITY (ROCHE): 212 UG/DL (ref 240–430)
IRON SATN MFR SERPL: 62 % (ref 15–46)
IRON SERPL-MCNC: 131 UG/DL (ref 37–145)
LYMPHOCYTES # BLD AUTO: 0.2 10E3/UL (ref 0.8–5.3)
LYMPHOCYTES NFR BLD AUTO: 5 %
MAGNESIUM SERPL-MCNC: 1.9 MG/DL (ref 1.7–2.3)
MCH RBC QN AUTO: 32.7 PG (ref 26.5–33)
MCHC RBC AUTO-ENTMCNC: 31.6 G/DL (ref 31.5–36.5)
MCV RBC AUTO: 104 FL (ref 78–100)
MONOCYTES # BLD AUTO: 0.3 10E3/UL (ref 0–1.3)
MONOCYTES NFR BLD AUTO: 7 %
NEUTROPHILS # BLD AUTO: 3.6 10E3/UL (ref 1.6–8.3)
NEUTROPHILS NFR BLD AUTO: 85 %
PLATELET # BLD AUTO: 204 10E3/UL (ref 150–450)
POTASSIUM SERPL-SCNC: 3.6 MMOL/L (ref 3.4–5.3)
PROT SERPL-MCNC: 5.8 G/DL (ref 6.4–8.3)
RBC # BLD AUTO: 2.2 10E6/UL (ref 3.8–5.2)
SODIUM SERPL-SCNC: 138 MMOL/L (ref 136–145)
WBC # BLD AUTO: 4.2 10E3/UL (ref 4–11)

## 2023-09-25 PROCEDURE — 36415 COLL VENOUS BLD VENIPUNCTURE: CPT | Performed by: INTERNAL MEDICINE

## 2023-09-25 PROCEDURE — 83735 ASSAY OF MAGNESIUM: CPT

## 2023-09-25 PROCEDURE — 83540 ASSAY OF IRON: CPT

## 2023-09-25 PROCEDURE — 83550 IRON BINDING TEST: CPT

## 2023-09-25 PROCEDURE — 99000 SPECIMEN HANDLING OFFICE-LAB: CPT | Performed by: INTERNAL MEDICINE

## 2023-09-25 PROCEDURE — 36415 COLL VENOUS BLD VENIPUNCTURE: CPT

## 2023-09-25 PROCEDURE — 80053 COMPREHEN METABOLIC PANEL: CPT

## 2023-09-25 PROCEDURE — 85025 COMPLETE CBC W/AUTO DIFF WBC: CPT

## 2023-09-25 PROCEDURE — 82248 BILIRUBIN DIRECT: CPT

## 2023-09-25 PROCEDURE — 82728 ASSAY OF FERRITIN: CPT

## 2023-10-02 ENCOUNTER — LAB (OUTPATIENT)
Dept: LAB | Facility: CLINIC | Age: 58
End: 2023-10-02
Payer: COMMERCIAL

## 2023-10-02 DIAGNOSIS — Z94.4 TRANSPLANTED LIVER (H): ICD-10-CM

## 2023-10-02 DIAGNOSIS — Z79.899 IMMUNODEFICIENCY DUE TO DRUGS (H): ICD-10-CM

## 2023-10-02 DIAGNOSIS — Z79.899 ENCOUNTER FOR LONG-TERM (CURRENT) USE OF MEDICATIONS: ICD-10-CM

## 2023-10-02 DIAGNOSIS — D84.821 IMMUNODEFICIENCY DUE TO DRUGS (H): ICD-10-CM

## 2023-10-02 DIAGNOSIS — Z94.4 LIVER REPLACED BY TRANSPLANT (H): Primary | ICD-10-CM

## 2023-10-02 LAB
ALBUMIN SERPL BCG-MCNC: 3.5 G/DL (ref 3.5–5.2)
ALP SERPL-CCNC: 33 U/L (ref 35–104)
ALT SERPL W P-5'-P-CCNC: 23 U/L (ref 0–50)
ANION GAP SERPL CALCULATED.3IONS-SCNC: 11 MMOL/L (ref 7–15)
AST SERPL W P-5'-P-CCNC: 21 U/L (ref 0–45)
BASO+EOS+MONOS # BLD AUTO: ABNORMAL 10*3/UL
BASO+EOS+MONOS NFR BLD AUTO: ABNORMAL %
BASOPHILS # BLD AUTO: 0 10E3/UL (ref 0–0.2)
BASOPHILS NFR BLD AUTO: 1 %
BILIRUB DIRECT SERPL-MCNC: <0.2 MG/DL (ref 0–0.3)
BILIRUB SERPL-MCNC: 0.2 MG/DL
BUN SERPL-MCNC: 18.9 MG/DL (ref 8–23)
CALCIUM SERPL-MCNC: 8.7 MG/DL (ref 8.6–10)
CHLORIDE SERPL-SCNC: 112 MMOL/L (ref 98–107)
CREAT SERPL-MCNC: 1.08 MG/DL (ref 0.51–0.95)
DEPRECATED HCO3 PLAS-SCNC: 16 MMOL/L (ref 22–29)
EGFRCR SERPLBLD CKD-EPI 2021: 59 ML/MIN/1.73M2
EOSINOPHIL # BLD AUTO: 0.1 10E3/UL (ref 0–0.7)
EOSINOPHIL NFR BLD AUTO: 4 %
ERYTHROCYTE [DISTWIDTH] IN BLOOD BY AUTOMATED COUNT: 17.1 % (ref 10–15)
GLUCOSE SERPL-MCNC: 101 MG/DL (ref 70–99)
HCT VFR BLD AUTO: 27.3 % (ref 35–47)
HGB BLD-MCNC: 8.3 G/DL (ref 11.7–15.7)
IMM GRANULOCYTES # BLD: 0 10E3/UL
IMM GRANULOCYTES NFR BLD: 1 %
LYMPHOCYTES # BLD AUTO: 0.2 10E3/UL (ref 0.8–5.3)
LYMPHOCYTES NFR BLD AUTO: 11 %
MAGNESIUM SERPL-MCNC: 2 MG/DL (ref 1.7–2.3)
MCH RBC QN AUTO: 31.4 PG (ref 26.5–33)
MCHC RBC AUTO-ENTMCNC: 30.4 G/DL (ref 31.5–36.5)
MCV RBC AUTO: 103 FL (ref 78–100)
MONOCYTES # BLD AUTO: 0.3 10E3/UL (ref 0–1.3)
MONOCYTES NFR BLD AUTO: 14 %
NEUTROPHILS # BLD AUTO: 1.6 10E3/UL (ref 1.6–8.3)
NEUTROPHILS NFR BLD AUTO: 69 %
NRBC # BLD AUTO: 0 10E3/UL
NRBC BLD AUTO-RTO: 0 /100
PLATELET # BLD AUTO: 155 10E3/UL (ref 150–450)
POTASSIUM SERPL-SCNC: 4.2 MMOL/L (ref 3.4–5.3)
PROT SERPL-MCNC: 5.4 G/DL (ref 6.4–8.3)
RBC # BLD AUTO: 2.64 10E6/UL (ref 3.8–5.2)
SODIUM SERPL-SCNC: 139 MMOL/L (ref 135–145)
WBC # BLD AUTO: 2.3 10E3/UL (ref 4–11)

## 2023-10-02 PROCEDURE — 99000 SPECIMEN HANDLING OFFICE-LAB: CPT | Performed by: INTERNAL MEDICINE

## 2023-10-02 PROCEDURE — 85025 COMPLETE CBC W/AUTO DIFF WBC: CPT

## 2023-10-02 PROCEDURE — 36415 COLL VENOUS BLD VENIPUNCTURE: CPT

## 2023-10-02 PROCEDURE — 83735 ASSAY OF MAGNESIUM: CPT

## 2023-10-02 PROCEDURE — 80053 COMPREHEN METABOLIC PANEL: CPT

## 2023-10-02 PROCEDURE — 36415 COLL VENOUS BLD VENIPUNCTURE: CPT | Performed by: INTERNAL MEDICINE

## 2023-10-02 PROCEDURE — 82248 BILIRUBIN DIRECT: CPT

## 2023-10-09 ENCOUNTER — LAB (OUTPATIENT)
Dept: LAB | Facility: CLINIC | Age: 58
End: 2023-10-09
Payer: COMMERCIAL

## 2023-10-09 DIAGNOSIS — Z79.899 ENCOUNTER FOR LONG-TERM (CURRENT) USE OF MEDICATIONS: ICD-10-CM

## 2023-10-09 DIAGNOSIS — E03.9 HYPOTHYROIDISM: Primary | ICD-10-CM

## 2023-10-09 DIAGNOSIS — Z94.4 TRANSPLANTED LIVER (H): ICD-10-CM

## 2023-10-09 DIAGNOSIS — Z79.899 IMMUNODEFICIENCY DUE TO DRUGS (H): ICD-10-CM

## 2023-10-09 DIAGNOSIS — D84.821 IMMUNODEFICIENCY DUE TO DRUGS (H): ICD-10-CM

## 2023-10-09 DIAGNOSIS — Z94.4 LIVER REPLACED BY TRANSPLANT (H): ICD-10-CM

## 2023-10-09 LAB
ALBUMIN SERPL BCG-MCNC: 3.8 G/DL (ref 3.5–5.2)
ALP SERPL-CCNC: 34 U/L (ref 35–104)
ALT SERPL W P-5'-P-CCNC: 25 U/L (ref 0–50)
ANION GAP SERPL CALCULATED.3IONS-SCNC: 10 MMOL/L (ref 7–15)
AST SERPL W P-5'-P-CCNC: 22 U/L (ref 0–45)
BASO+EOS+MONOS # BLD AUTO: ABNORMAL 10*3/UL
BASO+EOS+MONOS NFR BLD AUTO: ABNORMAL %
BASOPHILS # BLD AUTO: 0 10E3/UL (ref 0–0.2)
BASOPHILS NFR BLD AUTO: 1 %
BILIRUB DIRECT SERPL-MCNC: <0.2 MG/DL (ref 0–0.3)
BILIRUB SERPL-MCNC: 0.2 MG/DL
BUN SERPL-MCNC: 21.3 MG/DL (ref 8–23)
CALCIUM SERPL-MCNC: 8.8 MG/DL (ref 8.6–10)
CHLORIDE SERPL-SCNC: 112 MMOL/L (ref 98–107)
CREAT SERPL-MCNC: 1.11 MG/DL (ref 0.51–0.95)
DEPRECATED HCO3 PLAS-SCNC: 18 MMOL/L (ref 22–29)
EGFRCR SERPLBLD CKD-EPI 2021: 57 ML/MIN/1.73M2
EOSINOPHIL # BLD AUTO: 0.1 10E3/UL (ref 0–0.7)
EOSINOPHIL NFR BLD AUTO: 6 %
ERYTHROCYTE [DISTWIDTH] IN BLOOD BY AUTOMATED COUNT: 14.8 % (ref 10–15)
GLUCOSE SERPL-MCNC: 94 MG/DL (ref 70–99)
HCT VFR BLD AUTO: 28.3 % (ref 35–47)
HGB BLD-MCNC: 8.6 G/DL (ref 11.7–15.7)
IMM GRANULOCYTES # BLD: 0.1 10E3/UL
IMM GRANULOCYTES NFR BLD: 3 %
LYMPHOCYTES # BLD AUTO: 0.2 10E3/UL (ref 0.8–5.3)
LYMPHOCYTES NFR BLD AUTO: 14 %
MAGNESIUM SERPL-MCNC: 1.8 MG/DL (ref 1.7–2.3)
MCH RBC QN AUTO: 31.3 PG (ref 26.5–33)
MCHC RBC AUTO-ENTMCNC: 30.4 G/DL (ref 31.5–36.5)
MCV RBC AUTO: 103 FL (ref 78–100)
MONOCYTES # BLD AUTO: 0.3 10E3/UL (ref 0–1.3)
MONOCYTES NFR BLD AUTO: 19 %
NEUTROPHILS # BLD AUTO: 0.9 10E3/UL (ref 1.6–8.3)
NEUTROPHILS NFR BLD AUTO: 57 %
NRBC # BLD AUTO: 0 10E3/UL
NRBC BLD AUTO-RTO: 0 /100
PLATELET # BLD AUTO: 134 10E3/UL (ref 150–450)
POTASSIUM SERPL-SCNC: 3.7 MMOL/L (ref 3.4–5.3)
PROT SERPL-MCNC: 5.7 G/DL (ref 6.4–8.3)
RBC # BLD AUTO: 2.75 10E6/UL (ref 3.8–5.2)
SODIUM SERPL-SCNC: 140 MMOL/L (ref 135–145)
T4 FREE SERPL-MCNC: 0.81 NG/DL (ref 0.9–1.7)
TSH SERPL DL<=0.005 MIU/L-ACNC: 6.78 UIU/ML (ref 0.3–4.2)
WBC # BLD AUTO: 1.6 10E3/UL (ref 4–11)

## 2023-10-09 PROCEDURE — 83735 ASSAY OF MAGNESIUM: CPT

## 2023-10-09 PROCEDURE — 80053 COMPREHEN METABOLIC PANEL: CPT

## 2023-10-09 PROCEDURE — 99000 SPECIMEN HANDLING OFFICE-LAB: CPT

## 2023-10-09 PROCEDURE — 82248 BILIRUBIN DIRECT: CPT

## 2023-10-09 PROCEDURE — 85025 COMPLETE CBC W/AUTO DIFF WBC: CPT

## 2023-10-09 PROCEDURE — 84439 ASSAY OF FREE THYROXINE: CPT

## 2023-10-09 PROCEDURE — 36415 COLL VENOUS BLD VENIPUNCTURE: CPT

## 2023-10-09 PROCEDURE — 84443 ASSAY THYROID STIM HORMONE: CPT

## 2023-10-16 ENCOUNTER — LAB (OUTPATIENT)
Dept: LAB | Facility: CLINIC | Age: 58
End: 2023-10-16
Payer: COMMERCIAL

## 2023-10-16 DIAGNOSIS — Z94.4 TRANSPLANTED LIVER (H): ICD-10-CM

## 2023-10-16 DIAGNOSIS — D84.821 IMMUNODEFICIENCY DUE TO DRUGS (H): ICD-10-CM

## 2023-10-16 DIAGNOSIS — Z79.899 IMMUNODEFICIENCY DUE TO DRUGS (H): ICD-10-CM

## 2023-10-16 DIAGNOSIS — Z79.899 ENCOUNTER FOR LONG-TERM (CURRENT) USE OF MEDICATIONS: ICD-10-CM

## 2023-10-16 LAB
ACANTHOCYTES BLD QL SMEAR: ABNORMAL
ALBUMIN SERPL BCG-MCNC: 3.7 G/DL (ref 3.5–5.2)
ALP SERPL-CCNC: 39 U/L (ref 35–104)
ALT SERPL W P-5'-P-CCNC: 24 U/L (ref 0–50)
ANION GAP SERPL CALCULATED.3IONS-SCNC: 13 MMOL/L (ref 7–15)
AST SERPL W P-5'-P-CCNC: 18 U/L (ref 0–45)
AUER BODIES BLD QL SMEAR: ABNORMAL
BASO STIPL BLD QL SMEAR: ABNORMAL
BASO+EOS+MONOS # BLD AUTO: ABNORMAL 10*3/UL
BASO+EOS+MONOS NFR BLD AUTO: ABNORMAL %
BASOPHILS # BLD AUTO: 0 10E3/UL (ref 0–0.2)
BASOPHILS NFR BLD AUTO: 1 %
BILIRUB DIRECT SERPL-MCNC: <0.2 MG/DL (ref 0–0.3)
BILIRUB SERPL-MCNC: 0.2 MG/DL
BITE CELLS BLD QL SMEAR: ABNORMAL
BLISTER CELLS BLD QL SMEAR: ABNORMAL
BUN SERPL-MCNC: 20.6 MG/DL (ref 8–23)
BURR CELLS BLD QL SMEAR: ABNORMAL
CALCIUM SERPL-MCNC: 9 MG/DL (ref 8.6–10)
CHLORIDE SERPL-SCNC: 109 MMOL/L (ref 98–107)
CREAT SERPL-MCNC: 1.22 MG/DL (ref 0.51–0.95)
DACRYOCYTES BLD QL SMEAR: SLIGHT
DEPRECATED HCO3 PLAS-SCNC: 18 MMOL/L (ref 22–29)
EGFRCR SERPLBLD CKD-EPI 2021: 51 ML/MIN/1.73M2
ELLIPTOCYTES BLD QL SMEAR: ABNORMAL
EOSINOPHIL # BLD AUTO: 0.1 10E3/UL (ref 0–0.7)
EOSINOPHIL NFR BLD AUTO: 4 %
ERYTHROCYTE [DISTWIDTH] IN BLOOD BY AUTOMATED COUNT: 13.9 % (ref 10–15)
FRAGMENTS BLD QL SMEAR: ABNORMAL
GLUCOSE SERPL-MCNC: 95 MG/DL (ref 70–99)
HCT VFR BLD AUTO: 27.2 % (ref 35–47)
HGB BLD-MCNC: 8.5 G/DL (ref 11.7–15.7)
HGB C CRYSTALS: ABNORMAL
HOWELL-JOLLY BOD BLD QL SMEAR: ABNORMAL
IMM GRANULOCYTES # BLD: 0 10E3/UL
IMM GRANULOCYTES NFR BLD: 2 %
LYMPHOCYTES # BLD AUTO: 0.1 10E3/UL (ref 0.8–5.3)
LYMPHOCYTES NFR BLD AUTO: 11 %
MAGNESIUM SERPL-MCNC: 2 MG/DL (ref 1.7–2.3)
MCH RBC QN AUTO: 31.8 PG (ref 26.5–33)
MCHC RBC AUTO-ENTMCNC: 31.3 G/DL (ref 31.5–36.5)
MCV RBC AUTO: 102 FL (ref 78–100)
MONOCYTES # BLD AUTO: 0.3 10E3/UL (ref 0–1.3)
MONOCYTES NFR BLD AUTO: 23 %
NEUTROPHILS # BLD AUTO: 0.7 10E3/UL (ref 1.6–8.3)
NEUTROPHILS NFR BLD AUTO: 59 %
NEUTS HYPERSEG BLD QL SMEAR: ABNORMAL
NRBC # BLD AUTO: 0 10E3/UL
NRBC BLD AUTO-RTO: 0 /100
PLAT MORPH BLD: ABNORMAL
PLATELET # BLD AUTO: 121 10E3/UL (ref 150–450)
POLYCHROMASIA BLD QL SMEAR: ABNORMAL
POTASSIUM SERPL-SCNC: 4.1 MMOL/L (ref 3.4–5.3)
PROT SERPL-MCNC: 5.8 G/DL (ref 6.4–8.3)
RBC # BLD AUTO: 2.67 10E6/UL (ref 3.8–5.2)
RBC AGGLUT BLD QL: ABNORMAL
RBC MORPH BLD: ABNORMAL
ROULEAUX BLD QL SMEAR: ABNORMAL
SICKLE CELLS BLD QL SMEAR: ABNORMAL
SMUDGE CELLS BLD QL SMEAR: ABNORMAL
SODIUM SERPL-SCNC: 140 MMOL/L (ref 135–145)
SPHEROCYTES BLD QL SMEAR: ABNORMAL
STOMATOCYTES BLD QL SMEAR: ABNORMAL
TARGETS BLD QL SMEAR: ABNORMAL
TOXIC GRANULES BLD QL SMEAR: ABNORMAL
VARIANT LYMPHS BLD QL SMEAR: ABNORMAL
WBC # BLD AUTO: 1.2 10E3/UL (ref 4–11)

## 2023-10-16 PROCEDURE — 99000 SPECIMEN HANDLING OFFICE-LAB: CPT

## 2023-10-16 PROCEDURE — 83735 ASSAY OF MAGNESIUM: CPT

## 2023-10-16 PROCEDURE — 82248 BILIRUBIN DIRECT: CPT

## 2023-10-16 PROCEDURE — 85025 COMPLETE CBC W/AUTO DIFF WBC: CPT

## 2023-10-16 PROCEDURE — 36415 COLL VENOUS BLD VENIPUNCTURE: CPT

## 2023-10-16 PROCEDURE — 80053 COMPREHEN METABOLIC PANEL: CPT

## 2023-10-23 ENCOUNTER — LAB (OUTPATIENT)
Dept: LAB | Facility: CLINIC | Age: 58
End: 2023-10-23
Payer: COMMERCIAL

## 2023-10-23 DIAGNOSIS — Z94.4 LIVER REPLACED BY TRANSPLANT (H): Primary | ICD-10-CM

## 2023-10-23 DIAGNOSIS — Z94.4 TRANSPLANTED LIVER (H): ICD-10-CM

## 2023-10-23 DIAGNOSIS — D84.821 IMMUNODEFICIENCY DUE TO DRUGS (H): ICD-10-CM

## 2023-10-23 DIAGNOSIS — Z79.899 IMMUNODEFICIENCY DUE TO DRUGS (H): ICD-10-CM

## 2023-10-23 DIAGNOSIS — Z79.899 ENCOUNTER FOR LONG-TERM (CURRENT) USE OF MEDICATIONS: ICD-10-CM

## 2023-10-23 LAB
ALBUMIN SERPL BCG-MCNC: 4.3 G/DL (ref 3.5–5.2)
ALP SERPL-CCNC: 48 U/L (ref 35–104)
ALT SERPL W P-5'-P-CCNC: 41 U/L (ref 0–50)
ANION GAP SERPL CALCULATED.3IONS-SCNC: 11 MMOL/L (ref 7–15)
AST SERPL W P-5'-P-CCNC: 27 U/L (ref 0–45)
BASOPHILS # BLD AUTO: ABNORMAL 10*3/UL
BASOPHILS # BLD MANUAL: 0 10E3/UL (ref 0–0.2)
BASOPHILS NFR BLD AUTO: ABNORMAL %
BASOPHILS NFR BLD MANUAL: 1 %
BILIRUB DIRECT SERPL-MCNC: <0.2 MG/DL (ref 0–0.3)
BILIRUB SERPL-MCNC: 0.2 MG/DL
BUN SERPL-MCNC: 30 MG/DL (ref 8–23)
CALCIUM SERPL-MCNC: 9.5 MG/DL (ref 8.6–10)
CHLORIDE SERPL-SCNC: 112 MMOL/L (ref 98–107)
CREAT SERPL-MCNC: 1.37 MG/DL (ref 0.51–0.95)
DEPRECATED HCO3 PLAS-SCNC: 16 MMOL/L (ref 22–29)
EGFRCR SERPLBLD CKD-EPI 2021: 44 ML/MIN/1.73M2
EOSINOPHIL # BLD AUTO: ABNORMAL 10*3/UL
EOSINOPHIL # BLD MANUAL: 0.1 10E3/UL (ref 0–0.7)
EOSINOPHIL NFR BLD AUTO: ABNORMAL %
EOSINOPHIL NFR BLD MANUAL: 5 %
ERYTHROCYTE [DISTWIDTH] IN BLOOD BY AUTOMATED COUNT: 13.5 % (ref 10–15)
GLUCOSE SERPL-MCNC: 87 MG/DL (ref 70–99)
HCT VFR BLD AUTO: 31.3 % (ref 35–47)
HGB BLD-MCNC: 9.3 G/DL (ref 11.7–15.7)
IMM GRANULOCYTES # BLD: ABNORMAL 10*3/UL
IMM GRANULOCYTES NFR BLD: ABNORMAL %
LYMPHOCYTES # BLD AUTO: ABNORMAL 10*3/UL
LYMPHOCYTES # BLD MANUAL: 0.3 10E3/UL (ref 0.8–5.3)
LYMPHOCYTES NFR BLD AUTO: ABNORMAL %
LYMPHOCYTES NFR BLD MANUAL: 12 %
MAGNESIUM SERPL-MCNC: 2 MG/DL (ref 1.7–2.3)
MCH RBC QN AUTO: 30.9 PG (ref 26.5–33)
MCHC RBC AUTO-ENTMCNC: 29.7 G/DL (ref 31.5–36.5)
MCV RBC AUTO: 104 FL (ref 78–100)
METAMYELOCYTES # BLD MANUAL: 0 10E3/UL
METAMYELOCYTES NFR BLD MANUAL: 2 %
MONOCYTES # BLD AUTO: ABNORMAL 10*3/UL
MONOCYTES # BLD MANUAL: 0.3 10E3/UL (ref 0–1.3)
MONOCYTES NFR BLD AUTO: ABNORMAL %
MONOCYTES NFR BLD MANUAL: 16 %
NEUTROPHILS # BLD AUTO: ABNORMAL 10*3/UL
NEUTROPHILS # BLD MANUAL: 1.3 10E3/UL (ref 1.6–8.3)
NEUTROPHILS NFR BLD AUTO: ABNORMAL %
NEUTROPHILS NFR BLD MANUAL: 64 %
NRBC # BLD AUTO: 0 10E3/UL
NRBC BLD AUTO-RTO: 0 /100
PLAT MORPH BLD: ABNORMAL
PLATELET # BLD AUTO: 138 10E3/UL (ref 150–450)
POTASSIUM SERPL-SCNC: 4.8 MMOL/L (ref 3.4–5.3)
PROT SERPL-MCNC: 6.3 G/DL (ref 6.4–8.3)
RBC # BLD AUTO: 3.01 10E6/UL (ref 3.8–5.2)
RBC MORPH BLD: ABNORMAL
SODIUM SERPL-SCNC: 139 MMOL/L (ref 135–145)
WBC # BLD AUTO: 2.1 10E3/UL (ref 4–11)

## 2023-10-23 PROCEDURE — 99000 SPECIMEN HANDLING OFFICE-LAB: CPT

## 2023-10-23 PROCEDURE — 85007 BL SMEAR W/DIFF WBC COUNT: CPT

## 2023-10-23 PROCEDURE — 80053 COMPREHEN METABOLIC PANEL: CPT

## 2023-10-23 PROCEDURE — 36415 COLL VENOUS BLD VENIPUNCTURE: CPT

## 2023-10-23 PROCEDURE — 83735 ASSAY OF MAGNESIUM: CPT

## 2023-10-23 PROCEDURE — 85027 COMPLETE CBC AUTOMATED: CPT

## 2023-10-23 PROCEDURE — 82248 BILIRUBIN DIRECT: CPT

## 2023-10-24 NOTE — ED NOTES
Report given to [facility]. Questions asked and answered. Nurse states that they are ready for the pt.   Mohs Method Verbiage: An incision at a 45 degree angle following the standard Mohs approach was done and the specimen was harvested as a microscopic controlled layer.

## 2023-10-30 ENCOUNTER — LAB (OUTPATIENT)
Dept: LAB | Facility: CLINIC | Age: 58
End: 2023-10-30
Payer: COMMERCIAL

## 2023-10-30 DIAGNOSIS — Z79.899 ENCOUNTER FOR LONG-TERM (CURRENT) USE OF MEDICATIONS: ICD-10-CM

## 2023-10-30 DIAGNOSIS — D84.821 IMMUNODEFICIENCY DUE TO DRUGS (H): ICD-10-CM

## 2023-10-30 DIAGNOSIS — Z94.4 LIVER REPLACED BY TRANSPLANT (H): Primary | ICD-10-CM

## 2023-10-30 DIAGNOSIS — Z79.899 IMMUNODEFICIENCY DUE TO DRUGS (H): ICD-10-CM

## 2023-10-30 DIAGNOSIS — Z94.4 TRANSPLANTED LIVER (H): ICD-10-CM

## 2023-10-30 LAB
ALBUMIN SERPL BCG-MCNC: 4.1 G/DL (ref 3.5–5.2)
ALP SERPL-CCNC: 46 U/L (ref 35–104)
ALT SERPL W P-5'-P-CCNC: 37 U/L (ref 0–50)
ANION GAP SERPL CALCULATED.3IONS-SCNC: 11 MMOL/L (ref 7–15)
AST SERPL W P-5'-P-CCNC: 26 U/L (ref 0–45)
BASOPHILS # BLD AUTO: ABNORMAL 10*3/UL
BASOPHILS # BLD MANUAL: 0 10E3/UL (ref 0–0.2)
BASOPHILS NFR BLD AUTO: ABNORMAL %
BASOPHILS NFR BLD MANUAL: 0 %
BILIRUB DIRECT SERPL-MCNC: <0.2 MG/DL (ref 0–0.3)
BILIRUB SERPL-MCNC: 0.2 MG/DL
BUN SERPL-MCNC: 31.1 MG/DL (ref 8–23)
CALCIUM SERPL-MCNC: 9.3 MG/DL (ref 8.6–10)
CHLORIDE SERPL-SCNC: 111 MMOL/L (ref 98–107)
CREAT SERPL-MCNC: 1.34 MG/DL (ref 0.51–0.95)
DEPRECATED HCO3 PLAS-SCNC: 18 MMOL/L (ref 22–29)
EGFRCR SERPLBLD CKD-EPI 2021: 45 ML/MIN/1.73M2
EOSINOPHIL # BLD AUTO: ABNORMAL 10*3/UL
EOSINOPHIL # BLD MANUAL: 0.2 10E3/UL (ref 0–0.7)
EOSINOPHIL NFR BLD AUTO: ABNORMAL %
EOSINOPHIL NFR BLD MANUAL: 7 %
ERYTHROCYTE [DISTWIDTH] IN BLOOD BY AUTOMATED COUNT: 13.2 % (ref 10–15)
GLUCOSE SERPL-MCNC: 100 MG/DL (ref 70–99)
HCT VFR BLD AUTO: 29.9 % (ref 35–47)
HGB BLD-MCNC: 9.2 G/DL (ref 11.7–15.7)
IMM GRANULOCYTES # BLD: ABNORMAL 10*3/UL
IMM GRANULOCYTES NFR BLD: ABNORMAL %
LYMPHOCYTES # BLD AUTO: ABNORMAL 10*3/UL
LYMPHOCYTES # BLD MANUAL: 0.3 10E3/UL (ref 0.8–5.3)
LYMPHOCYTES NFR BLD AUTO: ABNORMAL %
LYMPHOCYTES NFR BLD MANUAL: 13 %
MAGNESIUM SERPL-MCNC: 2 MG/DL (ref 1.7–2.3)
MCH RBC QN AUTO: 31.1 PG (ref 26.5–33)
MCHC RBC AUTO-ENTMCNC: 30.8 G/DL (ref 31.5–36.5)
MCV RBC AUTO: 101 FL (ref 78–100)
METAMYELOCYTES # BLD MANUAL: 0 10E3/UL
METAMYELOCYTES NFR BLD MANUAL: 1 %
MONOCYTES # BLD AUTO: ABNORMAL 10*3/UL
MONOCYTES # BLD MANUAL: 0.3 10E3/UL (ref 0–1.3)
MONOCYTES NFR BLD AUTO: ABNORMAL %
MONOCYTES NFR BLD MANUAL: 14 %
NEUTROPHILS # BLD AUTO: ABNORMAL 10*3/UL
NEUTROPHILS # BLD MANUAL: 1.5 10E3/UL (ref 1.6–8.3)
NEUTROPHILS NFR BLD AUTO: ABNORMAL %
NEUTROPHILS NFR BLD MANUAL: 65 %
NRBC # BLD AUTO: 0 10E3/UL
NRBC BLD AUTO-RTO: 0 /100
PLAT MORPH BLD: ABNORMAL
PLATELET # BLD AUTO: 145 10E3/UL (ref 150–450)
POTASSIUM SERPL-SCNC: 4.6 MMOL/L (ref 3.4–5.3)
PROT SERPL-MCNC: 6.3 G/DL (ref 6.4–8.3)
RBC # BLD AUTO: 2.96 10E6/UL (ref 3.8–5.2)
RBC MORPH BLD: ABNORMAL
SODIUM SERPL-SCNC: 140 MMOL/L (ref 135–145)
WBC # BLD AUTO: 2.3 10E3/UL (ref 4–11)

## 2023-10-30 PROCEDURE — 36415 COLL VENOUS BLD VENIPUNCTURE: CPT

## 2023-10-30 PROCEDURE — 83735 ASSAY OF MAGNESIUM: CPT

## 2023-10-30 PROCEDURE — 82248 BILIRUBIN DIRECT: CPT

## 2023-10-30 PROCEDURE — 80053 COMPREHEN METABOLIC PANEL: CPT

## 2023-10-30 PROCEDURE — 85007 BL SMEAR W/DIFF WBC COUNT: CPT

## 2023-10-30 PROCEDURE — 85027 COMPLETE CBC AUTOMATED: CPT

## 2023-10-30 PROCEDURE — 99000 SPECIMEN HANDLING OFFICE-LAB: CPT

## 2023-11-20 ENCOUNTER — LAB (OUTPATIENT)
Dept: LAB | Facility: CLINIC | Age: 58
End: 2023-11-20
Payer: COMMERCIAL

## 2023-11-20 DIAGNOSIS — Z94.4 TRANSPLANTED LIVER (H): Primary | ICD-10-CM

## 2023-11-20 DIAGNOSIS — D84.821 IMMUNODEFICIENCY DUE TO DRUGS (H): ICD-10-CM

## 2023-11-20 DIAGNOSIS — Z79.899 ENCOUNTER FOR LONG-TERM (CURRENT) USE OF MEDICATIONS: ICD-10-CM

## 2023-11-20 DIAGNOSIS — Z79.899 IMMUNODEFICIENCY DUE TO DRUGS (H): ICD-10-CM

## 2023-11-20 LAB
ALBUMIN SERPL BCG-MCNC: 4 G/DL (ref 3.5–5.2)
ALP SERPL-CCNC: 45 U/L (ref 40–150)
ALT SERPL W P-5'-P-CCNC: 42 U/L (ref 0–50)
ANION GAP SERPL CALCULATED.3IONS-SCNC: 11 MMOL/L (ref 7–15)
AST SERPL W P-5'-P-CCNC: 34 U/L (ref 0–45)
BASOPHILS # BLD AUTO: ABNORMAL 10*3/UL
BASOPHILS # BLD MANUAL: 0 10E3/UL (ref 0–0.2)
BASOPHILS NFR BLD AUTO: ABNORMAL %
BASOPHILS NFR BLD MANUAL: 1 %
BILIRUB DIRECT SERPL-MCNC: <0.2 MG/DL (ref 0–0.3)
BILIRUB SERPL-MCNC: <0.2 MG/DL
BUN SERPL-MCNC: 28.8 MG/DL (ref 8–23)
CALCIUM SERPL-MCNC: 9.1 MG/DL (ref 8.8–10.2)
CHLORIDE SERPL-SCNC: 112 MMOL/L (ref 98–107)
CREAT SERPL-MCNC: 1.34 MG/DL (ref 0.51–0.95)
DEPRECATED HCO3 PLAS-SCNC: 17 MMOL/L (ref 22–29)
EGFRCR SERPLBLD CKD-EPI 2021: 45 ML/MIN/1.73M2
EOSINOPHIL # BLD AUTO: ABNORMAL 10*3/UL
EOSINOPHIL # BLD MANUAL: 0.1 10E3/UL (ref 0–0.7)
EOSINOPHIL NFR BLD AUTO: ABNORMAL %
EOSINOPHIL NFR BLD MANUAL: 2 %
ERYTHROCYTE [DISTWIDTH] IN BLOOD BY AUTOMATED COUNT: 13.1 % (ref 10–15)
GLUCOSE SERPL-MCNC: 91 MG/DL (ref 70–99)
HCT VFR BLD AUTO: 28.4 % (ref 35–47)
HGB BLD-MCNC: 9 G/DL (ref 11.7–15.7)
IMM GRANULOCYTES # BLD: ABNORMAL 10*3/UL
IMM GRANULOCYTES NFR BLD: ABNORMAL %
LYMPHOCYTES # BLD AUTO: ABNORMAL 10*3/UL
LYMPHOCYTES # BLD MANUAL: 0.4 10E3/UL (ref 0.8–5.3)
LYMPHOCYTES NFR BLD AUTO: ABNORMAL %
LYMPHOCYTES NFR BLD MANUAL: 12 %
MAGNESIUM SERPL-MCNC: 1.9 MG/DL (ref 1.7–2.3)
MCH RBC QN AUTO: 31 PG (ref 26.5–33)
MCHC RBC AUTO-ENTMCNC: 31.7 G/DL (ref 31.5–36.5)
MCV RBC AUTO: 98 FL (ref 78–100)
MONOCYTES # BLD AUTO: ABNORMAL 10*3/UL
MONOCYTES # BLD MANUAL: 0.4 10E3/UL (ref 0–1.3)
MONOCYTES NFR BLD AUTO: ABNORMAL %
MONOCYTES NFR BLD MANUAL: 12 %
NEUTROPHILS # BLD AUTO: ABNORMAL 10*3/UL
NEUTROPHILS # BLD MANUAL: 2.6 10E3/UL (ref 1.6–8.3)
NEUTROPHILS NFR BLD AUTO: ABNORMAL %
NEUTROPHILS NFR BLD MANUAL: 73 %
NRBC # BLD AUTO: 0 10E3/UL
NRBC BLD AUTO-RTO: 0 /100
PLAT MORPH BLD: ABNORMAL
PLATELET # BLD AUTO: 163 10E3/UL (ref 150–450)
POTASSIUM SERPL-SCNC: 4.6 MMOL/L (ref 3.4–5.3)
PROT SERPL-MCNC: 6 G/DL (ref 6.4–8.3)
RBC # BLD AUTO: 2.9 10E6/UL (ref 3.8–5.2)
RBC MORPH BLD: ABNORMAL
SODIUM SERPL-SCNC: 140 MMOL/L (ref 135–145)
WBC # BLD AUTO: 3.5 10E3/UL (ref 4–11)

## 2023-11-20 PROCEDURE — 83735 ASSAY OF MAGNESIUM: CPT

## 2023-11-20 PROCEDURE — 80053 COMPREHEN METABOLIC PANEL: CPT

## 2023-11-20 PROCEDURE — 99000 SPECIMEN HANDLING OFFICE-LAB: CPT

## 2023-11-20 PROCEDURE — 82248 BILIRUBIN DIRECT: CPT

## 2023-11-20 PROCEDURE — 36415 COLL VENOUS BLD VENIPUNCTURE: CPT

## 2023-11-20 PROCEDURE — 85007 BL SMEAR W/DIFF WBC COUNT: CPT

## 2023-11-20 PROCEDURE — 85027 COMPLETE CBC AUTOMATED: CPT

## 2023-12-04 ENCOUNTER — LAB (OUTPATIENT)
Dept: LAB | Facility: CLINIC | Age: 58
End: 2023-12-04
Payer: COMMERCIAL

## 2023-12-04 DIAGNOSIS — Z94.4 LIVER REPLACED BY TRANSPLANT (H): Primary | ICD-10-CM

## 2023-12-04 DIAGNOSIS — Z94.4 TRANSPLANTED LIVER (H): ICD-10-CM

## 2023-12-04 DIAGNOSIS — D84.821 IMMUNODEFICIENCY DUE TO DRUGS (H): ICD-10-CM

## 2023-12-04 DIAGNOSIS — Z79.899 ENCOUNTER FOR LONG-TERM (CURRENT) USE OF MEDICATIONS: ICD-10-CM

## 2023-12-04 DIAGNOSIS — Z79.899 IMMUNODEFICIENCY DUE TO DRUGS (H): ICD-10-CM

## 2023-12-04 LAB
ALBUMIN SERPL BCG-MCNC: 4 G/DL (ref 3.5–5.2)
ALP SERPL-CCNC: 52 U/L (ref 40–150)
ALT SERPL W P-5'-P-CCNC: 36 U/L (ref 0–50)
ANION GAP SERPL CALCULATED.3IONS-SCNC: 9 MMOL/L (ref 7–15)
AST SERPL W P-5'-P-CCNC: 23 U/L (ref 0–45)
BASOPHILS # BLD AUTO: ABNORMAL 10*3/UL
BASOPHILS # BLD MANUAL: 0 10E3/UL (ref 0–0.2)
BASOPHILS NFR BLD AUTO: ABNORMAL %
BASOPHILS NFR BLD MANUAL: 0 %
BILIRUB DIRECT SERPL-MCNC: <0.2 MG/DL (ref 0–0.3)
BILIRUB SERPL-MCNC: 0.2 MG/DL
BUN SERPL-MCNC: 18.4 MG/DL (ref 8–23)
CALCIUM SERPL-MCNC: 9.2 MG/DL (ref 8.8–10.2)
CHLORIDE SERPL-SCNC: 110 MMOL/L (ref 98–107)
CREAT SERPL-MCNC: 1.11 MG/DL (ref 0.51–0.95)
DACRYOCYTES BLD QL SMEAR: SLIGHT
DEPRECATED HCO3 PLAS-SCNC: 24 MMOL/L (ref 22–29)
EGFRCR SERPLBLD CKD-EPI 2021: 57 ML/MIN/1.73M2
ELLIPTOCYTES BLD QL SMEAR: SLIGHT
EOSINOPHIL # BLD AUTO: ABNORMAL 10*3/UL
EOSINOPHIL # BLD MANUAL: 0.1 10E3/UL (ref 0–0.7)
EOSINOPHIL NFR BLD AUTO: ABNORMAL %
EOSINOPHIL NFR BLD MANUAL: 3 %
ERYTHROCYTE [DISTWIDTH] IN BLOOD BY AUTOMATED COUNT: 13.6 % (ref 10–15)
GLUCOSE SERPL-MCNC: 86 MG/DL (ref 70–99)
HCT VFR BLD AUTO: 29.7 % (ref 35–47)
HGB BLD-MCNC: 9.4 G/DL (ref 11.7–15.7)
IMM GRANULOCYTES # BLD: ABNORMAL 10*3/UL
IMM GRANULOCYTES NFR BLD: ABNORMAL %
LYMPHOCYTES # BLD AUTO: ABNORMAL 10*3/UL
LYMPHOCYTES # BLD MANUAL: 0.2 10E3/UL (ref 0.8–5.3)
LYMPHOCYTES NFR BLD AUTO: ABNORMAL %
LYMPHOCYTES NFR BLD MANUAL: 13 %
MAGNESIUM SERPL-MCNC: 2 MG/DL (ref 1.7–2.3)
MCH RBC QN AUTO: 31.3 PG (ref 26.5–33)
MCHC RBC AUTO-ENTMCNC: 31.6 G/DL (ref 31.5–36.5)
MCV RBC AUTO: 99 FL (ref 78–100)
MONOCYTES # BLD AUTO: ABNORMAL 10*3/UL
MONOCYTES # BLD MANUAL: 0.3 10E3/UL (ref 0–1.3)
MONOCYTES NFR BLD AUTO: ABNORMAL %
MONOCYTES NFR BLD MANUAL: 17 %
NEUTROPHILS # BLD AUTO: ABNORMAL 10*3/UL
NEUTROPHILS # BLD MANUAL: 1.2 10E3/UL (ref 1.6–8.3)
NEUTROPHILS NFR BLD AUTO: ABNORMAL %
NEUTROPHILS NFR BLD MANUAL: 67 %
NRBC # BLD AUTO: 0 10E3/UL
NRBC BLD AUTO-RTO: 0 /100
PLAT MORPH BLD: ABNORMAL
PLATELET # BLD AUTO: 116 10E3/UL (ref 150–450)
POTASSIUM SERPL-SCNC: 4.7 MMOL/L (ref 3.4–5.3)
PROT SERPL-MCNC: 6.2 G/DL (ref 6.4–8.3)
RBC # BLD AUTO: 3 10E6/UL (ref 3.8–5.2)
RBC MORPH BLD: ABNORMAL
SODIUM SERPL-SCNC: 143 MMOL/L (ref 135–145)
TACROLIMUS BLD-MCNC: 6 UG/L (ref 5–15)
TME LAST DOSE: NORMAL H
TME LAST DOSE: NORMAL H
WBC # BLD AUTO: 1.8 10E3/UL (ref 4–11)

## 2023-12-04 PROCEDURE — 36415 COLL VENOUS BLD VENIPUNCTURE: CPT

## 2023-12-04 PROCEDURE — 80053 COMPREHEN METABOLIC PANEL: CPT

## 2023-12-04 PROCEDURE — 99000 SPECIMEN HANDLING OFFICE-LAB: CPT | Performed by: INTERNAL MEDICINE

## 2023-12-04 PROCEDURE — 85007 BL SMEAR W/DIFF WBC COUNT: CPT

## 2023-12-04 PROCEDURE — 36415 COLL VENOUS BLD VENIPUNCTURE: CPT | Performed by: INTERNAL MEDICINE

## 2023-12-04 PROCEDURE — 83735 ASSAY OF MAGNESIUM: CPT

## 2023-12-04 PROCEDURE — 80197 ASSAY OF TACROLIMUS: CPT

## 2023-12-04 PROCEDURE — 85027 COMPLETE CBC AUTOMATED: CPT

## 2023-12-04 PROCEDURE — 82248 BILIRUBIN DIRECT: CPT

## 2023-12-05 ENCOUNTER — APPOINTMENT (OUTPATIENT)
Dept: URBAN - METROPOLITAN AREA CLINIC 253 | Age: 58
Setting detail: DERMATOLOGY
End: 2023-12-14

## 2023-12-05 VITALS — HEIGHT: 63 IN | WEIGHT: 121 LBS | RESPIRATION RATE: 14 BRPM

## 2023-12-05 DIAGNOSIS — L65.0 TELOGEN EFFLUVIUM: ICD-10-CM

## 2023-12-05 DIAGNOSIS — D69.2 OTHER NONTHROMBOCYTOPENIC PURPURA: ICD-10-CM

## 2023-12-05 PROCEDURE — OTHER PRESCRIPTION: OTHER

## 2023-12-05 PROCEDURE — OTHER MIPS QUALITY: OTHER

## 2023-12-05 PROCEDURE — OTHER COUNSELING: OTHER

## 2023-12-05 PROCEDURE — OTHER ADDITIONAL NOTES: OTHER

## 2023-12-05 PROCEDURE — 99213 OFFICE O/P EST LOW 20 MIN: CPT

## 2023-12-05 RX ORDER — MINOXIDIL 5 %
5% SOLUTION, NON-ORAL TOPICAL BID
Qty: 180 | Refills: 3 | Status: ERX | COMMUNITY
Start: 2023-12-05

## 2023-12-05 ASSESSMENT — LOCATION ZONE DERM
LOCATION ZONE: SCALP
LOCATION ZONE: ARM

## 2023-12-05 ASSESSMENT — LOCATION SIMPLE DESCRIPTION DERM
LOCATION SIMPLE: POSTERIOR SCALP
LOCATION SIMPLE: RIGHT FOREARM
LOCATION SIMPLE: LEFT FOREARM

## 2023-12-05 ASSESSMENT — LOCATION DETAILED DESCRIPTION DERM
LOCATION DETAILED: LEFT DISTAL DORSAL FOREARM
LOCATION DETAILED: POSTERIOR MID-PARIETAL SCALP
LOCATION DETAILED: RIGHT DISTAL DORSAL FOREARM

## 2023-12-05 NOTE — PROCEDURE: ADDITIONAL NOTES
Render Risk Assessment In Note?: no
Detail Level: Simple
Additional Notes: Discussed that this may be related to her liver transplant and new anti rejection drugs. For management pineapple can help as well as arnica gel.
Additional Notes: I recommended the patient try topical minoxidil, RX sent today.

## 2023-12-05 NOTE — HPI: HAIR LOSS
How Did The Hair Loss Occur?: sudden in onset
How Severe Is Your Hair Loss?: moderate
Additional History: The patient underwent liver transplant over the summer.

## 2023-12-14 ENCOUNTER — APPOINTMENT (OUTPATIENT)
Dept: URBAN - METROPOLITAN AREA CLINIC 253 | Age: 58
Setting detail: DERMATOLOGY
End: 2023-12-18

## 2023-12-14 VITALS — HEIGHT: 63 IN | WEIGHT: 125 LBS | RESPIRATION RATE: 14 BRPM

## 2023-12-14 DIAGNOSIS — L82.1 OTHER SEBORRHEIC KERATOSIS: ICD-10-CM

## 2023-12-14 DIAGNOSIS — D18.0 HEMANGIOMA: ICD-10-CM

## 2023-12-14 DIAGNOSIS — L81.4 OTHER MELANIN HYPERPIGMENTATION: ICD-10-CM

## 2023-12-14 DIAGNOSIS — D49.2 NEOPLASM OF UNSPECIFIED BEHAVIOR OF BONE, SOFT TISSUE, AND SKIN: ICD-10-CM

## 2023-12-14 DIAGNOSIS — L72.0 EPIDERMAL CYST: ICD-10-CM

## 2023-12-14 DIAGNOSIS — D22 MELANOCYTIC NEVI: ICD-10-CM

## 2023-12-14 DIAGNOSIS — Z71.89 OTHER SPECIFIED COUNSELING: ICD-10-CM

## 2023-12-14 PROBLEM — D22.9 MELANOCYTIC NEVI, UNSPECIFIED: Status: ACTIVE | Noted: 2023-12-14

## 2023-12-14 PROBLEM — D18.01 HEMANGIOMA OF SKIN AND SUBCUTANEOUS TISSUE: Status: ACTIVE | Noted: 2023-12-14

## 2023-12-14 PROCEDURE — OTHER PHOTO-DOCUMENTATION: OTHER

## 2023-12-14 PROCEDURE — OTHER COUNSELING: OTHER

## 2023-12-14 PROCEDURE — 11102 TANGNTL BX SKIN SINGLE LES: CPT

## 2023-12-14 PROCEDURE — OTHER BIOPSY BY SHAVE METHOD: OTHER

## 2023-12-14 PROCEDURE — 99213 OFFICE O/P EST LOW 20 MIN: CPT | Mod: 25

## 2023-12-14 ASSESSMENT — LOCATION ZONE DERM
LOCATION ZONE: FACE
LOCATION ZONE: TRUNK

## 2023-12-14 ASSESSMENT — LOCATION SIMPLE DESCRIPTION DERM
LOCATION SIMPLE: ABDOMEN
LOCATION SIMPLE: LEFT TEMPLE

## 2023-12-14 ASSESSMENT — LOCATION DETAILED DESCRIPTION DERM
LOCATION DETAILED: PERIUMBILICAL SKIN
LOCATION DETAILED: LEFT INFERIOR TEMPLE

## 2023-12-14 NOTE — PROCEDURE: COUNSELING
Detail Level: Zone
Sunscreen Recommendations: Elta MD UV Clear 46
Detail Level: Detailed
Detail Level: Simple

## 2023-12-14 NOTE — PROCEDURE: BIOPSY BY SHAVE METHOD
Body Location Override (Optional - Billing Will Still Be Based On Selected Body Map Location If Applicable): mid superior umbilicus
Detail Level: Detailed
Depth Of Biopsy: dermis
Was A Bandage Applied: Yes
Size Of Lesion In Cm: 0
Biopsy Type: H and E
Biopsy Method: Dermablade
Anesthesia Type: 2% lidocaine with epinephrine and a 1:10 solution of 8.4% sodium bicarbonate
Anesthesia Volume In Cc: 0.2
Hemostasis: Drysol
Wound Care: Petrolatum
Dressing: bandage
Destruction After The Procedure: No
Type Of Destruction Used: Curettage
Curettage Text: The wound bed was treated with curettage after the biopsy was performed.
Cryotherapy Text: The wound bed was treated with cryotherapy after the biopsy was performed.
Electrodesiccation Text: The wound bed was treated with electrodesiccation after the biopsy was performed.
Electrodesiccation And Curettage Text: The wound bed was treated with electrodesiccation and curettage after the biopsy was performed.
Silver Nitrate Text: The wound bed was treated with silver nitrate after the biopsy was performed.
Lab: -[id number]
Consent: Written consent was obtained and risks were reviewed including but not limited to scarring, infection, bleeding, scabbing, incomplete removal, nerve damage and allergy to anesthesia.
Post-Care Instructions: I reviewed with the patient in detail post-care instructions. Patient is to keep the biopsy site dry overnight, and then apply bacitracin twice daily until healed. Patient may apply hydrogen peroxide soaks to remove any crusting.
Notification Instructions: Patient will be notified of biopsy results. However, patient instructed to call the office if not contacted within 2 weeks.
Billing Type: Third-Party Bill
Information: Selecting Yes will display possible errors in your note based on the variables you have selected. This validation is only offered as a suggestion for you. PLEASE NOTE THAT THE VALIDATION TEXT WILL BE REMOVED WHEN YOU FINALIZE YOUR NOTE. IF YOU WANT TO FAX A PRELIMINARY NOTE YOU WILL NEED TO TOGGLE THIS TO 'NO' IF YOU DO NOT WANT IT IN YOUR FAXED NOTE.

## 2023-12-18 ENCOUNTER — LAB (OUTPATIENT)
Dept: LAB | Facility: CLINIC | Age: 58
End: 2023-12-18
Payer: COMMERCIAL

## 2023-12-18 DIAGNOSIS — Z79.899 IMMUNODEFICIENCY DUE TO DRUGS (H): ICD-10-CM

## 2023-12-18 DIAGNOSIS — Z94.4 TRANSPLANTED LIVER (H): ICD-10-CM

## 2023-12-18 DIAGNOSIS — Z79.899 ENCOUNTER FOR LONG-TERM (CURRENT) USE OF MEDICATIONS: ICD-10-CM

## 2023-12-18 DIAGNOSIS — Z94.4 LIVER REPLACED BY TRANSPLANT (H): Primary | ICD-10-CM

## 2023-12-18 DIAGNOSIS — D84.821 IMMUNODEFICIENCY DUE TO DRUGS (H): ICD-10-CM

## 2023-12-18 LAB
ALBUMIN SERPL BCG-MCNC: 4.2 G/DL (ref 3.5–5.2)
ALP SERPL-CCNC: 61 U/L (ref 40–150)
ALT SERPL W P-5'-P-CCNC: 57 U/L (ref 0–50)
ANION GAP SERPL CALCULATED.3IONS-SCNC: 10 MMOL/L (ref 7–15)
AST SERPL W P-5'-P-CCNC: 45 U/L (ref 0–45)
BASOPHILS # BLD AUTO: ABNORMAL 10*3/UL
BASOPHILS # BLD MANUAL: 0 10E3/UL (ref 0–0.2)
BASOPHILS NFR BLD AUTO: ABNORMAL %
BASOPHILS NFR BLD MANUAL: 0 %
BILIRUB DIRECT SERPL-MCNC: <0.2 MG/DL (ref 0–0.3)
BILIRUB SERPL-MCNC: 0.2 MG/DL
BUN SERPL-MCNC: 28 MG/DL (ref 8–23)
CALCIUM SERPL-MCNC: 9.5 MG/DL (ref 8.8–10.2)
CHLORIDE SERPL-SCNC: 108 MMOL/L (ref 98–107)
CREAT SERPL-MCNC: 1.24 MG/DL (ref 0.51–0.95)
DACRYOCYTES BLD QL SMEAR: SLIGHT
DEPRECATED HCO3 PLAS-SCNC: 21 MMOL/L (ref 22–29)
EGFRCR SERPLBLD CKD-EPI 2021: 50 ML/MIN/1.73M2
ELLIPTOCYTES BLD QL SMEAR: SLIGHT
EOSINOPHIL # BLD AUTO: ABNORMAL 10*3/UL
EOSINOPHIL # BLD MANUAL: 0.1 10E3/UL (ref 0–0.7)
EOSINOPHIL NFR BLD AUTO: ABNORMAL %
EOSINOPHIL NFR BLD MANUAL: 4 %
ERYTHROCYTE [DISTWIDTH] IN BLOOD BY AUTOMATED COUNT: 13.1 % (ref 10–15)
GLUCOSE SERPL-MCNC: 91 MG/DL (ref 70–99)
HCT VFR BLD AUTO: 32.3 % (ref 35–47)
HGB BLD-MCNC: 10 G/DL (ref 11.7–15.7)
IMM GRANULOCYTES # BLD: ABNORMAL 10*3/UL
IMM GRANULOCYTES NFR BLD: ABNORMAL %
LYMPHOCYTES # BLD AUTO: ABNORMAL 10*3/UL
LYMPHOCYTES # BLD MANUAL: 0.3 10E3/UL (ref 0.8–5.3)
LYMPHOCYTES NFR BLD AUTO: ABNORMAL %
LYMPHOCYTES NFR BLD MANUAL: 13 %
MAGNESIUM SERPL-MCNC: 2.2 MG/DL (ref 1.7–2.3)
MCH RBC QN AUTO: 30.9 PG (ref 26.5–33)
MCHC RBC AUTO-ENTMCNC: 31 G/DL (ref 31.5–36.5)
MCV RBC AUTO: 100 FL (ref 78–100)
METAMYELOCYTES # BLD MANUAL: 0.3 10E3/UL
METAMYELOCYTES NFR BLD MANUAL: 10 %
MONOCYTES # BLD AUTO: ABNORMAL 10*3/UL
MONOCYTES # BLD MANUAL: 0.3 10E3/UL (ref 0–1.3)
MONOCYTES NFR BLD AUTO: ABNORMAL %
MONOCYTES NFR BLD MANUAL: 11 %
MYELOCYTES # BLD MANUAL: 0 10E3/UL
MYELOCYTES NFR BLD MANUAL: 1 %
NEUTROPHILS # BLD AUTO: ABNORMAL 10*3/UL
NEUTROPHILS # BLD MANUAL: 1.5 10E3/UL (ref 1.6–8.3)
NEUTROPHILS NFR BLD AUTO: ABNORMAL %
NEUTROPHILS NFR BLD MANUAL: 61 %
NRBC # BLD AUTO: 0 10E3/UL
NRBC BLD AUTO-RTO: 0 /100
PLAT MORPH BLD: ABNORMAL
PLATELET # BLD AUTO: 108 10E3/UL (ref 150–450)
POTASSIUM SERPL-SCNC: 4.9 MMOL/L (ref 3.4–5.3)
PROT SERPL-MCNC: 6.6 G/DL (ref 6.4–8.3)
RBC # BLD AUTO: 3.24 10E6/UL (ref 3.8–5.2)
RBC MORPH BLD: ABNORMAL
SODIUM SERPL-SCNC: 139 MMOL/L (ref 135–145)
WBC # BLD AUTO: 2.5 10E3/UL (ref 4–11)

## 2023-12-18 PROCEDURE — 36415 COLL VENOUS BLD VENIPUNCTURE: CPT

## 2023-12-18 PROCEDURE — 85007 BL SMEAR W/DIFF WBC COUNT: CPT

## 2023-12-18 PROCEDURE — 85027 COMPLETE CBC AUTOMATED: CPT

## 2023-12-18 PROCEDURE — 36415 COLL VENOUS BLD VENIPUNCTURE: CPT | Performed by: INTERNAL MEDICINE

## 2023-12-18 PROCEDURE — 82248 BILIRUBIN DIRECT: CPT

## 2023-12-18 PROCEDURE — 80053 COMPREHEN METABOLIC PANEL: CPT

## 2023-12-18 PROCEDURE — 83735 ASSAY OF MAGNESIUM: CPT

## 2023-12-18 PROCEDURE — 99000 SPECIMEN HANDLING OFFICE-LAB: CPT | Performed by: INTERNAL MEDICINE

## 2024-04-23 ENCOUNTER — LAB (OUTPATIENT)
Dept: LAB | Facility: CLINIC | Age: 59
End: 2024-04-23
Payer: COMMERCIAL

## 2024-04-23 DIAGNOSIS — Z94.4 LIVER REPLACED BY TRANSPLANT (H): Primary | ICD-10-CM

## 2024-04-23 DIAGNOSIS — Z79.899 ENCOUNTER FOR LONG-TERM (CURRENT) USE OF MEDICATIONS: ICD-10-CM

## 2024-04-23 DIAGNOSIS — Z94.4 TRANSPLANTED LIVER (H): ICD-10-CM

## 2024-04-23 LAB
BASOPHILS # BLD AUTO: 0 10E3/UL (ref 0–0.2)
BASOPHILS NFR BLD AUTO: 1 %
EOSINOPHIL # BLD AUTO: 0.2 10E3/UL (ref 0–0.7)
EOSINOPHIL NFR BLD AUTO: 8 %
ERYTHROCYTE [DISTWIDTH] IN BLOOD BY AUTOMATED COUNT: 12.4 % (ref 10–15)
HCT VFR BLD AUTO: 32 % (ref 35–47)
HGB BLD-MCNC: 10.8 G/DL (ref 11.7–15.7)
IMM GRANULOCYTES # BLD: 0 10E3/UL
IMM GRANULOCYTES NFR BLD: 1 %
LYMPHOCYTES # BLD AUTO: 0.4 10E3/UL (ref 0.8–5.3)
LYMPHOCYTES NFR BLD AUTO: 17 %
MCH RBC QN AUTO: 33.1 PG (ref 26.5–33)
MCHC RBC AUTO-ENTMCNC: 33.8 G/DL (ref 31.5–36.5)
MCV RBC AUTO: 98 FL (ref 78–100)
MONOCYTES # BLD AUTO: 0.2 10E3/UL (ref 0–1.3)
MONOCYTES NFR BLD AUTO: 9 %
NEUTROPHILS # BLD AUTO: 1.4 10E3/UL (ref 1.6–8.3)
NEUTROPHILS NFR BLD AUTO: 64 %
NRBC # BLD AUTO: 0 10E3/UL
NRBC BLD AUTO-RTO: 0 /100
PLATELET # BLD AUTO: 144 10E3/UL (ref 150–450)
RBC # BLD AUTO: 3.26 10E6/UL (ref 3.8–5.2)
TACROLIMUS BLD-MCNC: 7.1 UG/L (ref 5–15)
TME LAST DOSE: NORMAL H
TME LAST DOSE: NORMAL H
WBC # BLD AUTO: 2.1 10E3/UL (ref 4–11)

## 2024-04-23 PROCEDURE — 82248 BILIRUBIN DIRECT: CPT

## 2024-04-23 PROCEDURE — 99000 SPECIMEN HANDLING OFFICE-LAB: CPT | Performed by: INTERNAL MEDICINE

## 2024-04-23 PROCEDURE — 85025 COMPLETE CBC W/AUTO DIFF WBC: CPT

## 2024-04-23 PROCEDURE — 80197 ASSAY OF TACROLIMUS: CPT

## 2024-04-23 PROCEDURE — 36415 COLL VENOUS BLD VENIPUNCTURE: CPT | Performed by: INTERNAL MEDICINE

## 2024-04-23 PROCEDURE — 36415 COLL VENOUS BLD VENIPUNCTURE: CPT

## 2024-04-23 PROCEDURE — 80053 COMPREHEN METABOLIC PANEL: CPT

## 2024-04-24 LAB
ALBUMIN SERPL BCG-MCNC: 4 G/DL (ref 3.5–5.2)
ALP SERPL-CCNC: 67 U/L (ref 40–150)
ALT SERPL W P-5'-P-CCNC: 36 U/L (ref 0–50)
ANION GAP SERPL CALCULATED.3IONS-SCNC: 9 MMOL/L (ref 7–15)
AST SERPL W P-5'-P-CCNC: 43 U/L (ref 0–45)
BILIRUB DIRECT SERPL-MCNC: <0.2 MG/DL (ref 0–0.3)
BILIRUB SERPL-MCNC: 0.3 MG/DL
BUN SERPL-MCNC: 20.9 MG/DL (ref 8–23)
CALCIUM SERPL-MCNC: 9.6 MG/DL (ref 8.8–10.2)
CHLORIDE SERPL-SCNC: 106 MMOL/L (ref 98–107)
CREAT SERPL-MCNC: 1.31 MG/DL (ref 0.51–0.95)
DEPRECATED HCO3 PLAS-SCNC: 27 MMOL/L (ref 22–29)
EGFRCR SERPLBLD CKD-EPI 2021: 46 ML/MIN/1.73M2
GLUCOSE SERPL-MCNC: 88 MG/DL (ref 70–99)
POTASSIUM SERPL-SCNC: 4.4 MMOL/L (ref 3.4–5.3)
PROT SERPL-MCNC: 6.4 G/DL (ref 6.4–8.3)
SODIUM SERPL-SCNC: 142 MMOL/L (ref 135–145)

## 2024-05-02 DIAGNOSIS — E03.9 HYPOTHYROIDISM: Primary | ICD-10-CM

## 2024-05-06 ENCOUNTER — LAB (OUTPATIENT)
Dept: LAB | Facility: CLINIC | Age: 59
End: 2024-05-06
Payer: COMMERCIAL

## 2024-05-06 DIAGNOSIS — Z79.899 ENCOUNTER FOR LONG-TERM (CURRENT) USE OF MEDICATIONS: ICD-10-CM

## 2024-05-06 DIAGNOSIS — Z94.4 TRANSPLANTED LIVER (H): ICD-10-CM

## 2024-05-06 DIAGNOSIS — E03.9 HYPOTHYROIDISM: ICD-10-CM

## 2024-05-06 LAB
ALBUMIN SERPL BCG-MCNC: 3.8 G/DL (ref 3.5–5.2)
ALP SERPL-CCNC: 64 U/L (ref 40–150)
ALT SERPL W P-5'-P-CCNC: 36 U/L (ref 0–50)
ANION GAP SERPL CALCULATED.3IONS-SCNC: 7 MMOL/L (ref 7–15)
AST SERPL W P-5'-P-CCNC: 40 U/L (ref 0–45)
BASOPHILS # BLD AUTO: 0 10E3/UL (ref 0–0.2)
BASOPHILS NFR BLD AUTO: 1 %
BILIRUB DIRECT SERPL-MCNC: <0.2 MG/DL (ref 0–0.3)
BILIRUB SERPL-MCNC: 0.4 MG/DL
BUN SERPL-MCNC: 29 MG/DL (ref 8–23)
CALCIUM SERPL-MCNC: 9.7 MG/DL (ref 8.8–10.2)
CHLORIDE SERPL-SCNC: 106 MMOL/L (ref 98–107)
CREAT SERPL-MCNC: 1.41 MG/DL (ref 0.51–0.95)
DEPRECATED HCO3 PLAS-SCNC: 29 MMOL/L (ref 22–29)
EGFRCR SERPLBLD CKD-EPI 2021: 42 ML/MIN/1.73M2
EOSINOPHIL # BLD AUTO: 0.2 10E3/UL (ref 0–0.7)
EOSINOPHIL NFR BLD AUTO: 10 %
ERYTHROCYTE [DISTWIDTH] IN BLOOD BY AUTOMATED COUNT: 12.3 % (ref 10–15)
GLUCOSE SERPL-MCNC: 87 MG/DL (ref 70–99)
HCT VFR BLD AUTO: 32.5 % (ref 35–47)
HGB BLD-MCNC: 11.1 G/DL (ref 11.7–15.7)
IMM GRANULOCYTES # BLD: 0 10E3/UL
IMM GRANULOCYTES NFR BLD: 1 %
LYMPHOCYTES # BLD AUTO: 0.5 10E3/UL (ref 0.8–5.3)
LYMPHOCYTES NFR BLD AUTO: 29 %
MCH RBC QN AUTO: 33.6 PG (ref 26.5–33)
MCHC RBC AUTO-ENTMCNC: 34.2 G/DL (ref 31.5–36.5)
MCV RBC AUTO: 99 FL (ref 78–100)
MONOCYTES # BLD AUTO: 0.2 10E3/UL (ref 0–1.3)
MONOCYTES NFR BLD AUTO: 12 %
NEUTROPHILS # BLD AUTO: 0.7 10E3/UL (ref 1.6–8.3)
NEUTROPHILS NFR BLD AUTO: 47 %
NRBC # BLD AUTO: 0 10E3/UL
NRBC BLD AUTO-RTO: 0 /100
PLATELET # BLD AUTO: 145 10E3/UL (ref 150–450)
POTASSIUM SERPL-SCNC: 4.5 MMOL/L (ref 3.4–5.3)
PROT SERPL-MCNC: 6.3 G/DL (ref 6.4–8.3)
RBC # BLD AUTO: 3.3 10E6/UL (ref 3.8–5.2)
SODIUM SERPL-SCNC: 142 MMOL/L (ref 135–145)
TACROLIMUS BLD-MCNC: 6.8 UG/L (ref 5–15)
TME LAST DOSE: NORMAL H
TME LAST DOSE: NORMAL H
TSH SERPL DL<=0.005 MIU/L-ACNC: 2.44 UIU/ML (ref 0.3–4.2)
WBC # BLD AUTO: 1.6 10E3/UL (ref 4–11)

## 2024-05-06 PROCEDURE — 82248 BILIRUBIN DIRECT: CPT

## 2024-05-06 PROCEDURE — 80053 COMPREHEN METABOLIC PANEL: CPT

## 2024-05-06 PROCEDURE — 85025 COMPLETE CBC W/AUTO DIFF WBC: CPT

## 2024-05-06 PROCEDURE — 99000 SPECIMEN HANDLING OFFICE-LAB: CPT | Performed by: INTERNAL MEDICINE

## 2024-05-06 PROCEDURE — 80197 ASSAY OF TACROLIMUS: CPT

## 2024-05-06 PROCEDURE — 36415 COLL VENOUS BLD VENIPUNCTURE: CPT

## 2024-05-06 PROCEDURE — 84443 ASSAY THYROID STIM HORMONE: CPT

## 2024-05-20 ENCOUNTER — LAB (OUTPATIENT)
Dept: LAB | Facility: CLINIC | Age: 59
End: 2024-05-20
Payer: COMMERCIAL

## 2024-05-20 DIAGNOSIS — Z94.4 LIVER REPLACED BY TRANSPLANT (H): Primary | ICD-10-CM

## 2024-05-20 DIAGNOSIS — Z94.4 TRANSPLANTED LIVER (H): ICD-10-CM

## 2024-05-20 DIAGNOSIS — Z79.899 IMMUNODEFICIENCY DUE TO DRUGS (H): ICD-10-CM

## 2024-05-20 DIAGNOSIS — Z79.899 ENCOUNTER FOR LONG-TERM (CURRENT) USE OF MEDICATIONS: ICD-10-CM

## 2024-05-20 DIAGNOSIS — D84.821 IMMUNODEFICIENCY DUE TO DRUGS (H): ICD-10-CM

## 2024-05-20 LAB
ALBUMIN SERPL BCG-MCNC: 3.8 G/DL (ref 3.5–5.2)
ALP SERPL-CCNC: 67 U/L (ref 40–150)
ALT SERPL W P-5'-P-CCNC: 24 U/L (ref 0–50)
ANION GAP SERPL CALCULATED.3IONS-SCNC: 8 MMOL/L (ref 7–15)
AST SERPL W P-5'-P-CCNC: 32 U/L (ref 0–45)
BASOPHILS # BLD AUTO: 0 10E3/UL (ref 0–0.2)
BASOPHILS NFR BLD AUTO: 1 %
BILIRUB DIRECT SERPL-MCNC: <0.2 MG/DL (ref 0–0.3)
BILIRUB SERPL-MCNC: 0.3 MG/DL
BUN SERPL-MCNC: 22.5 MG/DL (ref 8–23)
CALCIUM SERPL-MCNC: 9.6 MG/DL (ref 8.8–10.2)
CHLORIDE SERPL-SCNC: 107 MMOL/L (ref 98–107)
CREAT SERPL-MCNC: 1.43 MG/DL (ref 0.51–0.95)
DEPRECATED HCO3 PLAS-SCNC: 28 MMOL/L (ref 22–29)
EGFRCR SERPLBLD CKD-EPI 2021: 42 ML/MIN/1.73M2
EOSINOPHIL # BLD AUTO: 0.1 10E3/UL (ref 0–0.7)
EOSINOPHIL NFR BLD AUTO: 9 %
ERYTHROCYTE [DISTWIDTH] IN BLOOD BY AUTOMATED COUNT: 11.8 % (ref 10–15)
GLUCOSE SERPL-MCNC: 90 MG/DL (ref 70–99)
HCT VFR BLD AUTO: 33.8 % (ref 35–47)
HGB BLD-MCNC: 11.4 G/DL (ref 11.7–15.7)
IMM GRANULOCYTES # BLD: 0 10E3/UL
IMM GRANULOCYTES NFR BLD: 1 %
LYMPHOCYTES # BLD AUTO: 0.4 10E3/UL (ref 0.8–5.3)
LYMPHOCYTES NFR BLD AUTO: 28 %
MAGNESIUM SERPL-MCNC: 2 MG/DL (ref 1.7–2.3)
MCH RBC QN AUTO: 33.6 PG (ref 26.5–33)
MCHC RBC AUTO-ENTMCNC: 33.7 G/DL (ref 31.5–36.5)
MCV RBC AUTO: 100 FL (ref 78–100)
MONOCYTES # BLD AUTO: 0.2 10E3/UL (ref 0–1.3)
MONOCYTES NFR BLD AUTO: 14 %
NEUTROPHILS # BLD AUTO: 0.7 10E3/UL (ref 1.6–8.3)
NEUTROPHILS NFR BLD AUTO: 47 %
NRBC # BLD AUTO: 0 10E3/UL
NRBC BLD AUTO-RTO: 0 /100
PLATELET # BLD AUTO: 133 10E3/UL (ref 150–450)
POTASSIUM SERPL-SCNC: 5.1 MMOL/L (ref 3.4–5.3)
PROT SERPL-MCNC: 6.2 G/DL (ref 6.4–8.3)
RBC # BLD AUTO: 3.39 10E6/UL (ref 3.8–5.2)
SODIUM SERPL-SCNC: 143 MMOL/L (ref 135–145)
WBC # BLD AUTO: 1.5 10E3/UL (ref 4–11)

## 2024-05-20 PROCEDURE — 80053 COMPREHEN METABOLIC PANEL: CPT

## 2024-05-20 PROCEDURE — 99000 SPECIMEN HANDLING OFFICE-LAB: CPT | Performed by: INTERNAL MEDICINE

## 2024-05-20 PROCEDURE — 83735 ASSAY OF MAGNESIUM: CPT

## 2024-05-20 PROCEDURE — 36415 COLL VENOUS BLD VENIPUNCTURE: CPT

## 2024-05-20 PROCEDURE — 82248 BILIRUBIN DIRECT: CPT

## 2024-05-20 PROCEDURE — 85025 COMPLETE CBC W/AUTO DIFF WBC: CPT

## 2024-06-03 ENCOUNTER — LAB (OUTPATIENT)
Dept: LAB | Facility: CLINIC | Age: 59
End: 2024-06-03
Payer: COMMERCIAL

## 2024-06-03 DIAGNOSIS — Z79.899 ENCOUNTER FOR LONG-TERM (CURRENT) USE OF MEDICATIONS: ICD-10-CM

## 2024-06-03 DIAGNOSIS — Z79.899 IMMUNODEFICIENCY DUE TO DRUGS (H): ICD-10-CM

## 2024-06-03 DIAGNOSIS — Z94.4 LIVER REPLACED BY TRANSPLANT (H): Primary | ICD-10-CM

## 2024-06-03 DIAGNOSIS — D84.821 IMMUNODEFICIENCY DUE TO DRUGS (H): ICD-10-CM

## 2024-06-03 DIAGNOSIS — Z94.4 TRANSPLANTED LIVER (H): ICD-10-CM

## 2024-06-03 LAB
ALBUMIN SERPL BCG-MCNC: 3.9 G/DL (ref 3.5–5.2)
ALP SERPL-CCNC: 64 U/L (ref 40–150)
ALT SERPL W P-5'-P-CCNC: 29 U/L (ref 0–50)
ANION GAP SERPL CALCULATED.3IONS-SCNC: 8 MMOL/L (ref 7–15)
AST SERPL W P-5'-P-CCNC: 34 U/L (ref 0–45)
BASOPHILS # BLD AUTO: 0 10E3/UL (ref 0–0.2)
BASOPHILS NFR BLD AUTO: 1 %
BILIRUB SERPL-MCNC: 0.2 MG/DL
BUN SERPL-MCNC: 26.6 MG/DL (ref 8–23)
CALCIUM SERPL-MCNC: 9.3 MG/DL (ref 8.8–10.2)
CHLORIDE SERPL-SCNC: 105 MMOL/L (ref 98–107)
CREAT SERPL-MCNC: 1.54 MG/DL (ref 0.51–0.95)
DEPRECATED HCO3 PLAS-SCNC: 28 MMOL/L (ref 22–29)
EGFRCR SERPLBLD CKD-EPI 2021: 38 ML/MIN/1.73M2
EOSINOPHIL # BLD AUTO: 0.1 10E3/UL (ref 0–0.7)
EOSINOPHIL NFR BLD AUTO: 7 %
ERYTHROCYTE [DISTWIDTH] IN BLOOD BY AUTOMATED COUNT: 11.7 % (ref 10–15)
GLUCOSE SERPL-MCNC: 96 MG/DL (ref 70–99)
HCT VFR BLD AUTO: 32.3 % (ref 35–47)
HGB BLD-MCNC: 10.9 G/DL (ref 11.7–15.7)
IMM GRANULOCYTES # BLD: 0 10E3/UL
IMM GRANULOCYTES NFR BLD: 1 %
LYMPHOCYTES # BLD AUTO: 0.4 10E3/UL (ref 0.8–5.3)
LYMPHOCYTES NFR BLD AUTO: 28 %
MCH RBC QN AUTO: 33.2 PG (ref 26.5–33)
MCHC RBC AUTO-ENTMCNC: 33.7 G/DL (ref 31.5–36.5)
MCV RBC AUTO: 99 FL (ref 78–100)
MONOCYTES # BLD AUTO: 0.2 10E3/UL (ref 0–1.3)
MONOCYTES NFR BLD AUTO: 13 %
NEUTROPHILS # BLD AUTO: 0.7 10E3/UL (ref 1.6–8.3)
NEUTROPHILS NFR BLD AUTO: 50 %
NRBC # BLD AUTO: 0 10E3/UL
NRBC BLD AUTO-RTO: 0 /100
PLATELET # BLD AUTO: 122 10E3/UL (ref 150–450)
POTASSIUM SERPL-SCNC: 4.4 MMOL/L (ref 3.4–5.3)
PROT SERPL-MCNC: 6 G/DL (ref 6.4–8.3)
RBC # BLD AUTO: 3.28 10E6/UL (ref 3.8–5.2)
SODIUM SERPL-SCNC: 141 MMOL/L (ref 135–145)
TACROLIMUS BLD-MCNC: 5.8 UG/L (ref 5–15)
TME LAST DOSE: NORMAL H
TME LAST DOSE: NORMAL H
WBC # BLD AUTO: 1.4 10E3/UL (ref 4–11)

## 2024-06-03 PROCEDURE — 80053 COMPREHEN METABOLIC PANEL: CPT

## 2024-06-03 PROCEDURE — 85025 COMPLETE CBC W/AUTO DIFF WBC: CPT

## 2024-06-03 PROCEDURE — 36415 COLL VENOUS BLD VENIPUNCTURE: CPT

## 2024-06-03 PROCEDURE — 83735 ASSAY OF MAGNESIUM: CPT

## 2024-06-03 PROCEDURE — 99000 SPECIMEN HANDLING OFFICE-LAB: CPT | Performed by: INTERNAL MEDICINE

## 2024-06-03 PROCEDURE — 82248 BILIRUBIN DIRECT: CPT

## 2024-06-03 PROCEDURE — 36415 COLL VENOUS BLD VENIPUNCTURE: CPT | Performed by: INTERNAL MEDICINE

## 2024-06-03 PROCEDURE — 80197 ASSAY OF TACROLIMUS: CPT

## 2024-06-04 LAB
BILIRUB DIRECT SERPL-MCNC: <0.2 MG/DL (ref 0–0.3)
MAGNESIUM SERPL-MCNC: 1.9 MG/DL (ref 1.7–2.3)

## 2024-06-17 ENCOUNTER — LAB (OUTPATIENT)
Dept: LAB | Facility: CLINIC | Age: 59
End: 2024-06-17
Payer: COMMERCIAL

## 2024-06-17 DIAGNOSIS — Z79.899 IMMUNODEFICIENCY DUE TO DRUGS (H): ICD-10-CM

## 2024-06-17 DIAGNOSIS — D84.821 IMMUNODEFICIENCY DUE TO DRUGS (H): ICD-10-CM

## 2024-06-17 DIAGNOSIS — Z79.899 ENCOUNTER FOR LONG-TERM (CURRENT) USE OF MEDICATIONS: ICD-10-CM

## 2024-06-17 DIAGNOSIS — Z94.4 LIVER REPLACED BY TRANSPLANT (H): Primary | ICD-10-CM

## 2024-06-17 DIAGNOSIS — Z94.4 TRANSPLANTED LIVER (H): ICD-10-CM

## 2024-06-17 LAB
ALBUMIN SERPL BCG-MCNC: 3.7 G/DL (ref 3.5–5.2)
ALP SERPL-CCNC: 66 U/L (ref 40–150)
ALT SERPL W P-5'-P-CCNC: 48 U/L (ref 0–50)
ANION GAP SERPL CALCULATED.3IONS-SCNC: 8 MMOL/L (ref 7–15)
AST SERPL W P-5'-P-CCNC: 55 U/L (ref 0–45)
BASOPHILS # BLD AUTO: 0 10E3/UL (ref 0–0.2)
BASOPHILS NFR BLD AUTO: 1 %
BILIRUB DIRECT SERPL-MCNC: <0.2 MG/DL (ref 0–0.3)
BILIRUB SERPL-MCNC: 0.2 MG/DL
BUN SERPL-MCNC: 27.2 MG/DL (ref 8–23)
CALCIUM SERPL-MCNC: 9.3 MG/DL (ref 8.8–10.2)
CHLORIDE SERPL-SCNC: 109 MMOL/L (ref 98–107)
CREAT SERPL-MCNC: 1.6 MG/DL (ref 0.51–0.95)
DEPRECATED HCO3 PLAS-SCNC: 26 MMOL/L (ref 22–29)
EGFRCR SERPLBLD CKD-EPI 2021: 37 ML/MIN/1.73M2
EOSINOPHIL # BLD AUTO: 0.2 10E3/UL (ref 0–0.7)
EOSINOPHIL NFR BLD AUTO: 9 %
ERYTHROCYTE [DISTWIDTH] IN BLOOD BY AUTOMATED COUNT: 11.9 % (ref 10–15)
GLUCOSE SERPL-MCNC: 95 MG/DL (ref 70–99)
HCT VFR BLD AUTO: 33.9 % (ref 35–47)
HGB BLD-MCNC: 11.3 G/DL (ref 11.7–15.7)
IMM GRANULOCYTES # BLD: 0 10E3/UL
IMM GRANULOCYTES NFR BLD: 1 %
LYMPHOCYTES # BLD AUTO: 0.4 10E3/UL (ref 0.8–5.3)
LYMPHOCYTES NFR BLD AUTO: 20 %
MAGNESIUM SERPL-MCNC: 2 MG/DL (ref 1.7–2.3)
MCH RBC QN AUTO: 33.3 PG (ref 26.5–33)
MCHC RBC AUTO-ENTMCNC: 33.3 G/DL (ref 31.5–36.5)
MCV RBC AUTO: 100 FL (ref 78–100)
MONOCYTES # BLD AUTO: 0.2 10E3/UL (ref 0–1.3)
MONOCYTES NFR BLD AUTO: 11 %
NEUTROPHILS # BLD AUTO: 1 10E3/UL (ref 1.6–8.3)
NEUTROPHILS NFR BLD AUTO: 58 %
NRBC # BLD AUTO: 0 10E3/UL
NRBC BLD AUTO-RTO: 0 /100
PLATELET # BLD AUTO: 123 10E3/UL (ref 150–450)
POTASSIUM SERPL-SCNC: 4.9 MMOL/L (ref 3.4–5.3)
PROT SERPL-MCNC: 6.2 G/DL (ref 6.4–8.3)
RBC # BLD AUTO: 3.39 10E6/UL (ref 3.8–5.2)
SODIUM SERPL-SCNC: 143 MMOL/L (ref 135–145)
TACROLIMUS BLD-MCNC: 5.4 UG/L (ref 5–15)
TME LAST DOSE: NORMAL H
TME LAST DOSE: NORMAL H
WBC # BLD AUTO: 1.7 10E3/UL (ref 4–11)

## 2024-06-17 PROCEDURE — 82248 BILIRUBIN DIRECT: CPT

## 2024-06-17 PROCEDURE — 36415 COLL VENOUS BLD VENIPUNCTURE: CPT

## 2024-06-17 PROCEDURE — 85025 COMPLETE CBC W/AUTO DIFF WBC: CPT

## 2024-06-17 PROCEDURE — 80197 ASSAY OF TACROLIMUS: CPT

## 2024-06-17 PROCEDURE — 80053 COMPREHEN METABOLIC PANEL: CPT

## 2024-06-17 PROCEDURE — 83735 ASSAY OF MAGNESIUM: CPT

## 2024-07-22 ENCOUNTER — LAB (OUTPATIENT)
Dept: LAB | Facility: CLINIC | Age: 59
End: 2024-07-22
Payer: COMMERCIAL

## 2024-07-22 DIAGNOSIS — D84.821 IMMUNODEFICIENCY DUE TO DRUGS (H): ICD-10-CM

## 2024-07-22 DIAGNOSIS — Z94.4 LIVER REPLACED BY TRANSPLANT (H): Primary | ICD-10-CM

## 2024-07-22 DIAGNOSIS — Z94.4 TRANSPLANTED LIVER (H): ICD-10-CM

## 2024-07-22 DIAGNOSIS — Z79.899 IMMUNODEFICIENCY DUE TO DRUGS (H): ICD-10-CM

## 2024-07-22 DIAGNOSIS — Z79.899 ENCOUNTER FOR LONG-TERM (CURRENT) USE OF MEDICATIONS: ICD-10-CM

## 2024-07-22 LAB
ALBUMIN SERPL BCG-MCNC: 3.8 G/DL (ref 3.5–5.2)
ALP SERPL-CCNC: 73 U/L (ref 40–150)
ALT SERPL W P-5'-P-CCNC: 60 U/L (ref 0–50)
ANION GAP SERPL CALCULATED.3IONS-SCNC: 7 MMOL/L (ref 7–15)
AST SERPL W P-5'-P-CCNC: 56 U/L (ref 0–45)
BASOPHILS # BLD AUTO: 0 10E3/UL (ref 0–0.2)
BASOPHILS NFR BLD AUTO: 1 %
BILIRUB DIRECT SERPL-MCNC: <0.2 MG/DL (ref 0–0.3)
BILIRUB SERPL-MCNC: 0.3 MG/DL
BUN SERPL-MCNC: 25.1 MG/DL (ref 8–23)
CALCIUM SERPL-MCNC: 9.2 MG/DL (ref 8.8–10.4)
CHLORIDE SERPL-SCNC: 106 MMOL/L (ref 98–107)
CREAT SERPL-MCNC: 1.39 MG/DL (ref 0.51–0.95)
EGFRCR SERPLBLD CKD-EPI 2021: 43 ML/MIN/1.73M2
EOSINOPHIL # BLD AUTO: 0.2 10E3/UL (ref 0–0.7)
EOSINOPHIL NFR BLD AUTO: 8 %
ERYTHROCYTE [DISTWIDTH] IN BLOOD BY AUTOMATED COUNT: 12.4 % (ref 10–15)
GLUCOSE SERPL-MCNC: 84 MG/DL (ref 70–99)
HCO3 SERPL-SCNC: 28 MMOL/L (ref 22–29)
HCT VFR BLD AUTO: 34.7 % (ref 35–47)
HGB BLD-MCNC: 11.5 G/DL (ref 11.7–15.7)
IMM GRANULOCYTES # BLD: 0 10E3/UL
IMM GRANULOCYTES NFR BLD: 0 %
LYMPHOCYTES # BLD AUTO: 0.4 10E3/UL (ref 0.8–5.3)
LYMPHOCYTES NFR BLD AUTO: 19 %
MAGNESIUM SERPL-MCNC: 2 MG/DL (ref 1.7–2.3)
MCH RBC QN AUTO: 32.6 PG (ref 26.5–33)
MCHC RBC AUTO-ENTMCNC: 33.1 G/DL (ref 31.5–36.5)
MCV RBC AUTO: 98 FL (ref 78–100)
MONOCYTES # BLD AUTO: 0.2 10E3/UL (ref 0–1.3)
MONOCYTES NFR BLD AUTO: 8 %
NEUTROPHILS # BLD AUTO: 1.5 10E3/UL (ref 1.6–8.3)
NEUTROPHILS NFR BLD AUTO: 63 %
NRBC # BLD AUTO: 0 10E3/UL
NRBC BLD AUTO-RTO: 0 /100
PLATELET # BLD AUTO: 141 10E3/UL (ref 150–450)
POTASSIUM SERPL-SCNC: 4.8 MMOL/L (ref 3.4–5.3)
PROT SERPL-MCNC: 6.4 G/DL (ref 6.4–8.3)
RBC # BLD AUTO: 3.53 10E6/UL (ref 3.8–5.2)
SODIUM SERPL-SCNC: 141 MMOL/L (ref 135–145)
TACROLIMUS BLD-MCNC: 6.4 UG/L (ref 5–15)
TME LAST DOSE: NORMAL H
TME LAST DOSE: NORMAL H
WBC # BLD AUTO: 2.3 10E3/UL (ref 4–11)

## 2024-07-22 PROCEDURE — 36415 COLL VENOUS BLD VENIPUNCTURE: CPT

## 2024-07-22 PROCEDURE — 83735 ASSAY OF MAGNESIUM: CPT

## 2024-07-22 PROCEDURE — 80053 COMPREHEN METABOLIC PANEL: CPT

## 2024-07-22 PROCEDURE — 82248 BILIRUBIN DIRECT: CPT

## 2024-07-22 PROCEDURE — 85025 COMPLETE CBC W/AUTO DIFF WBC: CPT

## 2024-07-22 PROCEDURE — 80197 ASSAY OF TACROLIMUS: CPT

## 2024-07-29 DIAGNOSIS — Z79.899 ON LONG TERM DRUG THERAPY: ICD-10-CM

## 2024-07-29 DIAGNOSIS — Z94.4 TRANSPLANTED LIVER (H): Primary | ICD-10-CM

## 2024-08-19 ENCOUNTER — LAB (OUTPATIENT)
Dept: LAB | Facility: CLINIC | Age: 59
End: 2024-08-19
Payer: COMMERCIAL

## 2024-08-19 DIAGNOSIS — Z94.4 TRANSPLANTED LIVER (H): ICD-10-CM

## 2024-08-19 DIAGNOSIS — Z79.899 ENCOUNTER FOR LONG-TERM (CURRENT) USE OF MEDICATIONS: ICD-10-CM

## 2024-08-19 DIAGNOSIS — Z94.4 LIVER REPLACED BY TRANSPLANT (H): Primary | ICD-10-CM

## 2024-08-19 DIAGNOSIS — Z79.899 ON LONG TERM DRUG THERAPY: ICD-10-CM

## 2024-08-19 LAB
ALBUMIN SERPL BCG-MCNC: 3.6 G/DL (ref 3.5–5.2)
ALP SERPL-CCNC: 66 U/L (ref 40–150)
ALT SERPL W P-5'-P-CCNC: 23 U/L (ref 0–50)
ANION GAP SERPL CALCULATED.3IONS-SCNC: 11 MMOL/L (ref 7–15)
AST SERPL W P-5'-P-CCNC: 29 U/L (ref 0–45)
BILIRUB DIRECT SERPL-MCNC: <0.2 MG/DL (ref 0–0.3)
BILIRUB SERPL-MCNC: 0.3 MG/DL
BUN SERPL-MCNC: 25.8 MG/DL (ref 8–23)
CALCIUM SERPL-MCNC: 9.1 MG/DL (ref 8.8–10.4)
CHLORIDE SERPL-SCNC: 108 MMOL/L (ref 98–107)
CREAT SERPL-MCNC: 1.45 MG/DL (ref 0.51–0.95)
EGFRCR SERPLBLD CKD-EPI 2021: 41 ML/MIN/1.73M2
ERYTHROCYTE [DISTWIDTH] IN BLOOD BY AUTOMATED COUNT: 12.9 % (ref 10–15)
GLUCOSE SERPL-MCNC: 86 MG/DL (ref 70–99)
HCO3 SERPL-SCNC: 25 MMOL/L (ref 22–29)
HCT VFR BLD AUTO: 33.1 % (ref 35–47)
HGB BLD-MCNC: 10.8 G/DL (ref 11.7–15.7)
MCH RBC QN AUTO: 32.3 PG (ref 26.5–33)
MCHC RBC AUTO-ENTMCNC: 32.6 G/DL (ref 31.5–36.5)
MCV RBC AUTO: 99 FL (ref 78–100)
PLATELET # BLD AUTO: 126 10E3/UL (ref 150–450)
POTASSIUM SERPL-SCNC: 4.1 MMOL/L (ref 3.4–5.3)
PROT SERPL-MCNC: 6 G/DL (ref 6.4–8.3)
RBC # BLD AUTO: 3.34 10E6/UL (ref 3.8–5.2)
SODIUM SERPL-SCNC: 144 MMOL/L (ref 135–145)
WBC # BLD AUTO: 2.1 10E3/UL (ref 4–11)

## 2024-08-19 PROCEDURE — 82248 BILIRUBIN DIRECT: CPT

## 2024-08-19 PROCEDURE — 36415 COLL VENOUS BLD VENIPUNCTURE: CPT

## 2024-08-19 PROCEDURE — 80053 COMPREHEN METABOLIC PANEL: CPT

## 2024-08-19 PROCEDURE — 85027 COMPLETE CBC AUTOMATED: CPT

## 2024-09-16 ENCOUNTER — LAB (OUTPATIENT)
Dept: LAB | Facility: CLINIC | Age: 59
End: 2024-09-16
Payer: COMMERCIAL

## 2024-09-16 DIAGNOSIS — Z79.899 ENCOUNTER FOR LONG-TERM (CURRENT) USE OF MEDICATIONS: ICD-10-CM

## 2024-09-16 DIAGNOSIS — Z94.4 TRANSPLANTED LIVER (H): ICD-10-CM

## 2024-09-16 DIAGNOSIS — Z79.899 ON LONG TERM DRUG THERAPY: ICD-10-CM

## 2024-09-16 DIAGNOSIS — Z94.4 LIVER REPLACED BY TRANSPLANT (H): Primary | ICD-10-CM

## 2024-09-16 LAB
ALBUMIN SERPL BCG-MCNC: 3.8 G/DL (ref 3.5–5.2)
ALP SERPL-CCNC: 70 U/L (ref 40–150)
ALT SERPL W P-5'-P-CCNC: 50 U/L (ref 0–50)
ANION GAP SERPL CALCULATED.3IONS-SCNC: 8 MMOL/L (ref 7–15)
AST SERPL W P-5'-P-CCNC: 53 U/L (ref 0–45)
BASOPHILS # BLD AUTO: 0 10E3/UL (ref 0–0.2)
BASOPHILS NFR BLD AUTO: 1 %
BILIRUB DIRECT SERPL-MCNC: <0.2 MG/DL (ref 0–0.3)
BILIRUB SERPL-MCNC: 0.3 MG/DL
BUN SERPL-MCNC: 26.3 MG/DL (ref 8–23)
CALCIUM SERPL-MCNC: 9.3 MG/DL (ref 8.8–10.4)
CHLORIDE SERPL-SCNC: 106 MMOL/L (ref 98–107)
CREAT SERPL-MCNC: 1.52 MG/DL (ref 0.51–0.95)
EGFRCR SERPLBLD CKD-EPI 2021: 39 ML/MIN/1.73M2
EOSINOPHIL # BLD AUTO: 0.1 10E3/UL (ref 0–0.7)
EOSINOPHIL NFR BLD AUTO: 6 %
ERYTHROCYTE [DISTWIDTH] IN BLOOD BY AUTOMATED COUNT: 12.4 % (ref 10–15)
GLUCOSE SERPL-MCNC: 90 MG/DL (ref 70–99)
HCO3 SERPL-SCNC: 26 MMOL/L (ref 22–29)
HCT VFR BLD AUTO: 35.3 % (ref 35–47)
HGB BLD-MCNC: 11.7 G/DL (ref 11.7–15.7)
IMM GRANULOCYTES # BLD: 0 10E3/UL
IMM GRANULOCYTES NFR BLD: 0 %
LYMPHOCYTES # BLD AUTO: 0.5 10E3/UL (ref 0.8–5.3)
LYMPHOCYTES NFR BLD AUTO: 19 %
MCH RBC QN AUTO: 32.6 PG (ref 26.5–33)
MCHC RBC AUTO-ENTMCNC: 33.1 G/DL (ref 31.5–36.5)
MCV RBC AUTO: 98 FL (ref 78–100)
MONOCYTES # BLD AUTO: 0.2 10E3/UL (ref 0–1.3)
MONOCYTES NFR BLD AUTO: 10 %
NEUTROPHILS # BLD AUTO: 1.5 10E3/UL (ref 1.6–8.3)
NEUTROPHILS NFR BLD AUTO: 64 %
NRBC # BLD AUTO: 0 10E3/UL
NRBC BLD AUTO-RTO: 0 /100
PLATELET # BLD AUTO: 143 10E3/UL (ref 150–450)
POTASSIUM SERPL-SCNC: 4.5 MMOL/L (ref 3.4–5.3)
PROT SERPL-MCNC: 6.6 G/DL (ref 6.4–8.3)
RBC # BLD AUTO: 3.59 10E6/UL (ref 3.8–5.2)
SODIUM SERPL-SCNC: 140 MMOL/L (ref 135–145)
TACROLIMUS BLD-MCNC: 6.1 UG/L (ref 5–15)
TME LAST DOSE: NORMAL H
TME LAST DOSE: NORMAL H
WBC # BLD AUTO: 2.4 10E3/UL (ref 4–11)

## 2024-09-16 PROCEDURE — 85025 COMPLETE CBC W/AUTO DIFF WBC: CPT

## 2024-09-16 PROCEDURE — 80053 COMPREHEN METABOLIC PANEL: CPT

## 2024-09-16 PROCEDURE — 82248 BILIRUBIN DIRECT: CPT

## 2024-09-16 PROCEDURE — 80197 ASSAY OF TACROLIMUS: CPT

## 2024-09-16 PROCEDURE — 36415 COLL VENOUS BLD VENIPUNCTURE: CPT

## 2024-09-26 DIAGNOSIS — Z79.899 ENCOUNTER FOR LONG-TERM (CURRENT) USE OF MEDICATIONS: ICD-10-CM

## 2024-09-26 DIAGNOSIS — Z94.4 TRANSPLANTED LIVER (H): Primary | ICD-10-CM

## 2024-10-08 ENCOUNTER — HOSPITAL ENCOUNTER (OUTPATIENT)
Dept: MAMMOGRAPHY | Facility: CLINIC | Age: 59
Discharge: HOME | End: 2024-10-08
Attending: INTERNAL MEDICINE | Admitting: INTERNAL MEDICINE
Payer: COMMERCIAL

## 2024-10-08 DIAGNOSIS — Z12.31 VISIT FOR SCREENING MAMMOGRAM: ICD-10-CM

## 2024-10-08 PROCEDURE — 77063 BREAST TOMOSYNTHESIS BI: CPT

## 2024-10-13 ENCOUNTER — HEALTH MAINTENANCE LETTER (OUTPATIENT)
Age: 59
End: 2024-10-13

## 2024-10-14 ENCOUNTER — LAB (OUTPATIENT)
Dept: LAB | Facility: CLINIC | Age: 59
End: 2024-10-14
Payer: COMMERCIAL

## 2024-10-14 DIAGNOSIS — Z94.4 LIVER REPLACED BY TRANSPLANT (H): Primary | ICD-10-CM

## 2024-10-14 DIAGNOSIS — Z79.899 ENCOUNTER FOR LONG-TERM (CURRENT) USE OF MEDICATIONS: ICD-10-CM

## 2024-10-14 DIAGNOSIS — Z94.4 TRANSPLANTED LIVER (H): ICD-10-CM

## 2024-10-14 LAB
ALBUMIN SERPL BCG-MCNC: 3.8 G/DL (ref 3.5–5.2)
ALP SERPL-CCNC: 77 U/L (ref 40–150)
ALT SERPL W P-5'-P-CCNC: 35 U/L (ref 0–50)
ANION GAP SERPL CALCULATED.3IONS-SCNC: 7 MMOL/L (ref 7–15)
AST SERPL W P-5'-P-CCNC: 39 U/L (ref 0–45)
BILIRUB DIRECT SERPL-MCNC: <0.2 MG/DL (ref 0–0.3)
BILIRUB SERPL-MCNC: 0.3 MG/DL
BUN SERPL-MCNC: 22.3 MG/DL (ref 8–23)
CALCIUM SERPL-MCNC: 9.2 MG/DL (ref 8.8–10.4)
CHLORIDE SERPL-SCNC: 105 MMOL/L (ref 98–107)
CREAT SERPL-MCNC: 1.42 MG/DL (ref 0.51–0.95)
EGFRCR SERPLBLD CKD-EPI 2021: 42 ML/MIN/1.73M2
ERYTHROCYTE [DISTWIDTH] IN BLOOD BY AUTOMATED COUNT: 12.2 % (ref 10–15)
GLUCOSE SERPL-MCNC: 89 MG/DL (ref 70–99)
HCO3 SERPL-SCNC: 27 MMOL/L (ref 22–29)
HCT VFR BLD AUTO: 35.9 % (ref 35–47)
HGB BLD-MCNC: 12 G/DL (ref 11.7–15.7)
MCH RBC QN AUTO: 32.8 PG (ref 26.5–33)
MCHC RBC AUTO-ENTMCNC: 33.4 G/DL (ref 31.5–36.5)
MCV RBC AUTO: 98 FL (ref 78–100)
PLATELET # BLD AUTO: 141 10E3/UL (ref 150–450)
POTASSIUM SERPL-SCNC: 4.4 MMOL/L (ref 3.4–5.3)
PROT SERPL-MCNC: 6.2 G/DL (ref 6.4–8.3)
RBC # BLD AUTO: 3.66 10E6/UL (ref 3.8–5.2)
SODIUM SERPL-SCNC: 139 MMOL/L (ref 135–145)
WBC # BLD AUTO: 2 10E3/UL (ref 4–11)

## 2024-10-14 PROCEDURE — 80053 COMPREHEN METABOLIC PANEL: CPT

## 2024-10-14 PROCEDURE — 85027 COMPLETE CBC AUTOMATED: CPT

## 2024-10-14 PROCEDURE — 82248 BILIRUBIN DIRECT: CPT

## 2024-10-14 PROCEDURE — 36415 COLL VENOUS BLD VENIPUNCTURE: CPT

## 2024-12-16 ENCOUNTER — LAB (OUTPATIENT)
Dept: LAB | Facility: CLINIC | Age: 59
End: 2024-12-16
Payer: COMMERCIAL

## 2024-12-16 DIAGNOSIS — Z94.4 LIVER REPLACED BY TRANSPLANT (H): Primary | ICD-10-CM

## 2024-12-16 DIAGNOSIS — Z79.899 ENCOUNTER FOR LONG-TERM (CURRENT) USE OF MEDICATIONS: ICD-10-CM

## 2024-12-16 DIAGNOSIS — Z94.4 TRANSPLANTED LIVER (H): ICD-10-CM

## 2024-12-16 LAB
ALBUMIN SERPL BCG-MCNC: 3.7 G/DL (ref 3.5–5.2)
ALP SERPL-CCNC: 85 U/L (ref 40–150)
ALT SERPL W P-5'-P-CCNC: 40 U/L (ref 0–50)
ANION GAP SERPL CALCULATED.3IONS-SCNC: 8 MMOL/L (ref 7–15)
AST SERPL W P-5'-P-CCNC: 49 U/L (ref 0–45)
BILIRUB DIRECT SERPL-MCNC: <0.2 MG/DL (ref 0–0.3)
BILIRUB SERPL-MCNC: 0.3 MG/DL
BUN SERPL-MCNC: 25.9 MG/DL (ref 8–23)
CALCIUM SERPL-MCNC: 9.4 MG/DL (ref 8.8–10.4)
CHLORIDE SERPL-SCNC: 106 MMOL/L (ref 98–107)
CREAT SERPL-MCNC: 1.61 MG/DL (ref 0.51–0.95)
EGFRCR SERPLBLD CKD-EPI 2021: 36 ML/MIN/1.73M2
ERYTHROCYTE [DISTWIDTH] IN BLOOD BY AUTOMATED COUNT: 11.8 % (ref 10–15)
GLUCOSE SERPL-MCNC: 87 MG/DL (ref 70–99)
HCO3 SERPL-SCNC: 27 MMOL/L (ref 22–29)
HCT VFR BLD AUTO: 34.6 % (ref 35–47)
HGB BLD-MCNC: 11.8 G/DL (ref 11.7–15.7)
MCH RBC QN AUTO: 33.5 PG (ref 26.5–33)
MCHC RBC AUTO-ENTMCNC: 34.1 G/DL (ref 31.5–36.5)
MCV RBC AUTO: 98 FL (ref 78–100)
PLATELET # BLD AUTO: 163 10E3/UL (ref 150–450)
POTASSIUM SERPL-SCNC: 5.3 MMOL/L (ref 3.4–5.3)
PROT SERPL-MCNC: 6.6 G/DL (ref 6.4–8.3)
RBC # BLD AUTO: 3.52 10E6/UL (ref 3.8–5.2)
SODIUM SERPL-SCNC: 141 MMOL/L (ref 135–145)
WBC # BLD AUTO: 2.7 10E3/UL (ref 4–11)

## 2024-12-16 PROCEDURE — 82248 BILIRUBIN DIRECT: CPT

## 2024-12-16 PROCEDURE — 36415 COLL VENOUS BLD VENIPUNCTURE: CPT

## 2024-12-16 PROCEDURE — 80053 COMPREHEN METABOLIC PANEL: CPT

## 2024-12-16 PROCEDURE — 85027 COMPLETE CBC AUTOMATED: CPT

## 2025-04-14 ENCOUNTER — LAB (OUTPATIENT)
Dept: LAB | Facility: CLINIC | Age: 60
End: 2025-04-14
Payer: COMMERCIAL

## 2025-04-14 DIAGNOSIS — Z79.899 ENCOUNTER FOR LONG-TERM (CURRENT) USE OF MEDICATIONS: ICD-10-CM

## 2025-04-14 DIAGNOSIS — Z94.4 TRANSPLANTED LIVER (H): ICD-10-CM

## 2025-04-14 DIAGNOSIS — Z94.4 LIVER REPLACED BY TRANSPLANT (H): Primary | ICD-10-CM

## 2025-04-14 LAB
ERYTHROCYTE [DISTWIDTH] IN BLOOD BY AUTOMATED COUNT: 12.4 % (ref 10–15)
HCT VFR BLD AUTO: 34.2 % (ref 35–47)
HGB BLD-MCNC: 12.1 G/DL (ref 11.7–15.7)
MCH RBC QN AUTO: 33.8 PG (ref 26.5–33)
MCHC RBC AUTO-ENTMCNC: 35.4 G/DL (ref 31.5–36.5)
MCV RBC AUTO: 96 FL (ref 78–100)
PLATELET # BLD AUTO: 138 10E3/UL (ref 150–450)
RBC # BLD AUTO: 3.58 10E6/UL (ref 3.8–5.2)
WBC # BLD AUTO: 2.8 10E3/UL (ref 4–11)

## 2025-04-14 PROCEDURE — 36415 COLL VENOUS BLD VENIPUNCTURE: CPT

## 2025-04-14 PROCEDURE — 82248 BILIRUBIN DIRECT: CPT

## 2025-04-14 PROCEDURE — 85027 COMPLETE CBC AUTOMATED: CPT

## 2025-04-14 PROCEDURE — 80053 COMPREHEN METABOLIC PANEL: CPT

## 2025-04-14 PROCEDURE — 36416 COLLJ CAPILLARY BLOOD SPEC: CPT

## 2025-04-15 LAB
ALBUMIN SERPL BCG-MCNC: 3.8 G/DL (ref 3.5–5.2)
ALP SERPL-CCNC: 93 U/L (ref 40–150)
ALT SERPL W P-5'-P-CCNC: 42 U/L (ref 0–50)
ANION GAP SERPL CALCULATED.3IONS-SCNC: 12 MMOL/L (ref 7–15)
AST SERPL W P-5'-P-CCNC: 55 U/L (ref 0–45)
BILIRUB DIRECT SERPL-MCNC: 0.14 MG/DL (ref 0–0.3)
BILIRUB SERPL-MCNC: 0.3 MG/DL
BUN SERPL-MCNC: 26.7 MG/DL (ref 8–23)
CALCIUM SERPL-MCNC: 9.2 MG/DL (ref 8.8–10.4)
CHLORIDE SERPL-SCNC: 106 MMOL/L (ref 98–107)
CREAT SERPL-MCNC: 1.55 MG/DL (ref 0.51–0.95)
EGFRCR SERPLBLD CKD-EPI 2021: 38 ML/MIN/1.73M2
GLUCOSE SERPL-MCNC: 93 MG/DL (ref 70–99)
HCO3 SERPL-SCNC: 23 MMOL/L (ref 22–29)
POTASSIUM SERPL-SCNC: 4.5 MMOL/L (ref 3.4–5.3)
PROT SERPL-MCNC: 7.1 G/DL (ref 6.4–8.3)
SODIUM SERPL-SCNC: 141 MMOL/L (ref 135–145)

## 2025-05-15 ENCOUNTER — LAB (OUTPATIENT)
Dept: LAB | Facility: CLINIC | Age: 60
End: 2025-05-15
Payer: COMMERCIAL

## 2025-05-15 DIAGNOSIS — Z94.4 TRANSPLANTED LIVER (H): ICD-10-CM

## 2025-05-15 DIAGNOSIS — Z94.4 LIVER REPLACED BY TRANSPLANT (H): Primary | ICD-10-CM

## 2025-05-15 DIAGNOSIS — Z79.899 ENCOUNTER FOR LONG-TERM (CURRENT) USE OF MEDICATIONS: ICD-10-CM

## 2025-05-15 LAB
ALBUMIN SERPL BCG-MCNC: 3.6 G/DL (ref 3.5–5.2)
ALP SERPL-CCNC: 88 U/L (ref 40–150)
ALT SERPL W P-5'-P-CCNC: 29 U/L (ref 0–50)
ANION GAP SERPL CALCULATED.3IONS-SCNC: 9 MMOL/L (ref 7–15)
AST SERPL W P-5'-P-CCNC: 44 U/L (ref 0–45)
BILIRUB SERPL-MCNC: 0.3 MG/DL
BILIRUBIN DIRECT (ROCHE PRO & PURE): 0.11 MG/DL (ref 0–0.45)
BUN SERPL-MCNC: 25.7 MG/DL (ref 8–23)
CALCIUM SERPL-MCNC: 9.1 MG/DL (ref 8.8–10.4)
CHLORIDE SERPL-SCNC: 108 MMOL/L (ref 98–107)
CREAT SERPL-MCNC: 1.52 MG/DL (ref 0.51–0.95)
EGFRCR SERPLBLD CKD-EPI 2021: 39 ML/MIN/1.73M2
ERYTHROCYTE [DISTWIDTH] IN BLOOD BY AUTOMATED COUNT: 11.8 % (ref 10–15)
GLUCOSE SERPL-MCNC: 81 MG/DL (ref 70–99)
HCO3 SERPL-SCNC: 24 MMOL/L (ref 22–29)
HCT VFR BLD AUTO: 34 % (ref 35–47)
HGB BLD-MCNC: 11.5 G/DL (ref 11.7–15.7)
MCH RBC QN AUTO: 32.7 PG (ref 26.5–33)
MCHC RBC AUTO-ENTMCNC: 33.8 G/DL (ref 31.5–36.5)
MCV RBC AUTO: 97 FL (ref 78–100)
PLATELET # BLD AUTO: 132 10E3/UL (ref 150–450)
POTASSIUM SERPL-SCNC: 4.8 MMOL/L (ref 3.4–5.3)
PROT SERPL-MCNC: 6.6 G/DL (ref 6.4–8.3)
RBC # BLD AUTO: 3.52 10E6/UL (ref 3.8–5.2)
SODIUM SERPL-SCNC: 141 MMOL/L (ref 135–145)
WBC # BLD AUTO: 2.4 10E3/UL (ref 4–11)

## 2025-06-24 ENCOUNTER — LAB (OUTPATIENT)
Dept: LAB | Facility: CLINIC | Age: 60
End: 2025-06-24
Payer: COMMERCIAL

## 2025-06-24 DIAGNOSIS — Z79.899 ENCOUNTER FOR LONG-TERM (CURRENT) USE OF MEDICATIONS: ICD-10-CM

## 2025-06-24 DIAGNOSIS — Z94.4 TRANSPLANTED LIVER (H): ICD-10-CM

## 2025-06-24 DIAGNOSIS — Z94.4 LIVER REPLACED BY TRANSPLANT (H): Primary | ICD-10-CM

## 2025-06-24 LAB
ALBUMIN SERPL BCG-MCNC: 3.7 G/DL (ref 3.5–5.2)
ALP SERPL-CCNC: 90 U/L (ref 40–150)
ALT SERPL W P-5'-P-CCNC: 20 U/L (ref 0–50)
ANION GAP SERPL CALCULATED.3IONS-SCNC: 8 MMOL/L (ref 7–15)
AST SERPL W P-5'-P-CCNC: 36 U/L (ref 0–45)
BILIRUB SERPL-MCNC: 0.3 MG/DL
BILIRUBIN DIRECT (ROCHE PRO & PURE): 0.13 MG/DL (ref 0–0.45)
BUN SERPL-MCNC: 25.1 MG/DL (ref 8–23)
CALCIUM SERPL-MCNC: 9.3 MG/DL (ref 8.8–10.4)
CHLORIDE SERPL-SCNC: 104 MMOL/L (ref 98–107)
CREAT SERPL-MCNC: 1.54 MG/DL (ref 0.51–0.95)
EGFRCR SERPLBLD CKD-EPI 2021: 38 ML/MIN/1.73M2
ERYTHROCYTE [DISTWIDTH] IN BLOOD BY AUTOMATED COUNT: 12.5 % (ref 10–15)
GLUCOSE SERPL-MCNC: 103 MG/DL (ref 70–99)
HCO3 SERPL-SCNC: 25 MMOL/L (ref 22–29)
HCT VFR BLD AUTO: 35 % (ref 35–47)
HGB BLD-MCNC: 11.8 G/DL (ref 11.7–15.7)
MCH RBC QN AUTO: 32.8 PG (ref 26.5–33)
MCHC RBC AUTO-ENTMCNC: 33.7 G/DL (ref 31.5–36.5)
MCV RBC AUTO: 97 FL (ref 78–100)
PLATELET # BLD AUTO: 143 10E3/UL (ref 150–450)
POTASSIUM SERPL-SCNC: 4.9 MMOL/L (ref 3.4–5.3)
PROT SERPL-MCNC: 6.7 G/DL (ref 6.4–8.3)
RBC # BLD AUTO: 3.6 10E6/UL (ref 3.8–5.2)
SODIUM SERPL-SCNC: 137 MMOL/L (ref 135–145)
WBC # BLD AUTO: 2.7 10E3/UL (ref 4–11)

## 2025-06-24 PROCEDURE — 36415 COLL VENOUS BLD VENIPUNCTURE: CPT

## 2025-06-24 PROCEDURE — 82248 BILIRUBIN DIRECT: CPT

## 2025-06-24 PROCEDURE — 80053 COMPREHEN METABOLIC PANEL: CPT

## 2025-06-24 PROCEDURE — 85027 COMPLETE CBC AUTOMATED: CPT

## 2025-07-30 DIAGNOSIS — Z94.4 TRANSPLANTED LIVER (H): Primary | ICD-10-CM

## (undated) DEVICE — Device

## (undated) DEVICE — LINEN TOWEL PACK X6 WHITE 5487

## (undated) DEVICE — STRAP UNIVERSAL POSITIONING 2-PIECE 4X47X76" 91-287

## (undated) DEVICE — DRAPE POUCH IRR 1016

## (undated) DEVICE — DRAIN JACKSON PRATT 10MM FLAT 4/4 PERF SU130-1311

## (undated) DEVICE — SOL RINGERS LACTATED 500ML BAG 2B2323QA

## (undated) DEVICE — PACK CRANIOTOMY

## (undated) DEVICE — SPONGE COTTONOID 1/2X1/2" 20-04S

## (undated) DEVICE — BUR STRK 2.3MM TAPERED ROUTER - FA2 5407-FA2-023

## (undated) DEVICE — DRAPE FLUID WARMING 52"X66" ORS-301

## (undated) DEVICE — DECANTER VIAL 2006S

## (undated) DEVICE — STPL SKIN 35W ROTATING HEAD PRW35

## (undated) DEVICE — SOL NACL 0.9% IRRIG 1000ML BOTTLE 07138-09

## (undated) DEVICE — DRAIN JACKSON PRATT RESERVOIR 100ML SU130-1305

## (undated) DEVICE — PAD CHUX UNDERPAD 23X24" 7136

## (undated) DEVICE — SOL RINGERS LACTATED 1000ML BAG 07953-09

## (undated) DEVICE — DRAPE CORETEMP FLUID WARM SYSTEM 62X56IN CTD200

## (undated) DEVICE — CATH TRAY FOLEY SURESTEP 16FR W/URNE MTR STLK LATEX A303316A

## (undated) DEVICE — SPONGE COTTONOID 1X3" 20-10S

## (undated) DEVICE — SU SILK 2-0 TIE 12X30" A305H

## (undated) DEVICE — DECANTER BAG 2002S

## (undated) DEVICE — DRSG TELFA 3X8" 1238

## (undated) DEVICE — PREP POVIDONE-IODINE 7.5% SCRUB 4OZ BOTTLE MDS093945

## (undated) DEVICE — ESU GROUND PAD ADULT W/CORD E7507

## (undated) DEVICE — PREP POVIDONE-IODINE 10% SOLUTION 4OZ BOTTLE MDS093944

## (undated) DEVICE — RX SURGIFLO HEMOSTATIC MATRIX W/THROMBIN 8ML 2994

## (undated) DEVICE — SU NUROLON 4-0 TF CR 8X18" C584D

## (undated) DEVICE — SURGICEL HEMOSTAT 4X8" 1952

## (undated) DEVICE — SU SILK 4-0 TIE 12X30" A303H

## (undated) DEVICE — SPONGE SURGIFOAM 100 1974

## (undated) DEVICE — RX BACITRACIN OINTMENT 0.9G 1/32OZ CUR001109

## (undated) DEVICE — SPONGE COTTONOID 1/2X3" 20-07S

## (undated) DEVICE — PERFORATOR CRANIAL DGR-O SURGICAL 11-14MM PEDS 200-271

## (undated) DEVICE — SOL RINGERS LACTATED 1000ML BAG 2B2324X

## (undated) DEVICE — DRSG PRIMAPORE 02X3" 7133

## (undated) DEVICE — SOL WATER IRRIG 1000ML BOTTLE 2F7114

## (undated) DEVICE — DRAPE U SPLIT 74X120" 29440

## (undated) DEVICE — SUCTION MANIFOLD NEPTUNE 2 SYS 4 PORT 0702-020-000

## (undated) DEVICE — SYR BULB IRRIG DOVER 60 ML LATEX FREE 67000

## (undated) DEVICE — SU VICRYL 2-0 CT-2 CR 8X18" J726D

## (undated) DEVICE — RETR ELASTIC STAYS LONE STAR BLUNT DUAL LEAD 3550-1G

## (undated) DEVICE — SU SILK 3-0 TIE 12X30" A304H

## (undated) DEVICE — DRAIN JACKSON PRATT 10FR ROUND SU130-1321

## (undated) DEVICE — PREP SKIN SCRUB TRAY 4461A

## (undated) DEVICE — ESU CORD BIPOLAR AND IRR TUBING AESCULAP US355

## (undated) DEVICE — ESU CORD BIPOLAR GREEN 10-4000

## (undated) DEVICE — SU ETHILON 3-0 PS-1 18" 1663H

## (undated) DEVICE — DRSG KERLIX 4 1/2"X4YDS ROLL 6715

## (undated) RX ORDER — LIDOCAINE HYDROCHLORIDE 10 MG/ML
INJECTION, SOLUTION EPIDURAL; INFILTRATION; INTRACAUDAL; PERINEURAL
Status: DISPENSED
Start: 2023-03-04

## (undated) RX ORDER — BUPIVACAINE HYDROCHLORIDE AND EPINEPHRINE 2.5; 5 MG/ML; UG/ML
INJECTION, SOLUTION EPIDURAL; INFILTRATION; INTRACAUDAL; PERINEURAL
Status: DISPENSED
Start: 2023-02-26

## (undated) RX ORDER — DEXAMETHASONE SODIUM PHOSPHATE 4 MG/ML
INJECTION, SOLUTION INTRA-ARTICULAR; INTRALESIONAL; INTRAMUSCULAR; INTRAVENOUS; SOFT TISSUE
Status: DISPENSED
Start: 2023-02-26

## (undated) RX ORDER — HEPARIN SODIUM 1000 [USP'U]/ML
INJECTION, SOLUTION INTRAVENOUS; SUBCUTANEOUS
Status: DISPENSED
Start: 2023-02-26

## (undated) RX ORDER — FENTANYL CITRATE 50 UG/ML
INJECTION, SOLUTION INTRAMUSCULAR; INTRAVENOUS
Status: DISPENSED
Start: 2023-02-26